# Patient Record
Sex: MALE | Race: WHITE | NOT HISPANIC OR LATINO | Employment: OTHER | URBAN - METROPOLITAN AREA
[De-identification: names, ages, dates, MRNs, and addresses within clinical notes are randomized per-mention and may not be internally consistent; named-entity substitution may affect disease eponyms.]

---

## 2017-01-10 ENCOUNTER — ALLSCRIPTS OFFICE VISIT (OUTPATIENT)
Dept: OTHER | Facility: OTHER | Age: 72
End: 2017-01-10

## 2017-01-10 LAB
BILIRUB UR QL STRIP: NORMAL
CLARITY UR: NORMAL
COLOR UR: YELLOW
GLUCOSE (HISTORICAL): NORMAL
HGB UR QL STRIP.AUTO: NORMAL
KETONES UR STRIP-MCNC: NORMAL MG/DL
LEUKOCYTE ESTERASE UR QL STRIP: NORMAL
NITRITE UR QL STRIP: NORMAL
PH UR STRIP.AUTO: 6 [PH]
PROT UR STRIP-MCNC: NORMAL MG/DL
SP GR UR STRIP.AUTO: 1.02
UROBILINOGEN UR QL STRIP.AUTO: NORMAL

## 2017-06-10 DIAGNOSIS — R97.20 ELEVATED PROSTATE SPECIFIC ANTIGEN (PSA): ICD-10-CM

## 2017-06-30 ENCOUNTER — APPOINTMENT (OUTPATIENT)
Dept: LAB | Facility: HOSPITAL | Age: 72
End: 2017-06-30
Attending: INTERNAL MEDICINE
Payer: COMMERCIAL

## 2017-06-30 DIAGNOSIS — I10 ESSENTIAL (PRIMARY) HYPERTENSION: ICD-10-CM

## 2017-06-30 DIAGNOSIS — R97.20 ELEVATED PROSTATE SPECIFIC ANTIGEN (PSA): ICD-10-CM

## 2017-06-30 DIAGNOSIS — G47.33 OBSTRUCTIVE SLEEP APNEA: ICD-10-CM

## 2017-06-30 DIAGNOSIS — E78.00 PURE HYPERCHOLESTEROLEMIA: ICD-10-CM

## 2017-06-30 LAB
ALBUMIN SERPL BCP-MCNC: 3.8 G/DL (ref 3.5–5)
ALP SERPL-CCNC: 70 U/L (ref 46–116)
ALT SERPL W P-5'-P-CCNC: 23 U/L (ref 12–78)
ANION GAP SERPL CALCULATED.3IONS-SCNC: 7 MMOL/L (ref 4–13)
AST SERPL W P-5'-P-CCNC: 16 U/L (ref 5–45)
BASOPHILS # BLD AUTO: 0.03 THOUSANDS/ΜL (ref 0–0.1)
BASOPHILS NFR BLD AUTO: 0 % (ref 0–1)
BILIRUB SERPL-MCNC: 1 MG/DL (ref 0.2–1)
BILIRUB UR QL STRIP: NEGATIVE
BUN SERPL-MCNC: 19 MG/DL (ref 5–25)
CALCIUM SERPL-MCNC: 10 MG/DL (ref 8.3–10.1)
CHLORIDE SERPL-SCNC: 106 MMOL/L (ref 100–108)
CHOLEST SERPL-MCNC: 138 MG/DL (ref 50–200)
CLARITY UR: CLEAR
CO2 SERPL-SCNC: 27 MMOL/L (ref 21–32)
COLOR UR: YELLOW
CREAT SERPL-MCNC: 1.14 MG/DL (ref 0.6–1.3)
EOSINOPHIL # BLD AUTO: 0.19 THOUSAND/ΜL (ref 0–0.61)
EOSINOPHIL NFR BLD AUTO: 3 % (ref 0–6)
ERYTHROCYTE [DISTWIDTH] IN BLOOD BY AUTOMATED COUNT: 13.9 % (ref 11.6–15.1)
GFR SERPL CREATININE-BSD FRML MDRD: >60 ML/MIN/1.73SQ M
GLUCOSE P FAST SERPL-MCNC: 91 MG/DL (ref 65–99)
GLUCOSE UR STRIP-MCNC: NEGATIVE MG/DL
HCT VFR BLD AUTO: 49.4 % (ref 36.5–49.3)
HDLC SERPL-MCNC: 41 MG/DL (ref 40–60)
HGB BLD-MCNC: 16.9 G/DL (ref 12–17)
HGB UR QL STRIP.AUTO: NEGATIVE
KETONES UR STRIP-MCNC: NEGATIVE MG/DL
LDLC SERPL CALC-MCNC: 72 MG/DL (ref 0–100)
LEUKOCYTE ESTERASE UR QL STRIP: NEGATIVE
LYMPHOCYTES # BLD AUTO: 1.7 THOUSANDS/ΜL (ref 0.6–4.47)
LYMPHOCYTES NFR BLD AUTO: 22 % (ref 14–44)
MCH RBC QN AUTO: 32.3 PG (ref 26.8–34.3)
MCHC RBC AUTO-ENTMCNC: 34.2 G/DL (ref 31.4–37.4)
MCV RBC AUTO: 94 FL (ref 82–98)
MONOCYTES # BLD AUTO: 0.7 THOUSAND/ΜL (ref 0.17–1.22)
MONOCYTES NFR BLD AUTO: 9 % (ref 4–12)
NEUTROPHILS # BLD AUTO: 5 THOUSANDS/ΜL (ref 1.85–7.62)
NEUTS SEG NFR BLD AUTO: 66 % (ref 43–75)
NITRITE UR QL STRIP: NEGATIVE
NRBC BLD AUTO-RTO: 0 /100 WBCS
PH UR STRIP.AUTO: 7 [PH] (ref 4.5–8)
PLATELET # BLD AUTO: 169 THOUSANDS/UL (ref 149–390)
PMV BLD AUTO: 12.1 FL (ref 8.9–12.7)
POTASSIUM SERPL-SCNC: 4.8 MMOL/L (ref 3.5–5.3)
PROT SERPL-MCNC: 7.2 G/DL (ref 6.4–8.2)
PROT UR STRIP-MCNC: NEGATIVE MG/DL
RBC # BLD AUTO: 5.24 MILLION/UL (ref 3.88–5.62)
SODIUM SERPL-SCNC: 140 MMOL/L (ref 136–145)
SP GR UR STRIP.AUTO: 1.02 (ref 1–1.03)
TRIGL SERPL-MCNC: 125 MG/DL
TSH SERPL DL<=0.05 MIU/L-ACNC: 1.87 UIU/ML (ref 0.36–3.74)
UROBILINOGEN UR QL STRIP.AUTO: 0.2 E.U./DL
WBC # BLD AUTO: 7.63 THOUSAND/UL (ref 4.31–10.16)

## 2017-06-30 PROCEDURE — 80053 COMPREHEN METABOLIC PANEL: CPT

## 2017-06-30 PROCEDURE — 85025 COMPLETE CBC W/AUTO DIFF WBC: CPT

## 2017-06-30 PROCEDURE — 36415 COLL VENOUS BLD VENIPUNCTURE: CPT

## 2017-06-30 PROCEDURE — 84153 ASSAY OF PSA TOTAL: CPT

## 2017-06-30 PROCEDURE — 84443 ASSAY THYROID STIM HORMONE: CPT

## 2017-06-30 PROCEDURE — 81003 URINALYSIS AUTO W/O SCOPE: CPT

## 2017-06-30 PROCEDURE — 84154 ASSAY OF PSA FREE: CPT

## 2017-06-30 PROCEDURE — 80061 LIPID PANEL: CPT

## 2017-07-01 LAB
PSA FREE MFR SERPL: 21.6 %
PSA FREE SERPL-MCNC: 0.67 NG/ML
PSA SERPL-MCNC: 3.1 NG/ML (ref 0–4)

## 2017-07-19 ENCOUNTER — ALLSCRIPTS OFFICE VISIT (OUTPATIENT)
Dept: OTHER | Facility: OTHER | Age: 72
End: 2017-07-19

## 2017-07-19 ENCOUNTER — GENERIC CONVERSION - ENCOUNTER (OUTPATIENT)
Dept: OTHER | Facility: OTHER | Age: 72
End: 2017-07-19

## 2017-07-31 ENCOUNTER — ALLSCRIPTS OFFICE VISIT (OUTPATIENT)
Dept: OTHER | Facility: OTHER | Age: 72
End: 2017-07-31

## 2017-10-23 ENCOUNTER — APPOINTMENT (OUTPATIENT)
Dept: LAB | Facility: HOSPITAL | Age: 72
End: 2017-10-23
Payer: COMMERCIAL

## 2017-10-23 DIAGNOSIS — R97.20 ELEVATED PROSTATE SPECIFIC ANTIGEN (PSA): ICD-10-CM

## 2017-10-23 PROCEDURE — 84153 ASSAY OF PSA TOTAL: CPT

## 2017-10-23 PROCEDURE — 36415 COLL VENOUS BLD VENIPUNCTURE: CPT

## 2017-10-23 PROCEDURE — 84154 ASSAY OF PSA FREE: CPT

## 2017-10-24 LAB
PSA FREE MFR SERPL: 22.5 %
PSA FREE SERPL-MCNC: 0.72 NG/ML
PSA SERPL-MCNC: 3.2 NG/ML (ref 0–4)

## 2017-10-31 ENCOUNTER — ALLSCRIPTS OFFICE VISIT (OUTPATIENT)
Dept: OTHER | Facility: OTHER | Age: 72
End: 2017-10-31

## 2017-10-31 DIAGNOSIS — R97.20 ELEVATED PROSTATE SPECIFIC ANTIGEN (PSA): ICD-10-CM

## 2017-11-02 NOTE — PROGRESS NOTES
Assessment  1  Elevated PSA (790 93) (R97 20)   2  Enlarged prostate without lower urinary tract symptoms (luts) (600 00) (N40 0)    Plan   Cystourethroscopy - POC; Status:Active - Perform Order; Requested [id number];   Perform: In Office; [id number]; Ordered;    For:Enlarged prostate without lower urinary tract symptoms (luts); Ordered By:Catrachito Pathak; Discussion/Summary  Discussion Summary:   Elevated PSA, BPH with nocturia  is a 75 y/o male being managed by Dr Nehemias Warren  He continues to find his nocturia and lower urinary tract symptoms bothersome despite the use of Flomax and Finasteride  We discussed further evaluation with cystoscopy  This procedure was reviewed with the patient and he is agreeable  His PSA remains stable at 3 2 (6 4 corrected with Finasteride), previously 3 1 (6 2 corrected with Finasteride)  His % free PSA is 22 5  His PSA was 4 4 prior to starting Finasteride  We discussed that based on his current PSA we will hold on proceeding with a prostate biopsy and recheck a PSA in 6 to 9 months to ensure stability  He is agreeable  All questions answered  Goals and Barriers: The patient has the current Goals: None  The patent has the current Barriers:   Patient's Capacity to Self-Care: Patient is able to Self-Care  Patient Education: Educational resources provided: None  Medication SE Review and Pt Understands Tx: Possible side effects of new medications were reviewed with the patient/guardian today  The treatment plan was reviewed with the patient/guardian  The patient/guardian understands and agrees with the treatment plan      Chief Complaint  Chief Complaint Free Text Note Form: Patient presents for elevated PSA, BPH  3 2 (10/23/2017)      History of Present Illness  HPI: 75 y/o male with BPH and an elevated PSA presents today for 3 month follow up  He continues to take Flomax and Finasteride daily  He continues to have nocturia every hour during the night   He denies any improvement with the use of this medication regimen  He otherwise has a fair stream and complete bladder emptying  He denies any changes in his overall health and has been doing well  His PSA was 4 4 prior to starting Finasteride  He has not had a prostate biopsy  Review of Systems  Complete-Male Urology:   Constitutional: No fever or chills, feels well, no tiredness, no recent weight gain or weight loss  Respiratory: No complaints of shortness of breath, no wheezing, no cough, no SOB on exertion, no orthopnea or PND  Cardiovascular: No complaints of slow heart rate, no fast heart rate, no chest pain, no palpitations, no leg claudication, no lower extremity  Gastrointestinal: No complaints of abdominal pain, no constipation, no nausea or vomiting, no diarrhea or bloody stools  Genitourinary: Empty sensation-- (Varies )-- and-- stream quality fair, but-- no dysuria,-- no urinary hesitancy,-- no hematuria,-- no incontinence-- and-- no feelings of urinary urgency--    The patient presents with complaints of nocturia (Every 1-2 hours )  Musculoskeletal: No complaints of arthralgia, no myalgias, no joint swelling or stiffness, no limb pain or swelling  Integumentary: No complaints of skin rash or skin lesions, no itching, no skin wound, no dry skin  Hematologic/Lymphatic: No complaints of swollen glands, no swollen glands in the neck, does not bleed easily, no easy bruising  Neurological: No compliants of headache, no confusion, no convulsions, no numbness or tingling, no dizziness or fainting, no limb weakness, no difficulty walking  ROS Reviewed:   ROS reviewed  Active Problems  1  Benign essential hypertension (401 1) (I10)   2  Chronic bilateral low back pain without sciatica (724 2,338 29) (M54 5,G89 29)   3  Copy of advanced directive obtained (V49 89) (Z78 9)   4  Elevated PSA (790 93) (R97 20)   5  Enlarged prostate without lower urinary tract symptoms (luts) (600 00) (N40 0)   6  Hypercholesterolemia (272 0) (E78 00)   7  Influenza vaccine needed (V04 81) (Z23)   8  OAB (overactive bladder) (596 51) (N32 81)   9  Obstructive sleep apnea (327 23) (G47 33)   10  Screening for genitourinary condition (V81 6) (Z13 89)    Past Medical History  1  History of Acute Gouty Arthropathy (274 01)   2  Denied: History of Carrier Of STD   3  History of Encounter for Medicare annual wellness exam (V70 0) (Z00 00)   4  History of Enlarged prostate (600 00) (N40 0)   5  History of Gout (274 9) (M10 9)   6  History of Hearing Loss (389 9)   7  History of backache (V13 59) (Z87 39)   8  History of cataract (V12 49) (Z86 69)   9  History of diverticulitis of colon (V12 79) (Z87 19)   10  History of dizziness (V13 89) (Z87 898)   11  History of hypercholesterolemia (V12 29) (Z86 39)   12  History of hypertension (V12 59) (Z86 79)   13  History of low back pain (V13 59) (Z87 39)   14  Denied: History of Mental Status Change   15  History of Neck pain (723 1) (M54 2)   16  History of Need for prophylactic vaccination and inoculation against chickenpox (V05 4)    (Z23)   17  History of Palpitations (785 1) (R00 2)   18  History of Screening for depression (V79 0) (Z13 89)   19  History of Screening for neurological condition (V80 09) (Z13 89)   20  History of Special screening for malignant neoplasms, colon (V76 51) (Z12 11)  Active Problems And Past Medical History Reviewed: The active problems and past medical history were reviewed and updated today  Surgical History  1  History of Surgery Testis Orchiectomy   2  History of Treatment Of Forearm Fracture  Surgical History Reviewed: The surgical history was reviewed and updated today  Family History  Mother    1  Family history of Coronary Artery Disease (V17 49)   2  Family history of Family Health Status 1  Children Living   3  Family history of Heart Disease (V17 49)   4  Family history of Hypertension (V17 49)   5   Family history of Mother  At Age 80   7  Family history of Stroke Syndrome (V17 1)  Father    7  Family history of Coronary Artery Disease (V17 49)  Family History    8  Family history of Acute Myocardial Infarction (V17 3)   9  Family history of Father  At Age 51   8  Family history of Stroke Syndrome (V17 1)  Family History Reviewed: The family history was reviewed and updated today  Social History   · Alcohol Use (History)   · Copy of advanced directive obtained (V49 89) (Z78 9)   · Denied: History of Drug Use   · Exercising Regularly   · Former smoker (W02 27) (Z83 630)   · Marital History - Currently    · Recreational Activities   · Retired From Work   · Travel history  Social History Reviewed: The social history was reviewed and updated today  The social history was reviewed and is unchanged  Current Meds   1  Allopurinol 300 MG Oral Tablet; TAKE 1 TABLET DAILY AS DIRECTED; Therapy: 12AXM7114 to (Evaluate:2018)  Requested for: 2017; Last   [id number] Ordered   2  AmLODIPine Besylate 5 MG Oral Tablet; TAKE 1 TABLET DAILY; Therapy: 95ZWY9364 to (Evaluate:92Qxv1607)  Requested for: 20MQV9187; Last   Rx:[id number] Ordered   3  Aspirin 81 MG TABS; Therapy: (Recorded:2015) to Recorded   4  Colestipol HCl - 1 GM Oral Tablet; TAKE 1 TABLET TWICE DAILY; Therapy: 17SAK6805 to (Evaluate:2018)  Requested for: 02NVR5302; Last   Rx:[id number] Ordered   5  Finasteride 5 MG Oral Tablet; TAKE 1 TABLET DAILY; Therapy: 43LCT1354 to (Evaluate:2018)  Requested for: 45REB9312; Last   Rx:[id number] Ordered   6  Lisinopril 20 MG Oral Tablet; 1 TAB QD;   Therapy: 12NFT8245 to (Evaluate:2017)  Requested for: 38MEF5392; Last   Rx:[id number] Ordered   7  Meclizine HCl - 25 MG Oral Tablet; One po BID prn; Therapy: 64SSQ8333 to (Evaluate:2017)  Requested for: 87AGN3280; Last   Rx:81Eft7264 Ordered   8  Simvastatin 40 MG Oral Tablet; take 1 tablet every other day;    Therapy: 32ZJL7502 to (Evaluate:22Apr2018)  Requested for: 24Oct2017; Last   AO:15NDH6163 Ordered   9  Tamsulosin HCl - 0 4 MG Oral Capsule; take 1 capsule daily; Therapy: 16JRE8214 to (Brinda Ledezma)  Requested for: 93ZHL1237; Last   Rx:[id number] Ordered  Medication List Reviewed: The medication list was reviewed and updated today  Allergies  1  No Known Drug Allergies  2  No Known Food Allergies   3  Seasonal    Vitals  Vital Signs    Recorded: 87EWR5082 09:08AM   Heart Rate 66   Systolic 802   Diastolic 80   Height 5 ft 10 in   Weight 281 lb 2 oz   BMI Calculated 40 34   BSA Calculated 2 41     Physical Exam    Constitutional   General appearance: No acute distress, well appearing and well nourished  Pulmonary   Respiratory effort: No increased work of breathing or signs of respiratory distress  Cardiovascular   Palpation of heart: Normal PMI, no thrills  Examination of extremities for edema and/or varicosities: Normal     Abdomen   Abdomen: Non-tender, no masses  Musculoskeletal   Gait and station: Normal     Skin   Skin and subcutaneous tissue: Normal without rashes or lesions  Results/Data  (1) PSA FREE & TOTAL 23Oct2017 12:51PM Bunny Mccormick Order Number: [id number]     Test Name Result Flag Reference   PSA, FREE 0 72 ng/mL  N/A   Roche ECLIA methodology  % FREE PSA 22 5 %     The table below lists the probability of prostate cancer for  men with non-suspicious JUAN LUIS results and total PSA between  4 and 10 ng/mL, by patient age Ferman Epley, [address] [place] [address],  [phone])                      % Free PSA       50-64 yr        65-75 yr                    0 00-10 00%        56%             55%                   10 01-15 00%        24%             35%                   15 01-20 00%        17%             23%                   20 01-25 00%        10%             20%                        >25 00%         5%              9%  Please note:  Michael et al did not make specific recommendations regarding the use of                percent free PSA for any other population                of men  Performed at:  [address] [place] [address]  [id number]  : Nate Willoughby MD, Phone:  [phone]   PROSTATE SPECIFIC ANTIGEN TOTAL 3 2 ng/mL  0 0 - 4 0   Roche ECLIA methodology  According to the American U[employer] Association, Serum PSA should  decrease and remain at undetectable levels after radical  prostatectomy  The AUA defines biochemical recurrence as an initial  PSA value 0 2 ng/mL or greater followed by a subsequent confirmatory  PSA value 0 2 ng/mL or greater  Values obtained with different assay methods or kits cannot be used  interchangeably  Results cannot be interpreted as absolute evidence  of the presence or absence of malignant disease       Future Appointments    Date/Time Provider Specialty Site   12/27/2017 09:00 AM Sherrill Li MD Urology [address]   01/22/2018 09:30 AM Dulce Solis MD Internal Medicine [facility]     Signatures   Electronically signed by : [address]; Oct 31 2017  9:45AM EST                       (Author)    Electronically signed by : Demetria Marrero MD; Nov 1 2017  7:21AM EST

## 2017-12-27 ENCOUNTER — ALLSCRIPTS OFFICE VISIT (OUTPATIENT)
Dept: OTHER | Facility: OTHER | Age: 72
End: 2017-12-27

## 2017-12-27 LAB
CLARITY UR: NORMAL
COLOR UR: YELLOW
GLUCOSE (HISTORICAL): NORMAL
HGB UR QL STRIP.AUTO: NORMAL
KETONES UR STRIP-MCNC: NORMAL MG/DL
LEUKOCYTE ESTERASE UR QL STRIP: NORMAL
NITRITE UR QL STRIP: NORMAL
PH UR STRIP.AUTO: 5 [PH]
PROT UR STRIP-MCNC: NORMAL MG/DL
SP GR UR STRIP.AUTO: 1.01

## 2017-12-28 NOTE — PROCEDURES
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)    Plan   Enlarged prostate without lower urinary tract symptoms (luts), Hypervascular lesion of    urinary bladder    · Schedule Surgery Treatment  Procedure -Uro lift with bladder biopsy  Please schedule    at the [place] [address]  Status: Hold For - Scheduling  Requested for: 86Mfz1036  Ordered; For: Enlarged prostate without lower urinary tract symptoms (luts),     Hypervascular lesion of urinary bladder;  Ordered By: Josh Martinez      Performed:   Due: 83XMK0651  OAB (overactive bladder)    · Urine Dip Non-Automated- POC; Status:Complete - Retrospective By Protocol    Authorization;   Done: 57CUO9484 08:38AM  Performed: In Office; [id number]; Last Updated By:Colbert, Tawni Mohs; 12/27/2017     8:38:41 AM;Ordered; For:OAB (overactive bladder); Ordered By:Bernardo Billingsley;           Cystourethroscopy - POC; Status:Active - Perform Order; Requested OOK:[id number];      Perform: In Office; [id number]; Ordered;       For:Enlarged prostate without lower urinary tract symptoms (luts); Ordered By:Saba Pathak; Discussion/Summary   Discussion Summary: This is a 26-year-old male with medically refractory lower urinary tract symptoms  today revealed lateral lobe hyperplasia, overall gland volume of less than 80 g  he has no median lobe  His PSA is less than 4  His uroflow Q max is less than 15  He has normal renal function  He has been on maximum medical therapy for more than 3 months remains highly symptomatic, specifically with nocturia up to x5  I do not believe that the additional morbidity of a TURP would justify the standard procedure     1   addition, a small hypervascular lesion noted on the prostatic urethra at the level of the median lobe was visualized on his cystoscopy    have recommended proceeding to the operating room for a Uro lift as well as a bladder biopsy (biopsy of a growth present on the prostatic urethra, at the level of the very small median lobe)  reviewed the options for treating BPH/LUTS which include but are not limited to expectant management, medical therapy, minimally invasive options such as thermotherapy or interstitial laser therapy, transurethral resection of prostate (TURP), open prostatectomy, etc, and the Uro lift procedure  At this point, the patient wishes to proceed with Uro lift The risks include but are not limited to bleeding, infection, reaction to anesthesia such as heart attack, stroke, DVT/PE, hyponatremia, bladder neck contracture, urethral stricture, injury to surrounding structures (ureters, rectum, etc) incontinence, retrograde ejaculation, erectile dysfunction, prostatic regrowth or the need for further therapy  I also told him that the tissue resected and sent for pathological analysis could reveal prostate cancer and the implications were discussed  His questions regarding surgery, the possible complications and alternatives to therapy were answered to his satisfaction  Finally, I told him that he may require additional procedures secondary to some of these complications  consent was obtained for a Uro lift plus bladder biopsy  [facility] be [facility] in the near future, as an outpatient operation with a catheter to be left in place overnight    Medication SE Review and Pt Understands Tx: Possible side effects of new medications were reviewed with the patient/guardian today  The treatment plan was reviewed with the patient/guardian  The patient/guardian understands and agrees with the treatment plan    Counseling Documentation With Imm: The patient, patient's family was counseled regarding impressions,-- risks and benefits of treatment options  Goals and Barriers:  The patient has the current Goals: Improved nocturia  The patent has the current Barriers: Underlying BPH  Patient's Capacity to Self-Care: Patient is able to Self-Care  Patient Education: Educational resources provided: Uro lift surgical brochure and informed consent packet  1 Amended By: Juan Roper; Dec 27 2017 4:40 PM EST      Chief Complaint   Chief Complaint Free Text Note Form: CYSTO; BPH With Nocturia, Elevated PSA, OAB      History of Present Illness   HPI: Angela Comes returns today for medically refractory lower urinary tract symptoms  At this point he is on maximum medical therapy with tamsulosin and finasteride and has been on this regimen for greater than 3 months  He remains highly symptomatic with nocturia up to every hour at night, double voiding, as well as a weak stream    presents today for cystoscopy having previously been counseled on the procedure in detail  Review of Systems   Complete-Male Urology:      Constitutional: No fever or chills, feels well, no tiredness, no recent weight gain or weight loss  Respiratory: No complaints of shortness of breath, no wheezing, no cough, no SOB on exertion, no orthopnea or PND  Cardiovascular: No complaints of slow heart rate, no fast heart rate, no chest pain, no palpitations, no leg claudication, no lower extremity  Gastrointestinal: No complaints of abdominal pain, no constipation, no nausea or vomiting, no diarrhea or bloody stools  Genitourinary: Empty sensation-- and-- stream quality fair, but-- no dysuria,-- no urinary hesitancy,-- no hematuria,-- no incontinence-- and-- no feelings of urinary urgency--       The patient presents with complaints of nocturia (Once hourly)  Musculoskeletal: No complaints of arthralgia, no myalgias, no joint swelling or stiffness, no limb pain or swelling  Integumentary: No complaints of skin rash or skin lesions, no itching, no skin wound, no dry skin        Hematologic/Lymphatic: No complaints of swollen glands, no swollen glands in the neck, does not bleed easily, no easy bruising  Neurological: No compliants of headache, no confusion, no convulsions, no numbness or tingling, no dizziness or fainting, no limb weakness, no difficulty walking  ROS Reviewed:    ROS reviewed  Active Problems   1  Benign essential hypertension (401 1) (I10)  2  Chronic bilateral low back pain without sciatica (724 2,338 29) (M54 5,G89 29)  3  Copy of advanced directive obtained (V49 89) (Z78 9)  4  Elevated PSA (790 93) (R97 20)  5  Enlarged prostate without lower urinary tract symptoms (luts) (600 00) (N40 0)  6  Hypercholesterolemia (272 0) (E78 00)  7  Influenza vaccine needed (V04 81) (Z23)  8  OAB (overactive bladder) (596 51) (N32 81)  9  Obstructive sleep apnea (327 23) (G47 33)  10  Screening for genitourinary condition (V81 6) (Z13 89)    Past Medical History   1  History of Acute Gouty Arthropathy (274 01)  2  Denied: History of Carrier Of STD  3  History of Encounter for Medicare annual wellness exam (V70 0) (Z00 00)  4  History of Enlarged prostate (600 00) (N40 0)  5  History of Gout (274 9) (M10 9)  6  History of Hearing Loss (389 9)  7  History of backache (V13 59) (Z87 39)  8  History of cataract (V12 49) (Z86 69)  9  History of diverticulitis of colon (V12 79) (Z87 19)  10  History of dizziness (V13 89) (Z87 898)  11  History of hypercholesterolemia (V12 29) (Z86 39)  12  History of hypertension (V12 59) (Z86 79)  13  History of low back pain (V13 59) (Z87 39)  14  Denied: History of Mental Status Change  15  History of Neck pain (723 1) (M54 2)  16  History of Need for prophylactic vaccination and inoculation against chickenpox (V05 4)      (Z23)  17  History of Palpitations (785 1) (R00 2)  18  History of Screening for depression (V79 0) (Z13 89)  19  History of Screening for neurological condition (V80 09) (Z13 89)  20   History of Special screening for malignant neoplasms, colon (V76 51) (Z12 11)  Active Problems And Past Medical History Reviewed: The active problems and past medical history were reviewed and updated today  Surgical History   1  History of Surgery Testis Orchiectomy  2  History of Treatment Of Forearm Fracture  Surgical History Reviewed: The surgical history was reviewed and updated today  Family History   Mother   1  Family history of Coronary Artery Disease (V17 49)  2  Family history of Family Health Status 1  Children Living  3  Family history of Heart Disease (V17 49)  4  Family history of Hypertension (V17 49)  5  Family history of Mother  At Age 80  7  Family history of Stroke Syndrome (V17 1)  Father   7  Family history of Coronary Artery Disease (V17 49)  Family History   8  Family history of Acute Myocardial Infarction (V17 3)  9  Family history of Father  At Age 54  7  Family history of Stroke Syndrome (V17 1)  Family History Reviewed: The family history was reviewed and updated today  Social History    · Alcohol Use (History)   · Copy of advanced directive obtained (V49 89) (Z78 9)   · Denied: History of Drug Use   · Exercising Regularly   · Former smoker (F09 04) (Y85 885)   · Marital History - Currently    · Recreational Activities   · Retired From Work   · Travel history  Social History Reviewed: The social history was reviewed and updated today  The social history was reviewed and is unchanged  Current Meds   1  Allopurinol 300 MG Oral Tablet; TAKE 1 TABLET DAILY AS DIRECTED; Therapy: 40UIA8802 to (Evaluate:2018)  Requested for: 2017; Last     ME:58EUQ1849 Ordered  2  AmLODIPine Besylate 5 MG Oral Tablet; TAKE 1 TABLET DAILY; Therapy: 03RZE1292 to (Evaluate:2018)  Requested for: 90NST3286; Last     Rx:13Hyb0297 Ordered  3  Aspirin 81 MG TABS; Therapy: (Recorded:68Sgc1177) to Recorded  4  Colestipol HCl - 1 GM Oral Tablet; TAKE 1 TABLET TWICE DAILY;      Therapy: 85KSX7505 to ([id number])  Requested for: 90PON6671; Last     Rx:71Gxi8542 Ordered  5  Finasteride 5 MG Oral Tablet; TAKE 1 TABLET DAILY; Therapy: 71GTO5354 to (Evaluate:2018)  Requested for: 20LRC6570; Last     Rx:46Exg3292 Ordered  6  Lisinopril 20 MG Oral Tablet; 1 TAB QD;     Therapy: 84GQR9162 to (Evaluate:62Tgb0409)  Requested for: 72THZ6823; Last     Rx:72Xpf5632 Ordered  7  Meclizine HCl - 25 MG Oral Tablet; One po BID prn; Therapy: 01LGM4805 to (Evaluate:2017)  Requested for: 30TND9578; Last     Rx:59Mse0830 Ordered  8  Simvastatin 40 MG Oral Tablet; take 1 tablet every other day; Therapy: 43JJL4885 to (Evaluate:2018)  Requested for: 08Wxn9565; Last     TZ:32JPK2617 Ordered  9  Tamsulosin HCl - 0 4 MG Oral Capsule; take 1 capsule daily; Therapy: 96HXT9092 to (Glorine Chacon)  Requested for: 79XPJ8156; Last     Rx:45Ukk3043 Ordered  Medication List Reviewed: The medication list was reviewed and updated today  Allergies   1  No Known Drug Allergies  2  No Known Food Allergies  3  Seasonal    Vitals   Vital Signs    Recorded: 74TPQ4472 08:39AM   Heart Rate 70   Systolic 851   Diastolic 80   Height 5 ft 10 in   Weight 284 lb    BMI Calculated 40 75   BSA Calculated 2 42     Physical Exam        Constitutional      General appearance: No acute distress, well appearing and well nourished  Pulmonary      Respiratory effort: No increased work of breathing or signs of respiratory distress  Cardiovascular      Palpation of heart: Normal PMI, no thrills  Examination of extremities for edema and/or varicosities: Normal        Abdomen      Abdomen: Non-tender, no masses  Musculoskeletal      Gait and station: Normal        Skin      Skin and subcutaneous tissue: Normal without rashes or lesions         Results/Data   AUA Symptom Score 74Pqp3331 08:39AM User, Ahs      Test Name Result Flag Reference   AUA Symptom Score (for prostate disease) 10     Incomplete emptying: Not at all (0)     Frequency: About half the time (3)     Intermittency: Not at all (0)     Urgency: Not at all (0)     Weak-stream: Less than half the time (2)     Straining: Not at all (0)     Nocturia: Almost always (5)   AUA Symptom Score (for prostate disease) - Quality of Life Due to Urinary Symptoms Mostly satisfied     AUA Symptom Score (for prostate disease) - Score Category Moderate        Urine Dip Non-Automated- POC 42EDC6219 08:38AM Sherrill Li      Test Name Result Flag Reference   Color Yellow     Clarity Transparent     Leukocytes -     Nitrite -     Blood -     Protein -     Ph 5 0     Specific Gravity 1 010     Ketone -     Glucose -        (1) PSA FREE & TOTAL 23Oct2017 12:51PM Jakub Palacios Order Number: [id number]      Test Name Result Flag Reference   PSA, FREE 0 72 ng/mL  N/A   Roche ECLIA methodology  % FREE PSA 22 5 %     The table below lists the probability of prostate cancer for     men with non-suspicious JUAN LUIS results and total PSA between     4 and 10 ng/mL, by patient age [facility], [address] [place] [address],     [id number])  % Free PSA       50-64 yr        65-75 yr                       0 00-10 00%        56%             55%                      10 01-15 00%        24%             35%                      15 01-20 00%        17%             23%                      20 01-25 00%        10%             20%                           >25 00%         5%              9%     Please note:  Michael et al did not make specific                   recommendations regarding the use of                   percent free PSA for any other population                   of men  Performed at:  [address] [place] [address]  [id number]     : Nate Willoughby MD, Phone:  [phone]   PROSTATE SPECIFIC ANTIGEN TOTAL 3 2 ng/mL  0 0 - 4 0   Roche ECLIA methodology       According to the American Urological Association, Serum PSA should     decrease and remain at undetectable levels after radical     prostatectomy  The AUA defines biochemical recurrence as an initial     PSA value 0 2 ng/mL or greater followed by a subsequent confirmatory     PSA value 0 2 ng/mL or greater  Values obtained with different assay methods or kits cannot be used     interchangeably  Results cannot be interpreted as absolute evidence     of the presence or absence of malignant disease  (1) COMPREHENSIVE METABOLIC PANEL 70SRY6942 33:27ZT Bill Morning   TW Order Number: [id number]      Test Name Result Flag Reference   SODIUM 140 mmol/L  136-145   POTASSIUM 4 8 mmol/L  3 5-5 3   CHLORIDE 106 mmol/L  100-108   CARBON DIOXIDE 27 mmol/L  21-32   ANION GAP (CALC) 7 mmol/L  4-13   BLOOD UREA NITROGEN 19 mg/dL  5-25   CREATININE 1 14 mg/dL  0 60-1 30   Standardized to IDMS reference method   CALCIUM 10 0 mg/dL  8 3-10 1   BILI, TOTAL 1 00 mg/dL  0 20-1 00   ALK PHOSPHATAS 70 U/L     ALT (SGPT) 23 U/L  12-78   AST(SGOT) 16 U/L  5-45   ALBUMIN 3 8 g/dL  3 5-5 0   TOTAL PROTEIN 7 2 g/dL  6 4-8 2   eGFR Non-African American      >60 0 ml/min/1 73sq m   [facility] Disease Education Program recommendations are as follows:     GFR calculation is accurate only with a steady state creatinine     Chronic Kidney disease less than 60 ml/min/1 73 sq  meters     Kidney failure less than 15 ml/min/1 73 sq  meters  GLUCOSE FASTING 91 mg/dL  65-99      Procedure      Procedure:      Test indication:1  Benign Prostatic Hyperplasia1   Equipment And Procedure: The patient  voided 83 ml1  1  with a maximum flow rate of 6 2 ml/second1   U/S Findings:1  PVR=31mL1   Procedure: diagnostic cystourethroscopy  Indications for the procedure include frequency  Risks, benefits, alternatives, risk of bleeding, infection risks and possible injury to the urethra, bladder and/or ureters were discussed with the patient   Written consent was obtained prior to the procedure and is detailed in the patient's record  Anesthesia: the urethra was lubricated with 2% lidocaine gel  Procedure Note:       The patient was placed in the supine position  The patient was prepped and draped in the usual sterile fashion using betadine  The periurethral area was exposed and a lubricated 16 Upper sorbian flexible cystoscope was introduced into the urethral meatus  The urethra was normal and A mild bulbar urethral stricture is noted  The cystoscope was advanced to the urethrovesical junction and the bladder was distended with saline  The prostate was visualized at the proximal urethra and bladder neck and the prostate appeared abnormal and Moderate lateral lobe hyperplasia with a long prostatic fossa  There is 80% kissing of the lateral lobes  There is no median lobe  The overall gland volume is less than 80 g  Of note on the tiny nonobstructive median lobe, there is a abnormal papillary growth that may be indicative of a very early urothelial neoplasm versus a benign inflammatory  All regions of the bladder were systematically inspected and the bladder appeared abnormal and Grade 1 trabeculations, no papillary tumors      Post-procedure: the bladder was drained and the cystoscope was removed       1 Amended By: Dennise Gardner; Dec 27 2017 9:58 AM EST      Future Appointments      Date/Time Provider Specialty Site   01/22/2018 09:30 AM Linda Snell MD Internal Medicine [facility]    Electronically signed by : Erika Carey MD; Dec 27 2017  9:26AM EST                       (Author)     Electronically signed by : Erika Carey MD; Dec 27 2017  9:59AM EST                       (Author)     Electronically signed by : Erika Carey MD; Dec 27 2017 10:01AM EST                       (Author)     Electronically signed by : Erika Carey MD; Dec 27 2017  4:40PM EST (Author)

## 2018-01-12 VITALS
DIASTOLIC BLOOD PRESSURE: 80 MMHG | WEIGHT: 277.8 LBS | SYSTOLIC BLOOD PRESSURE: 140 MMHG | RESPIRATION RATE: 16 BRPM | OXYGEN SATURATION: 96 % | TEMPERATURE: 98.8 F | BODY MASS INDEX: 41.15 KG/M2 | HEART RATE: 58 BPM | HEIGHT: 69 IN

## 2018-01-12 VITALS
DIASTOLIC BLOOD PRESSURE: 88 MMHG | SYSTOLIC BLOOD PRESSURE: 134 MMHG | HEART RATE: 60 BPM | HEIGHT: 69 IN | WEIGHT: 279 LBS | BODY MASS INDEX: 41.32 KG/M2

## 2018-01-12 VITALS
DIASTOLIC BLOOD PRESSURE: 80 MMHG | BODY MASS INDEX: 40.25 KG/M2 | HEART RATE: 66 BPM | SYSTOLIC BLOOD PRESSURE: 140 MMHG | WEIGHT: 281.13 LBS | HEIGHT: 70 IN

## 2018-01-14 VITALS
HEART RATE: 60 BPM | DIASTOLIC BLOOD PRESSURE: 82 MMHG | WEIGHT: 276.38 LBS | SYSTOLIC BLOOD PRESSURE: 148 MMHG | BODY MASS INDEX: 39.57 KG/M2 | HEIGHT: 70 IN

## 2018-01-15 ENCOUNTER — GENERIC CONVERSION - ENCOUNTER (OUTPATIENT)
Dept: OTHER | Facility: OTHER | Age: 73
End: 2018-01-15

## 2018-01-16 ENCOUNTER — APPOINTMENT (OUTPATIENT)
Dept: LAB | Facility: CLINIC | Age: 73
End: 2018-01-16
Payer: COMMERCIAL

## 2018-01-16 ENCOUNTER — GENERIC CONVERSION - ENCOUNTER (OUTPATIENT)
Dept: UROLOGY | Facility: CLINIC | Age: 73
End: 2018-01-16

## 2018-01-16 ENCOUNTER — TRANSCRIBE ORDERS (OUTPATIENT)
Dept: LAB | Facility: CLINIC | Age: 73
End: 2018-01-16

## 2018-01-16 ENCOUNTER — GENERIC CONVERSION - ENCOUNTER (OUTPATIENT)
Dept: INTERNAL MEDICINE CLINIC | Facility: CLINIC | Age: 73
End: 2018-01-16

## 2018-01-16 DIAGNOSIS — N40.0 BENIGN PROSTATIC HYPERPLASIA, UNSPECIFIED WHETHER LOWER URINARY TRACT SYMPTOMS PRESENT: ICD-10-CM

## 2018-01-16 DIAGNOSIS — N40.0 BENIGN PROSTATIC HYPERPLASIA, UNSPECIFIED WHETHER LOWER URINARY TRACT SYMPTOMS PRESENT: Primary | ICD-10-CM

## 2018-01-16 DIAGNOSIS — Z01.818 PREOP EXAMINATION: ICD-10-CM

## 2018-01-16 DIAGNOSIS — N32.89 NONTRAUMATIC RUPTURE OF BLADDER: ICD-10-CM

## 2018-01-16 LAB
ANION GAP SERPL CALCULATED.3IONS-SCNC: 7 MMOL/L (ref 4–13)
APTT PPP: 35 SECONDS (ref 23–35)
ATRIAL RATE: 65 BPM
BASOPHILS # BLD AUTO: 0.03 THOUSANDS/ΜL (ref 0–0.1)
BASOPHILS NFR BLD AUTO: 0 % (ref 0–1)
BUN SERPL-MCNC: 17 MG/DL (ref 5–25)
CALCIUM SERPL-MCNC: 9.9 MG/DL (ref 8.3–10.1)
CHLORIDE SERPL-SCNC: 105 MMOL/L (ref 100–108)
CO2 SERPL-SCNC: 28 MMOL/L (ref 21–32)
CREAT SERPL-MCNC: 1.34 MG/DL (ref 0.6–1.3)
EOSINOPHIL # BLD AUTO: 0.2 THOUSAND/ΜL (ref 0–0.61)
EOSINOPHIL NFR BLD AUTO: 3 % (ref 0–6)
ERYTHROCYTE [DISTWIDTH] IN BLOOD BY AUTOMATED COUNT: 14.3 % (ref 11.6–15.1)
GFR SERPL CREATININE-BSD FRML MDRD: 52 ML/MIN/1.73SQ M
GLUCOSE SERPL-MCNC: 114 MG/DL (ref 65–140)
HCT VFR BLD AUTO: 52.6 % (ref 36.5–49.3)
HGB BLD-MCNC: 17.2 G/DL (ref 12–17)
INR PPP: 0.91 (ref 0.86–1.16)
LYMPHOCYTES # BLD AUTO: 1.64 THOUSANDS/ΜL (ref 0.6–4.47)
LYMPHOCYTES NFR BLD AUTO: 20 % (ref 14–44)
MCH RBC QN AUTO: 30.1 PG (ref 26.8–34.3)
MCHC RBC AUTO-ENTMCNC: 32.7 G/DL (ref 31.4–37.4)
MCV RBC AUTO: 92 FL (ref 82–98)
MONOCYTES # BLD AUTO: 0.83 THOUSAND/ΜL (ref 0.17–1.22)
MONOCYTES NFR BLD AUTO: 10 % (ref 4–12)
NEUTROPHILS # BLD AUTO: 5.36 THOUSANDS/ΜL (ref 1.85–7.62)
NEUTS SEG NFR BLD AUTO: 67 % (ref 43–75)
P AXIS: 10 DEGREES
PLATELET # BLD AUTO: 193 THOUSANDS/UL (ref 149–390)
PMV BLD AUTO: 11.1 FL (ref 8.9–12.7)
POTASSIUM SERPL-SCNC: 4.2 MMOL/L (ref 3.5–5.3)
PR INTERVAL: 214 MS
PROTHROMBIN TIME: 12.5 SECONDS (ref 12.1–14.4)
QRS AXIS: -6 DEGREES
QRSD INTERVAL: 90 MS
QT INTERVAL: 366 MS
QTC INTERVAL: 380 MS
RBC # BLD AUTO: 5.71 MILLION/UL (ref 3.88–5.62)
SODIUM SERPL-SCNC: 140 MMOL/L (ref 136–145)
T WAVE AXIS: 25 DEGREES
VENTRICULAR RATE: 65 BPM
WBC # BLD AUTO: 8.06 THOUSAND/UL (ref 4.31–10.16)

## 2018-01-16 PROCEDURE — 80048 BASIC METABOLIC PNL TOTAL CA: CPT

## 2018-01-16 PROCEDURE — 93005 ELECTROCARDIOGRAM TRACING: CPT

## 2018-01-16 PROCEDURE — 85025 COMPLETE CBC W/AUTO DIFF WBC: CPT

## 2018-01-16 PROCEDURE — 85610 PROTHROMBIN TIME: CPT

## 2018-01-16 PROCEDURE — 36415 COLL VENOUS BLD VENIPUNCTURE: CPT

## 2018-01-16 PROCEDURE — 85730 THROMBOPLASTIN TIME PARTIAL: CPT

## 2018-01-18 NOTE — PROGRESS NOTES
Assessment    1  Encounter for preventive health examination (V70 0) (Z00 00)   2  Need for pneumococcal vaccination (V03 82) (Z23)   3  Encounter for Medicare annual wellness exam (V70 0) (Z00 00)   4  Screening for neurological condition (V80 09) (Z13 89)    Plan  Encounter for Medicare annual wellness exam    · Medicare Annual Wellness Visit; Status:Complete;   Done: 04SYV4093 12:00PM  Health Maintenance    · Always use a seat belt and shoulder strap when riding or driving a motor vehicle ;  Status:Complete;   Done: 22CQL1241   · Brush your teeth freq1 and floss at least once a day ; Status:Complete;   Done:  01TKF6888   · Decreasing the stress in your life may help your condition improve ; Status:Complete;    Done: 24QHN9414   · Drink plenty of fluids ; Status:Complete;   Done: 16NRB6543   · It is important that you drink enough fluids to stay healthy ; Status:Complete;   Done:  51BZJ6212   · Keep a diary of when and what you eat ; Status:Complete;   Done: 03MDS2246   · Regular aerobic exercise can help reduce stress ; Status:Complete;   Done: 77EZS7750   · Stretch and warm up your muscles during the first 10 minutes , then cool down your  muscles for the last 10 minutes of exercise ; Status:Complete;   Done: 28BMP7260   · Take steps to avoid hypothermia ; Status:Complete;   Done: 87HJQ9762   · There are many ways to reduce your risk of catching or spreading a sexually transmitted  Infection ; Status:Complete;   Done: 51GQY5616   · There are ways to decrease your stress and improve your sense of well-being  We  encourage you to keep active and exercise regularly  Make time to take care of yourself  and participate in activities that you enjoy  Stay connected to friends and family that can  support and comfort you  If at any time you have thoughts of harming yourself or  someone else, contact us immediately ; Status:Active;  Requested for:22Mar2016;    · These are things you can do to prevent falls in and around the home ; Status:Complete;    Done: 49UDG4162   · Use a sun block product with an SPF of 15 or more ; Status:Complete;   Done:  92EOT1685   · We encourage you to begin to make lifestyle changes to help control your blood  pressure  These may include losing weight, increasing your activity level, limiting salt in  your diet, decreasing alcohol intake, and eating a diet low in fat and rich in fruits  and vegetables ; Status:Complete;   Done: 02LWO5690   · We recommend routine visits to a dentist ; Status:Complete;   Done: 24EAM2698   · We recommend that you bring your body mass index down to 26 ; Status:Complete;    Done: 13SXI8983   · We recommend that you create an advance directive ; Status:Complete;   Done:  26EOC2107   · We recommend that you follow these steps to lower your risk of osteoporosis  ;  Status:Complete;   Done: 54OFC0405   · We recommend you modify your diet to achieve and maintain a healthy weight  Being  overweight may increase your risk for developing health problems such as diabetes,  heart disease, and cancer  Avoid high fat foods and eat a balanced diet rich  in fruits and vegetables  The combination of a reduced-calorie diet and increased  physical activity is recommended  Please let us know if you would like to  learn more about your nutrition and calorie needs, and additional options including  weight loss programs that can help you achieve your goals ; Status:Complete;   Done:  25HAL3900   · We want you to follow the Therapeutic Lifestyle Changes (TLC) diet ; Status:Complete;    Done: 87MNO7986   · Call [phone] if: You have any warning signs of skin cancer ; Status:Complete;    Done: 09ORJ5397   · Seek Immediate Medical Attention if: You experience a new kind of chest pain (angina)  or pressure ; Status:Complete;   Done: 35TKI4355   · Medicare Annual Wellness Visit ; every 1 year;  Last 72VJO0722; Next 59KTC1832;  Status:Active  Need for pneumococcal vaccination    · Prevnar 13 Intramuscular Suspension  Screening for depression    · *VB-Depression Screening; Status:Complete;   Done: 51QLY8757 12:01PM   · *VB-Depression Screening ; every 1 year; Last 47EOZ1618; Next 93PRI0653;  Status:Active  Screening for genitourinary condition    · *VB-Urinary Incontinence Screen (Dx V81 6 Screen for UI); Status:Complete;   Done:  22OCR3618 12:03PM   · *VB-Urinary Incontinence Screen (Dx V81 6 Screen for UI) ; every 1 year; Last  04JNN2590; Next 03ZWN0163; Status:Active  Screening for neurological condition    · *VB - Fall Risk Assessment  (Dx V80 09 Screen for Neurologic Disorder);  Status:Complete;   Done: 37BLA2258 12:02PM   · *VB - Fall Risk Assessment  (Dx V80 09 Screen for Neurologic Disorder) ; every 1 year; Last 06QYU1591; Next 19WAI8737; Status:Active    Discussion/Summary    Prevnar vaccine given today  Keep appt for colonoscopy  Labs prior to your next appt  Eye exam up to date  Impression: Subsequent Annual Wellness Visit  Cardiovascular screening and counseling: the risks and benefits of screening were discussed and screening is current  Diabetes screening and counseling: the risks and benefits of screening were discussed and screening is current  Colorectal cancer screening and counseling: the risks and benefits of screening were discussed, due for a colonoscopy (low risk) and colonoscopy due 3/24/16  Prostate cancer screening and counseling: the risks and benefits of screening were discussed, screening is current and PSA due every 1 year(s)  Glaucoma screening and counseling: the risks and benefits of screening were discussed and screening is current   Immunizations: the risks and benefits of influenza vaccination were discussed with the patient, the patient declines the influenza vaccination, the risks and benefits of pneumococcal vaccination were discussed with the patient, pneumococcal vaccine due today, Prevnar due today, the risks and benefits of the Zostavax vaccine were discussed with the patient and Zostavax vaccination up to date  Patient Discussion: follow-up visit needed in one year  Chief Complaint  Pt is here for his Medicare Annual Wellness Exam       History of Present Illness  HPI: Pt is here for annual Medicare wellness exam  Denies any complaints or concerns  Pt has appt 3/24 for colonoscopy  Last eye exam done 9/2015  Has lab work to be done prior to his next follow up appt  Welcome to [facility] and Wellness Visits: The patient is being seen for the subsequent annual wellness visit  Medicare Screening and Risk Factors   Hospitalizations: no previous hospitalizations  Medicare Screening Tests Risk Questions   Abdominal aortic aneurysm risk assessment: over 72years of age  Osteoporosis risk assessment: over 48years of age  HIV risk assessment: none indicated  Drug and Alcohol Use: The patient is a former smokeless tobacco user  The patient reports rare alcohol use  He has never used illicit drugs  Diet and Physical Activity: Current diet includes well balanced meals, frequent junk food, 2 servings of fruit per day, 2 servings of vegetables per day, 2 servings of meat per day, 0 servings of whole grains per day, 1 servings of simple carbohydrates per day, 3 servings of dairy products per day, 1 cups of coffee per day, 0 cups of tea per day, 0 cans of regular soda per day and 0 cans of diet soda per day  He exercises infrequently  Exercise: walking 30 minutes per week  Mood Disorder and Cognitive Impairment Screening: PHQ-9 Depression Scale   Over the past 2 weeks, how often have you been bothered by the following problems? 1 ) Little interest or pleasure in doing things? Not at all    2 ) Feeling down, depressed or hopeless? Not at all    3 ) Trouble falling asleep or sleeping too much? Not at all    4 ) Feeling tired or having little energy? Not at all    5 ) Poor appetite or overeating?  Not at all    6 ) Feeling bad about yourself, or that you are a failure, or have let yourself or your family down? Not at all    7 ) Trouble concentrating on things, such as reading a newspaper or watching television? Not at all    8 ) Moving or speaking so slowly that other people could have noticed, or the opposite, moving or speaking faster than usual? Not at all    9 ) Thoughts that you would be off dead or of hurting yourself in some way? Not at all  TOTAL SCORE: 0    Cognitive impairment screening: denies difficulty learning/retaining new information, denies difficulty handling complex tasks, denies difficulty with reasoning, denies difficulty with spatial ability and orientation, denies difficulty with language and denies difficulty with behavior  Functional Ability/Level of Safety: Hearing is slightly decreased, slightly decreased in the right ear, slightly decreased in the left ear and a hearing aid is not used  The patient is currently able to do activities of daily living without limitations, able to do instrumental activities of daily living without limitations, able to participate in social activities without limitations and able to drive without limitations  Activities of daily living details: does not need help using the phone, no transportation help needed, does not need help shopping, no meal preparation help needed, does not need help doing housework, does not need help doing laundry, does not need help managing medications and does not need help managing money  Fall risk factors:  urinary incontinence, but The patient fell 0 times in the past 12 months  Home safety risk factors:  loose rugs and no handrails on the stairs, but no unfamiliar surroundings, no poor household lighting, no uneven floors and no household clutter  Advance Directives: Advance directives: living will, durable power of  for health care directives and advance directives     Co-Managers and Medical Equipment/Suppliers: See Patient Care Team   Preventive Quality Program 65 and Older: Falls Risk: The patient fell 0 times in the past 12 months  Symptoms Include: no confusion, no lightheadedness, no vertigo, no dizziness, no syncope, no impaired balance, no visual problems, no leg weakness, no recurring falls and no recent fall  Urinary Incontinence Symptoms includes: no urinary incontinence, but no incomplete bladder emptying, no urinary frequency, no urinary urgency, no urinary hesitancy, no straining, no weak stream, no intermittent stream, no post-void dribbling, no vaginal pressure and no vaginal dryness    Date of last glaucoma screen was 9/2015      Review of Systems    Constitutional: no fever, no chills, no malaise, no fatigue and no anorexia  Head and Face: no facial pain and no facial pressure  Eyes: no eye pain, eyes not red, no watery discharge from the eyes, no purulent discharge from the eyes, no itching of the eyes and no blurred vision  ENT: hearing loss, but no sore throat, no scratchy throat, no hoarseness, no nasal congestion, no nasal discharge, no sneezing, no earache and no white patches in the mouth  Cardiovascular: no chest pain, no palpitations and no lower extremity edema  Respiratory: no shortness of breath, no wheezing, no cough, no dry cough and no productive cough  Gastrointestinal: no abdominal pain, no nausea, no vomiting, no diarrhea, no constipation, no bright red blood per rectum and no melena  Genitourinary: nocturia, but no dysuria, no urinary incontinence and no hematuria  Musculoskeletal: negative, no diffuse joint pain, no generalized muscle aches, no joint swelling, no joint stiffness, no back muscle spasm, no pain in other joints and no limping  Integumentary and Breasts: no rashes  Neurological: no headache, no confusion, no dizziness, no fainting, no leg weakness, no tingling and no difficulty walking     Psychiatric: no insomnia, no anxiety, no depression and not suicidal    Endocrine: no hot flashes and no muscle weakness  Hematologic and Lymphatic: no swollen glands, no swollen glands in the neck, no tendency for easy bleeding and no tendency for easy bruising  Active Problems    1  Benign essential hypertension (401 1) (I10)   2  Enlarged prostate without lower urinary tract symptoms (luts) (600 00) (N40 0)   3  Hypercholesterolemia (272 0) (E78 0)   4  Obstructive sleep apnea (327 23) (G47 33)   5  Screening for depression (V79 0) (Z13 89)   6  Screening for genitourinary condition (V81 6) (Z13 89)   7  Screening for neurological condition (V80 09) (Z13 89)    Past Medical History    · History of Acute Gouty Arthropathy (274 01)   · Denied: History of Carrier Of STD   · History of Enlarged prostate (600 00) (N40 0)   · History of Gout (274 9) (M10 9)   · History of Hearing Loss (389 9)   · History of backache (V13 59) (Z87 39)   · History of cataract (V12 49) (Z86 69)   · History of diverticulitis of colon (V12 79) (Z87 19)   · History of dizziness (V13 89) (L08 134)   · History of hypercholesterolemia (V12 29) (Z86 39)   · History of hypertension (V12 59) (Z86 79)   · History of low back pain (V13 59) (Z87 39)   · History of Influenza vaccine needed (V04 81) (Z23)   · Denied: History of Mental Status Change   · History of Neck pain (723 1) (M54 2)   · History of Need for prophylactic vaccination and inoculation against chickenpox (V05 4)  (Z23)   · History of Palpitations (785 1) (R00 2)   · History of Special screening for malignant neoplasms, colon (V76 51) (Z12 11)    The active problems and past medical history were reviewed and updated today        Surgical History    · History of Surgery Testis    Family History    · Family history of Coronary Artery Disease (V17 49)   · Family history of Family Health Status 1  Children Living   · Family history of Heart Disease (V17 49)   · Family history of Hypertension (V17 49)   · Family history of Mother  At Age 80   · Family history of Stroke Syndrome (V17 1)    · Family history of Acute Myocardial Infarction (V17 3)   · Family history of Father  At Age 46   · Family history of Stroke Syndrome (V17 1)    Social History    · Alcohol Use (History)   · DRINKS ALCOHOL VERY INFREQUENTLY   · Denied: History of Drug Use   · Exercising Regularly   · PRIMARY FORM OF EXERCISE IS WORK  · Former smoker (F33 36) (K89 462)   · QUIT DATE: 26   · Marital History - Currently    · Recreational Activities   · HE ENJOYS [address] [email or web address]  · Retired From Work   · Travel history   · Pt denies being out of the country during 2014-11/10/2014  The social history was reviewed and updated today  The social history was reviewed and is unchanged  Current Meds   1  Allopurinol 300 MG Oral Tablet; TAKE 1 TABLET DAILY AS DIRECTED; Therapy: 03PXG0655 to (Evaluate:2016)  Requested for: 21GAE7810; Last   Rx:2015 Ordered   2  AmLODIPine Besylate 5 MG Oral Tablet; TAKE 1 TABLET DAILY; Therapy: 21GBI9407 to (Evaluate:2016)  Requested for: 0676 543 19 15; Last   Rx:76Zvs1191 Ordered   3  Aspirin 81 MG Oral Tablet; Therapy: (Recorded:2015) to Recorded   4  Colestipol HCl - 1 GM Oral Tablet; TAKE 1 TABLET TWICE DAILY; Therapy: 01EYE4577 to (Aura Enciso)  Requested for: 12FTG1005; Last   Rx:2015 Ordered   5  Lisinopril 20 MG Oral Tablet; 1 TAB QD;   Therapy: 94QNV4515 to (Evaluate:2016)  Requested for: 83DXD0017; Last   Rx:2016 Ordered   6  Meclizine HCl - 25 MG Oral Tablet; TAKE 1 TABLET EVERY 6 HOURS AS NEEDED FOR   DIZZINESS; Therapy: 48GXE6820 to (Evaluate:2016)  Requested for: 63GJI3022; Last   Rx:2015 Ordered   7  Simvastatin 40 MG Oral Tablet; take 1 tablet every other day; Therapy: 44AZK6081 to (Marlon Carcamo)  Requested for: 46CAF9542; Last   Rx:2015 Ordered   8  Tamsulosin HCl - 0 4 MG Oral Capsule; take 1 capsule daily;    Therapy: 38ZOU9720 to (Evaluate:2016)  Requested for: 65OIT6639; Last   Rx:19Jan2016 Ordered    The medication list was reviewed and updated today  Allergies    1  No Known Drug Allergies    Immunizations   ** Printed in Appendix #1 below  Vitals  Signs [Data Includes: Current Encounter]    Temperature: 97 7 F, Oral  Heart Rate: 49  Systolic: 368, LUE, Standing  Diastolic: 78, LUE, Standing  BP Cuff Size: Large  Height: 5 ft 10 in  Weight: 271 lb 8 oz  BMI Calculated: 38 96  BSA Calculated: 2 38  O2 Saturation: 93, RA    Physical Exam    Constitutional   General appearance: No acute distress, well appearing and well nourished  Head and Face   Head and face: Normal     Eyes   Conjunctiva and lids: No erythema, swelling or discharge  Pupils and irises: Equal, round, reactive to light  Ears, Nose, Mouth, and Throat   Otoscopic examination: Tympanic membranes translucent with normal light reflex  Canals patent without erythema  Hearing: Normal   normal spoken work hearing intact  Nasal mucosa, septum, and turbinates: Normal without edema or erythema  Oropharynx: Normal with no erythema, edema, exudate or lesions  MMM  Neck   Neck: Supple, symmetric, trachea midline, no masses  Thyroid: Normal, no thyromegaly  Pulmonary   Respiratory effort: No increased work of breathing or signs of respiratory distress  Auscultation of lungs: Clear to auscultation  Cardiovascular   Auscultation of heart: Normal rate and rhythm, normal S1 and S2, no murmurs  Carotid pulses: 2+ bilaterally  Examination of extremities for edema and/or varicosities: Normal     Abdomen   Liver and spleen: No hepatomegaly or splenomegaly  Lymphatic   Palpation of lymph nodes in neck: No lymphadenopathy  Musculoskeletal   Gait and station: Normal     Inspection/palpation of digits and nails: Normal without clubbing or cyanosis  Stability: Normal     Muscle strength/tone: Normal     Neurologic   Cranial nerves: Cranial nerves 2-12 intact      Coordination: Normal finger to nose and heel to shin  Psychiatric   Orientation to person, place and time: Normal     Recent and remote memory: Intact  Mood and affect: Normal        Results/Data  *VB-Urinary Incontinence Screen (Dx V81 6 Screen for UI) 48ZFC4468 12:03PM Sarath Zakiya     Test Name Result Flag Reference   Urinary Incontinence Assessment 76GUE1716       *VB - Fall Risk Assessment  (Dx V80 09 Screen for Neurologic Disorder) 31ERK8783 12:02PM Sarath Zakiya     Test Name Result Flag Reference   Fall Risk Assessment 57GYI1090       *VB-Depression Screening 15INA3597 12:01PM Sarath Zakiya     Test Name Result Flag Reference   Depression Scale Result      Depression Screen - Negative For Symptoms     Medicare Annual Wellness Visit 33WDV4830 12:00PM Sarath Zakiya     Test Name Result Flag Reference   MEDICARE [place] VISIT [id number]         Health Management  History of Special screening for malignant neoplasms, colon   COLONOSCOPY; every 5 years; Last 50BMK6119; Next Due: 20TXX6276; Overdue  Screening for depression   *VB-Depression Screening; every 1 year; Last 53SDP4297; Next Due: 28UZF7935; Active  Screening for genitourinary condition   *VB-Urinary Incontinence Screen (Dx V81 6 Screen for UI); every 1 year; Last  63QSW8992; Next Due: 84PGB8056; Active  Screening for neurological condition   *VB - Fall Risk Assessment  (Dx V80 09 Screen for Neurologic Disorder); every 1 year; Last 55DKJ2270; Next Due: 10AGL1083; Active  Health Maintenance   Medicare Annual Wellness Visit; every 1 year; Last 59YGE3305; Next Due: 40NMT4076; Active    Future Appointments    Date/Time Provider Specialty Site   05/27/2016 03:30 PM Apurva Gonzales MD Internal Medicine [facility] PRIMARY CARE     Signatures   Electronically signed by : Danae Ding; Mar 22 2016  5:17PM EST                       (Author)    Electronically signed by :  Florette Rubinstein, MD; Mar 23 2016  9:21AM EST (Author)    Appendix #1     Patient: Fela Vogt ; : 1945; MRN: [id number]      1 2 3 4 5 6    Influenza  29MQB8600 57Uyi3691 20XPV5720 57HCZ5049 95NXV8089 38CBV9175    Pneumococcal  94LDJ4746         Zoster  Approx 2011

## 2018-01-22 ENCOUNTER — ALLSCRIPTS OFFICE VISIT (OUTPATIENT)
Dept: OTHER | Facility: OTHER | Age: 73
End: 2018-01-22

## 2018-01-22 VITALS
BODY MASS INDEX: 40.66 KG/M2 | SYSTOLIC BLOOD PRESSURE: 140 MMHG | HEIGHT: 70 IN | WEIGHT: 284 LBS | DIASTOLIC BLOOD PRESSURE: 80 MMHG | HEART RATE: 70 BPM

## 2018-01-23 NOTE — MISCELLANEOUS
Message   Date: 15 Lyndon 2018 1:30 PM EST, Recorded By: Connor Velazquez For: Davina Padilla   Caller: Spouse, Spouse   Phone: [phone]   Returned call from spouse patient to have urolift/bladder in February and is concerned with post op constipation, advised patient can start stool softener BID after surgery and to discuss with DR Ramirez Fret day of surgery  Active Problems    1  Benign essential hypertension (401 1) (I10)   2  Chronic bilateral low back pain without sciatica (724 2,338 29) (M54 5,G89 29)   3  Copy of advanced directive obtained (V49 89) (Z78 9)   4  Elevated PSA (790 93) (R97 20)   5  Enlarged prostate without lower urinary tract symptoms (luts) (600 00) (N40 0)   6  Hypercholesterolemia (272 0) (E78 00)   7  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)   8  Influenza vaccine needed (V04 81) (Z23)   9  OAB (overactive bladder) (596 51) (N32 81)   10  Obstructive sleep apnea (327 23) (G47 33)   11  Screening for genitourinary condition (V81 6) (Z13 89)    Current Meds   1  Allopurinol 300 MG Oral Tablet; TAKE 1 TABLET DAILY AS DIRECTED; Therapy: 22KZJ8310 to (Evaluate:22Apr2018)  Requested for: 24Oct2017; Last   LN:83UYF9828 Ordered   2  AmLODIPine Besylate 5 MG Oral Tablet; TAKE 1 TABLET DAILY; Therapy: 76CCM5389 to (Evaluate:17Jun2018)  Requested for: 05ZVV7571; Last   Rx:83Ukb8594 Ordered   3  Aspirin 81 MG TABS; Therapy: (Recorded:30Oct2015) to Recorded   4  Colestipol HCl - 1 GM Oral Tablet (Colestid); TAKE 1 TABLET TWICE DAILY; Therapy: 28PJK1333 to (Evaluate:27Mar2018)  Requested for: 44JCB8731; Last   Rx:07Clv4923 Ordered   5  Finasteride 5 MG Oral Tablet; TAKE 1 TABLET DAILY; Therapy: 26ZJM6573 to (Evaluate:27Mar2018)  Requested for: 92CPX2703; Last   Rx:45Vay6416 Ordered   6  Lisinopril 20 MG Oral Tablet; 1 TAB QD;   Therapy: 50IHB2877 to (Evaluate:89Apx9921)  Requested for: 05GLY1476; Last   Rx:01Dec2017 Ordered   7   Meclizine HCl - 25 MG Oral Tablet; One po BID prn; Therapy: 90PWA3881 to (Evaluate:10Nov2017)  Requested for: 27EQJ7218; Last   Rx:78Xni9388 Ordered   8  Simvastatin 40 MG Oral Tablet; take 1 tablet every other day; Therapy: 73SBU7022 to (Evaluate:22Apr2018)  Requested for: 06Diw2406; Last   XT:81PUC6031 Ordered   9  Tamsulosin HCl - 0 4 MG Oral Capsule; take 1 capsule daily; Therapy: 30AUZ8929 to (Evaluate:27Mar2018)  Requested for: 67JTT7127; Last   Rx:49Mhm4001 Ordered    Allergies    1  No Known Drug Allergies    2  No Known Food Allergies   3   Seasonal    Signatures   Electronically signed by : Aldo Davis, ; Lyndon 15 2018  1:32PM EST                       (Author)

## 2018-01-25 ENCOUNTER — ANESTHESIA EVENT (OUTPATIENT)
Dept: PERIOP | Facility: AMBULARY SURGERY CENTER | Age: 73
End: 2018-01-25
Payer: COMMERCIAL

## 2018-02-06 RX ORDER — AMLODIPINE BESYLATE 5 MG/1
5 TABLET ORAL DAILY
COMMUNITY
End: 2018-04-24 | Stop reason: SDUPTHER

## 2018-02-06 RX ORDER — FINASTERIDE 5 MG/1
5 TABLET, FILM COATED ORAL DAILY
COMMUNITY
End: 2018-03-14

## 2018-02-06 RX ORDER — LISINOPRIL 20 MG/1
20 TABLET ORAL DAILY
COMMUNITY
End: 2018-04-24 | Stop reason: SDUPTHER

## 2018-02-06 RX ORDER — ALLOPURINOL 300 MG/1
300 TABLET ORAL DAILY
COMMUNITY
End: 2018-04-24 | Stop reason: SDUPTHER

## 2018-02-06 RX ORDER — TAMSULOSIN HYDROCHLORIDE 0.4 MG/1
0.4 CAPSULE ORAL
COMMUNITY
End: 2018-03-14

## 2018-02-06 RX ORDER — SIMVASTATIN 40 MG
40 TABLET ORAL
COMMUNITY
End: 2018-04-24 | Stop reason: DRUGHIGH

## 2018-02-06 RX ORDER — ASPIRIN 81 MG/1
81 TABLET ORAL DAILY
COMMUNITY
End: 2018-04-24 | Stop reason: SDUPTHER

## 2018-02-06 NOTE — PRE-PROCEDURE INSTRUCTIONS
Pre-Surgery Instructions:   Medication Instructions    allopurinol (ZYLOPRIM) 300 mg tablet Instructed patient per Anesthesia Guidelines   amLODIPine (NORVASC) 5 mg tablet Instructed patient per Anesthesia Guidelines   aspirin (ECOTRIN LOW STRENGTH) 81 mg EC tablet Patient was instructed by Physician and understands   finasteride (PROSCAR) 5 mg tablet Instructed patient per Anesthesia Guidelines   lisinopril (ZESTRIL) 20 mg tablet Instructed patient per Anesthesia Guidelines   meclizine (ANTIVERT) 32 MG tablet Instructed patient per Anesthesia Guidelines   simvastatin (ZOCOR) 40 mg tablet Instructed patient per Anesthesia Guidelines   tamsulosin (FLOMAX) 0 4 mg Instructed patient per Anesthesia Guidelines  Pre op and bathing instructions reviewed   Pt has hibiclens

## 2018-02-09 ENCOUNTER — ANESTHESIA (OUTPATIENT)
Dept: PERIOP | Facility: AMBULARY SURGERY CENTER | Age: 73
End: 2018-02-09
Payer: COMMERCIAL

## 2018-02-09 ENCOUNTER — HOSPITAL ENCOUNTER (OUTPATIENT)
Facility: AMBULARY SURGERY CENTER | Age: 73
Setting detail: OUTPATIENT SURGERY
Discharge: HOME | End: 2018-02-09
Attending: UROLOGY | Admitting: UROLOGY
Payer: COMMERCIAL

## 2018-02-09 VITALS
HEIGHT: 70 IN | SYSTOLIC BLOOD PRESSURE: 108 MMHG | WEIGHT: 278 LBS | DIASTOLIC BLOOD PRESSURE: 63 MMHG | BODY MASS INDEX: 39.8 KG/M2 | HEART RATE: 49 BPM | RESPIRATION RATE: 18 BRPM | OXYGEN SATURATION: 94 % | TEMPERATURE: 97 F

## 2018-02-09 DIAGNOSIS — N40.0 ENLARGED PROSTATE WITHOUT LOWER URINARY TRACT SYMPTOMS (LUTS): ICD-10-CM

## 2018-02-09 DIAGNOSIS — N32.9 HYPERVASCULAR LESION OF URINARY BLADDER: ICD-10-CM

## 2018-02-09 PROCEDURE — 52442 CYSTO INS TRNSPRSTC IMPLT EA: CPT | Performed by: UROLOGY

## 2018-02-09 PROCEDURE — 52441 CYSTO INSJ TRNSPRSTC 1 IMPLT: CPT | Performed by: UROLOGY

## 2018-02-09 PROCEDURE — 88305 TISSUE EXAM BY PATHOLOGIST: CPT | Performed by: PATHOLOGY

## 2018-02-09 PROCEDURE — 88305 TISSUE EXAM BY PATHOLOGIST: CPT | Performed by: UROLOGY

## 2018-02-09 PROCEDURE — 52204 CYSTOSCOPY W/BIOPSY(S): CPT | Performed by: UROLOGY

## 2018-02-09 PROCEDURE — L8699 PROSTHETIC IMPLANT NOS: HCPCS | Performed by: UROLOGY

## 2018-02-09 DEVICE — IMPLANT URO PROSTATE UROLIFT: Type: IMPLANTABLE DEVICE | Status: FUNCTIONAL

## 2018-02-09 RX ORDER — CIPROFLOXACIN 500 MG/1
500 TABLET, FILM COATED ORAL EVERY 12 HOURS SCHEDULED
Qty: 6 TABLET | Refills: 0 | Status: SHIPPED | OUTPATIENT
Start: 2018-02-09 | End: 2018-02-12

## 2018-02-09 RX ORDER — DOCUSATE SODIUM 100 MG/1
100 CAPSULE, LIQUID FILLED ORAL 2 TIMES DAILY
Qty: 30 CAPSULE | Refills: 0 | Status: SHIPPED | OUTPATIENT
Start: 2018-02-09 | End: 2019-03-21 | Stop reason: ALTCHOICE

## 2018-02-09 RX ORDER — SODIUM CHLORIDE, SODIUM LACTATE, POTASSIUM CHLORIDE, CALCIUM CHLORIDE 600; 310; 30; 20 MG/100ML; MG/100ML; MG/100ML; MG/100ML
20 INJECTION, SOLUTION INTRAVENOUS CONTINUOUS
Status: DISCONTINUED | OUTPATIENT
Start: 2018-02-09 | End: 2018-02-09 | Stop reason: HOSPADM

## 2018-02-09 RX ORDER — FENTANYL CITRATE 50 UG/ML
INJECTION, SOLUTION INTRAMUSCULAR; INTRAVENOUS AS NEEDED
Status: DISCONTINUED | OUTPATIENT
Start: 2018-02-09 | End: 2018-02-09 | Stop reason: SURG

## 2018-02-09 RX ORDER — ONDANSETRON 2 MG/ML
4 INJECTION INTRAMUSCULAR; INTRAVENOUS ONCE AS NEEDED
Status: DISCONTINUED | OUTPATIENT
Start: 2018-02-09 | End: 2018-02-09 | Stop reason: HOSPADM

## 2018-02-09 RX ORDER — PROPOFOL 10 MG/ML
INJECTION, EMULSION INTRAVENOUS AS NEEDED
Status: DISCONTINUED | OUTPATIENT
Start: 2018-02-09 | End: 2018-02-09 | Stop reason: SURG

## 2018-02-09 RX ORDER — SODIUM CHLORIDE 9 MG/ML
INJECTION, SOLUTION INTRAVENOUS CONTINUOUS PRN
Status: DISCONTINUED | OUTPATIENT
Start: 2018-02-09 | End: 2018-02-09 | Stop reason: SURG

## 2018-02-09 RX ORDER — IBUPROFEN 200 MG
600 TABLET ORAL EVERY 6 HOURS PRN
Refills: 0
Start: 2018-02-09 | End: 2018-08-29

## 2018-02-09 RX ORDER — OXYCODONE HYDROCHLORIDE 5 MG/1
5 TABLET ORAL EVERY 4 HOURS PRN
Status: DISCONTINUED | OUTPATIENT
Start: 2018-02-09 | End: 2018-02-09 | Stop reason: HOSPADM

## 2018-02-09 RX ORDER — PHENAZOPYRIDINE HYDROCHLORIDE 200 MG/1
200 TABLET, FILM COATED ORAL 3 TIMES DAILY PRN
Qty: 10 TABLET | Refills: 0 | Status: SHIPPED | OUTPATIENT
Start: 2018-02-09 | End: 2018-02-12

## 2018-02-09 RX ORDER — PROPOFOL 10 MG/ML
INJECTION, EMULSION INTRAVENOUS CONTINUOUS PRN
Status: DISCONTINUED | OUTPATIENT
Start: 2018-02-09 | End: 2018-02-09 | Stop reason: SURG

## 2018-02-09 RX ORDER — MIDAZOLAM HYDROCHLORIDE 1 MG/ML
INJECTION INTRAMUSCULAR; INTRAVENOUS AS NEEDED
Status: DISCONTINUED | OUTPATIENT
Start: 2018-02-09 | End: 2018-02-09 | Stop reason: SURG

## 2018-02-09 RX ORDER — FENTANYL CITRATE/PF 50 MCG/ML
25 SYRINGE (ML) INJECTION
Status: DISCONTINUED | OUTPATIENT
Start: 2018-02-09 | End: 2018-02-09 | Stop reason: HOSPADM

## 2018-02-09 RX ORDER — HYDROCODONE BITARTRATE AND ACETAMINOPHEN 5; 325 MG/1; MG/1
1 TABLET ORAL EVERY 6 HOURS PRN
Qty: 5 TABLET | Refills: 0 | Status: SHIPPED | OUTPATIENT
Start: 2018-02-09 | End: 2018-02-19

## 2018-02-09 RX ADMIN — FENTANYL CITRATE 100 MCG: 50 INJECTION, SOLUTION INTRAMUSCULAR; INTRAVENOUS at 09:30

## 2018-02-09 RX ADMIN — CEFAZOLIN SODIUM 2000 MG: 2 SOLUTION INTRAVENOUS at 09:25

## 2018-02-09 RX ADMIN — PROPOFOL 100 MCG/KG/MIN: 10 INJECTION, EMULSION INTRAVENOUS at 09:30

## 2018-02-09 RX ADMIN — PROPOFOL 50 MG: 10 INJECTION, EMULSION INTRAVENOUS at 09:38

## 2018-02-09 RX ADMIN — SODIUM CHLORIDE: 0.9 INJECTION, SOLUTION INTRAVENOUS at 09:28

## 2018-02-09 RX ADMIN — CEFAZOLIN SODIUM 1000 MG: 1 SOLUTION INTRAVENOUS at 09:34

## 2018-02-09 RX ADMIN — FENTANYL CITRATE 25 MCG: 50 INJECTION INTRAMUSCULAR; INTRAVENOUS at 10:07

## 2018-02-09 RX ADMIN — MIDAZOLAM HYDROCHLORIDE 2 MG: 1 INJECTION, SOLUTION INTRAMUSCULAR; INTRAVENOUS at 09:28

## 2018-02-09 RX ADMIN — FENTANYL CITRATE 25 MCG: 50 INJECTION INTRAMUSCULAR; INTRAVENOUS at 10:14

## 2018-02-09 RX ADMIN — FENTANYL CITRATE 25 MCG: 50 INJECTION INTRAMUSCULAR; INTRAVENOUS at 10:21

## 2018-02-09 RX ADMIN — FENTANYL CITRATE 25 MCG: 50 INJECTION INTRAMUSCULAR; INTRAVENOUS at 10:26

## 2018-02-09 NOTE — OP NOTE
Operative Note     PATIENT:  Elma Murguia (MRN [id number])    DATE OF PROCEDURE:   2/9/2018    PRE-OP DIAGNOSES:   1) BPH with obstruction  2) bladder tumor (urothelium overlying prostatic urethra)     POST-OP DIAGNOSES AND OPERATIVE FINDINGS:   1) BPH with obstruction  2) bladder tumor (urothelium overlying prostatic urethra)    PROCEDURES:  1) UroLift implantation  2) bladder biopsy with fulguration    SURGEON:   Kenyatta Warren MD    No qualified teaching residents available to assist    ANESTHESIA TYPE:  General anesthesia    ESTIMATED BLOOD LOSS:   Minimal    COMPLICATIONS:   None    ANTIBIOTICS:  Cefazolin    INTRAOPERATIVE THROMBOEMBOLISM PROPHYLAXIS:  Pneumatic compression stockings    INDICATIONS:  Matthew Gambino is a 75-year-old male with medically refractory lower urinary tract symptoms  Recent cystoscopy demonstrated extensive lateral lobe hyperplasia  In addition a papillary tumor was present in the urothelium within the prostatic urethra  After discussion of the options patient elected to proceed to the operating room for a biopsy of this lesion as well as the Uro lift procedure  We discussed each procedure in detail, the alternatives, the benefits, the risks, the expected postoperative course  Patient provided informed consent and elected to proceed    PROCEDURE SUMMARY:    The patient was identified, brought to the operating room, and placed on the table in supine position  After induction of general anesthesia, the patient was placed in dorsal lithotomy position and prepped and draped in the usual sterile fashion  A complete formal timeout was performed  A 20F cystoscope was inserted into the bladder  A very small papillary tumor was present within the urothelium of the prostatic urethra, at the level of the bladder neck  This did appear to be consistent with a clinical TA papillary bladder tumor    Lesion was biopsied with a cold cup device and the underlying urothelium was managed with targeted Bugbee electrocautery  This was submitted as a specimen  The cystoscopy bridge was replaced with a UroLift delivery device  The first treatment site was the patient's left side approximately 1 5 cm distal to the bladder neck  The distal tip of the delivery device was then angled laterally approximately 20 degrees at this position to compress the lateral lobe  The trigger was pulled, thereby deploying a needle containing the implant through the prostate  The needle was then retracted, allowing one end of the implant to be delivered to the capsular surface of the prostate  The implant was then tensioned to assure capsular seating and removal of slack monofilament  The device was then angled back toward midline and slowly advanced proximally until cystoscopic verification of the monofilament being centered in the delivery bay  The urethral end piece was then affixed to the monofilament thereby tailoring the size of the implant  Excess filament was then severed  The delivery device was then re-advanced into the bladder  The delivery device was then replaced with cystoscope and bridge and the implant location and opening effect was confirmed cystoscopically  The same procedure was then repeated on the right side, and two additional implants were delivered just proximal to the veru montanum, again one on left and one on right side of the prostate, following the same technique  A total of 6 implants were deployed to create a nice anterior channel  A final cystoscopy was conducted first to inspect the location and state of each implant and second, to confirm the presence of a continuous anterior channel was present through the prostatic urethra with irrigation flow turned off  A pereira catheter was then placed  The patient tolerated the procedure well and was transferred to the recovery room awake alert and in stable condition  IMPLANTS:     Implant Name Type Inv   Item Serial No   Lot No  LRB No  Used   IMPLANT URO PROSTATE UROLIFT - [id number]   IMPLANT URO PROSTATE UROLIFT   [id number] N/A 6        PLAN:  Patient will continue with the Correa catheter until Monday  He was taught how to remove this at home at that time  He will follow up in 2 weeks for a review of the pathology as well as avoiding assessment    If urothelial carcinoma is present, the patient may require a secondary TURP for full resection and staging, versus BCG, versus surveillance

## 2018-02-09 NOTE — ANESTHESIA POSTPROCEDURE EVALUATION
Post-Op Assessment Note      CV Status:  Stable    Mental Status:  Alert and awake    Hydration Status:  Euvolemic    PONV Controlled:  Controlled    Airway Patency:  Patent    Post Op Vitals Reviewed: Yes          Staff: CRNA           BP      Temp     Pulse    Resp      SpO2

## 2018-02-09 NOTE — PROGRESS NOTES
Patient and wife shown by demonstration how to withdraw fluid from catheter balloon and then told to pull catheter out on Monday at 0800 per Dr Beryle [place]  Patient verbally told to use proper hand hygiene and to use alcohol swabs to cleanse penis and catheter  Leg bag use also demonstrated and applying large drainage bag at night encouraged  Not allowing bag to be above bladder level, s/s of infection discussed, picking up antibiotic and other prescribed meds  That were called in also reviewed  All questions answered but wife and patient refused teach back when asked to demonstrate back to RN

## 2018-02-09 NOTE — ANESTHESIA PREPROCEDURE EVALUATION
Review of Systems/Medical History  Patient summary reviewed  Chart reviewed  No history of anesthetic complications     Cardiovascular  EKG reviewed, Exercise tolerance: good,  Hyperlipidemia, Hypertension , No CAD, , No cardiac stents     Pulmonary  No asthma: , No recent URI , No sleep apnea ,        GI/Hepatic            Endo/Other     GYN       Hematology   Musculoskeletal       Neurology   Psychology           Physical Exam    Airway    Mallampati score: II  TM Distance: >3 FB       Dental       Cardiovascular      Pulmonary      Other Findings  PERMANENT CAPS FRONT UPPER      Anesthesia Plan  ASA Score- 2     Anesthesia Type- IV sedation with anesthesia with ASA Monitors  Additional Monitors:   Airway Plan:     Comment: TIMOTHY Randall , have personally seen and evaluated the patient prior to anesthetic care  I have reviewed the pre-anesthetic record, and other medical records if appropriate to the anesthetic care  If a CRNA is involved in the case, I have reviewed the CRNA assessment, if present, and agree  Risks/benefits and alternatives discussed with patient including possible PONV, sore throat, and possibility of rare anesthetic and surgical emergencies        Plan Factors-Patient not instructed to abstain from smoking on day of procedure  Patient did not smoke on day of surgery  Induction-     Postoperative Plan-     Informed Consent- Anesthetic plan and risks discussed with patient  I personally reviewed this patient with the CRNA  Discussed and agreed on the Anesthesia Plan with the CRNA  Burton Gutierrez

## 2018-02-09 NOTE — DISCHARGE INSTRUCTIONS
Correa Catheter Placement and Care     Because of the location of your biopsy, I would like to leave the catheter in place until Monday  You may remove it at home Monday morning before 8:00 a m  and call the office if you have any difficulty urinating by 1:00 p m  Please resume your aspirin on Monday        WHAT YOU NEED TO KNOW:   A Correa catheter is a sterile tube that is inserted into your bladder to drain urine  It is also called an indwelling urinary catheter  The tip of the catheter has a small balloon filled with solution that holds the catheter inside your bladder  DISCHARGE INSTRUCTIONS:   Return to the emergency department if:   · Your catheter comes out  · You suddenly have material that looks like sand in the tubing or drainage bag  · No urine is draining into the bag and you have checked the system  · You have pain in your hip, back, pelvis, or lower abdomen  · You are confused or cannot think clearly  Contact your healthcare provider if:   · You have a fever  · You have bladder spasms for more than 1 day after the catheter is placed  · You see blood in the tubing or drainage bag  · You have a rash or itching where the catheter tube is secured to your skin  · Urine leaks from or around the catheter, tubing, or drainage bag  · The closed drainage system has accidently come open or apart  · You see a layer of crystals inside the tubing  · You have questions or concerns about your condition or care  Care for your Correa catheter:   · Clean your genital area 2 times every day  Clean your catheter and the area around where it was inserted  Use soap and water  Clean your anal opening and catheter area after every bowel movement  · Secure the catheter tube  so you do not pull or move the catheter  This helps prevent pain and bladder spasms  Healthcare providers will show you how to use medical tape or a strap to secure the catheter tube to your body  · Keep a closed drainage system  Your Correa catheter should always be attached to the drainage bag to form a closed system  Do not disconnect any part of the closed system unless you need to change the bag  Care for your drainage bag:   · Ask if a leg bag is right for you  A leg bag can be worn under your clothes  Ask your healthcare provider for more information about a leg bag  · Keep the drainage bag below the level of your waist   This helps stop urine from moving back up the tubing and into your bladder  Do not loop or kink the tubing  This can cause urine to back up and collect in your bladder  Do not let the drainage bag touch or lie on the floor  · Empty the drainage bag when needed  The weight of a full drainage bag can be painful  Empty the drainage bag every 3 to 6 hours or when it is ? full  · Clean and change the drainage bag as directed  Ask your healthcare provider how often you should change the drainage bag and what cleaning solution to use  Wear disposable gloves when you change the bag  Do not allow the end of the catheter or tubing to touch anything  Clean the ends with an alcohol pad before you reconnect them  What to do if problems develop:   · No urine is draining into the bag:      ¨ Check for kinks in the tubing and straighten them out  ¨ Check the tape or strap used to secure the catheter tube to your skin  Make sure it is not blocking the tube  ¨ Make sure you are not sitting or lying on the tubing  ¨ Make sure the urine bag is hanging below the level of your waist     · Urine leaks from or around the catheter, tubing, or drainage bag:  Check if the closed drainage system has accidently come open or apart  Clean the catheter and tubing ends with a new alcohol pad and reconnect them  Prevent an infection:   · Wash your hands often  Wash before and after you touch your catheter, tubing, or drainage bag  Use soap and water   Wear clean disposable gloves when you care for your catheter or disconnect the drainage bag  Wash your hands before you prepare or eat food  · Drink liquids as directed  Ask your healthcare provider how much liquid to drink each day and which liquids are best for you  Liquids will help flush your kidneys and bladder to help prevent infection  Follow up with your healthcare provider as directed:  Write down your questions so you remember to ask them during your visits  © 2017 [employer]  Information is for End User's use only and may not be sold, redistributed or otherwise used for commercial purposes  All illustrations and images included in CareNotes® are the copyrighted property of A D A M , Inc  or [employer]  The above information is an  only  It is not intended as medical advice for individual conditions or treatments  Talk to your doctor, nurse or pharmacist before following any medical regimen to see if it is safe and effective for you

## 2018-02-09 NOTE — H&P
UROLOGY CONSULTATION NOTE     Patient Identifiers: Shaka Marcos (MRN [id number])  Service Providing Consultation:  Urology, Manisha Gomez MD    Date of Service: 2/9/2018        ASSESSMENT:     74 y o  old male with  MEDICALLY REFRACTORY LOWER URINARY TRACT SYMPTOMS, BPH, AND A HYPERVASCULAR BLADDER LESION  PRESENTS FOR URO LIFT PROCEDURE  PLAN:     TO OR FOR CYSTOSCOPY, URO LIFT, BLADDER BIOPSY      History of Present Illness:     Shaka Marcos is a 74 y o  old with a history of medically refractory lower urinary tract symptoms, BPH, as well as a small hypervascular lesion on the urothelium overlying the median lobe  Past Medical, Past Surgical History:     Past Medical History:   Diagnosis Date    Hyperlipidemia     Hypertension     Sinus bradycardia    :    Past Surgical History:   Procedure Laterality Date    ARM NEUROPLASTY Left     1977    COLONOSCOPY      TESTICLE SURGERY Right 1976   :    Medications, Allergies:     Current Facility-Administered Medications:     ceFAZolin (ANCEF) IVPB (premix) 2,000 mg, 2,000 mg, Intravenous, On Call To OR **AND** ceFAZolin (ANCEF) IVPB (premix) 1,000 mg, 1,000 mg, Intravenous, On Call To OR, Manisha Gomez MD    Allergies:  No Known Allergies:    Social and Family History:   Social History:   Social History   Substance Use Topics    Smoking status: Former Smoker     Quit date: 2/6/1992    Smokeless tobacco: Never Used    Alcohol use Yes      Comment: rarely     History   Smoking Status    Former Smoker    Quit date: 2/6/1992   Smokeless Tobacco    Never Used       Family History:  History reviewed  No pertinent family history :     Review of Systems:     General: Fever, chills, or night sweats: negative  Cardiac: Negative for chest pain  Pulmonary: Negative for shortness of breath  Gastrointestinal: Abdominal pain negative  Nausea, vomiting, or diarrhea negative,  Genitourinary: See HPI above  Patient does not have hematuria    All other systems queried were negative  General: Patient is pleasant and in NAD  Awake and alert  /79   Pulse (!) 50 Comment: 43-61  Temp (!) 97 2 °F (36 2 °C) (Temporal)   Resp 19   Ht 5' 10" (1 778 m)   Wt 126 kg (278 lb)   SpO2 96%   BMI 39 89 kg/m²   Cardiac: Peripheral edema: negative  Pulmonary: Non-labored breathing  Abdomen: Soft, non-tender, non-distended  No surgical scars  No masses, tenderness, hernias noted  Genitourinary: Negative CVA tenderness, negative suprapubic tenderness        Labs:     Lab Results   Component Value Date    HGB 17 2 (H) 01/16/2018    HCT 52 6 (H) 01/16/2018    WBC 8 06 01/16/2018     01/16/2018   ]    Lab Results   Component Value Date     01/16/2018    K 4 2 01/16/2018     01/16/2018    CO2 28 01/16/2018    BUN 17 01/16/2018    CREATININE 1 34 (H) 01/16/2018    CALCIUM 9 9 01/16/2018    GLUCOSE 114 01/16/2018   ]    Kenyatta Warren MD

## 2018-02-13 ENCOUNTER — TELEPHONE (OUTPATIENT)
Dept: UROLOGY | Facility: CLINIC | Age: 73
End: 2018-02-13

## 2018-02-13 NOTE — PROGRESS NOTES
Please let the patient know the great news that their biopsy was negative for cancer! SHould followup as scheduled    Thank you

## 2018-02-13 NOTE — TELEPHONE ENCOUNTER
----- Message from Lynette Valero MD sent at 2/13/2018  1:01 PM EST -----  Please let the patient know the great news that their biopsy was negative for cancer! SHould followup as scheduled    Thank you

## 2018-03-05 ENCOUNTER — TELEPHONE (OUTPATIENT)
Dept: UROLOGY | Facility: AMBULATORY SURGERY CENTER | Age: 73
End: 2018-03-05

## 2018-03-05 NOTE — TELEPHONE ENCOUNTER
Pts wife called and cancelled appt for 03/07/18 due to no electricity and would like to rescheldule  Has to see Dr Kimberly Mosqueda post up would like [facility]  Please advise on appt

## 2018-03-13 RX ORDER — AMLODIPINE BESYLATE 5 MG/1
1 TABLET ORAL DAILY
COMMUNITY
Start: 2013-10-01 | End: 2018-06-18 | Stop reason: SDUPTHER

## 2018-03-13 RX ORDER — ALLOPURINOL 300 MG/1
1 TABLET ORAL DAILY
COMMUNITY
Start: 2013-11-15 | End: 2018-04-24 | Stop reason: SDUPTHER

## 2018-03-13 RX ORDER — LISINOPRIL 20 MG/1
1 TABLET ORAL DAILY
COMMUNITY
Start: 2013-11-29 | End: 2018-04-24 | Stop reason: SDUPTHER

## 2018-03-13 RX ORDER — TAMSULOSIN HYDROCHLORIDE 0.4 MG/1
1 CAPSULE ORAL DAILY
COMMUNITY
Start: 2014-01-02 | End: 2018-03-14

## 2018-03-13 RX ORDER — SIMVASTATIN 40 MG
1 TABLET ORAL EVERY OTHER DAY
COMMUNITY
Start: 2013-11-18 | End: 2018-04-24 | Stop reason: DRUGHIGH

## 2018-03-13 RX ORDER — FINASTERIDE 5 MG/1
1 TABLET, FILM COATED ORAL DAILY
COMMUNITY
Start: 2016-05-27 | End: 2018-03-14

## 2018-03-13 RX ORDER — MECLIZINE HYDROCHLORIDE 25 MG/1
TABLET ORAL 2 TIMES DAILY PRN
COMMUNITY
Start: 2013-11-29 | End: 2018-04-09 | Stop reason: SDUPTHER

## 2018-03-13 RX ORDER — MONTELUKAST SODIUM 4 MG/1
1 TABLET, CHEWABLE ORAL 2 TIMES DAILY
COMMUNITY
Start: 2013-10-01 | End: 2018-04-09 | Stop reason: SDUPTHER

## 2018-03-14 ENCOUNTER — OFFICE VISIT (OUTPATIENT)
Dept: UROLOGY | Facility: CLINIC | Age: 73
End: 2018-03-14
Payer: COMMERCIAL

## 2018-03-14 VITALS
WEIGHT: 278 LBS | HEIGHT: 70 IN | SYSTOLIC BLOOD PRESSURE: 126 MMHG | BODY MASS INDEX: 39.8 KG/M2 | DIASTOLIC BLOOD PRESSURE: 88 MMHG

## 2018-03-14 DIAGNOSIS — N32.9 HYPERVASCULAR LESION OF URINARY BLADDER: ICD-10-CM

## 2018-03-14 DIAGNOSIS — N40.1 BENIGN PROSTATIC HYPERPLASIA WITH URINARY FREQUENCY: Primary | ICD-10-CM

## 2018-03-14 DIAGNOSIS — R35.0 BENIGN PROSTATIC HYPERPLASIA WITH URINARY FREQUENCY: Primary | ICD-10-CM

## 2018-03-14 PROCEDURE — 51741 ELECTRO-UROFLOWMETRY FIRST: CPT | Performed by: UROLOGY

## 2018-03-14 PROCEDURE — 99214 OFFICE O/P EST MOD 30 MIN: CPT | Performed by: UROLOGY

## 2018-03-14 PROCEDURE — 51798 US URINE CAPACITY MEASURE: CPT | Performed by: UROLOGY

## 2018-03-14 NOTE — PROGRESS NOTES
Diagnoses and all orders for this visit:    Benign prostatic hyperplasia with urinary frequency    Hypervascular lesion of urinary bladder    Other orders  -     aspirin 81 MG tablet; Take by mouth  -     colestipol (COLESTID) 1 g tablet; Take 1 tablet by mouth 2 (two) times a day  -     allopurinol (ZYLOPRIM) 300 mg tablet; Take 1 tablet by mouth daily  -     amLODIPine (NORVASC) 5 mg tablet; Take 1 tablet by mouth daily  -     Discontinue: finasteride (PROSCAR) 5 mg tablet; Take 1 tablet by mouth daily  -     lisinopril (ZESTRIL) 20 mg tablet; Take 1 tablet by mouth daily  -     meclizine (ANTIVERT) 25 mg tablet; Take by mouth 2 (two) times a day as needed  -     simvastatin (ZOCOR) 40 mg tablet; Take 1 tablet by mouth every other day  -     Discontinue: tamsulosin (FLOMAX) 0 4 mg; Take 1 capsule by mouth daily            Assessment and plan:       1  BPH status post Uro lift  - patient with marked improvement in his daytime and nocturnal symptoms    2  Bladder lesion status post biopsy  - cystitis cystica (benign inflammatory lesion)     It is great to see Ford Cortes doing so well  He has a remarkable improvement both objectively and subjectively on his uroflow analysis  At this point we will discontinue his tamsulosin and his finasteride  I suspect his nocturia will continue to improve over the next few months or so  He will follow up in 9 months for symptom reassessment      Jeannine Lazo MD      Chief Complaint     Chief Complaint   Patient presents with    Benign Prostatic Hypertrophy     s/p urolift/BBX 2/9/18    Elevated PSA    bladder lesion         History of Present Illness     Uday Pryor is a 74 y o  male with medically refractory BPH as well as a bladder lesion status post Uro lift procedure as well as a bladder biopsy  Patient is doing very well  He has no pain  He states that his stream is remarkably improved    His nocturia has improved from 4 times per night down to 2 times per night  Daytime urgency is also improved  Detailed Urologic History     - please refer to HPI    Review of Systems     Review of Systems   Constitutional: Negative for activity change and fatigue  HENT: Negative for congestion  Eyes: Negative for visual disturbance  Respiratory: Negative for shortness of breath and wheezing  Cardiovascular: Negative for chest pain and leg swelling  Gastrointestinal: Negative for abdominal pain  Endocrine: Negative for polyuria  Genitourinary: Positive for frequency and urgency  Negative for dysuria, flank pain and hematuria  Musculoskeletal: Negative for back pain  Allergic/Immunologic: Negative for immunocompromised state  Neurological: Negative for dizziness and numbness  Psychiatric/Behavioral: Negative for dysphoric mood  All other systems reviewed and are negative  AUA SYMPTOM SCORE    Flowsheet Row Most Recent Value   AUA SYMPTOM SCORE   How often have you had a sensation of not emptying your bladder completely after you finished urinating? 0   How often have you had to urinate again less than two hours after you finished urinating? 1   How often have you found you stopped and started again several times when you urinate?  0   How often have you found it difficult to postpone urination? 0   How often have you had a weak urinary stream?  0   How often have you had to push or strain to begin urination? 0   How many times did you most typically get up to urinate from the time you went to bed at night until the time you got up in the morning? 3   Quality of Life: If you were to spend the rest of your life with your urinary condition just the way it is now, how would you feel about that?  1   AUA SYMPTOM SCORE  4            Allergies     Allergies   Allergen Reactions    Pollen Extract        Physical Exam     Physical Exam   Constitutional: He is oriented to person, place, and time  He appears well-developed and well-nourished  No distress  HENT:   Head: Normocephalic and atraumatic  Eyes: EOM are normal    Neck: Normal range of motion  Cardiovascular:   Negative lower extremity edema   Pulmonary/Chest: Effort normal and breath sounds normal    Abdominal: Soft  Genitourinary: Penis normal    Musculoskeletal: Normal range of motion  Neurological: He is alert and oriented to person, place, and time  Skin: Skin is warm  Psychiatric: He has a normal mood and affect   His behavior is normal            Vital Signs  Vitals:    03/14/18 1450   BP: 126/88   BP Location: Left arm   Patient Position: Sitting   Weight: 126 kg (278 lb)   Height: 5' 10" (1 778 m)         Current Medications       Current Outpatient Prescriptions:     allopurinol (ZYLOPRIM) 300 mg tablet, Take 300 mg by mouth daily, Disp: , Rfl:     amLODIPine (NORVASC) 5 mg tablet, Take 5 mg by mouth daily, Disp: , Rfl:     aspirin (ECOTRIN LOW STRENGTH) 81 mg EC tablet, Take 81 mg by mouth daily, Disp: , Rfl:     colestipol (COLESTID) 1 g tablet, Take 1 tablet by mouth 2 (two) times a day, Disp: , Rfl:     finasteride (PROSCAR) 5 mg tablet, Take 5 mg by mouth daily, Disp: , Rfl:     lisinopril (ZESTRIL) 20 mg tablet, Take 20 mg by mouth daily, Disp: , Rfl:     meclizine (ANTIVERT) 25 mg tablet, Take by mouth 2 (two) times a day as needed, Disp: , Rfl:     simvastatin (ZOCOR) 40 mg tablet, Take 40 mg by mouth daily at bedtime, Disp: , Rfl:     tamsulosin (FLOMAX) 0 4 mg, Take 0 4 mg by mouth daily with dinner, Disp: , Rfl:     allopurinol (ZYLOPRIM) 300 mg tablet, Take 1 tablet by mouth daily, Disp: , Rfl:     amLODIPine (NORVASC) 5 mg tablet, Take 1 tablet by mouth daily, Disp: , Rfl:     aspirin 81 MG tablet, Take by mouth, Disp: , Rfl:     docusate sodium (COLACE) 100 mg capsule, Take 1 capsule (100 mg total) by mouth 2 (two) times a day for 15 days, Disp: 30 capsule, Rfl: 0    finasteride (PROSCAR) 5 mg tablet, Take 1 tablet by mouth daily, Disp: , Rfl:   ibuprofen (MOTRIN) 200 mg tablet, Take 3 tablets (600 mg total) by mouth every 6 (six) hours as needed for mild pain, Disp: , Rfl: 0    lisinopril (ZESTRIL) 20 mg tablet, Take 1 tablet by mouth daily, Disp: , Rfl:     meclizine (ANTIVERT) 32 MG tablet, Take 32 mg by mouth 3 (three) times a day as needed for dizziness, Disp: , Rfl:     simvastatin (ZOCOR) 40 mg tablet, Take 1 tablet by mouth every other day, Disp: , Rfl:     tamsulosin (FLOMAX) 0 4 mg, Take 1 capsule by mouth daily, Disp: , Rfl:       Active Problems     There is no problem list on file for this patient          Past Medical History     Past Medical History:   Diagnosis Date    Acute gouty arthropathy     Enlarged prostate     Hearing loss     History of diverticulitis of colon     History of dizziness     Hypercholesterolemia     Hyperlipidemia     Hypertension     Palpitations     Sinus bradycardia          Surgical History     Past Surgical History:   Procedure Laterality Date    ARM NEUROPLASTY Left     1977    COLONOSCOPY      CYSTOSCOPY  12/27/2017    ORCHIECTOMY      MS CYSTOURETHRO W/IMPLANT N/A 2/9/2018    Procedure: CYSTOSCOPY WITH INSERTION UROLIFT;  Surgeon: Jj Shah MD;  Location: [facility];  Service: Urology    MS CYSTOURETHROSCOPY,BIOPSY N/A 2/9/2018    Procedure: BLADDER BIOPSY;  Surgeon: Jj Shah MD;  Location: [facility];  Service: Urology    TESTICLE SURGERY Right 1976         Family History     Family History   Problem Relation Age of Onset    Coronary artery disease Mother     Heart disease Mother     Hypertension Mother     Stroke Mother     Coronary artery disease Father          Social History     Social History     History   Smoking Status    Former Smoker    Quit date: 1971   Smokeless Tobacco    Never Used         Pertinent Lab Values     Lab Results   Component Value Date    CREATININE 1 34 (H) 01/16/2018       Lab Results   Component Value Date    PSA 2 9 10/04/2016    PSA 4 4 (H) 05/13/2016       @RESULTRCNT(1H])@      Pertinent Imaging      - n/a    Portions of the record may have been created with voice recognition software   Occasional wrong word or "sound a like" substitutions may have occurred due to the inherent limitations of voice recognition software   Read the chart carefully and recognize, using context, where substitutions have occurred

## 2018-03-14 NOTE — PROGRESS NOTES
Uroflow Result    Indication:     medically refractory lower urinary tract symptoms       Procedure  The patient was brought ambulatory to the [facility] and instructions provided  There asked to void volitionally into the uroflow apparatus  The following findings were noted:    Findings:  Voided Volume:   72  Maximum flow (Qmax):  9 ml/s  Average flow:    6 ml/s  Description of emptying curve:    Normal bell-shaped       Post Void Residual Measurement:  After voiding to completion the patient was brought to the exam room and positioned supine    Residual urine volume was measured with an ultrasound at:    Sixty-four ml

## 2018-04-09 DIAGNOSIS — R42 DIZZINESS: Primary | ICD-10-CM

## 2018-04-09 DIAGNOSIS — E78.5 HYPERLIPIDEMIA, UNSPECIFIED HYPERLIPIDEMIA TYPE: ICD-10-CM

## 2018-04-10 RX ORDER — MECLIZINE HYDROCHLORIDE 25 MG/1
25 TABLET ORAL 2 TIMES DAILY PRN
Qty: 180 TABLET | Refills: 1 | Status: SHIPPED | OUTPATIENT
Start: 2018-04-10 | End: 2019-03-13 | Stop reason: SDUPTHER

## 2018-04-10 RX ORDER — MONTELUKAST SODIUM 4 MG/1
1 TABLET, CHEWABLE ORAL 2 TIMES DAILY
Qty: 180 TABLET | Refills: 1 | Status: SHIPPED | OUTPATIENT
Start: 2018-04-10 | End: 2018-10-04 | Stop reason: SDUPTHER

## 2018-04-24 DIAGNOSIS — E78.5 HYPERLIPIDEMIA, UNSPECIFIED HYPERLIPIDEMIA TYPE: ICD-10-CM

## 2018-04-24 DIAGNOSIS — M10.9 GOUT, UNSPECIFIED CAUSE, UNSPECIFIED CHRONICITY, UNSPECIFIED SITE: Primary | ICD-10-CM

## 2018-04-24 RX ORDER — ALLOPURINOL 300 MG/1
300 TABLET ORAL DAILY
Qty: 90 TABLET | Refills: 0 | Status: SHIPPED | OUTPATIENT
Start: 2018-04-24 | End: 2018-04-30 | Stop reason: SDUPTHER

## 2018-04-24 RX ORDER — SIMVASTATIN 40 MG
40 TABLET ORAL EVERY OTHER DAY
Qty: 45 TABLET | Refills: 0 | Status: SHIPPED | OUTPATIENT
Start: 2018-04-24 | End: 2018-04-30 | Stop reason: SDUPTHER

## 2018-04-30 DIAGNOSIS — E78.5 HYPERLIPIDEMIA, UNSPECIFIED HYPERLIPIDEMIA TYPE: ICD-10-CM

## 2018-04-30 DIAGNOSIS — M10.9 GOUT, UNSPECIFIED CAUSE, UNSPECIFIED CHRONICITY, UNSPECIFIED SITE: ICD-10-CM

## 2018-04-30 RX ORDER — SIMVASTATIN 40 MG
40 TABLET ORAL EVERY OTHER DAY
Qty: 45 TABLET | Refills: 1 | Status: SHIPPED | OUTPATIENT
Start: 2018-04-30 | End: 2018-10-16 | Stop reason: SDUPTHER

## 2018-04-30 RX ORDER — ALLOPURINOL 300 MG/1
300 TABLET ORAL DAILY
Qty: 90 TABLET | Refills: 1 | Status: SHIPPED | OUTPATIENT
Start: 2018-04-30 | End: 2018-10-24 | Stop reason: SDUPTHER

## 2018-05-24 DIAGNOSIS — I10 ESSENTIAL HYPERTENSION, BENIGN: Primary | ICD-10-CM

## 2018-05-24 RX ORDER — LISINOPRIL 20 MG/1
20 TABLET ORAL DAILY
Qty: 90 TABLET | Refills: 0 | Status: SHIPPED | OUTPATIENT
Start: 2018-05-24 | End: 2018-08-21 | Stop reason: SDUPTHER

## 2018-06-18 DIAGNOSIS — I10 ESSENTIAL HYPERTENSION, BENIGN: Primary | ICD-10-CM

## 2018-06-18 RX ORDER — AMLODIPINE BESYLATE 5 MG/1
5 TABLET ORAL DAILY
Qty: 90 TABLET | Refills: 0 | Status: SHIPPED | OUTPATIENT
Start: 2018-06-18 | End: 2018-09-17 | Stop reason: SDUPTHER

## 2018-08-21 DIAGNOSIS — I10 ESSENTIAL HYPERTENSION, BENIGN: ICD-10-CM

## 2018-08-21 RX ORDER — LISINOPRIL 20 MG/1
20 TABLET ORAL DAILY
Qty: 90 TABLET | Refills: 1 | Status: SHIPPED | OUTPATIENT
Start: 2018-08-21 | End: 2019-03-04 | Stop reason: SDUPTHER

## 2018-08-29 ENCOUNTER — OFFICE VISIT (OUTPATIENT)
Dept: INTERNAL MEDICINE CLINIC | Age: 73
End: 2018-08-29
Payer: COMMERCIAL

## 2018-08-29 VITALS
HEIGHT: 69 IN | SYSTOLIC BLOOD PRESSURE: 132 MMHG | TEMPERATURE: 97.8 F | DIASTOLIC BLOOD PRESSURE: 68 MMHG | HEART RATE: 54 BPM | OXYGEN SATURATION: 97 % | BODY MASS INDEX: 40.67 KG/M2 | WEIGHT: 274.6 LBS

## 2018-08-29 DIAGNOSIS — G89.29 CHRONIC BILATERAL LOW BACK PAIN WITHOUT SCIATICA: ICD-10-CM

## 2018-08-29 DIAGNOSIS — Z12.11 SCREENING FOR COLON CANCER: ICD-10-CM

## 2018-08-29 DIAGNOSIS — G47.33 OBSTRUCTIVE SLEEP APNEA: Primary | ICD-10-CM

## 2018-08-29 DIAGNOSIS — E78.00 HYPERCHOLESTEROLEMIA: ICD-10-CM

## 2018-08-29 DIAGNOSIS — M54.50 CHRONIC BILATERAL LOW BACK PAIN WITHOUT SCIATICA: ICD-10-CM

## 2018-08-29 DIAGNOSIS — E79.0 HYPERURICEMIA: ICD-10-CM

## 2018-08-29 DIAGNOSIS — I10 BENIGN ESSENTIAL HYPERTENSION: ICD-10-CM

## 2018-08-29 PROCEDURE — 1101F PT FALLS ASSESS-DOCD LE1/YR: CPT | Performed by: INTERNAL MEDICINE

## 2018-08-29 PROCEDURE — 3078F DIAST BP <80 MM HG: CPT | Performed by: INTERNAL MEDICINE

## 2018-08-29 PROCEDURE — 3008F BODY MASS INDEX DOCD: CPT | Performed by: INTERNAL MEDICINE

## 2018-08-29 PROCEDURE — 3075F SYST BP GE 130 - 139MM HG: CPT | Performed by: INTERNAL MEDICINE

## 2018-08-29 PROCEDURE — 1160F RVW MEDS BY RX/DR IN RCRD: CPT | Performed by: INTERNAL MEDICINE

## 2018-08-29 PROCEDURE — 3725F SCREEN DEPRESSION PERFORMED: CPT | Performed by: INTERNAL MEDICINE

## 2018-08-29 PROCEDURE — 1036F TOBACCO NON-USER: CPT | Performed by: INTERNAL MEDICINE

## 2018-08-29 PROCEDURE — 99214 OFFICE O/P EST MOD 30 MIN: CPT | Performed by: INTERNAL MEDICINE

## 2018-08-29 NOTE — PROGRESS NOTES
Assessment/Plan:     No problem-specific Assessment & Plan notes found for this encounter  Diagnoses and all orders for this visit:    Obstructive sleep apnea   patient is not using any CPAP mask he never went for it  Screening for colon cancer  -     Ambulatory referral to Gastroenterology; Future    Benign essential hypertension   hypertension is very well controlled  Chronic bilateral low back pain without sciatica    Lower back pain and hip pain are the chronic problems exercise weight loss may help overall  Hypercholesterolemia   will follow up with the blood workup    Hyperuricemia   will check the uric acid level no episodes of gout so far since he is on allopurinol        Subjective:    back and hip pain   Patient ID: Nazia Moctezuma is a 74 y o  male  HPI    The following portions of the patient's history were reviewed and updated as appropriate: allergies, current medications, past family history, past medical history, past social history, past surgical history and problem list     Review of Systems   Constitutional: Negative for appetite change, fatigue and fever  HENT: Negative for congestion, ear pain, hearing loss, nosebleeds, sneezing, tinnitus and voice change  Eyes: Negative for pain, discharge and redness  Respiratory: Negative for cough, chest tightness and wheezing  Cardiovascular: Negative for chest pain, palpitations and leg swelling  Gastrointestinal: Negative for abdominal pain, blood in stool, constipation, diarrhea, nausea and vomiting  Genitourinary: Negative for difficulty urinating, dysuria, hematuria and urgency  Musculoskeletal: Positive for arthralgias and back pain  Negative for gait problem and joint swelling  Both hip   Skin: Negative for rash and wound  Allergic/Immunologic: Negative for environmental allergies  Neurological: Negative for dizziness, tremors, seizures, weakness, light-headedness and numbness     Hematological: Negative for adenopathy  Does not bruise/bleed easily  Psychiatric/Behavioral: Negative for behavioral problems and confusion  The patient is not nervous/anxious            Past Medical History:   Diagnosis Date    Acute gouty arthropathy     last assessed-11/15/2013    Cataract     Diverticulitis of colon     Enlarged prostate     Gout     Hearing loss     History of diverticulitis of colon     History of dizziness     Hypercholesterolemia     Hyperlipidemia     Hypertension     Palpitations     Sinus bradycardia          Current Outpatient Prescriptions:     allopurinol (ZYLOPRIM) 300 mg tablet, Take 1 tablet (300 mg total) by mouth daily Address refill requests at office visit 7/31/18, Disp: 90 tablet, Rfl: 1    amLODIPine (NORVASC) 5 mg tablet, Take 1 tablet (5 mg total) by mouth daily, Disp: 90 tablet, Rfl: 0    colestipol (COLESTID) 1 g tablet, Take 1 tablet (1 g total) by mouth 2 (two) times a day, Disp: 180 tablet, Rfl: 1    lisinopril (ZESTRIL) 20 mg tablet, Take 1 tablet (20 mg total) by mouth daily, Disp: 90 tablet, Rfl: 1    meclizine (ANTIVERT) 25 mg tablet, Take 1 tablet (25 mg total) by mouth 2 (two) times a day as needed for dizziness, Disp: 180 tablet, Rfl: 1    simvastatin (ZOCOR) 40 mg tablet, Take 1 tablet (40 mg total) by mouth every other day Address refill requests at office visit 7/31/18, Disp: 45 tablet, Rfl: 1    docusate sodium (COLACE) 100 mg capsule, Take 1 capsule (100 mg total) by mouth 2 (two) times a day for 15 days, Disp: 30 capsule, Rfl: 0    Allergies   Allergen Reactions    Pollen Extract        Social History   Past Surgical History:   Procedure Laterality Date    ARM NEUROPLASTY Left     1977    COLONOSCOPY      CYSTOSCOPY  12/27/2017    diagnostic    FOREARM FRACTURE SURGERY      ORCHIECTOMY      surgery testis    [place] CYSTOURETHRO W/IMPLANT N/A 2/9/2018    Procedure: CYSTOSCOPY WITH INSERTION UROLIFT;  Surgeon: Soledad Goodell, MD;  [facility]; Service: Urology    [place] CYSTOURETHROSCOPY,BIOPSY N/A 2/9/2018    Procedure: BLADDER BIOPSY;  Surgeon: Aretha Walker MD;  [facility];  Service: Urology    TESTICLE SURGERY Right 1976     Family History   Problem Relation Age of Onset    Coronary artery disease Mother     Heart disease Mother     Hypertension Mother     Stroke Mother     Coronary artery disease Father     Stroke Family     Heart attack Family         acute MI       Objective:  /68 (BP Location: Left arm, Patient Position: Sitting, Cuff Size: Standard)   Pulse (!) 54   Temp 97 8 °F (36 6 °C) (Tympanic)   Ht 5' 8 9" (1 75 m)   Wt 125 kg (274 lb 9 6 oz)   SpO2 97%   BMI 40 67 kg/m²        Physical Exam   Constitutional: He is oriented to person, place, and time  He appears well-developed and well-nourished  HENT:   Right Ear: External ear normal    Mouth/Throat: Oropharynx is clear and moist    Eyes: Conjunctivae and EOM are normal  Pupils are equal, round, and reactive to light  Neck: Normal range of motion  No JVD present  No thyromegaly present  Cardiovascular: Normal rate, regular rhythm, normal heart sounds and intact distal pulses  Pulmonary/Chest: Breath sounds normal    Abdominal: Soft  Bowel sounds are normal    Musculoskeletal: Normal range of motion  Lymphadenopathy:     He has no cervical adenopathy  Neurological: He is alert and oriented to person, place, and time  He has normal reflexes  Skin: Skin is dry  Psychiatric: He has a normal mood and affect  His behavior is normal  Judgment and thought content normal    Nursing note and vitals reviewed

## 2018-09-17 DIAGNOSIS — I10 ESSENTIAL HYPERTENSION, BENIGN: ICD-10-CM

## 2018-09-17 RX ORDER — AMLODIPINE BESYLATE 5 MG/1
5 TABLET ORAL DAILY
Qty: 90 TABLET | Refills: 1 | Status: SHIPPED | OUTPATIENT
Start: 2018-09-17 | End: 2019-03-13 | Stop reason: SDUPTHER

## 2018-10-02 ENCOUNTER — CLINICAL SUPPORT (OUTPATIENT)
Dept: INTERNAL MEDICINE CLINIC | Age: 73
End: 2018-10-02
Payer: COMMERCIAL

## 2018-10-02 DIAGNOSIS — Z23 NEED FOR INFLUENZA VACCINATION: Primary | ICD-10-CM

## 2018-10-02 PROCEDURE — 90662 IIV NO PRSV INCREASED AG IM: CPT

## 2018-10-02 PROCEDURE — 4040F PNEUMOC VAC/ADMIN/RCVD: CPT

## 2018-10-02 PROCEDURE — G0008 ADMIN INFLUENZA VIRUS VAC: HCPCS

## 2018-10-04 DIAGNOSIS — E78.5 HYPERLIPIDEMIA, UNSPECIFIED HYPERLIPIDEMIA TYPE: ICD-10-CM

## 2018-10-04 RX ORDER — MONTELUKAST SODIUM 4 MG/1
1 TABLET, CHEWABLE ORAL 2 TIMES DAILY
Qty: 180 TABLET | Refills: 1 | Status: SHIPPED | OUTPATIENT
Start: 2018-10-04 | End: 2019-03-13 | Stop reason: SDUPTHER

## 2018-10-16 DIAGNOSIS — E78.5 HYPERLIPIDEMIA, UNSPECIFIED HYPERLIPIDEMIA TYPE: ICD-10-CM

## 2018-10-16 RX ORDER — SIMVASTATIN 40 MG
40 TABLET ORAL EVERY OTHER DAY
Qty: 45 TABLET | Refills: 1 | Status: SHIPPED | OUTPATIENT
Start: 2018-10-16 | End: 2018-10-24 | Stop reason: SDUPTHER

## 2018-10-24 DIAGNOSIS — M10.9 GOUT, UNSPECIFIED CAUSE, UNSPECIFIED CHRONICITY, UNSPECIFIED SITE: ICD-10-CM

## 2018-10-24 DIAGNOSIS — E78.5 HYPERLIPIDEMIA, UNSPECIFIED HYPERLIPIDEMIA TYPE: ICD-10-CM

## 2018-10-25 RX ORDER — SIMVASTATIN 40 MG
40 TABLET ORAL EVERY OTHER DAY
Qty: 45 TABLET | Refills: 1 | Status: SHIPPED | OUTPATIENT
Start: 2018-10-25 | End: 2019-04-16 | Stop reason: SDUPTHER

## 2018-10-25 RX ORDER — ALLOPURINOL 300 MG/1
300 TABLET ORAL DAILY
Qty: 90 TABLET | Refills: 1 | Status: SHIPPED | OUTPATIENT
Start: 2018-10-25 | End: 2019-04-25 | Stop reason: SDUPTHER

## 2018-12-17 NOTE — PROGRESS NOTES
12/19/2018    Lia Godinez  1945  [id number]      Assessment  -BPH s/p Urolift (2/9/18)  -Bladder lesion s/p biopsy (2/9/18)    Miguel Barnes is a 74 y o  male being managed by Dr Robles Mcclain        -Patient continues to do well from a urinary standpoint  He will remain off of tamsulosin and finasteride  We discussed his symptom of nocturia, as this may be secondary to underlying sleep apnea as he reports snoring  Offered patient referral for sleep study, however he defers at this time  Patient states he is not bothered by this symptom  -Patient was noted to have elevated BP today, 162/102  He states that he regularly checks BP at home, which is typically WNL  Advise he follow up with PCP  -Follow up in 1 year with PVR and uroflow  He was instructed to call with any issues  -All questions answered, patient agrees with plan      History of Present Illness  74 y o  male with a history of BPH and bladder lesion s/p Urolift and bladder biopsy (2/9/18) presents today for follow up  At last office visit, patient had discontinued use of flomax and finasteride  Patient reports that he is pleased with his urinary pattern though she continues to report episodes of daytime frequency and nocturia up to 4 times per night, however he does not find bothersome  Patient does admit to snoring and could have underlying sleep apnea  He feels he empties his bladder to completion and has a strong urinary stream   No reports of gross hematuria or dysuria  His final pathology revealed benign tissue from bladder lesion  No overall changes to health since last office visit  Review of Systems  Review of Systems   Constitutional: Negative  HENT: Negative  Respiratory: Negative  Cardiovascular: Negative  Gastrointestinal: Negative  Genitourinary: Positive for frequency ( nocturia)  Negative for decreased urine volume, difficulty urinating, dysuria, flank pain, hematuria and urgency  Musculoskeletal: Negative  Skin: Negative  Neurological: Negative  Psychiatric/Behavioral: Negative  AUA SYMPTOM SCORE      Most Recent Value   AUA SYMPTOM SCORE   How often have you had a sensation of not emptying your bladder completely after you finished urinating? 1   How often have you had to urinate again less than two hours after you finished urinating? 4   How often have you found you stopped and started again several times when you urinate? 1   How often have you found it difficult to postpone urination? 0   How often have you had a weak urinary stream?  0   How often have you had to push or strain to begin urination? 0   How many times did you most typically get up to urinate from the time you went to bed at night until the time you got up in the morning?   4   Quality of Life: If you were to spend the rest of your life with your urinary condition just the way it is now, how would you feel about that?  1   AUA SYMPTOM SCORE  10            Past Medical History  Past Medical History:   Diagnosis Date    Acute gouty arthropathy     last assessed-11/15/2013    Cataract     Diverticulitis of colon     Enlarged prostate     Gout     Hearing loss     History of diverticulitis of colon     History of dizziness     Hypercholesterolemia     Hyperlipidemia     Hypertension     Palpitations     Sinus bradycardia        Past Social History  Past Surgical History:   Procedure Laterality Date    ARM NEUROPLASTY Left     1977    COLONOSCOPY      CYSTOSCOPY  12/27/2017    diagnostic    FOREARM FRACTURE SURGERY      ORCHIECTOMY      surgery testis    [place] CYSTOURETHRO W/IMPLANT N/A 2/9/2018    Procedure: CYSTOSCOPY WITH INSERTION Riya Oats;  Surgeon: Verner Squires, MD;  [facility];  Service: Urology     [facility] N/A 2/9/2018    Procedure: BLADDER BIOPSY;  Surgeon: Verner Squires, MD;  [facility];  Service: Urology    TESTICLE SURGERY Right 1976       Past Family History  Family History   Problem Relation Age of Onset    Coronary artery disease Mother     Heart disease Mother     Hypertension Mother     Stroke Mother     Coronary artery disease Father     Stroke Family     Heart attack Family         acute MI       Past Social history  Social History     Social History    Marital status: /Civil Union     Spouse name: N/A    Number of children: N/A    Years of education: N/A     Occupational History          retired from work     Social History Main Topics    Smoking status: Former Smoker     Quit date: 1971    Smokeless tobacco: Never Used    Alcohol use Yes      Comment: rarely   Per allscripts, drinks alcohol very infrequently    Drug use: No    Sexual activity: Not on file     Other Topics Concern    Not on file     Social History Narrative    Copy of advanced directive obtained    Exercising regularly-primary form of exercise is work    Recreational activities-he enjoys home crafts and wood working    Travel history-Pt denies being out of the country during 1/2014-11/10/2014       Current Medications  Current Outpatient Prescriptions   Medication Sig Dispense Refill    allopurinol (ZYLOPRIM) 300 mg tablet Take 1 tablet (300 mg total) by mouth daily 90 tablet 1    amLODIPine (NORVASC) 5 mg tablet Take 1 tablet (5 mg total) by mouth daily 90 tablet 1    colestipol (COLESTID) 1 g tablet Take 1 tablet (1 g total) by mouth 2 (two) times a day 180 tablet 1    lisinopril (ZESTRIL) 20 mg tablet Take 1 tablet (20 mg total) by mouth daily 90 tablet 1    meclizine (ANTIVERT) 25 mg tablet Take 1 tablet (25 mg total) by mouth 2 (two) times a day as needed for dizziness 180 tablet 1    simvastatin (ZOCOR) 40 mg tablet Take 1 tablet (40 mg total) by mouth every other day 45 tablet 1    docusate sodium (COLACE) 100 mg capsule Take 1 capsule (100 mg total) by mouth 2 (two) times a day for 15 days 30 capsule 0     No current facility-administered medications for this visit  Allergies  Allergies   Allergen Reactions    Pollen Extract        Past Medical History, Social History, Family History, medications and allergies were reviewed  Vitals  Vitals:    12/19/18 0840   BP: (!) 162/102   BP Location: Left arm   Patient Position: Sitting   Cuff Size: Adult   Pulse: (!) 52   Weight: 124 kg (274 lb)   Height: 5' 10" (1 778 m)       Physical Exam  Physical Exam   Constitutional: He is oriented to person, place, and time  He appears well-developed and well-nourished  HENT:   Head: Normocephalic  Eyes: Pupils are equal, round, and reactive to light  Neck: Normal range of motion  Cardiovascular: Normal rate and regular rhythm  Pulmonary/Chest: Effort normal    Abdominal: Soft  Normal appearance  He exhibits no distension  There is no tenderness  There is no CVA tenderness  Musculoskeletal: Normal range of motion  Neurological: He is alert and oriented to person, place, and time  Skin: Skin is warm and dry  Psychiatric: He has a normal mood and affect  His behavior is normal  Judgment and thought content normal        Results    I have personally reviewed all pertinent lab results and reviewed with patient  Lab Results   Component Value Date    PSA 3 2 10/23/2017    PSA 3 1 06/30/2017    PSA 3 9 12/30/2016     Lab Results   Component Value Date    CALCIUM 9 9 01/16/2018    K 4 2 01/16/2018    CO2 28 01/16/2018     01/16/2018    BUN 17 01/16/2018    CREATININE 1 34 (H) 01/16/2018     Lab Results   Component Value Date    WBC 8 06 01/16/2018    HGB 17 2 (H) 01/16/2018    HCT 52 6 (H) 01/16/2018    MCV 92 01/16/2018     01/16/2018     No results found for this or any previous visit (from the past 1 hour(s))

## 2018-12-19 ENCOUNTER — OFFICE VISIT (OUTPATIENT)
Dept: UROLOGY | Facility: CLINIC | Age: 73
End: 2018-12-19
Payer: COMMERCIAL

## 2018-12-19 VITALS
HEIGHT: 70 IN | SYSTOLIC BLOOD PRESSURE: 162 MMHG | DIASTOLIC BLOOD PRESSURE: 102 MMHG | HEART RATE: 52 BPM | WEIGHT: 274 LBS | BODY MASS INDEX: 39.22 KG/M2

## 2018-12-19 DIAGNOSIS — N32.9 HYPERVASCULAR LESION OF URINARY BLADDER: ICD-10-CM

## 2018-12-19 DIAGNOSIS — N40.1 BENIGN PROSTATIC HYPERPLASIA WITH LOWER URINARY TRACT SYMPTOMS, SYMPTOM DETAILS UNSPECIFIED: Primary | ICD-10-CM

## 2018-12-19 PROCEDURE — 99213 OFFICE O/P EST LOW 20 MIN: CPT | Performed by: NURSE PRACTITIONER

## 2019-02-14 ENCOUNTER — CLINICAL SUPPORT (OUTPATIENT)
Dept: INTERNAL MEDICINE CLINIC | Facility: CLINIC | Age: 74
End: 2019-02-14
Payer: COMMERCIAL

## 2019-02-14 DIAGNOSIS — I10 BENIGN ESSENTIAL HYPERTENSION: ICD-10-CM

## 2019-02-14 DIAGNOSIS — E78.00 HYPERCHOLESTEROLEMIA: Primary | ICD-10-CM

## 2019-02-14 DIAGNOSIS — G47.33 OBSTRUCTIVE SLEEP APNEA: ICD-10-CM

## 2019-02-14 DIAGNOSIS — E79.0 HYPERURICEMIA: ICD-10-CM

## 2019-02-14 LAB
ALBUMIN SERPL BCP-MCNC: 4.2 G/DL (ref 3.5–5)
ALP SERPL-CCNC: 83 U/L (ref 46–116)
ALT SERPL W P-5'-P-CCNC: 25 U/L (ref 12–78)
ANION GAP SERPL CALCULATED.3IONS-SCNC: 7 MMOL/L (ref 4–13)
AST SERPL W P-5'-P-CCNC: 18 U/L (ref 5–45)
BASOPHILS # BLD AUTO: 0.05 THOUSANDS/ΜL (ref 0–0.1)
BASOPHILS NFR BLD AUTO: 1 % (ref 0–1)
BILIRUB SERPL-MCNC: 1.01 MG/DL (ref 0.2–1)
BILIRUB UR QL STRIP: NEGATIVE
BUN SERPL-MCNC: 21 MG/DL (ref 5–25)
CALCIUM SERPL-MCNC: 9.6 MG/DL (ref 8.3–10.1)
CHLORIDE SERPL-SCNC: 106 MMOL/L (ref 100–108)
CHOLEST SERPL-MCNC: 163 MG/DL (ref 50–200)
CLARITY UR: CLEAR
CO2 SERPL-SCNC: 28 MMOL/L (ref 21–32)
COLOR UR: YELLOW
CREAT SERPL-MCNC: 1.23 MG/DL (ref 0.6–1.3)
EOSINOPHIL # BLD AUTO: 0.17 THOUSAND/ΜL (ref 0–0.61)
EOSINOPHIL NFR BLD AUTO: 2 % (ref 0–6)
ERYTHROCYTE [DISTWIDTH] IN BLOOD BY AUTOMATED COUNT: 13.9 % (ref 11.6–15.1)
GFR SERPL CREATININE-BSD FRML MDRD: 57 ML/MIN/1.73SQ M
GLUCOSE P FAST SERPL-MCNC: 80 MG/DL (ref 65–99)
GLUCOSE UR STRIP-MCNC: NEGATIVE MG/DL
HCT VFR BLD AUTO: 57.8 % (ref 36.5–49.3)
HDLC SERPL-MCNC: 46 MG/DL (ref 40–60)
HGB BLD-MCNC: 18.4 G/DL (ref 12–17)
HGB UR QL STRIP.AUTO: NEGATIVE
IMM GRANULOCYTES # BLD AUTO: 0.02 THOUSAND/UL (ref 0–0.2)
IMM GRANULOCYTES NFR BLD AUTO: 0 % (ref 0–2)
KETONES UR STRIP-MCNC: NEGATIVE MG/DL
LDLC SERPL CALC-MCNC: 87 MG/DL (ref 0–100)
LEUKOCYTE ESTERASE UR QL STRIP: NEGATIVE
LYMPHOCYTES # BLD AUTO: 1.7 THOUSANDS/ΜL (ref 0.6–4.47)
LYMPHOCYTES NFR BLD AUTO: 22 % (ref 14–44)
MCH RBC QN AUTO: 31 PG (ref 26.8–34.3)
MCHC RBC AUTO-ENTMCNC: 31.8 G/DL (ref 31.4–37.4)
MCV RBC AUTO: 97 FL (ref 82–98)
MONOCYTES # BLD AUTO: 0.79 THOUSAND/ΜL (ref 0.17–1.22)
MONOCYTES NFR BLD AUTO: 10 % (ref 4–12)
NEUTROPHILS # BLD AUTO: 4.86 THOUSANDS/ΜL (ref 1.85–7.62)
NEUTS SEG NFR BLD AUTO: 65 % (ref 43–75)
NITRITE UR QL STRIP: NEGATIVE
NONHDLC SERPL-MCNC: 117 MG/DL
NRBC BLD AUTO-RTO: 0 /100 WBCS
PH UR STRIP.AUTO: 6.5 [PH] (ref 4.5–8)
PLATELET # BLD AUTO: 192 THOUSANDS/UL (ref 149–390)
PMV BLD AUTO: 12.1 FL (ref 8.9–12.7)
POTASSIUM SERPL-SCNC: 4.4 MMOL/L (ref 3.5–5.3)
PROT SERPL-MCNC: 7.7 G/DL (ref 6.4–8.2)
PROT UR STRIP-MCNC: NEGATIVE MG/DL
RBC # BLD AUTO: 5.94 MILLION/UL (ref 3.88–5.62)
SODIUM SERPL-SCNC: 141 MMOL/L (ref 136–145)
SP GR UR STRIP.AUTO: 1.02 (ref 1–1.03)
TRIGL SERPL-MCNC: 151 MG/DL
TSH SERPL DL<=0.05 MIU/L-ACNC: 2.03 UIU/ML (ref 0.36–3.74)
URATE SERPL-MCNC: 3.8 MG/DL (ref 4.2–8)
UROBILINOGEN UR QL STRIP.AUTO: 0.2 E.U./DL
WBC # BLD AUTO: 7.59 THOUSAND/UL (ref 4.31–10.16)

## 2019-02-14 PROCEDURE — 80061 LIPID PANEL: CPT | Performed by: INTERNAL MEDICINE

## 2019-02-14 PROCEDURE — 85025 COMPLETE CBC W/AUTO DIFF WBC: CPT | Performed by: INTERNAL MEDICINE

## 2019-02-14 PROCEDURE — 84443 ASSAY THYROID STIM HORMONE: CPT | Performed by: INTERNAL MEDICINE

## 2019-02-14 PROCEDURE — 81003 URINALYSIS AUTO W/O SCOPE: CPT | Performed by: INTERNAL MEDICINE

## 2019-02-14 PROCEDURE — 80053 COMPREHEN METABOLIC PANEL: CPT | Performed by: INTERNAL MEDICINE

## 2019-02-14 PROCEDURE — 36415 COLL VENOUS BLD VENIPUNCTURE: CPT

## 2019-02-14 PROCEDURE — 84550 ASSAY OF BLOOD/URIC ACID: CPT | Performed by: INTERNAL MEDICINE

## 2019-03-04 ENCOUNTER — OFFICE VISIT (OUTPATIENT)
Dept: INTERNAL MEDICINE CLINIC | Age: 74
End: 2019-03-04
Payer: COMMERCIAL

## 2019-03-04 VITALS
HEART RATE: 68 BPM | WEIGHT: 274 LBS | DIASTOLIC BLOOD PRESSURE: 76 MMHG | TEMPERATURE: 97.5 F | BODY MASS INDEX: 40.58 KG/M2 | OXYGEN SATURATION: 98 % | HEIGHT: 69 IN | SYSTOLIC BLOOD PRESSURE: 140 MMHG

## 2019-03-04 DIAGNOSIS — R97.20 ELEVATED PSA: ICD-10-CM

## 2019-03-04 DIAGNOSIS — R00.1 BRADYCARDIA: ICD-10-CM

## 2019-03-04 DIAGNOSIS — D75.1 POLYCYTHEMIA: ICD-10-CM

## 2019-03-04 DIAGNOSIS — I10 BENIGN ESSENTIAL HYPERTENSION: Primary | ICD-10-CM

## 2019-03-04 DIAGNOSIS — I10 ESSENTIAL HYPERTENSION, BENIGN: ICD-10-CM

## 2019-03-04 DIAGNOSIS — G47.33 OBSTRUCTIVE SLEEP APNEA: ICD-10-CM

## 2019-03-04 PROCEDURE — 93000 ELECTROCARDIOGRAM COMPLETE: CPT | Performed by: INTERNAL MEDICINE

## 2019-03-04 PROCEDURE — 99214 OFFICE O/P EST MOD 30 MIN: CPT | Performed by: INTERNAL MEDICINE

## 2019-03-04 PROCEDURE — 1036F TOBACCO NON-USER: CPT | Performed by: INTERNAL MEDICINE

## 2019-03-04 PROCEDURE — 1160F RVW MEDS BY RX/DR IN RCRD: CPT | Performed by: INTERNAL MEDICINE

## 2019-03-04 RX ORDER — LISINOPRIL 20 MG/1
20 TABLET ORAL DAILY
Qty: 90 TABLET | Refills: 1 | Status: SHIPPED | OUTPATIENT
Start: 2019-03-04 | End: 2019-08-05 | Stop reason: SDUPTHER

## 2019-03-04 NOTE — PROGRESS NOTES
Assessment/Plan:    Polycythemia  Patient's the hemoglobin and hematocrit is elevated WBC count and platelet count is normal this is the 2nd time that his the numbers are higher than normal he does not have a history of smoking he is not smoking a recently  I will REM recommend Hematology-Oncology consultation for further evaluation of polycythemia and the management    Hyperuricemia  Hyperuricemia is very well controlled last the uric acid level was 3 9 no episodes of gout    Hypercholesterolemia  Lipid panel is excellent HDL LDL are within normal range continue with the same medication no changes    Elevated PSA  He is followed up by the Urology for increased PSA I reviewed the notes from the Urology    Benign essential hypertension  Blood pressure is reasonably good control but the pulses in the lower side mostly in 50s he does not have any symptoms right now he has a family history of a sick sinus syndrome he also complain of some irregular heartbeats no chest pain or shortness of breath I will recommend 48 hour heart monitor and possibly a cardiology consultation for further evaluation    Obstructive sleep apnea  He was diagnosed with mild sleep apnea but he is not using CPAP mask       Diagnoses and all orders for this visit:    Benign essential hypertension    Essential hypertension, benign  -     lisinopril (ZESTRIL) 20 mg tablet; Take 1 tablet (20 mg total) by mouth daily    Elevated PSA    Obstructive sleep apnea    Polycythemia        Subjective:   Here for the regular follow-up of his medical condition complaining of bradycardia he brought his blood pressure readings most of the blood pressure reading for ease age are not too bad but the bradycardia is prominent   Patient ID: Dara Flannery is a 74 y o  male      HPI  This is a very pleasant 74 years young gentleman here for the regular follow-up for his medical condition as given above complaining of bradycardia he has a chronic problem with dizziness and vertigo I do not think so it is related to the bradycardia his heart rate goes in 30s and 40s some time he has a family history of a pacemaker I will order a 48 hours Holter and electrophysiology consultation for further evaluation his TSH was normal  The following portions of the patient's history were reviewed and updated as appropriate: allergies, current medications, past family history, past medical history, past social history, past surgical history and problem list     Review of Systems   Constitutional: Negative for appetite change, fatigue and fever  HENT: Negative for congestion, ear pain, hearing loss, nosebleeds, sneezing, tinnitus and voice change  Eyes: Negative for pain, discharge and redness  Respiratory: Negative for cough, chest tightness and wheezing  Cardiovascular: Negative for chest pain, palpitations and leg swelling  Gastrointestinal: Negative for abdominal pain, blood in stool, constipation, diarrhea, nausea and vomiting  Genitourinary: Negative for difficulty urinating, dysuria, hematuria and urgency  Musculoskeletal: Positive for arthralgias and back pain  Negative for gait problem and joint swelling  Both hip   Skin: Negative for rash and wound  Allergic/Immunologic: Negative for environmental allergies  Neurological: Negative for dizziness, tremors, seizures, weakness, light-headedness and numbness  Hematological: Negative for adenopathy  Does not bruise/bleed easily  Psychiatric/Behavioral: Negative for behavioral problems and confusion  The patient is not nervous/anxious            Past Medical History:   Diagnosis Date    Acute gouty arthropathy     last assessed-11/15/2013    Cataract     Diverticulitis of colon     Enlarged prostate     Gout     Hearing loss     History of diverticulitis of colon     History of dizziness     Hypercholesterolemia     Hyperlipidemia     Hypertension     Palpitations     Sinus bradycardia          Current Outpatient Medications:     allopurinol (ZYLOPRIM) 300 mg tablet, Take 1 tablet (300 mg total) by mouth daily, Disp: 90 tablet, Rfl: 1    amLODIPine (NORVASC) 5 mg tablet, Take 1 tablet (5 mg total) by mouth daily, Disp: 90 tablet, Rfl: 1    colestipol (COLESTID) 1 g tablet, Take 1 tablet (1 g total) by mouth 2 (two) times a day, Disp: 180 tablet, Rfl: 1    lisinopril (ZESTRIL) 20 mg tablet, Take 1 tablet (20 mg total) by mouth daily, Disp: 90 tablet, Rfl: 1    meclizine (ANTIVERT) 25 mg tablet, Take 1 tablet (25 mg total) by mouth 2 (two) times a day as needed for dizziness, Disp: 180 tablet, Rfl: 1    simvastatin (ZOCOR) 40 mg tablet, Take 1 tablet (40 mg total) by mouth every other day, Disp: 45 tablet, Rfl: 1    docusate sodium (COLACE) 100 mg capsule, Take 1 capsule (100 mg total) by mouth 2 (two) times a day for 15 days, Disp: 30 capsule, Rfl: 0    Allergies   Allergen Reactions    Pollen Extract        Social History   Past Surgical History:   Procedure Laterality Date    ARM NEUROPLASTY Left     1977    COLONOSCOPY      CYSTOSCOPY  12/27/2017    diagnostic    FOREARM FRACTURE SURGERY      ORCHIECTOMY      surgery testis    [place] CYSTOURETHRO W/IMPLANT N/A 2/9/2018    Procedure: CYSTOSCOPY WITH INSERTION Akosua Coad;  Surgeon: Saintclair Lah, MD;  [facility];  Service: Urology    MA CYSTOURETHROSCOPY,BIOPSY N/A 2/9/2018    Procedure: BLADDER BIOPSY;  Surgeon: Saintclair Lah, MD;  [facility];  Service: Urology    TESTICLE SURGERY Right 1976     Family History   Problem Relation Age of Onset    Coronary artery disease Mother     Heart disease Mother     Hypertension Mother     Stroke Mother     Coronary artery disease Father     Stroke Family     Heart attack Family         acute MI       Objective:  /76 (BP Location: Left arm, Patient Position: Sitting, Cuff Size: Adult)   Pulse 68   Temp 97 5 °F (36 4 °C) (Tympanic)   Ht 5' 8 66" (1 744 m)   Wt 124 kg (274 lb)   SpO2 98%   BMI 40 86 kg/m²        Physical Exam   Constitutional: He is oriented to person, place, and time  He appears well-developed and well-nourished  HENT:   Right Ear: External ear normal    Mouth/Throat: Oropharynx is clear and moist    Eyes: Pupils are equal, round, and reactive to light  Conjunctivae and EOM are normal    Neck: Normal range of motion  No JVD present  No thyromegaly present  Cardiovascular: Normal rate, regular rhythm, normal heart sounds and intact distal pulses  Pulmonary/Chest: Breath sounds normal    Abdominal: Soft  Bowel sounds are normal    Musculoskeletal: Normal range of motion  Lymphadenopathy:     He has no cervical adenopathy  Neurological: He is alert and oriented to person, place, and time  He has normal reflexes  Skin: Skin is dry  Psychiatric: He has a normal mood and affect  His behavior is normal  Judgment and thought content normal    Nursing note and vitals reviewed  EKG:  Normal sinus rhythm with first-degree AV block PVC no new specific ST-T changes discussed with the patient he was scheduled for a consultation with Cardiology for bradycardia        Recent Results (from the past 672 hour(s))   CBC and differential    Collection Time: 02/14/19 12:06 PM   Result Value Ref Range    WBC 7 59 4 31 - 10 16 Thousand/uL    RBC 5 94 (H) 3 88 - 5 62 Million/uL    Hemoglobin 18 4 (H) 12 0 - 17 0 g/dL    Hematocrit 57 8 (H) 36 5 - 49 3 %    MCV 97 82 - 98 fL    MCH 31 0 26 8 - 34 3 pg    MCHC 31 8 31 4 - 37 4 g/dL    RDW 13 9 11 6 - 15 1 %    MPV 12 1 8 9 - 12 7 fL    Platelets 893 636 - 216 Thousands/uL    nRBC 0 /100 WBCs    Neutrophils Relative 65 43 - 75 %    Immat GRANS % 0 0 - 2 %    Lymphocytes Relative 22 14 - 44 %    Monocytes Relative 10 4 - 12 %    Eosinophils Relative 2 0 - 6 %    Basophils Relative 1 0 - 1 %    Neutrophils Absolute 4 86 1 85 - 7 62 Thousands/µL    Immature Grans Absolute 0 02 0 00 - 0 20 Thousand/uL    Lymphocytes Absolute 1 70 0 60 - 4 47 Thousands/µL    Monocytes Absolute 0 79 0 17 - 1 22 Thousand/µL    Eosinophils Absolute 0 17 0 00 - 0 61 Thousand/µL    Basophils Absolute 0 05 0 00 - 0 10 Thousands/µL   Comprehensive metabolic panel    Collection Time: 02/14/19 12:06 PM   Result Value Ref Range    Sodium 141 136 - 145 mmol/L    Potassium 4 4 3 5 - 5 3 mmol/L    Chloride 106 100 - 108 mmol/L    CO2 28 21 - 32 mmol/L    ANION GAP 7 4 - 13 mmol/L    BUN 21 5 - 25 mg/dL    Creatinine 1 23 0 60 - 1 30 mg/dL    Glucose, Fasting 80 65 - 99 mg/dL    Calcium 9 6 8 3 - 10 1 mg/dL    AST 18 5 - 45 U/L    ALT 25 12 - 78 U/L    Alkaline Phosphatase 83 46 - 116 U/L    Total Protein 7 7 6 4 - 8 2 g/dL    Albumin 4 2 3 5 - 5 0 g/dL    Total Bilirubin 1 01 (H) 0 20 - 1 00 mg/dL    eGFR 57 ml/min/1 73sq m   Lipid panel    Collection Time: 02/14/19 12:06 PM   Result Value Ref Range    Cholesterol 163 50 - 200 mg/dL    Triglycerides 151 (H) <=150 mg/dL    HDL, Direct 46 40 - 60 mg/dL    LDL Calculated 87 0 - 100 mg/dL    Non-HDL-Chol (CHOL-HDL) 117 mg/dl   Uric acid    Collection Time: 02/14/19 12:06 PM   Result Value Ref Range    Uric Acid 3 8 (L) 4 2 - 8 0 mg/dL   TSH, 3rd generation    Collection Time: 02/14/19 12:06 PM   Result Value Ref Range    TSH 3RD GENERATON 2 030 0 358 - 3 740 uIU/mL   UA w Reflex to Microscopic w Reflex to Culture - Clinic Collect    Collection Time: 02/14/19 12:06 PM   Result Value Ref Range    Color, UA Yellow     Clarity, UA Clear     Specific Gravity, UA 1 022 1 003 - 1 030    pH, UA 6 5 4 5 - 8 0    Leukocytes, UA Negative Negative    Nitrite, UA Negative Negative    Protein, UA Negative Negative mg/dl    Glucose, UA Negative Negative mg/dl    Ketones, UA Negative Negative mg/dl    Urobilinogen, UA 0 2 0 2, 1 0 E U /dl E U /dl    Bilirubin, UA Negative Negative    Blood, UA Negative Negative

## 2019-03-04 NOTE — ASSESSMENT & PLAN NOTE
Patient's the hemoglobin and hematocrit is elevated WBC count and platelet count is normal this is the 2nd time that his the numbers are higher than normal he does not have a history of smoking he is not smoking a recently    I will REM recommend Hematology-Oncology consultation for further evaluation of polycythemia and the management

## 2019-03-04 NOTE — ASSESSMENT & PLAN NOTE
Lipid panel is excellent HDL LDL are within normal range continue with the same medication no changes

## 2019-03-04 NOTE — ASSESSMENT & PLAN NOTE
Blood pressure is reasonably good control but the pulses in the lower side mostly in 50s he does not have any symptoms right now he has a family history of a sick sinus syndrome he also complain of some irregular heartbeats no chest pain or shortness of breath I will recommend 48 hour heart monitor and possibly a cardiology consultation for further evaluation

## 2019-03-05 ENCOUNTER — TELEPHONE (OUTPATIENT)
Dept: INTERNAL MEDICINE CLINIC | Age: 74
End: 2019-03-05

## 2019-03-05 NOTE — TELEPHONE ENCOUNTER
Louann called because the pt's wife called the [place] office to let you know that when they called to schedule Cardiac Electrophysiology and the was told that before doing this one they need to schedule the regular one first

## 2019-03-07 ENCOUNTER — TELEPHONE (OUTPATIENT)
Dept: INTERNAL MEDICINE CLINIC | Age: 74
End: 2019-03-07

## 2019-03-07 NOTE — TELEPHONE ENCOUNTER
Patient saw you on Monday and received an order for an EEG, Patient's wife called and wanted to know if they should get that done right away or after they see cardiologist  Please call patient back       Patient also said that when they called to make appointment they said he should have a regular EEG first before the ambulatory one  Is this accurate?

## 2019-03-13 DIAGNOSIS — R42 DIZZINESS: ICD-10-CM

## 2019-03-13 DIAGNOSIS — I10 ESSENTIAL HYPERTENSION, BENIGN: ICD-10-CM

## 2019-03-13 DIAGNOSIS — E78.5 HYPERLIPIDEMIA, UNSPECIFIED HYPERLIPIDEMIA TYPE: ICD-10-CM

## 2019-03-13 RX ORDER — MONTELUKAST SODIUM 4 MG/1
1 TABLET, CHEWABLE ORAL 2 TIMES DAILY
Qty: 180 TABLET | Refills: 1 | Status: SHIPPED | OUTPATIENT
Start: 2019-03-13 | End: 2019-10-04 | Stop reason: SDUPTHER

## 2019-03-13 RX ORDER — MECLIZINE HYDROCHLORIDE 25 MG/1
25 TABLET ORAL 2 TIMES DAILY PRN
Qty: 180 TABLET | Refills: 1 | Status: SHIPPED | OUTPATIENT
Start: 2019-03-13 | End: 2020-03-16 | Stop reason: SDUPTHER

## 2019-03-13 RX ORDER — AMLODIPINE BESYLATE 5 MG/1
5 TABLET ORAL DAILY
Qty: 90 TABLET | Refills: 1 | Status: SHIPPED | OUTPATIENT
Start: 2019-03-13 | End: 2019-09-12 | Stop reason: SDUPTHER

## 2019-03-18 ENCOUNTER — TELEPHONE (OUTPATIENT)
Dept: HEMATOLOGY ONCOLOGY | Facility: CLINIC | Age: 74
End: 2019-03-18

## 2019-03-18 NOTE — TELEPHONE ENCOUNTER
Spoke to patient and his wife and instructed them the his appt has been moved up t 03/21/19 at 2:00 pm at the [place] location with Dr Jessica Sadler  Patient was fine with the change

## 2019-03-21 ENCOUNTER — HOSPITAL ENCOUNTER (OUTPATIENT)
Dept: RADIOLOGY | Facility: HOSPITAL | Age: 74
Discharge: HOME | End: 2019-03-21
Attending: INTERNAL MEDICINE
Payer: COMMERCIAL

## 2019-03-21 ENCOUNTER — CONSULT (OUTPATIENT)
Dept: HEMATOLOGY ONCOLOGY | Facility: CLINIC | Age: 74
End: 2019-03-21
Payer: COMMERCIAL

## 2019-03-21 ENCOUNTER — CONSULT (OUTPATIENT)
Dept: CARDIOLOGY CLINIC | Facility: CLINIC | Age: 74
End: 2019-03-21
Payer: COMMERCIAL

## 2019-03-21 ENCOUNTER — TRANSCRIBE ORDERS (OUTPATIENT)
Dept: RADIOLOGY | Facility: HOSPITAL | Age: 74
End: 2019-03-21

## 2019-03-21 ENCOUNTER — TRANSCRIBE ORDERS (OUTPATIENT)
Dept: LAB | Facility: HOSPITAL | Age: 74
End: 2019-03-21

## 2019-03-21 ENCOUNTER — APPOINTMENT (OUTPATIENT)
Dept: LAB | Facility: HOSPITAL | Age: 74
End: 2019-03-21
Attending: INTERNAL MEDICINE
Payer: COMMERCIAL

## 2019-03-21 VITALS
HEIGHT: 69 IN | DIASTOLIC BLOOD PRESSURE: 90 MMHG | BODY MASS INDEX: 40.94 KG/M2 | WEIGHT: 276.4 LBS | OXYGEN SATURATION: 93 % | HEART RATE: 83 BPM | TEMPERATURE: 98.1 F | SYSTOLIC BLOOD PRESSURE: 128 MMHG | RESPIRATION RATE: 18 BRPM

## 2019-03-21 VITALS
HEART RATE: 63 BPM | BODY MASS INDEX: 40.46 KG/M2 | DIASTOLIC BLOOD PRESSURE: 82 MMHG | SYSTOLIC BLOOD PRESSURE: 122 MMHG | HEIGHT: 69 IN | WEIGHT: 273.2 LBS

## 2019-03-21 DIAGNOSIS — I10 BENIGN ESSENTIAL HYPERTENSION: ICD-10-CM

## 2019-03-21 DIAGNOSIS — Z01.818 PRE-OP TESTING: Primary | ICD-10-CM

## 2019-03-21 DIAGNOSIS — D75.1 POLYCYTHEMIA: ICD-10-CM

## 2019-03-21 DIAGNOSIS — R00.1 BRADYCARDIA: ICD-10-CM

## 2019-03-21 LAB
CREAT SERPL-MCNC: 1.37 MG/DL (ref 0.6–1.3)
GFR SERPL CREATININE-BSD FRML MDRD: 50 ML/MIN/1.73SQ M

## 2019-03-21 PROCEDURE — 99204 OFFICE O/P NEW MOD 45 MIN: CPT | Performed by: INTERNAL MEDICINE

## 2019-03-21 PROCEDURE — 71046 X-RAY EXAM CHEST 2 VIEWS: CPT

## 2019-03-21 PROCEDURE — 81270 JAK2 GENE: CPT

## 2019-03-21 PROCEDURE — 82565 ASSAY OF CREATININE: CPT | Performed by: INTERNAL MEDICINE

## 2019-03-21 PROCEDURE — 99205 OFFICE O/P NEW HI 60 MIN: CPT | Performed by: PHYSICIAN ASSISTANT

## 2019-03-21 PROCEDURE — 82668 ASSAY OF ERYTHROPOIETIN: CPT | Performed by: INTERNAL MEDICINE

## 2019-03-21 PROCEDURE — 36415 COLL VENOUS BLD VENIPUNCTURE: CPT | Performed by: INTERNAL MEDICINE

## 2019-03-21 PROCEDURE — 93000 ELECTROCARDIOGRAM COMPLETE: CPT | Performed by: PHYSICIAN ASSISTANT

## 2019-03-21 NOTE — PROGRESS NOTES
Electrophysiology Consult    Adelfo Alvarenga  1945  [id number]  [facility]  [address]    Assessment/Plan     1  Bradycardia  Ambulatory referral to Cardiac Electrophysiology    POCT ECG     ASSESSMENT:  1  Symptomatic bradycardia, sick sinus syndrome  - recorded pulses of 33 and 39 with fatigue  2  Essential hypertension, controlled  - maintained on amlodipine 5 mg daily and lisinopril 20 mg daily  3  Hyperlipidemia  - maintained on simvastatin 40 mg every other day  4  Mild ELLYN  - noncompliance with CPAP machine  5  Polycythemia     DISCUSSION/SUMMARY:  1  Bradycardia - Given the fact that he has documented heart rates in the 30s and fatigue, recommendation at the time of be for left-sided dual-chamber pacemaker implantation  The procedure and hospital stay was explained in detail to the patient and wife  The patient has never had any surgery to her chest, radiation, or allergy to contrast dye  Patient is agreeable at this time to undergo this procedure  2  Follow up - We will have him scheduled for this procedure down stairs  I have also ordered an limited echo to assess for ejection fraction prior to procedure  He will follow up with the patient 2 weeks after his pacemaker implantation in our device clinic  History of Present Illness     HPI/INTERVAL HISTORY: Adelfo Alvarenga is a 74 y o  male with a history of essential hypertension, hypercholesterolemia, bradycardia, mild sleep apnea, polycythemia, and gout  He was referred to electrophysiology by his primary doctor, Dr Nadeen Colin for further evaluation of bradycardia  In talking with the patient, he states that he has had episodes of heart rates in the 30s  He denies lightheadedness, dizziness, or syncope at that time  Donates blood but every can and preparation for that, when they took his pulse, it was found to be 33 beats per minute and he was turned away  Even the following week when he went back to trying again, his heart rate was 39 beats per minute  At home he has started to take his pulse within the last couple weeks and is found his pulse to always be below 60 - once being in the 30s  He does have symptoms of fatigue when he is being active either outside with his tools or in the house going up the steps  He states that he has to sit for at least a half an hour before he feels any better  He understands that he is out of shape but this has gotten worse within the past couple months  Family history:  Negative on father side hurting cardiac issues  Mother had pacemaker  Surgical history:  Has never undergone invasive cardiac surgery such as catheter open heart surgery  Denies any surgery to upper chest or any radiation  Social history:  Patient is currently retired but did use to work in a [address] [employer]  Still has many power tools at home that he uses, even a small welding machine  Review of Systems   Positive for fatigue, rhythm problems (palpitations), back pain, hearing problems, swallowing  Negative for chest pain, lightheadedness or dizziness  ROS as noted above, otherwise 12 point review of systems was performed and is negative       Historical Information   Social History     Socioeconomic History    Marital status: /Civil Union     Spouse name: Not on file    Number of children: Not on file    Years of education: Not on file    Highest education level: Not on file   Occupational History     Comment: retired from work   Social Needs    Financial resource strain: Not on file    Food insecurity:     Worry: Not on file     Inability: Not on file   [employer] needs:     Medical: Not on file     Non-medical: Not on file   Tobacco Use    Smoking status: Former Smoker     Last attempt to quit:      Years since quittin 2    Smokeless tobacco: Never Used   Substance and Sexual Activity    Alcohol use: Yes     Comment: rarely   Per allscripts, drinks alcohol very infrequently    Drug use: No    Sexual activity: Not on file   Lifestyle    Physical activity:     Days per week: Not on file     Minutes per session: Not on file    Stress: Not on file   Relationships    Social connections:     Talks on phone: Not on file     Gets together: Not on file     Attends Yarsani service: Not on file     Active member of club or organization: Not on file     Attends meetings of clubs or organizations: Not on file     Relationship status: Not on file    Intimate partner violence:     Fear of current or ex partner: Not on file     Emotionally abused: Not on file     Physically abused: Not on file     Forced sexual activity: Not on file   Other Topics Concern    Not on file   Social History Narrative    Copy of advanced directive obtained    Exercising regularly-primary form of exercise is work    Recreational activities-he enjoys home crafts and wood working    Travel history-Pt denies being out of the country during 1/2014-11/10/2014     Past Medical History:   Diagnosis Date    Acute gouty arthropathy     last assessed-11/15/2013    Cataract     Diverticulitis of colon     Enlarged prostate     Gout     Hearing loss     History of diverticulitis of colon     History of dizziness     Hypercholesterolemia     Hyperlipidemia     Hypertension     Palpitations     Sinus bradycardia      Past Surgical History:   Procedure Laterality Date    ARM NEUROPLASTY Left     1977    COLONOSCOPY      CYSTOSCOPY  12/27/2017    diagnostic    FOREARM FRACTURE SURGERY      ORCHIECTOMY      surgery testis    MA CYSTOURETHRO W/IMPLANT N/A 2/9/2018    Procedure: CYSTOSCOPY WITH INSERTION Laz Kid;  Surgeon: Kelton Mariano MD;  [facility];  Service: Urology    MA CYSTOURETHROSCOPY,BIOPSY N/A 2/9/2018    Procedure: BLADDER BIOPSY;  Surgeon: Kelton Mariano MD;  [facility];  Service: Urology    TESTICLE SURGERY Right      Social History     Substance and Sexual Activity   Alcohol Use Yes    Comment: rarely   Per allscripts, drinks alcohol very infrequently     Social History     Substance and Sexual Activity   Drug Use No     Social History     Tobacco Use   Smoking Status Former Smoker    Last attempt to quit: [place] Years since quittin 2   Smokeless Tobacco Never Used     Family History   Problem Relation Age of Onset    Coronary artery disease Mother     Heart disease Mother     Hypertension Mother     Stroke Mother     Coronary artery disease Father     Stroke Family     Heart attack Family         acute MI       Meds/Allergies       Current Outpatient Medications:     allopurinol (ZYLOPRIM) 300 mg tablet, Take 1 tablet (300 mg total) by mouth daily, Disp: 90 tablet, Rfl: 1    amLODIPine (NORVASC) 5 mg tablet, Take 1 tablet (5 mg total) by mouth daily, Disp: 90 tablet, Rfl: 1    aspirin 81 MG tablet, Take 81 mg by mouth daily, Disp: , Rfl:     colestipol (COLESTID) 1 g tablet, Take 1 tablet (1 g total) by mouth 2 (two) times a day, Disp: 180 tablet, Rfl: 1    lisinopril (ZESTRIL) 20 mg tablet, Take 1 tablet (20 mg total) by mouth daily, Disp: 90 tablet, Rfl: 1    meclizine (ANTIVERT) 25 mg tablet, Take 1 tablet (25 mg total) by mouth 2 (two) times a day as needed for dizziness, Disp: 180 tablet, Rfl: 1    simvastatin (ZOCOR) 40 mg tablet, Take 1 tablet (40 mg total) by mouth every other day, Disp: 45 tablet, Rfl: 1    Allergies   Allergen Reactions    Pollen Extract        Objective   Vitals: Blood pressure 122/82, pulse 63, height 5' 8 6" (1 742 m), weight 124 kg (273 lb 3 2 oz)  Physical Exam   Constitutional: He is oriented to person, place, and time  He appears well-developed and well-nourished  No distress  HENT:   Head: Normocephalic and atraumatic  Eyes: Pupils are equal, round, and reactive to light  EOM are normal    Neck: Normal range of motion  No JVD present  Cardiovascular: Normal rate, regular rhythm and normal heart sounds  No murmur heard  Pulmonary/Chest: Effort normal and breath sounds normal  No respiratory distress  He has no wheezes  He has no rales  Abdominal: Soft  Neurological: He is alert and oriented to person, place, and time  Skin: Skin is warm and dry  He is not diaphoretic  Psychiatric: He has a normal mood and affect  His behavior is normal    Nursing note and vitals reviewed      Labs:  Clinical Support on 02/14/2019   Component Date Value    WBC 02/14/2019 7 59     RBC 02/14/2019 5 94*    Hemoglobin 02/14/2019 18 4*    Hematocrit 02/14/2019 57 8*    MCV 02/14/2019 97     MCH 02/14/2019 31 0     MCHC 02/14/2019 31 8     RDW 02/14/2019 13 9     MPV 02/14/2019 12 1     Platelets 85/51/5300 192     nRBC 02/14/2019 0     Neutrophils Relative 02/14/2019 65     Immat GRANS % 02/14/2019 0     Lymphocytes Relative 02/14/2019 22     Monocytes Relative 02/14/2019 10     Eosinophils Relative 02/14/2019 2     Basophils Relative 02/14/2019 1     Neutrophils Absolute 02/14/2019 4 86     Immature Grans Absolute 02/14/2019 0 02     Lymphocytes Absolute 02/14/2019 1 70     Monocytes Absolute 02/14/2019 0 79     Eosinophils Absolute 02/14/2019 0 17     Basophils Absolute 02/14/2019 0 05     Sodium 02/14/2019 141     Potassium 02/14/2019 4 4     Chloride 02/14/2019 106     CO2 02/14/2019 28     ANION GAP 02/14/2019 7     BUN 02/14/2019 21     Creatinine 02/14/2019 1 23     Glucose, Fasting 02/14/2019 80     Calcium 02/14/2019 9 6     AST 02/14/2019 18     ALT 02/14/2019 25     Alkaline Phosphatase 02/14/2019 83     Total Protein 02/14/2019 7 7     Albumin 02/14/2019 4 2     Total Bilirubin 02/14/2019 1 01*    eGFR 02/14/2019 57     Cholesterol 02/14/2019 163     Triglycerides 02/14/2019 151*    HDL, Direct 02/14/2019 46     LDL Calculated 02/14/2019 87     Non-HDL-Chol (CHOL-HDL) 02/14/2019 117     Uric Acid 02/14/2019 3 8*    TSH 3RD GENERATON 02/14/2019 2 030     Color, UA 02/14/2019 Yellow     Clarity, UA 02/14/2019 Clear     Specific Gravity, UA 02/14/2019 1 022     pH, UA 02/14/2019 6 5     Leukocytes, UA 02/14/2019 Negative     Nitrite, UA 02/14/2019 Negative     Protein, UA 02/14/2019 Negative     Glucose, UA 02/14/2019 Negative     Ketones, UA 02/14/2019 Negative     Urobilinogen, UA 02/14/2019 0 2     Bilirubin, UA 02/14/2019 Negative     Blood, UA 02/14/2019 Negative        Imaging: I have personally reviewed pertinent reports  EKG: Sinus rhythm with RI prolongation  63 beats per minute  No bundle branch blocks noted  Occasional PVCs

## 2019-03-21 NOTE — PROGRESS NOTES
Consultation - Medical Oncology   Mrii Pacheco 74 y o  male MRN: [id number]  Unit/Bed#:  Encounter: [id number]      Reason for Consult:  Dr Misbah Faye  HPI: Miri Pacheco is a 74y o  year old male   He is here with his wife  He runs high hematocrit that went from 49 4 in 2017 to 52 6 in 2018 and now 57 8  Patient donates blood at the blood bank every 4 months  He has been doing that for years  He has some tiredness  He has chronic mild low back discomfort  He has exertional dyspnea that lasts for couple of minutes and he will be going for a pacemaker insertion on 05/01/2019  He has nocturia and he follows with his urologist   Occasionally slight dizziness and Antivert helps He quit smoking cigars and pipes more than 30 years ago  He did maintenance work    ROS:  03/21/19 Reviewed 13 systems:  Presently no headaches, seizures,  diplopia, dysphagia, hoarseness, chest pain, palpitations,  cough, hemoptysis, abdominal pain, nausea, vomiting, change in bowel habits, melena, hematuria, fever, chills, bleeding, bone pains, skin rash, weight loss,  weakness, numbness, claudication and gait problem  No frequent infections  Not unusually sensitive to heat or cold  No swelling of the ankles  No swollen glands  Patient is anxious   Other symptoms are in HPI        Historical Information   Past Medical History:   Diagnosis Date    Acute gouty arthropathy     last assessed-11/15/2013    Cataract     Diverticulitis of colon     Enlarged prostate     Gout     Hearing loss     History of diverticulitis of colon     History of dizziness     Hypercholesterolemia     Hyperlipidemia     Hypertension     Palpitations     Sinus bradycardia      Past Surgical History:   Procedure Laterality Date    ARM NEUROPLASTY Left     1977    COLONOSCOPY      CYSTOSCOPY  12/27/2017    diagnostic    FOREARM FRACTURE SURGERY      ORCHIECTOMY      surgery testis    NM CYSTOURETHRO W/IMPLANT N/A 2/9/2018    Procedure: CYSTOSCOPY WITH INSERTION UROLIFT;  Surgeon: Radha Jaime MD;  [facility];  Service: Urology    [place] CYSTOURETHROSCOPY,BIOPSY N/A 2018    Procedure: BLADDER BIOPSY;  Surgeon: Radha Jaime MD;  [facility];  Service: Urology    TESTICLE SURGERY Right      Social History   Social History     Substance and Sexual Activity   Alcohol Use Yes    Comment: rarely   Per allscripts, drinks alcohol very infrequently     Social History     Substance and Sexual Activity   Drug Use No     Social History     Tobacco Use   Smoking Status Former Smoker    Last attempt to quit: [facility] Years since quittin 2   Smokeless Tobacco Never Used     Family History:   Family History   Problem Relation Age of Onset    Coronary artery disease Mother     Heart disease Mother     Hypertension Mother     Stroke Mother     Coronary artery disease Father     Stroke Family     Heart attack Family         acute MI         Current Outpatient Medications:     allopurinol (ZYLOPRIM) 300 mg tablet, Take 1 tablet (300 mg total) by mouth daily, Disp: 90 tablet, Rfl: 1    amLODIPine (NORVASC) 5 mg tablet, Take 1 tablet (5 mg total) by mouth daily, Disp: 90 tablet, Rfl: 1    aspirin 81 MG tablet, Take 81 mg by mouth daily, Disp: , Rfl:     colestipol (COLESTID) 1 g tablet, Take 1 tablet (1 g total) by mouth 2 (two) times a day, Disp: 180 tablet, Rfl: 1    lisinopril (ZESTRIL) 20 mg tablet, Take 1 tablet (20 mg total) by mouth daily, Disp: 90 tablet, Rfl: 1    meclizine (ANTIVERT) 25 mg tablet, Take 1 tablet (25 mg total) by mouth 2 (two) times a day as needed for dizziness, Disp: 180 tablet, Rfl: 1    simvastatin (ZOCOR) 40 mg tablet, Take 1 tablet (40 mg total) by mouth every other day, Disp: 45 tablet, Rfl: 1    Allergies   Allergen Reactions    Pollen Extract      @ ROS@  Physical Exam:  Vitals:    19 1329   BP: 128/90   BP Location: Left arm   Pulse: 83   Resp: 18   Temp: 98 1 °F (36 7 °C)   TempSrc: Tympanic Core   SpO2: 93%   Weight: 125 kg (276 lb 6 4 oz)   Height: 5' 8 5" (1 74 m)     Alert, oriented, not in distress, no icterus, no oral thrush, no palpable neck mass, clear lung fields, regular heart rate, abdomen  soft and non tender, no palpable abdominal mass, no ascites, no edema of ankles, no calf tenderness, no focal neurological deficit, no skin rash, no palpable lymphadenopathy, good arterial pulses, no clubbing  Patient is anxious  Performance status 1  Lab Results: I have reviewed all pertinent labs    LABS:  Results for orders placed or performed in visit on 02/14/19   CBC and differential   Result Value Ref Range    WBC 7 59 4 31 - 10 16 Thousand/uL    RBC 5 94 (H) 3 88 - 5 62 Million/uL    Hemoglobin 18 4 (H) 12 0 - 17 0 g/dL    Hematocrit 57 8 (H) 36 5 - 49 3 %    MCV 97 82 - 98 fL    MCH 31 0 26 8 - 34 3 pg    MCHC 31 8 31 4 - 37 4 g/dL    RDW 13 9 11 6 - 15 1 %    MPV 12 1 8 9 - 12 7 fL    Platelets 985 324 - 635 Thousands/uL    nRBC 0 /100 WBCs    Neutrophils Relative 65 43 - 75 %    Immat GRANS % 0 0 - 2 %    Lymphocytes Relative 22 14 - 44 %    Monocytes Relative 10 4 - 12 %    Eosinophils Relative 2 0 - 6 %    Basophils Relative 1 0 - 1 %    Neutrophils Absolute 4 86 1 85 - 7 62 Thousands/µL    Immature Grans Absolute 0 02 0 00 - 0 20 Thousand/uL    Lymphocytes Absolute 1 70 0 60 - 4 47 Thousands/µL    Monocytes Absolute 0 79 0 17 - 1 22 Thousand/µL    Eosinophils Absolute 0 17 0 00 - 0 61 Thousand/µL    Basophils Absolute 0 05 0 00 - 0 10 Thousands/µL   Comprehensive metabolic panel   Result Value Ref Range    Sodium 141 136 - 145 mmol/L    Potassium 4 4 3 5 - 5 3 mmol/L    Chloride 106 100 - 108 mmol/L    CO2 28 21 - 32 mmol/L    ANION GAP 7 4 - 13 mmol/L    BUN 21 5 - 25 mg/dL    Creatinine 1 23 0 60 - 1 30 mg/dL    Glucose, Fasting 80 65 - 99 mg/dL    Calcium 9 6 8 3 - 10 1 mg/dL    AST 18 5 - 45 U/L    ALT 25 12 - 78 U/L    Alkaline Phosphatase 83 46 - 116 U/L    Total Protein 7 7 6 4 - 8 2 g/dL    Albumin 4 2 3 5 - 5 0 g/dL    Total Bilirubin 1 01 (H) 0 20 - 1 00 mg/dL    eGFR 57 ml/min/1 73sq m   Lipid panel   Result Value Ref Range    Cholesterol 163 50 - 200 mg/dL    Triglycerides 151 (H) <=150 mg/dL    HDL, Direct 46 40 - 60 mg/dL    LDL Calculated 87 0 - 100 mg/dL    Non-HDL-Chol (CHOL-HDL) 117 mg/dl   Uric acid   Result Value Ref Range    Uric Acid 3 8 (L) 4 2 - 8 0 mg/dL   TSH, 3rd generation   Result Value Ref Range    TSH 3RD GENERATON 2 030 0 358 - 3 740 uIU/mL   UA w Reflex to Microscopic w Reflex to Culture - Clinic Collect   Result Value Ref Range    Color, UA Yellow     Clarity, UA Clear     Specific Gravity, UA 1 022 1 003 - 1 030    pH, UA 6 5 4 5 - 8 0    Leukocytes, UA Negative Negative    Nitrite, UA Negative Negative    Protein, UA Negative Negative mg/dl    Glucose, UA Negative Negative mg/dl    Ketones, UA Negative Negative mg/dl    Urobilinogen, UA 0 2 0 2, 1 0 E U /dl E U /dl    Bilirubin, UA Negative Negative    Blood, UA Negative Negative         Imaging Studies: I have personally reviewed pertinent reports  Pathology, and Other Studies: I have personally reviewed pertinent reports  Assessment and Plan:  Polycythemia, likely primary but could be secondary  Ordered additional tests as below  Patient will come back in 2 weeks  He will be requiring phlebotomy  Explained all that to the patient in detail  Questions answered  He states he has been keeping himself hydrated  Discussed the importance of eating healthy foods but to avoid iron rich foods, staying active and health screening tests  Patient is capable of self-care  Goal will be to bring hematocrit down to a level depending upon primary or secondary polycythemia  All discussed in detail  Questions answered  1  Polycythemia    - Ambulatory referral to Hematology / Oncology  - XR chest pa & lateral; Future  - US abdomen complete;  Future  - Erythropoietin  - Creatinine, serum  - JAK2 V617F,Ql,W/RFL Exons 12,13 and MPL Q481,S393; Future      Patient voiced understanding and agreement in the discussion  Counseling / Coordination of Care    Greater than 50% of total time was spent with the patient and / or family counseling and / or coordination of care

## 2019-03-22 ENCOUNTER — TELEPHONE (OUTPATIENT)
Dept: INTERNAL MEDICINE CLINIC | Age: 74
End: 2019-03-22

## 2019-03-22 LAB — EPO SERPL-ACNC: 9.7 MIU/ML (ref 2.6–18.5)

## 2019-03-22 NOTE — TELEPHONE ENCOUNTER
[facility] called to inform pt needs a routine EEG before they can do the 24 hr test can you please send in an order for a Routine EEG       Thank you

## 2019-03-23 ENCOUNTER — HOSPITAL ENCOUNTER (OUTPATIENT)
Dept: RADIOLOGY | Facility: HOSPITAL | Age: 74
Discharge: HOME | End: 2019-03-23
Attending: INTERNAL MEDICINE
Payer: COMMERCIAL

## 2019-03-23 DIAGNOSIS — D75.1 POLYCYTHEMIA: ICD-10-CM

## 2019-03-23 PROCEDURE — 76700 US EXAM ABDOM COMPLETE: CPT

## 2019-03-26 NOTE — TELEPHONE ENCOUNTER
I called Mrs Hollis Lat  She stated that her  saw Dr Ailyn Ibarra, cardiologist   Dawood's heart rate was in the 30's and he is scheduled for a PPM implant on 5/1/19  The 24 hour test that was ordered by Dr Tae Kitchen is not needed  Dr Ailyn Ibarra will take over and order the testing that needs to be done pre PPM implantation

## 2019-04-01 ENCOUNTER — OFFICE VISIT (OUTPATIENT)
Dept: HEMATOLOGY ONCOLOGY | Facility: CLINIC | Age: 74
End: 2019-04-01
Payer: COMMERCIAL

## 2019-04-01 VITALS
BODY MASS INDEX: 37.85 KG/M2 | HEART RATE: 59 BPM | OXYGEN SATURATION: 93 % | TEMPERATURE: 96.2 F | WEIGHT: 264.4 LBS | HEIGHT: 70 IN | SYSTOLIC BLOOD PRESSURE: 144 MMHG | RESPIRATION RATE: 18 BRPM | DIASTOLIC BLOOD PRESSURE: 88 MMHG

## 2019-04-01 DIAGNOSIS — D75.1 ERYTHROCYTOSIS: Primary | ICD-10-CM

## 2019-04-01 PROCEDURE — 99214 OFFICE O/P EST MOD 30 MIN: CPT | Performed by: PHYSICIAN ASSISTANT

## 2019-04-04 LAB — MISCELLANEOUS LAB TEST RESULT: NORMAL

## 2019-04-08 ENCOUNTER — APPOINTMENT (OUTPATIENT)
Dept: LAB | Facility: OTHER | Age: 74
End: 2019-04-08
Payer: COMMERCIAL

## 2019-04-08 ENCOUNTER — TRANSCRIBE ORDERS (OUTPATIENT)
Dept: LAB | Facility: OTHER | Age: 74
End: 2019-04-08

## 2019-04-08 DIAGNOSIS — Z01.818 PRE-OP TESTING: ICD-10-CM

## 2019-04-08 LAB
ALBUMIN SERPL BCP-MCNC: 3.7 G/DL (ref 3.5–5)
ALP SERPL-CCNC: 83 U/L (ref 46–116)
ALT SERPL W P-5'-P-CCNC: 22 U/L (ref 12–78)
ANION GAP SERPL CALCULATED.3IONS-SCNC: 3 MMOL/L (ref 4–13)
AST SERPL W P-5'-P-CCNC: 12 U/L (ref 5–45)
BASOPHILS # BLD AUTO: 0.04 THOUSANDS/ΜL (ref 0–0.1)
BASOPHILS NFR BLD AUTO: 1 % (ref 0–1)
BILIRUB SERPL-MCNC: 1.22 MG/DL (ref 0.2–1)
BUN SERPL-MCNC: 18 MG/DL (ref 5–25)
CALCIUM SERPL-MCNC: 9.7 MG/DL (ref 8.3–10.1)
CHLORIDE SERPL-SCNC: 105 MMOL/L (ref 100–108)
CO2 SERPL-SCNC: 30 MMOL/L (ref 21–32)
CREAT SERPL-MCNC: 1.42 MG/DL (ref 0.6–1.3)
EOSINOPHIL # BLD AUTO: 0.23 THOUSAND/ΜL (ref 0–0.61)
EOSINOPHIL NFR BLD AUTO: 3 % (ref 0–6)
ERYTHROCYTE [DISTWIDTH] IN BLOOD BY AUTOMATED COUNT: 13.6 % (ref 11.6–15.1)
GFR SERPL CREATININE-BSD FRML MDRD: 48 ML/MIN/1.73SQ M
GLUCOSE P FAST SERPL-MCNC: 93 MG/DL (ref 65–99)
HCT VFR BLD AUTO: 53 % (ref 36.5–49.3)
HGB BLD-MCNC: 17.4 G/DL (ref 12–17)
IMM GRANULOCYTES # BLD AUTO: 0.03 THOUSAND/UL (ref 0–0.2)
IMM GRANULOCYTES NFR BLD AUTO: 0 % (ref 0–2)
INR PPP: 0.98 (ref 0.86–1.17)
LYMPHOCYTES # BLD AUTO: 1.54 THOUSANDS/ΜL (ref 0.6–4.47)
LYMPHOCYTES NFR BLD AUTO: 19 % (ref 14–44)
MCH RBC QN AUTO: 31.5 PG (ref 26.8–34.3)
MCHC RBC AUTO-ENTMCNC: 32.8 G/DL (ref 31.4–37.4)
MCV RBC AUTO: 96 FL (ref 82–98)
MONOCYTES # BLD AUTO: 0.85 THOUSAND/ΜL (ref 0.17–1.22)
MONOCYTES NFR BLD AUTO: 11 % (ref 4–12)
NEUTROPHILS # BLD AUTO: 5.37 THOUSANDS/ΜL (ref 1.85–7.62)
NEUTS SEG NFR BLD AUTO: 66 % (ref 43–75)
NRBC BLD AUTO-RTO: 0 /100 WBCS
PLATELET # BLD AUTO: 207 THOUSANDS/UL (ref 149–390)
PMV BLD AUTO: 11.5 FL (ref 8.9–12.7)
POTASSIUM SERPL-SCNC: 4.1 MMOL/L (ref 3.5–5.3)
PROT SERPL-MCNC: 7.6 G/DL (ref 6.4–8.2)
PROTHROMBIN TIME: 13.1 SECONDS (ref 11.8–14.2)
RBC # BLD AUTO: 5.52 MILLION/UL (ref 3.88–5.62)
SODIUM SERPL-SCNC: 138 MMOL/L (ref 136–145)
WBC # BLD AUTO: 8.06 THOUSAND/UL (ref 4.31–10.16)

## 2019-04-08 PROCEDURE — 36415 COLL VENOUS BLD VENIPUNCTURE: CPT

## 2019-04-08 PROCEDURE — 85610 PROTHROMBIN TIME: CPT

## 2019-04-08 PROCEDURE — 80053 COMPREHEN METABOLIC PANEL: CPT

## 2019-04-08 PROCEDURE — 85025 COMPLETE CBC W/AUTO DIFF WBC: CPT

## 2019-04-16 DIAGNOSIS — E78.5 HYPERLIPIDEMIA, UNSPECIFIED HYPERLIPIDEMIA TYPE: ICD-10-CM

## 2019-04-16 RX ORDER — SIMVASTATIN 40 MG
40 TABLET ORAL EVERY OTHER DAY
Qty: 45 TABLET | Refills: 1 | Status: SHIPPED | OUTPATIENT
Start: 2019-04-16 | End: 2019-10-04 | Stop reason: SDUPTHER

## 2019-04-22 DIAGNOSIS — R00.1 BRADYCARDIA: Primary | ICD-10-CM

## 2019-04-24 ENCOUNTER — TELEPHONE (OUTPATIENT)
Dept: HEMATOLOGY ONCOLOGY | Facility: CLINIC | Age: 74
End: 2019-04-24

## 2019-04-24 DIAGNOSIS — D75.1 ERYTHROCYTOSIS: Primary | ICD-10-CM

## 2019-04-25 DIAGNOSIS — M10.9 GOUT, UNSPECIFIED CAUSE, UNSPECIFIED CHRONICITY, UNSPECIFIED SITE: ICD-10-CM

## 2019-04-25 RX ORDER — ALLOPURINOL 300 MG/1
300 TABLET ORAL DAILY
Qty: 90 TABLET | Refills: 1 | Status: SHIPPED | OUTPATIENT
Start: 2019-04-25 | End: 2019-10-21 | Stop reason: SDUPTHER

## 2019-04-26 ENCOUNTER — HOSPITAL ENCOUNTER (OUTPATIENT)
Dept: NON INVASIVE DIAGNOSTICS | Facility: CLINIC | Age: 74
Discharge: HOME | End: 2019-04-26
Payer: COMMERCIAL

## 2019-04-26 DIAGNOSIS — R00.1 BRADYCARDIA: ICD-10-CM

## 2019-04-26 PROCEDURE — 93321 DOPPLER ECHO F-UP/LMTD STD: CPT | Performed by: INTERNAL MEDICINE

## 2019-04-26 PROCEDURE — 93308 TTE F-UP OR LMTD: CPT

## 2019-04-26 PROCEDURE — 93325 DOPPLER ECHO COLOR FLOW MAPG: CPT | Performed by: INTERNAL MEDICINE

## 2019-04-26 PROCEDURE — 93308 TTE F-UP OR LMTD: CPT | Performed by: INTERNAL MEDICINE

## 2019-04-30 RX ORDER — CEFAZOLIN SODIUM 2 G/50ML
2000 SOLUTION INTRAVENOUS ONCE
Status: CANCELLED | OUTPATIENT
Start: 2019-04-30 | End: 2019-04-30

## 2019-05-01 ENCOUNTER — HOSPITAL ENCOUNTER (OUTPATIENT)
Dept: NON INVASIVE DIAGNOSTICS | Facility: HOSPITAL | Age: 74
Discharge: HOME | End: 2019-05-02
Attending: INTERNAL MEDICINE | Admitting: INTERNAL MEDICINE
Payer: COMMERCIAL

## 2019-05-01 ENCOUNTER — APPOINTMENT (OUTPATIENT)
Dept: RADIOLOGY | Facility: HOSPITAL | Age: 74
End: 2019-05-01
Payer: COMMERCIAL

## 2019-05-01 ENCOUNTER — ANESTHESIA EVENT (OUTPATIENT)
Dept: NON INVASIVE DIAGNOSTICS | Facility: HOSPITAL | Age: 74
End: 2019-05-01
Payer: COMMERCIAL

## 2019-05-01 ENCOUNTER — ANESTHESIA (OUTPATIENT)
Dept: NON INVASIVE DIAGNOSTICS | Facility: HOSPITAL | Age: 74
End: 2019-05-01
Payer: COMMERCIAL

## 2019-05-01 DIAGNOSIS — R00.1 BRADYCARDIA: ICD-10-CM

## 2019-05-01 LAB
ANION GAP SERPL CALCULATED.3IONS-SCNC: 6 MMOL/L (ref 4–13)
ATRIAL RATE: 60 BPM
ATRIAL RATE: 70 BPM
BASOPHILS # BLD AUTO: 0.05 THOUSANDS/ΜL (ref 0–0.1)
BASOPHILS NFR BLD AUTO: 1 % (ref 0–1)
BUN SERPL-MCNC: 22 MG/DL (ref 5–25)
CALCIUM SERPL-MCNC: 10 MG/DL (ref 8.3–10.1)
CHLORIDE SERPL-SCNC: 109 MMOL/L (ref 100–108)
CO2 SERPL-SCNC: 26 MMOL/L (ref 21–32)
CREAT SERPL-MCNC: 1.4 MG/DL (ref 0.6–1.3)
EOSINOPHIL # BLD AUTO: 0.26 THOUSAND/ΜL (ref 0–0.61)
EOSINOPHIL NFR BLD AUTO: 3 % (ref 0–6)
ERYTHROCYTE [DISTWIDTH] IN BLOOD BY AUTOMATED COUNT: 13.7 % (ref 11.6–15.1)
GFR SERPL CREATININE-BSD FRML MDRD: 49 ML/MIN/1.73SQ M
GLUCOSE P FAST SERPL-MCNC: 107 MG/DL (ref 65–99)
GLUCOSE SERPL-MCNC: 107 MG/DL (ref 65–140)
HCT VFR BLD AUTO: 53 % (ref 36.5–49.3)
HGB BLD-MCNC: 18 G/DL (ref 12–17)
IMM GRANULOCYTES # BLD AUTO: 0.03 THOUSAND/UL (ref 0–0.2)
IMM GRANULOCYTES NFR BLD AUTO: 0 % (ref 0–2)
INR PPP: 0.94 (ref 0.86–1.17)
LYMPHOCYTES # BLD AUTO: 1.52 THOUSANDS/ΜL (ref 0.6–4.47)
LYMPHOCYTES NFR BLD AUTO: 19 % (ref 14–44)
MCH RBC QN AUTO: 31.9 PG (ref 26.8–34.3)
MCHC RBC AUTO-ENTMCNC: 34 G/DL (ref 31.4–37.4)
MCV RBC AUTO: 94 FL (ref 82–98)
MONOCYTES # BLD AUTO: 0.76 THOUSAND/ΜL (ref 0.17–1.22)
MONOCYTES NFR BLD AUTO: 9 % (ref 4–12)
NEUTROPHILS # BLD AUTO: 5.47 THOUSANDS/ΜL (ref 1.85–7.62)
NEUTS SEG NFR BLD AUTO: 68 % (ref 43–75)
NRBC BLD AUTO-RTO: 0 /100 WBCS
P AXIS: 14 DEGREES
PLATELET # BLD AUTO: 186 THOUSANDS/UL (ref 149–390)
PMV BLD AUTO: 10.9 FL (ref 8.9–12.7)
POTASSIUM SERPL-SCNC: 4.4 MMOL/L (ref 3.5–5.3)
PR INTERVAL: 252 MS
PR INTERVAL: 296 MS
PROTHROMBIN TIME: 12.7 SECONDS (ref 11.8–14.2)
QRS AXIS: -15 DEGREES
QRS AXIS: -16 DEGREES
QRSD INTERVAL: 86 MS
QRSD INTERVAL: 92 MS
QT INTERVAL: 360 MS
QT INTERVAL: 370 MS
QTC INTERVAL: 370 MS
QTC INTERVAL: 388 MS
RBC # BLD AUTO: 5.65 MILLION/UL (ref 3.88–5.62)
SODIUM SERPL-SCNC: 141 MMOL/L (ref 136–145)
T WAVE AXIS: 103 DEGREES
T WAVE AXIS: 143 DEGREES
VENTRICULAR RATE: 60 BPM
VENTRICULAR RATE: 70 BPM
WBC # BLD AUTO: 8.09 THOUSAND/UL (ref 4.31–10.16)

## 2019-05-01 PROCEDURE — 80048 BASIC METABOLIC PNL TOTAL CA: CPT | Performed by: PHYSICIAN ASSISTANT

## 2019-05-01 PROCEDURE — 71045 X-RAY EXAM CHEST 1 VIEW: CPT

## 2019-05-01 PROCEDURE — C1785 PMKR, DUAL, RATE-RESP: HCPCS

## 2019-05-01 PROCEDURE — C1898 LEAD, PMKR, OTHER THAN TRANS: HCPCS

## 2019-05-01 PROCEDURE — 85610 PROTHROMBIN TIME: CPT | Performed by: PHYSICIAN ASSISTANT

## 2019-05-01 PROCEDURE — C1892 INTRO/SHEATH,FIXED,PEEL-AWAY: HCPCS

## 2019-05-01 PROCEDURE — 33208 INSRT HEART PM ATRIAL & VENT: CPT

## 2019-05-01 PROCEDURE — NC001 PR NO CHARGE: Performed by: PHYSICIAN ASSISTANT

## 2019-05-01 PROCEDURE — 93010 ELECTROCARDIOGRAM REPORT: CPT | Performed by: INTERNAL MEDICINE

## 2019-05-01 PROCEDURE — 33208 INSRT HEART PM ATRIAL & VENT: CPT | Performed by: INTERNAL MEDICINE

## 2019-05-01 PROCEDURE — 93005 ELECTROCARDIOGRAM TRACING: CPT

## 2019-05-01 PROCEDURE — 85025 COMPLETE CBC W/AUTO DIFF WBC: CPT | Performed by: PHYSICIAN ASSISTANT

## 2019-05-01 RX ORDER — ASPIRIN 81 MG/1
81 TABLET, CHEWABLE ORAL DAILY
Status: DISCONTINUED | OUTPATIENT
Start: 2019-05-01 | End: 2019-05-02 | Stop reason: HOSPADM

## 2019-05-01 RX ORDER — FENTANYL CITRATE 50 UG/ML
INJECTION, SOLUTION INTRAMUSCULAR; INTRAVENOUS AS NEEDED
Status: DISCONTINUED | OUTPATIENT
Start: 2019-05-01 | End: 2019-05-01 | Stop reason: SURG

## 2019-05-01 RX ORDER — KETAMINE HYDROCHLORIDE 50 MG/ML
INJECTION, SOLUTION, CONCENTRATE INTRAMUSCULAR; INTRAVENOUS AS NEEDED
Status: DISCONTINUED | OUTPATIENT
Start: 2019-05-01 | End: 2019-05-01 | Stop reason: SURG

## 2019-05-01 RX ORDER — AMLODIPINE BESYLATE 5 MG/1
5 TABLET ORAL DAILY
Status: DISCONTINUED | OUTPATIENT
Start: 2019-05-01 | End: 2019-05-02 | Stop reason: HOSPADM

## 2019-05-01 RX ORDER — PRAVASTATIN SODIUM 80 MG/1
80 TABLET ORAL
Status: DISCONTINUED | OUTPATIENT
Start: 2019-05-01 | End: 2019-05-02 | Stop reason: HOSPADM

## 2019-05-01 RX ORDER — EPHEDRINE SULFATE 50 MG/ML
INJECTION INTRAVENOUS AS NEEDED
Status: DISCONTINUED | OUTPATIENT
Start: 2019-05-01 | End: 2019-05-01 | Stop reason: SURG

## 2019-05-01 RX ORDER — SODIUM CHLORIDE 9 MG/ML
INJECTION, SOLUTION INTRAVENOUS CONTINUOUS PRN
Status: DISCONTINUED | OUTPATIENT
Start: 2019-05-01 | End: 2019-05-01 | Stop reason: SURG

## 2019-05-01 RX ORDER — LIDOCAINE HYDROCHLORIDE 10 MG/ML
INJECTION, SOLUTION INFILTRATION; PERINEURAL AS NEEDED
Status: DISCONTINUED | OUTPATIENT
Start: 2019-05-01 | End: 2019-05-01 | Stop reason: SURG

## 2019-05-01 RX ORDER — PROPOFOL 10 MG/ML
INJECTION, EMULSION INTRAVENOUS CONTINUOUS PRN
Status: DISCONTINUED | OUTPATIENT
Start: 2019-05-01 | End: 2019-05-01 | Stop reason: SURG

## 2019-05-01 RX ORDER — ACETAMINOPHEN 325 MG/1
650 TABLET ORAL EVERY 4 HOURS PRN
Status: DISCONTINUED | OUTPATIENT
Start: 2019-05-01 | End: 2019-05-02 | Stop reason: HOSPADM

## 2019-05-01 RX ORDER — MONTELUKAST SODIUM 4 MG/1
1 TABLET, CHEWABLE ORAL 2 TIMES DAILY
Status: DISCONTINUED | OUTPATIENT
Start: 2019-05-01 | End: 2019-05-02 | Stop reason: HOSPADM

## 2019-05-01 RX ORDER — CEFAZOLIN SODIUM 2 G/50ML
2000 SOLUTION INTRAVENOUS ONCE
Status: COMPLETED | OUTPATIENT
Start: 2019-05-01 | End: 2019-05-01

## 2019-05-01 RX ORDER — MECLIZINE HYDROCHLORIDE 25 MG/1
25 TABLET ORAL 2 TIMES DAILY PRN
Status: DISCONTINUED | OUTPATIENT
Start: 2019-05-01 | End: 2019-05-02 | Stop reason: HOSPADM

## 2019-05-01 RX ORDER — ALLOPURINOL 300 MG/1
300 TABLET ORAL DAILY
Status: DISCONTINUED | OUTPATIENT
Start: 2019-05-01 | End: 2019-05-02 | Stop reason: HOSPADM

## 2019-05-01 RX ORDER — LIDOCAINE HYDROCHLORIDE 10 MG/ML
INJECTION, SOLUTION INFILTRATION; PERINEURAL CODE/TRAUMA/SEDATION MEDICATION
Status: COMPLETED | OUTPATIENT
Start: 2019-05-01 | End: 2019-05-01

## 2019-05-01 RX ORDER — LISINOPRIL 20 MG/1
20 TABLET ORAL DAILY
Status: DISCONTINUED | OUTPATIENT
Start: 2019-05-01 | End: 2019-05-02 | Stop reason: HOSPADM

## 2019-05-01 RX ORDER — GENTAMICIN SULFATE 40 MG/ML
INJECTION, SOLUTION INTRAMUSCULAR; INTRAVENOUS CODE/TRAUMA/SEDATION MEDICATION
Status: COMPLETED | OUTPATIENT
Start: 2019-05-01 | End: 2019-05-01

## 2019-05-01 RX ADMIN — FENTANYL CITRATE 12.5 MCG: 50 INJECTION, SOLUTION INTRAMUSCULAR; INTRAVENOUS at 09:45

## 2019-05-01 RX ADMIN — PHENYLEPHRINE HYDROCHLORIDE 20 MCG/MIN: 10 INJECTION INTRAVENOUS at 08:52

## 2019-05-01 RX ADMIN — IOHEXOL 10 ML: 350 INJECTION, SOLUTION INTRAVENOUS at 09:19

## 2019-05-01 RX ADMIN — PRAVASTATIN SODIUM 80 MG: 80 TABLET ORAL at 16:23

## 2019-05-01 RX ADMIN — EPHEDRINE SULFATE 5 MG: 50 INJECTION, SOLUTION INTRAVENOUS at 09:02

## 2019-05-01 RX ADMIN — GENTAMICIN SULFATE 80 MG: 40 INJECTION, SOLUTION INTRAMUSCULAR; INTRAVENOUS at 09:59

## 2019-05-01 RX ADMIN — PROPOFOL 120 MCG/KG/MIN: 10 INJECTION, EMULSION INTRAVENOUS at 08:42

## 2019-05-01 RX ADMIN — FENTANYL CITRATE 12.5 MCG: 50 INJECTION, SOLUTION INTRAMUSCULAR; INTRAVENOUS at 09:50

## 2019-05-01 RX ADMIN — ASPIRIN 81 MG 81 MG: 81 TABLET ORAL at 16:23

## 2019-05-01 RX ADMIN — PHENYLEPHRINE HYDROCHLORIDE 100 MCG: 10 INJECTION INTRAVENOUS at 09:26

## 2019-05-01 RX ADMIN — CEFAZOLIN SODIUM 3000 MG: 2 SOLUTION INTRAVENOUS at 08:25

## 2019-05-01 RX ADMIN — EPHEDRINE SULFATE 5 MG: 50 INJECTION, SOLUTION INTRAVENOUS at 08:53

## 2019-05-01 RX ADMIN — KETAMINE HYDROCHLORIDE 20 MG: 50 INJECTION, SOLUTION INTRAMUSCULAR; INTRAVENOUS at 08:42

## 2019-05-01 RX ADMIN — LISINOPRIL 20 MG: 20 TABLET ORAL at 16:23

## 2019-05-01 RX ADMIN — KETAMINE HYDROCHLORIDE 10 MG: 50 INJECTION, SOLUTION INTRAMUSCULAR; INTRAVENOUS at 09:44

## 2019-05-01 RX ADMIN — SODIUM CHLORIDE: 0.9 INJECTION, SOLUTION INTRAVENOUS at 08:36

## 2019-05-01 RX ADMIN — PHENYLEPHRINE HYDROCHLORIDE 100 MCG: 10 INJECTION INTRAVENOUS at 09:46

## 2019-05-01 RX ADMIN — LIDOCAINE HYDROCHLORIDE 20 ML: 10 INJECTION, SOLUTION INFILTRATION; PERINEURAL at 09:15

## 2019-05-01 RX ADMIN — KETAMINE HYDROCHLORIDE 10 MG: 50 INJECTION, SOLUTION INTRAMUSCULAR; INTRAVENOUS at 09:09

## 2019-05-01 RX ADMIN — LIDOCAINE HYDROCHLORIDE 50 MG: 10 INJECTION, SOLUTION INFILTRATION; PERINEURAL at 08:42

## 2019-05-01 RX ADMIN — FENTANYL CITRATE 25 MCG: 50 INJECTION, SOLUTION INTRAMUSCULAR; INTRAVENOUS at 08:42

## 2019-05-01 RX ADMIN — KETAMINE HYDROCHLORIDE 10 MG: 50 INJECTION, SOLUTION INTRAMUSCULAR; INTRAVENOUS at 08:54

## 2019-05-01 RX ADMIN — EPHEDRINE SULFATE 10 MG: 50 INJECTION, SOLUTION INTRAVENOUS at 09:22

## 2019-05-02 ENCOUNTER — APPOINTMENT (OUTPATIENT)
Dept: RADIOLOGY | Facility: HOSPITAL | Age: 74
End: 2019-05-02
Payer: COMMERCIAL

## 2019-05-02 VITALS
BODY MASS INDEX: 38.65 KG/M2 | TEMPERATURE: 98.2 F | WEIGHT: 270 LBS | OXYGEN SATURATION: 93 % | RESPIRATION RATE: 16 BRPM | SYSTOLIC BLOOD PRESSURE: 130 MMHG | HEART RATE: 78 BPM | HEIGHT: 70 IN | DIASTOLIC BLOOD PRESSURE: 64 MMHG

## 2019-05-02 PROBLEM — R00.1 BRADYCARDIA: Status: ACTIVE | Noted: 2019-05-02

## 2019-05-02 LAB
ANION GAP SERPL CALCULATED.3IONS-SCNC: 2 MMOL/L (ref 4–13)
BASOPHILS # BLD AUTO: 0.05 THOUSANDS/ΜL (ref 0–0.1)
BASOPHILS NFR BLD AUTO: 1 % (ref 0–1)
BUN SERPL-MCNC: 21 MG/DL (ref 5–25)
CALCIUM SERPL-MCNC: 9.7 MG/DL (ref 8.3–10.1)
CHLORIDE SERPL-SCNC: 106 MMOL/L (ref 100–108)
CO2 SERPL-SCNC: 29 MMOL/L (ref 21–32)
CREAT SERPL-MCNC: 1.32 MG/DL (ref 0.6–1.3)
EOSINOPHIL # BLD AUTO: 0.25 THOUSAND/ΜL (ref 0–0.61)
EOSINOPHIL NFR BLD AUTO: 3 % (ref 0–6)
ERYTHROCYTE [DISTWIDTH] IN BLOOD BY AUTOMATED COUNT: 13.8 % (ref 11.6–15.1)
GFR SERPL CREATININE-BSD FRML MDRD: 52 ML/MIN/1.73SQ M
GLUCOSE SERPL-MCNC: 102 MG/DL (ref 65–140)
HCT VFR BLD AUTO: 50.5 % (ref 36.5–49.3)
HGB BLD-MCNC: 16.6 G/DL (ref 12–17)
IMM GRANULOCYTES # BLD AUTO: 0.03 THOUSAND/UL (ref 0–0.2)
IMM GRANULOCYTES NFR BLD AUTO: 0 % (ref 0–2)
LYMPHOCYTES # BLD AUTO: 1.41 THOUSANDS/ΜL (ref 0.6–4.47)
LYMPHOCYTES NFR BLD AUTO: 17 % (ref 14–44)
MCH RBC QN AUTO: 31.4 PG (ref 26.8–34.3)
MCHC RBC AUTO-ENTMCNC: 32.9 G/DL (ref 31.4–37.4)
MCV RBC AUTO: 96 FL (ref 82–98)
MONOCYTES # BLD AUTO: 0.86 THOUSAND/ΜL (ref 0.17–1.22)
MONOCYTES NFR BLD AUTO: 10 % (ref 4–12)
NEUTROPHILS # BLD AUTO: 5.91 THOUSANDS/ΜL (ref 1.85–7.62)
NEUTS SEG NFR BLD AUTO: 69 % (ref 43–75)
NRBC BLD AUTO-RTO: 0 /100 WBCS
PLATELET # BLD AUTO: 158 THOUSANDS/UL (ref 149–390)
PMV BLD AUTO: 10.9 FL (ref 8.9–12.7)
POTASSIUM SERPL-SCNC: 4.7 MMOL/L (ref 3.5–5.3)
RBC # BLD AUTO: 5.29 MILLION/UL (ref 3.88–5.62)
SODIUM SERPL-SCNC: 137 MMOL/L (ref 136–145)
WBC # BLD AUTO: 8.51 THOUSAND/UL (ref 4.31–10.16)

## 2019-05-02 PROCEDURE — 85025 COMPLETE CBC W/AUTO DIFF WBC: CPT | Performed by: PHYSICIAN ASSISTANT

## 2019-05-02 PROCEDURE — 71046 X-RAY EXAM CHEST 2 VIEWS: CPT

## 2019-05-02 PROCEDURE — 80048 BASIC METABOLIC PNL TOTAL CA: CPT | Performed by: PHYSICIAN ASSISTANT

## 2019-05-02 PROCEDURE — 99024 POSTOP FOLLOW-UP VISIT: CPT | Performed by: INTERNAL MEDICINE

## 2019-05-02 RX ADMIN — ALLOPURINOL 300 MG: 300 TABLET ORAL at 11:38

## 2019-05-15 ENCOUNTER — TELEPHONE (OUTPATIENT)
Dept: CARDIOLOGY CLINIC | Facility: CLINIC | Age: 74
End: 2019-05-15

## 2019-05-15 ENCOUNTER — IN-CLINIC DEVICE VISIT (OUTPATIENT)
Dept: CARDIOLOGY CLINIC | Facility: CLINIC | Age: 74
End: 2019-05-15

## 2019-05-15 DIAGNOSIS — Z95.0 CARDIAC PACEMAKER IN SITU: ICD-10-CM

## 2019-05-15 DIAGNOSIS — R00.1 BRADYCARDIA: Primary | ICD-10-CM

## 2019-05-15 PROCEDURE — 99024 POSTOP FOLLOW-UP VISIT: CPT | Performed by: INTERNAL MEDICINE

## 2019-05-29 ENCOUNTER — TELEPHONE (OUTPATIENT)
Dept: HEMATOLOGY ONCOLOGY | Facility: CLINIC | Age: 74
End: 2019-05-29

## 2019-05-30 ENCOUNTER — TELEPHONE (OUTPATIENT)
Dept: HEMATOLOGY ONCOLOGY | Facility: CLINIC | Age: 74
End: 2019-05-30

## 2019-06-06 ENCOUNTER — HOSPITAL ENCOUNTER (OUTPATIENT)
Dept: INFUSION CENTER | Facility: CLINIC | Age: 74
Discharge: HOME | End: 2019-06-06
Payer: COMMERCIAL

## 2019-06-06 DIAGNOSIS — D75.1 POLYCYTHEMIA: ICD-10-CM

## 2019-06-06 LAB
BASOPHILS # BLD AUTO: 0.05 THOUSANDS/ΜL (ref 0–0.1)
BASOPHILS NFR BLD AUTO: 1 % (ref 0–1)
EOSINOPHIL # BLD AUTO: 0.26 THOUSAND/ΜL (ref 0–0.61)
EOSINOPHIL NFR BLD AUTO: 3 % (ref 0–6)
ERYTHROCYTE [DISTWIDTH] IN BLOOD BY AUTOMATED COUNT: 13.4 % (ref 11.6–15.1)
HCT VFR BLD AUTO: 50 % (ref 36.5–49.3)
HGB BLD-MCNC: 16.6 G/DL (ref 12–17)
IMM GRANULOCYTES # BLD AUTO: 0.02 THOUSAND/UL (ref 0–0.2)
IMM GRANULOCYTES NFR BLD AUTO: 0 % (ref 0–2)
LYMPHOCYTES # BLD AUTO: 1.71 THOUSANDS/ΜL (ref 0.6–4.47)
LYMPHOCYTES NFR BLD AUTO: 20 % (ref 14–44)
MCH RBC QN AUTO: 31.5 PG (ref 26.8–34.3)
MCHC RBC AUTO-ENTMCNC: 33.2 G/DL (ref 31.4–37.4)
MCV RBC AUTO: 95 FL (ref 82–98)
MONOCYTES # BLD AUTO: 1.12 THOUSAND/ΜL (ref 0.17–1.22)
MONOCYTES NFR BLD AUTO: 13 % (ref 4–12)
NEUTROPHILS # BLD AUTO: 5.42 THOUSANDS/ΜL (ref 1.85–7.62)
NEUTS SEG NFR BLD AUTO: 63 % (ref 43–75)
NRBC BLD AUTO-RTO: 0 /100 WBCS
PLATELET # BLD AUTO: 178 THOUSANDS/UL (ref 149–390)
PMV BLD AUTO: 10.8 FL (ref 8.9–12.7)
RBC # BLD AUTO: 5.27 MILLION/UL (ref 3.88–5.62)
WBC # BLD AUTO: 8.58 THOUSAND/UL (ref 4.31–10.16)

## 2019-06-06 PROCEDURE — 85025 COMPLETE CBC W/AUTO DIFF WBC: CPT | Performed by: INTERNAL MEDICINE

## 2019-06-06 NOTE — PROGRESS NOTES
Hematocrit drawn today resulted at 50%  Phlebotomy parameters are set for greater than or equal to 54%  Parameters double checked by Connie White RN  No phlebotomy needed today  Patient instructed to have blood work done 2 days prior to next appointment and to call the day before his appointment to see if he meets parameters for phlebotomy  Patient verbalized understanding  Schedule of appointments given to patient  And he is aware of upcoming appointment

## 2019-06-06 NOTE — PROGRESS NOTES
Patient arrived for therapeutic phlebotomy  Offers no complaints  Patients last CBC from 5/3 shows a hematocrit of 50 5%  Patient did not know to have blood work drawn for appointment  CBC drawn today

## 2019-06-06 NOTE — PATIENT INSTRUCTIONS
June 2019 Sunday Monday Tuesday Wednesday Thursday Friday Saturday                                 1  Happy Birthday!        2     3     4     5     6    INF THERAPY PLAN   3:00 PM   (60 min )   [facility] [address] [place] [address] 7     8       9     10     11     12     13     14     15       16     17     18     19     20     21     22       23     24     25     26     27     28     29       30

## 2019-06-24 ENCOUNTER — APPOINTMENT (OUTPATIENT)
Dept: LAB | Facility: CLINIC | Age: 74
End: 2019-06-24
Payer: COMMERCIAL

## 2019-06-24 ENCOUNTER — TRANSCRIBE ORDERS (OUTPATIENT)
Dept: LAB | Facility: CLINIC | Age: 74
End: 2019-06-24

## 2019-06-24 DIAGNOSIS — D75.1 POLYCYTHEMIA: ICD-10-CM

## 2019-06-24 LAB
BASOPHILS # BLD AUTO: 0.06 THOUSANDS/ΜL (ref 0–0.1)
BASOPHILS NFR BLD AUTO: 1 % (ref 0–1)
EOSINOPHIL # BLD AUTO: 0.23 THOUSAND/ΜL (ref 0–0.61)
EOSINOPHIL NFR BLD AUTO: 3 % (ref 0–6)
ERYTHROCYTE [DISTWIDTH] IN BLOOD BY AUTOMATED COUNT: 13.4 % (ref 11.6–15.1)
HCT VFR BLD AUTO: 53.1 % (ref 36.5–49.3)
HGB BLD-MCNC: 17.3 G/DL (ref 12–17)
IMM GRANULOCYTES # BLD AUTO: 0.03 THOUSAND/UL (ref 0–0.2)
IMM GRANULOCYTES NFR BLD AUTO: 0 % (ref 0–2)
LYMPHOCYTES # BLD AUTO: 1.7 THOUSANDS/ΜL (ref 0.6–4.47)
LYMPHOCYTES NFR BLD AUTO: 19 % (ref 14–44)
MCH RBC QN AUTO: 30.9 PG (ref 26.8–34.3)
MCHC RBC AUTO-ENTMCNC: 32.6 G/DL (ref 31.4–37.4)
MCV RBC AUTO: 95 FL (ref 82–98)
MONOCYTES # BLD AUTO: 0.87 THOUSAND/ΜL (ref 0.17–1.22)
MONOCYTES NFR BLD AUTO: 10 % (ref 4–12)
NEUTROPHILS # BLD AUTO: 6.11 THOUSANDS/ΜL (ref 1.85–7.62)
NEUTS SEG NFR BLD AUTO: 67 % (ref 43–75)
NRBC BLD AUTO-RTO: 0 /100 WBCS
PLATELET # BLD AUTO: 209 THOUSANDS/UL (ref 149–390)
PMV BLD AUTO: 11.1 FL (ref 8.9–12.7)
RBC # BLD AUTO: 5.6 MILLION/UL (ref 3.88–5.62)
WBC # BLD AUTO: 9 THOUSAND/UL (ref 4.31–10.16)

## 2019-06-24 PROCEDURE — 36415 COLL VENOUS BLD VENIPUNCTURE: CPT | Performed by: PHYSICIAN ASSISTANT

## 2019-06-24 PROCEDURE — 85025 COMPLETE CBC W/AUTO DIFF WBC: CPT | Performed by: PHYSICIAN ASSISTANT

## 2019-07-01 ENCOUNTER — HOSPITAL ENCOUNTER (OUTPATIENT)
Dept: INFUSION CENTER | Facility: CLINIC | Age: 74
Discharge: HOME | End: 2019-07-01
Payer: COMMERCIAL

## 2019-07-01 VITALS
DIASTOLIC BLOOD PRESSURE: 96 MMHG | RESPIRATION RATE: 18 BRPM | TEMPERATURE: 98.3 F | HEART RATE: 74 BPM | SYSTOLIC BLOOD PRESSURE: 132 MMHG

## 2019-07-01 DIAGNOSIS — D75.1 POLYCYTHEMIA: Primary | ICD-10-CM

## 2019-07-01 PROCEDURE — 99195 PHLEBOTOMY: CPT

## 2019-07-01 NOTE — PROGRESS NOTES
Pt to clinic for therapeutic phlebotomy, Hct checked with Wendy King RN of 53 1 ok to proceed, started phlebotomy at 1521 on left [facility] and ended at 1537   Removed 500 ml of blood, pt offers no complaints at this time, will continue to monitor and discharge when stable

## 2019-07-03 ENCOUNTER — TELEPHONE (OUTPATIENT)
Dept: HEMATOLOGY ONCOLOGY | Facility: CLINIC | Age: 74
End: 2019-07-03

## 2019-07-03 NOTE — TELEPHONE ENCOUNTER
NASREEN informing patient that his appt on 7/5/19 needed to be R/S  His new appt is 7/18/19 at 1:00 pm with JAD Goldberg at the [place] location  Patient was instructed to call our office if the appt roberts not work for his schedule

## 2019-07-11 ENCOUNTER — OFFICE VISIT (OUTPATIENT)
Dept: INTERNAL MEDICINE CLINIC | Age: 74
End: 2019-07-11
Payer: COMMERCIAL

## 2019-07-11 VITALS
SYSTOLIC BLOOD PRESSURE: 128 MMHG | OXYGEN SATURATION: 94 % | BODY MASS INDEX: 39.26 KG/M2 | DIASTOLIC BLOOD PRESSURE: 78 MMHG | HEART RATE: 78 BPM | WEIGHT: 273.6 LBS | TEMPERATURE: 98.2 F

## 2019-07-11 DIAGNOSIS — D75.1 POLYCYTHEMIA: ICD-10-CM

## 2019-07-11 DIAGNOSIS — R97.20 ELEVATED PSA: Primary | ICD-10-CM

## 2019-07-11 PROCEDURE — 99213 OFFICE O/P EST LOW 20 MIN: CPT | Performed by: INTERNAL MEDICINE

## 2019-07-11 PROCEDURE — 1170F FXNL STATUS ASSESSED: CPT | Performed by: INTERNAL MEDICINE

## 2019-07-11 PROCEDURE — G0439 PPPS, SUBSEQ VISIT: HCPCS | Performed by: INTERNAL MEDICINE

## 2019-07-11 PROCEDURE — 1125F AMNT PAIN NOTED PAIN PRSNT: CPT | Performed by: INTERNAL MEDICINE

## 2019-07-11 NOTE — PROGRESS NOTES
Assessment/Plan:    Elevated PSA  Patient is followed up by the urologist his PSA is are lower than before    Benign essential hypertension  Hypertension is very well controlled on amlodipine and lisinopril continue with the same medication no changes diet weight loss will be advisable for this gentleman    Polycythemia  Patient was seen by the Hematology-Oncology for polycythemia, he is on phlebotomy and the 2 units of blood was removed his hemoglobin and hematocrit is better he will continue to follow up with the Hematology-Oncology       Diagnoses and all orders for this visit:    Elevated PSA    Polycythemia        Subjective:      Patient ID: Philip Murillo is a 66 y o  male  HPI    The following portions of the patient's history were reviewed and updated as appropriate: allergies, current medications, past family history, past medical history, past social history, past surgical history and problem list     Review of Systems   Constitutional: Negative for appetite change, fatigue and fever  HENT: Negative for congestion, ear pain, hearing loss, nosebleeds, sneezing, tinnitus and voice change  Eyes: Negative for pain, discharge and redness  Respiratory: Negative for cough, chest tightness and wheezing  Cardiovascular: Negative for chest pain, palpitations and leg swelling  Gastrointestinal: Negative for abdominal pain, blood in stool, constipation, diarrhea, nausea and vomiting  Genitourinary: Negative for difficulty urinating, dysuria, hematuria and urgency  Musculoskeletal: Positive for arthralgias and back pain  Negative for gait problem and joint swelling  Both hip   Skin: Negative for rash and wound  Allergic/Immunologic: Negative for environmental allergies  Neurological: Negative for dizziness, tremors, seizures, weakness, light-headedness and numbness  Hematological: Negative for adenopathy  Does not bruise/bleed easily     Psychiatric/Behavioral: Negative for behavioral problems and confusion  The patient is not nervous/anxious            Past Medical History:   Diagnosis Date    Acute gouty arthropathy     last assessed-11/15/2013    Cataract     Diverticulitis of colon     Enlarged prostate     Gout     Hearing loss     Heart disease     History of diverticulitis of colon     History of dizziness     Hypercholesterolemia     Hyperlipidemia     Hypertension     Palpitations     Sinus bradycardia          Current Outpatient Medications:     allopurinol (ZYLOPRIM) 300 mg tablet, Take 1 tablet (300 mg total) by mouth daily, Disp: 90 tablet, Rfl: 1    amLODIPine (NORVASC) 5 mg tablet, Take 1 tablet (5 mg total) by mouth daily, Disp: 90 tablet, Rfl: 1    aspirin 81 MG tablet, Take 81 mg by mouth daily, Disp: , Rfl:     colestipol (COLESTID) 1 g tablet, Take 1 tablet (1 g total) by mouth 2 (two) times a day, Disp: 180 tablet, Rfl: 1    lisinopril (ZESTRIL) 20 mg tablet, Take 1 tablet (20 mg total) by mouth daily, Disp: 90 tablet, Rfl: 1    meclizine (ANTIVERT) 25 mg tablet, Take 1 tablet (25 mg total) by mouth 2 (two) times a day as needed for dizziness, Disp: 180 tablet, Rfl: 1    simvastatin (ZOCOR) 40 mg tablet, Take 1 tablet (40 mg total) by mouth every other day, Disp: 45 tablet, Rfl: 1    Allergies   Allergen Reactions    Pollen Extract        Social History   Past Surgical History:   Procedure Laterality Date    ARM NEUROPLASTY Left     1977    CARDIAC SURGERY  05/01/2019    pace maker placed    COLONOSCOPY      CYSTOSCOPY  12/27/2017    diagnostic    FOREARM FRACTURE SURGERY      ORCHIECTOMY      surgery testis    FL CYSTOURETHRO W/IMPLANT N/A 2/9/2018    Procedure: CYSTOSCOPY WITH INSERTION UROLIFT;  Surgeon: Oralia Hughes MD;  Location: [facility];  Service: Urology    FL CYSTOURETHROSCOPY,BIOPSY N/A 2/9/2018    Procedure: BLADDER BIOPSY;  Surgeon: Oralia Hughes MD;  Location: [facility];  Service: Urology    TESTICLE SURGERY Right 1976     Family History   Problem Relation Age of Onset    Coronary artery disease Mother     Heart disease Mother     Hypertension Mother     Stroke Mother     Coronary artery disease Father     Stroke Family     Heart attack Family         acute MI       Objective:  /78 (BP Location: Left arm, Patient Position: Sitting, Cuff Size: Large)   Pulse 78   Temp 98 2 °F (36 8 °C) (Tympanic)   Wt 124 kg (273 lb 9 6 oz)   SpO2 94%   BMI 39 26 kg/m²        Physical Exam   Constitutional: He is oriented to person, place, and time  He appears well-developed and well-nourished  HENT:   Right Ear: External ear normal    Mouth/Throat: Oropharynx is clear and moist    Eyes: Pupils are equal, round, and reactive to light  Conjunctivae and EOM are normal    Neck: Normal range of motion  No JVD present  No thyromegaly present  Cardiovascular: Normal rate, regular rhythm, normal heart sounds and intact distal pulses  Pulmonary/Chest: Breath sounds normal    Abdominal: Soft  Bowel sounds are normal    Musculoskeletal: Normal range of motion  Lymphadenopathy:     He has no cervical adenopathy  Neurological: He is alert and oriented to person, place, and time  He has normal reflexes  Skin: Skin is dry  Psychiatric: He has a normal mood and affect  His behavior is normal  Judgment and thought content normal    Nursing note and vitals reviewed          Recent Results (from the past 672 hour(s))   CBC and differential    Collection Time: 06/24/19  8:16 AM   Result Value Ref Range    WBC 9 00 4 31 - 10 16 Thousand/uL    RBC 5 60 3 88 - 5 62 Million/uL    Hemoglobin 17 3 (H) 12 0 - 17 0 g/dL    Hematocrit 53 1 (H) 36 5 - 49 3 %    MCV 95 82 - 98 fL    MCH 30 9 26 8 - 34 3 pg    MCHC 32 6 31 4 - 37 4 g/dL    RDW 13 4 11 6 - 15 1 %    MPV 11 1 8 9 - 12 7 fL    Platelets 651 472 - 998 Thousands/uL    nRBC 0 /100 WBCs    Neutrophils Relative 67 43 - 75 %    Immat GRANS % 0 0 - 2 %    Lymphocytes Relative 19 14 - 44 %    Monocytes Relative 10 4 - 12 %    Eosinophils Relative 3 0 - 6 %    Basophils Relative 1 0 - 1 %    Neutrophils Absolute 6 11 1 85 - 7 62 Thousands/µL    Immature Grans Absolute 0 03 0 00 - 0 20 Thousand/uL    Lymphocytes Absolute 1 70 0 60 - 4 47 Thousands/µL    Monocytes Absolute 0 87 0 17 - 1 22 Thousand/µL    Eosinophils Absolute 0 23 0 00 - 0 61 Thousand/µL    Basophils Absolute 0 06 0 00 - 0 10 Thousands/µL

## 2019-07-11 NOTE — PATIENT INSTRUCTIONS
Obesity   AMBULATORY CARE:   Obesity  is when your body mass index (BMI) is greater than 30  Your healthcare provider will use your height and weight to measure your BMI  The risks of obesity include  many health problems, such as injuries or physical disability  You may need tests to check for the following:  · Diabetes     · High blood pressure or high cholesterol     · Heart disease     · Gallbladder or liver disease     · Cancer of the colon, breast, prostate, liver, or kidney     · Sleep apnea     · Arthritis or gout  Seek care immediately if:   · You have a severe headache, confusion, or difficulty speaking  · You have weakness on one side of your body  · You have chest pain, sweating, or shortness of breath  Contact your healthcare provider if:   · You have symptoms of gallbladder or liver disease, such as pain in your upper abdomen  · You have knee or hip pain and discomfort while walking  · You have symptoms of diabetes, such as intense hunger and thirst, and frequent urination  · You have symptoms of sleep apnea, such as snoring or daytime sleepiness  · You have questions or concerns about your condition or care  Treatment for obesity  focuses on helping you lose weight to improve your health  Even a small decrease in BMI can reduce the risk for many health problems  Your healthcare provider will help you set a weight-loss goal   · Lifestyle changes  are the first step in treating obesity  These include making healthy food choices and getting regular physical activity  Your healthcare provider may suggest a weight-loss program that involves coaching, education, and therapy  · Medicine  may help you lose weight when it is used with a healthy diet and physical activity  · Surgery  can help you lose weight if you are very obese and have other health problems  There are several types of weight-loss surgery  Ask your healthcare provider for more information    Be successful losing weight:   · Set small, realistic goals  An example of a small goal is to walk for 20 minutes 5 days a week  Anther goal is to lose 5% of your body weight  · Tell friends, family members, and coworkers about your goals  and ask for their support  Ask a friend to lose weight with you, or join a weight-loss support group  · Identify foods or triggers that may cause you to overeat , and find ways to avoid them  Remove tempting high-calorie foods from your home and workplace  Place a bowl of fresh fruit on your kitchen counter  If stress causes you to eat, then find other ways to cope with stress  · Keep a diary to track what you eat and drink  Also write down how many minutes of physical activity you do each day  Weigh yourself once a week and record it in your diary  Eating changes: You will need to eat 500 to 1,000 fewer calories each day than you currently eat to lose 1 to 2 pounds a week  The following changes will help you cut calories:  · Eat smaller portions  Use small plates, no larger than 9 inches in diameter  Fill your plate half full of fruits and vegetables  Measure your food using measuring cups until you know what a serving size looks like  · Eat 3 meals and 1 or 2 snacks each day  Plan your meals in advance  Marck Russell and eat at home most of the time  Eat slowly  · Eat fruits and vegetables at every meal   They are low in calories and high in fiber, which makes you feel full  Do not add butter, margarine, or cream sauce to vegetables  Use herbs to season steamed vegetables  · Eat less fat and fewer fried foods  Eat more baked or grilled chicken and fish  These protein sources are lower in calories and fat than red meat  Limit fast food  Dress your salads with olive oil and vinegar instead of bottled dressing  · Limit the amount of sugar you eat  Do not drink sugary beverages  Limit alcohol  Activity changes:  Physical activity is good for your body in many ways   It helps you burn calories and build strong muscles  It decreases stress and depression, and improves your mood  It can also help you sleep better  Talk to your healthcare provider before you begin an exercise program   · Exercise for at least 30 minutes 5 days a week  Start slowly  Set aside time each day for physical activity that you enjoy and that is convenient for you  It is best to do both weight training and an activity that increases your heart rate, such as walking, bicycling, or swimming  · Find ways to be more active  Do yard work and housecleaning  Walk up the stairs instead of using elevators  Spend your leisure time going to events that require walking, such as outdoor festivals or fairs  This extra physical activity can help you lose weight and keep it off  Follow up with your healthcare provider as directed: You may need to meet with a dietitian  Write down your questions so you remember to ask them during your visits  © 2017 [address] [employer] Information is for End User's use only and may not be sold, redistributed or otherwise used for commercial purposes  All illustrations and images included in CareNotes® are the copyrighted property of [employer]  or Eamon Real  The above information is an  only  It is not intended as medical advice for individual conditions or treatments  Talk to your doctor, nurse or pharmacist before following any medical regimen to see if it is safe and effective for you  Urinary Incontinence   WHAT YOU NEED TO KNOW:   What is urinary incontinence? Urinary incontinence (UI) is when you lose control of your bladder  What causes UI? UI occurs because your bladder cannot store or empty urine properly  The following are the most common types of UI:  · Stress incontinence  is when you leak urine due to increased bladder pressure  This may happen when you cough, sneeze, or exercise       · Urge incontinence  is when you feel the need to urinate right away and leak urine accidentally  · Mixed incontinence  is when you have both stress and urge UI  What are the signs and symptoms of UI?   · You feel like your bladder does not empty completely when you urinate  · You urinate often and need to urinate immediately  · You leak urine when you sleep, or you wake up with the urge to urinate  · You leak urine when you cough, sneeze, exercise, or laugh  How is UI diagnosed? Your healthcare provider will ask how often you leak urine and whether you have stress or urge symptoms  Tell him which medicines you take, how often you urinate, and how much liquid you drink each day  You may need any of the following tests:  · Urine tests  may show infection or kidney function  · A pelvic exam  may be done to check for blockages  A pelvic exam will also show if your bladder, uterus, or other organs have moved out of place  · An x-ray, ultrasound, or CT  may show problems with parts of your urinary system  You may be given contrast liquid to help your organs show up better in the pictures  Tell the healthcare provider if you have ever had an allergic reaction to contrast liquid  Do not enter the MRI room with anything metal  Metal can cause serious injury  Tell the healthcare provider if you have any metal in or on your body  · A bladder scan  will show how much urine is left in your bladder after you urinate  You will be asked to urinate and then healthcare providers will use a small ultrasound machine to check the urine left in your bladder  · Cystometry  is used to check the function of your urinary system  Your healthcare provider checks the pressure in your bladder while filling it with fluid  Your bladder pressure may also be tested when your bladder is full and while you urinate  How is UI treated? · Medicines  can help strengthen your bladder control      · Electrical stimulation  is used to send a small amount of electrical energy to your pelvic floor muscles  This helps control your bladder function  Electrodes may be placed outside your body or in your rectum  For women, the electrodes may be placed in the vagina  · A bulking agent  may be injected into the wall of your urethra to make it thicker  This helps keep your urethra closed and decreases urine leakage  · Devices  such as a clamp, pessary, or tampon may help stop urine leaks  Ask your healthcare provider for more information about these and other devices  · Surgery  may be needed if other treatments do not work  Several types of surgery can help improve your bladder control  Ask your healthcare provider for more information about the surgery you may need  How can I manage my symptoms? · Do pelvic muscle exercises often  Your pelvic muscles help you stop urinating  Squeeze these muscles tight for 5 seconds, then relax for 5 seconds  Gradually work up to squeezing for 10 seconds  Do 3 sets of 15 repetitions a day, or as directed  This will help strengthen your pelvic muscles and improve bladder control  · A catheter  may be used to help empty your bladder  A catheter is a tiny, plastic tube that is put into your bladder to drain your urine  Your healthcare provider may tell you to use a catheter to prevent your bladder from getting too full and leaking urine  · Keep a UI record  Write down how often you leak urine and how much you leak  Make a note of what you were doing when you leaked urine  · Train your bladder  Go to the bathroom at set times, such as every 2 hours, even if you do not feel the urge to go  You can also try to hold your urine when you feel the urge to go  For example, hold your urine for 5 minutes when you feel the urge to go  As that becomes easier, hold your urine for 10 minutes  · Drink liquids as directed  Ask your healthcare provider how much liquid to drink each day and which liquids are best for you   You may need to limit the amount of liquid you drink to help control your urine leakage  Limit or do not have drinks that contain caffeine or alcohol  Do not drink any liquid right before you go to bed  · Prevent constipation  Eat a variety of high-fiber foods  Good examples are high-fiber cereals, beans, vegetables, and whole-grain breads  Prune juice may help make your bowel movement softer  Walking is the best way to trigger your intestines to have a bowel movement  · Exercise regularly and maintain a healthy weight  Ask your healthcare provider how much you should weigh and about the best exercise plan for you  Weight loss and exercise will decrease pressure on your bladder and help you control your leakage  Ask him to help you create a weight loss plan if you are overweight  When should I seek immediate care? · You have severe pain  · You are confused or cannot think clearly  When should I contact my healthcare provider? · You have a fever  · You see blood in your urine  · You have pain when you urinate  · You have new or worse pain, even after treatment  · Your mouth feels dry or you have vision changes  · Your urine is cloudy or smells bad  · You have questions or concerns about your condition or care  CARE AGREEMENT:   You have the right to help plan your care  Learn about your health condition and how it may be treated  Discuss treatment options with your caregivers to decide what care you want to receive  You always have the right to refuse treatment  The above information is an  only  It is not intended as medical advice for individual conditions or treatments  Talk to your doctor, nurse or pharmacist before following any medical regimen to see if it is safe and effective for you  © 2017 [address] [employer] Information is for End User's use only and may not be sold, redistributed or otherwise used for commercial purposes   All illustrations and images included in CareNotes® are the copyrighted property of [employer] M [employer]  or Eamon Real  Cigarette Smoking and Your Health   AMBULATORY CARE:   Risks to your health if you smoke:  Nicotine and other chemicals found in tobacco damage every cell in your body  Even if you are a light smoker, you have an increased risk for cancer, heart disease, and lung disease  If you are pregnant or have diabetes, smoking increases your risk for complications  Benefits to your health if you stop smoking:   · You decrease respiratory symptoms such as coughing, wheezing, and shortness of breath  · You reduce your risk for cancers of the lung, mouth, throat, kidney, bladder, pancreas, stomach, and cervix  If you already have cancer, you increase the benefits of chemotherapy  You also reduce your risk for cancer returning or a second cancer from developing  · You reduce your risk for heart disease, blood clots, heart attack, and stroke  · You reduce your risk for lung infections, and diseases such as pneumonia, asthma, chronic bronchitis, and emphysema  · Your circulation improves  More oxygen can be delivered to your body  If you have diabetes, you lower your risk for complications, such as kidney, artery, and eye diseases  You also lower your risk for nerve damage  Nerve damage can lead to amputations, poor vision, and blindness  · You improve your body's ability to heal and to fight infections  Benefits to the health of others if you stop smoking:  Tobacco is harmful to nonsmokers who breathe in your secondhand smoke  The following are ways the health of others around you may improve when you stop smoking:  · You lower the risks for lung cancer and heart disease in nonsmoking adults  · If you are pregnant, you lower the risk for miscarriage, early delivery, low birth weight, and stillbirth  You also lower your baby's risk for SIDS, obesity, developmental delay, and neurobehavioral problems, such as ADHD  · If you have children, you lower their risk for ear infections, colds, pneumonia, bronchitis, and asthma  For more information and support to stop smoking:   · Smokefree  gov  Phone: 6- 227 - 322-2252  Web Address: www smokefrAmerican [facility]ney Stone Management  Follow up with your healthcare provider as directed:  Write down your questions so you remember to ask them during your visits  © 2017 [address] [employer] Information is for End User's use only and may not be sold, redistributed or otherwise used for commercial purposes  All illustrations and images included in CareNotes® are the copyrighted property of [employer]  or Eamon Real  The above information is an  only  It is not intended as medical advice for individual conditions or treatments  Talk to your doctor, nurse or pharmacist before following any medical regimen to see if it is safe and effective for you  Fall Prevention   WHAT YOU NEED TO KNOW:   What is fall prevention? Fall prevention includes ways to make your home and other areas safer  It also includes ways you can move more carefully to prevent a fall  What increases my risk for falls? · Lack of vitamin D    · Not getting enough sleep each night    · Trouble walking or keeping your balance, or foot problems    · Health conditions that cause changes in your blood pressure, vision, or muscle strength and coordination    · Medicines that make you dizzy, weak, or sleepy    · Problems seeing clearly    · Shoes that have high heels or are not supportive    · Tripping hazards, such as items left on the floor, no handrails on the stairs, or broken steps  How can I help protect myself from falls? · Stand or sit up slowly  This may help you keep your balance and prevent falls  If you need to get up during the night, sit up first  Be sure you are fully awake before you stand  Turn on the light before you start walking  Go slowly in case you are still sleepy   Make sure you will not trip over any pets sleeping in the bedroom  · Use assistive devices as directed  Your healthcare provider may suggest that you use a cane or walker to help you keep your balance  You may need to have grab bars put in your bathroom near the toilet or in the shower  · Wear shoes that fit well and have soles that   Wear shoes both inside and outside  Use slippers with good   Do not wear shoes with high heels  · Wear a personal alarm  This is a device that allows you to call 911 if you fall and need help  Ask your healthcare provider for more information  · Stay active  Exercise can help strengthen your muscles and improve your balance  Your healthcare provider may recommend water aerobics or walking  He or she may also recommend physical therapy to improve your coordination  Never start an exercise program without talking to your healthcare provider first      · Manage medical conditions  Keep all appointments with your healthcare providers  Visit your eye doctor as directed  How can I make my home safer? · Add items to prevent falls in the bathroom  Put nonslip strips on your bath or shower floor to prevent you from slipping  Use a bath mat if you do not have carpet in the bathroom  This will prevent you from falling when you step out of the bath or shower  Use a shower seat so you do not need to stand while you shower  Sit on the toilet or a chair in your bathroom to dry yourself and put on clothing  This will prevent you from losing your balance from drying or dressing yourself while you are standing  · Keep paths clear  Remove books, shoes, and other objects from walkways and stairs  Place cords for telephones and lamps out of the way so that you do not need to walk over them  Tape them down if you cannot move them  Remove small rugs  If you cannot remove a rug, secure it with double-sided tape  This will prevent you from tripping  · Install bright lights in your home  Use night lights to help light paths to the bathroom or kitchen  Always turn on the light before you start walking  · Keep items you use often on shelves within reach  Do not use a step stool to help you reach an item  · Paint or place reflective tape on the edges of your stairs  This will help you see the stairs better  Call 911 or have someone else call if:   · You have fallen and are unconscious  · You have fallen and cannot move part of your body  Contact your healthcare provider if:   · You have fallen and have pain or a headache  · You have questions or concerns about your condition or care  CARE AGREEMENT:   You have the right to help plan your care  Learn about your health condition and how it may be treated  Discuss treatment options with your caregivers to decide what care you want to receive  You always have the right to refuse treatment  The above information is an  only  It is not intended as medical advice for individual conditions or treatments  Talk to your doctor, nurse or pharmacist before following any medical regimen to see if it is safe and effective for you  © 2017 [address] [facility] Information is for End User's use only and may not be sold, redistributed or otherwise used for commercial purposes  All illustrations and images included in CareNotes® are the copyrighted property of [employer]  or Eamon Real  Advance Directives   WHAT YOU NEED TO KNOW:   What are advance directives? Advance directives are legal documents that state your wishes and plans for medical care  These plans are made ahead of time in case you lose your ability to make decisions for yourself  Advance directives can apply to any medical decision, such as the treatments you want, and if you want to donate organs  What are the types of advance directives? There are many types of advance directives, and each state has rules about how to use them   You may choose a combination of any of the following:  · Living will: This is a written record of the treatment you want  You can also choose which treatments you do not want, which to limit, and which to stop at a certain time  This includes surgery, medicine, IV fluid, and tube feedings  · Durable power of  for healthcare [facility]): This is a written record that states who you want to make healthcare choices for you when you are unable to make them for yourself  This person, called a proxy, is usually a family member or a friend  You may choose more than 1 proxy  · Do not resuscitate (DNR) order:  A DNR order is used in case your heart stops beating or you stop breathing  It is a request not to have certain forms of treatment, such as CPR  A DNR order may be included in other types of advance directives  · Medical directive: This covers the care that you want if you are in a coma, near death, or unable to make decisions for yourself  You can list the treatments you want for each condition  Treatment may include pain medicine, surgery, blood transfusions, dialysis, IV or tube feedings, and a ventilator (breathing machine)  · Values history: This document has questions about your views, beliefs, and how you feel and think about life  This information can help others choose the care that you would choose  Why are advance directives important? An advance directive helps you control your care  Although spoken wishes may be used, it is better to have your wishes written down  Spoken wishes can be misunderstood, or not followed  Treatments may be given even if you do not want them  An advance directive may make it easier for your family to make difficult choices about your care  How do I decide what to put in my advance directives? · Make informed decisions:  Make sure you fully understand treatments or care you may receive   Think about the benefits and problems your decisions could cause for you or your family  Talk to healthcare providers if you have concerns or questions before you write down your wishes  You may also want to talk with your Rastafarian or , or a   Check your state laws to make sure that what you put in your advance directive is legal      · Sign all forms:  Sign and date your advance directive when you have finished  You may also need 2 witnesses to sign the forms  Witnesses cannot be your doctor or his staff, your spouse, heirs or beneficiaries, people you owe money to, or your chosen proxy  Talk to your family, proxy, and healthcare providers about your advance directive  Give each person a copy, and keep one for yourself in a place you can get to easily  Do not keep it hidden or locked away  · Review and revise your plans: You can revise your advance directive at any time, as long as you are able to make decisions  Review your plan every year, and when there are changes in your life, or your health  When you make changes, let your family, proxy, and healthcare providers know  Give each a new copy  Where can I find more information? · [employer]  [address] [place] , [address]  Phone: 7- 454 - [phone]  Phone: 1- 303 - 737-4552  Web Address: http://www  aafp org  · [address] [facility])  [address] [facility], [address] [facility] , [address] [place] [address]  Phone: [phone]  Phone: [phone]  Web Address: Selina berrios  [facility] AGREEMENT:   You have the right to help plan your care  To help with this plan, you must learn about your health condition and treatment options  You must also learn about advance directives and how they are used  Work with your healthcare providers to decide what care will be used to treat you  You always have the right to refuse treatment  The above information is an  only   It is not intended as medical advice for individual conditions or treatments  Talk to your doctor, nurse or pharmacist before following any medical regimen to see if it is safe and effective for you  © 2017 [address] [employer] Information is for End User's use only and may not be sold, redistributed or otherwise used for commercial purposes  All illustrations and images included in CareNotes® are the copyrighted property of [employer]  or Eamon Real

## 2019-07-11 NOTE — ASSESSMENT & PLAN NOTE
Hypertension is very well controlled on amlodipine and lisinopril continue with the same medication no changes diet weight loss will be advisable for this gentleman

## 2019-07-11 NOTE — ASSESSMENT & PLAN NOTE
Patient was seen by the Hematology-Oncology for polycythemia, he is on phlebotomy and the 2 units of blood was removed his hemoglobin and hematocrit is better he will continue to follow up with the Hematology-Oncology

## 2019-07-11 NOTE — PROGRESS NOTES
Assessment and Plan:     Problem List Items Addressed This Visit     None         History of Present Illness:     Patient presents for Medicare Annual Wellness visit    Patient Care Team:  Deon Mccann MD as PCP - MD Deon Jovel MD     Problem List:     Patient Active Problem List   Diagnosis    Benign prostatic hyperplasia with urinary frequency    Hypervascular lesion of urinary bladder    Benign essential hypertension    Chronic bilateral low back pain without sciatica    Elevated PSA    Hypercholesterolemia    Obstructive sleep apnea    Hyperuricemia    Polycythemia    Bradycardia      Past Medical and Surgical History:     Past Medical History:   Diagnosis Date    Acute gouty arthropathy     last assessed-11/15/2013    Cataract     Diverticulitis of colon     Enlarged prostate     Gout     Hearing loss     History of diverticulitis of colon     History of dizziness     Hypercholesterolemia     Hyperlipidemia     Hypertension     Palpitations     Sinus bradycardia      Past Surgical History:   Procedure Laterality Date    ARM NEUROPLASTY Left     1977    COLONOSCOPY      CYSTOSCOPY  12/27/2017    diagnostic    FOREARM FRACTURE SURGERY      ORCHIECTOMY      surgery testis    [place] CYSTOURETHRO W/IMPLANT N/A 2/9/2018    Procedure: CYSTOSCOPY WITH INSERTION Audrea Casino;  Surgeon: Mandeep Benitez MD;  [facility];  Service: Urology    [place] CYSTOURETHROSCOPY,BIOPSY N/A 2/9/2018    Procedure: BLADDER BIOPSY;  Surgeon: Mandeep Benitez MD;  [facility];  Service: Urology    TESTICLE SURGERY Right 1976      Family History:     Family History   Problem Relation Age of Onset    Coronary artery disease Mother     Heart disease Mother     Hypertension Mother     Stroke Mother     Coronary artery disease Father     Stroke Family     Heart attack Family         acute MI      Social History:     Social History     Tobacco Use Smoking Status Former Smoker    Last attempt to quit: [facility] Years since quittin 5   Smokeless Tobacco Never Used     Social History     Substance and Sexual Activity   Alcohol Use Yes    Comment: rarely   Per allscripts, drinks alcohol very infrequently     Social History     Substance and Sexual Activity   Drug Use No      Medications and Allergies:     Current Outpatient Medications   Medication Sig Dispense Refill    allopurinol (ZYLOPRIM) 300 mg tablet Take 1 tablet (300 mg total) by mouth daily 90 tablet 1    amLODIPine (NORVASC) 5 mg tablet Take 1 tablet (5 mg total) by mouth daily 90 tablet 1    aspirin 81 MG tablet Take 81 mg by mouth daily      colestipol (COLESTID) 1 g tablet Take 1 tablet (1 g total) by mouth 2 (two) times a day 180 tablet 1    lisinopril (ZESTRIL) 20 mg tablet Take 1 tablet (20 mg total) by mouth daily 90 tablet 1    meclizine (ANTIVERT) 25 mg tablet Take 1 tablet (25 mg total) by mouth 2 (two) times a day as needed for dizziness 180 tablet 1    simvastatin (ZOCOR) 40 mg tablet Take 1 tablet (40 mg total) by mouth every other day 45 tablet 1     No current facility-administered medications for this visit  Allergies   Allergen Reactions    Pollen Extract       Immunizations:     Immunization History   Administered Date(s) Administered    INFLUENZA 2008, 2009, 10/13/2010, 10/30/2015, 11/15/2016, 10/04/2017    Influenza Split High Dose Preservative Free IM 10/04/2013, 2014, 10/30/2015, 11/15/2016, 10/04/2017    Influenza TIV (IM) 2011, 2012    Influenza, high dose seasonal 0 5 mL 10/02/2018    Pneumococcal Conjugate 13-Valent 2016    Pneumococcal Polysaccharide PPV23 05/10/2011    Zoster 2011      Medicare Screening Tests and Risk Assessments:     Sheri Rosalie is here for his Subsequent Wellness visit  Health Risk Assessment:  Patient rates overall health as very good   Patient feels that their physical health rating is Much better  Eyesight was rated as Same  Hearing was rated as Same  Patient feels that their emotional and mental health rating is Same  Pain experienced by patient in the last 7 days has been None  Patient states that he has experienced no weight loss or gain in last 6 months  Emotional/Mental Health:  Patient has not been feeling nervous/anxious  PHQ-9 Depression Screening:    Frequency of the following problems over the past two weeks:      1  Little interest or pleasure in doing things: 0 - not at all      2  Feeling down, depressed, or hopeless: 0 - not at all  PHQ-2 Score: 0          Broken Bones/Falls: Fall Risk Assessment:    In the past year, patient has experienced: No history of falling in past year          Bladder/Bowel:  Patient has not leaked urine accidently in the last six months  Patient reports no loss of bowel control  Immunizations:  Patient has had a flu vaccination within the last year  Patient has received a pneumonia shot  Patient has received a shingles shot  Home Safety:  Patient does not have trouble with stairs inside or outside of their home  Patient currently reports that there are no safety hazards present in home, working smoke alarms, no working carbon monoxide detectors  Preventative Screenings:   prostate cancer screen performed, colon cancer screen completed, cholesterol screen completed, glaucoma eye exam completed,     Nutrition:  Current diet: Regular with servings of the following:    Medications:  Patient is not currently taking any over-the-counter supplements  Patient is able to manage medications  Lifestyle Choices:  Patient reports no tobacco use  Patient has smoked or used tobacco in the past   Patient has stopped his tobacco use  Tobacco use quit date: 2006  Patient reports no alcohol use  Patient drives a vehicle  Patient wears seat belt      Current level of exercise of physical activity described by patient as: Normal daily activities           Activities of Daily Living:  Can get out of bed by his or her self, able to dress self, able to make own meals, able to do own shopping, able to bathe self, can do own laundry/housekeeping, can manage own money, pay bills and track expenses    Previous Hospitalizations:  Hospitalization or ED visit in past 12 months  Number of hospitalizations within the last year: 1-2        Advanced Directives:  Patient has decided on a power of   Patient has spoken to designated power of   Patient has completed advanced directive  Preventative Screening/Counseling:      Cardiovascular:      General: Screening Current          Diabetes:      General: Screening Current          Colorectal Cancer:      General: Screening Current          Prostate Cancer:      General: Screening Current          Osteoporosis:      General: Screening Not Indicated          AAA:      General: Screening Current          Glaucoma:      General: Screening Current          HIV:      General: Screening Not Indicated          Hepatitis C:      General: Screening Not Indicated        Advanced Directives:   Patient has living will for healthcare, has durable POA for healthcare, patient has an advanced directive  Information on ACP and/or AD provided  5 wishes given  End of life assessment reviewed with patient  Provider agrees with end of life decisions        Immunizations:      Influenza: Influenza UTD This Year      Pneumococcal: Lifetime Vaccine Completed      Zostavax: Zostavax Vaccine UTD      Other Preventative Counseling (Non-Medicare):  Dietary education for weight gain and Weight reduction discussed

## 2019-07-18 ENCOUNTER — OFFICE VISIT (OUTPATIENT)
Dept: HEMATOLOGY ONCOLOGY | Facility: CLINIC | Age: 74
End: 2019-07-18
Payer: COMMERCIAL

## 2019-07-18 VITALS
DIASTOLIC BLOOD PRESSURE: 80 MMHG | BODY MASS INDEX: 39.37 KG/M2 | RESPIRATION RATE: 16 BRPM | HEART RATE: 58 BPM | SYSTOLIC BLOOD PRESSURE: 100 MMHG | WEIGHT: 275 LBS | TEMPERATURE: 97.8 F | HEIGHT: 70 IN

## 2019-07-18 DIAGNOSIS — D75.1 ERYTHROCYTOSIS: Primary | ICD-10-CM

## 2019-07-18 PROCEDURE — 99214 OFFICE O/P EST MOD 30 MIN: CPT | Performed by: PHYSICIAN ASSISTANT

## 2019-07-18 NOTE — PROGRESS NOTES
Hematology/Oncology Outpatient Follow-up  Jorge Luis Miranda 66 y o  male 1945 [id number]    Date:  7/18/2019      Assessment and Plan:    1  Erythrocytosis  20-year-old male presents for follow-up regarding elevated hemoglobin and hematocrit  When patient presented for consultation in February 2019 his hematocrit was 57 8  Without any intervention is decreased to 53 0 in April 2019  This remained decreased at 53 0 in May 2019 and then 1 day different it decreased to 50 5  Patient was advised in May to have a phlebotomy  He was going to have this done at [facility]  We then received call in June stating that he wanted to have this done at the infusion Center  He had 1 phlebotomy on 06/24/2019 due to a hematocrit of 53 1  Reviewed that his previous workup for erythrocytosis was negative  Patient is overweight and snores occasionally  He states that he had previously been tested for sleep apnea and was given a diagnosis of borderline sleep the approximately 15 years ago  Reviewed that this could be the cause of his erythrocytosis  However he states that he does not want have a sleep study because regardless he is not going to wear a CPAP machine due to his nocturia of every hour  He may proceed with phlebotomy a [facility] as when he was donating blood frequently he did not have this problem  Recommended resumption of scheduled blood donating  Follow-up in 6 months with repeat labs  - CBC and differential    HPI:  20-year-old male presents for follow-up regarding elevated hematocrit  Patient has been donating blood every 4 months for many years  He donated double red cells when he would donate  Patient had exertional dyspnea and noted to have bradycardia and had a pacemaker implanted in May 2019       He has history of smoking cigars and pipes but this was more than 30 years ago    He does not do so at present      He has history of nocturia and follows with the urologist     Workup for erythrocytosis showed negative LUKE 2, erythropoietin of 9 7, negative chest x-ray, ultrasound showing fatty liver, normal spleen size  He had previously stated that he was diagnosed with borderline sleep apnea approximately 15 years ago but never used a CPAP  We had discussed seeing a sleep specialist at the previous visit however this has not been done  Interval history:    ROS: Review of Systems   Constitutional: Negative for appetite change, chills, fever and unexpected weight change  HENT: Negative for mouth sores and nosebleeds  Respiratory: Negative for cough and shortness of breath  Cardiovascular: Negative for chest pain, palpitations and leg swelling  Gastrointestinal: Negative for abdominal pain, blood in stool, constipation, diarrhea, nausea and vomiting  Genitourinary: Negative for difficulty urinating, dysuria and hematuria  Musculoskeletal: Negative for arthralgias  Skin: Negative  Neurological: Negative for dizziness, weakness, light-headedness, numbness and headaches  Hematological: Negative  Psychiatric/Behavioral: Negative          Past Medical History:   Diagnosis Date    Acute gouty arthropathy     last assessed-11/15/2013    Cataract     Diverticulitis of colon     Enlarged prostate     Gout     Hearing loss     Heart disease     History of diverticulitis of colon     History of dizziness     Hypercholesterolemia     Hyperlipidemia     Hypertension     Palpitations     Sinus bradycardia        Past Surgical History:   Procedure Laterality Date    ARM NEUROPLASTY Left     1977    CARDIAC SURGERY  05/01/2019    pace maker placed    COLONOSCOPY      CYSTOSCOPY  12/27/2017    diagnostic    FOREARM FRACTURE SURGERY      ORCHIECTOMY      surgery testis    [place] CYSTOURETHRO W/IMPLANT N/A 2/9/2018    Procedure: CYSTOSCOPY WITH INSERTION UROLIFT;  Surgeon: Onel Hector MD;  [facility];  Service: Urology    [place] CYSTOURETHROSCOPY,BIOPSY N/A 2018    Procedure: BLADDER BIOPSY;  Surgeon: Erika Carey MD;  [facility];  Service: Urology    TESTICLE SURGERY Right        Social History     Socioeconomic History    Marital status: /Civil Union     Spouse name: None    Number of children: None    Years of education: None    Highest education level: None   Occupational History     Comment: retired from work   Social Needs    Financial resource strain: None    Food insecurity:     Worry: None     Inability: None    Transportation needs:     Medical: None     Non-medical: None   Tobacco Use    Smoking status: Former Smoker     Last attempt to quit:      Years since quittin 5    Smokeless tobacco: Never Used   Substance and Sexual Activity    Alcohol use: Not Currently     Comment: rarely   Per allscripts, drinks alcohol very infrequently    Drug use: No    Sexual activity: None   Lifestyle    Physical activity:     Days per week: None     Minutes per session: None    Stress: None   Relationships    Social connections:     Talks on phone: None     Gets together: None     Attends L[employer] service: None     Active member of club or organization: None     Attends meetings of clubs or organizations: None     Relationship status: None    Intimate partner violence:     Fear of current or ex partner: None     Emotionally abused: None     Physically abused: None     Forced sexual activity: None   Other Topics Concern    None   Social History Narrative    Copy of advanced directive obtained    Exercising regularly-primary form of exercise is work    Recreational activities-he enjoys home crafts and wood working    Travel history-Pt denies being out of the country during 2014-11/10/2014       Family History   Problem Relation Age of Onset    Coronary artery disease Mother     Heart disease Mother     Hypertension Mother     Stroke Mother     Coronary artery disease Father     Stroke Family     Heart attack Family         acute MI       Allergies   Allergen Reactions    Pollen Extract Allergic Rhinitis         Current Outpatient Medications:     allopurinol (ZYLOPRIM) 300 mg tablet, Take 1 tablet (300 mg total) by mouth daily, Disp: 90 tablet, Rfl: 1    amLODIPine (NORVASC) 5 mg tablet, Take 1 tablet (5 mg total) by mouth daily, Disp: 90 tablet, Rfl: 1    aspirin 81 MG tablet, Take 81 mg by mouth daily, Disp: , Rfl:     colestipol (COLESTID) 1 g tablet, Take 1 tablet (1 g total) by mouth 2 (two) times a day, Disp: 180 tablet, Rfl: 1    lisinopril (ZESTRIL) 20 mg tablet, Take 1 tablet (20 mg total) by mouth daily, Disp: 90 tablet, Rfl: 1    meclizine (ANTIVERT) 25 mg tablet, Take 1 tablet (25 mg total) by mouth 2 (two) times a day as needed for dizziness, Disp: 180 tablet, Rfl: 1    simvastatin (ZOCOR) 40 mg tablet, Take 1 tablet (40 mg total) by mouth every other day, Disp: 45 tablet, Rfl: 1      Physical Exam:  /80 (BP Location: Left arm)   Pulse 58   Temp 97 8 °F (36 6 °C) (Tympanic)   Resp 16   Ht 5' 10" (1 778 m)   Wt 125 kg (275 lb)   BMI 39 46 kg/m²     Physical Exam   Constitutional: He is oriented to person, place, and time  He appears well-developed and well-nourished  No distress  Over weight   HENT:   Head: Normocephalic and atraumatic  Eyes: Conjunctivae are normal  No scleral icterus  Neck: Normal range of motion  Neck supple  Cardiovascular: Normal rate, regular rhythm and normal heart sounds  No murmur heard  Pulmonary/Chest: Effort normal and breath sounds normal  No respiratory distress  Abdominal: Soft  There is no tenderness  Musculoskeletal: Normal range of motion  He exhibits no edema or tenderness  Lymphadenopathy:     He has no cervical adenopathy  Neurological: He is alert and oriented to person, place, and time  No cranial nerve deficit  Skin: Skin is warm and dry     Psychiatric: He has a normal mood and affect  Vitals reviewed  Labs:  Lab Results   Component Value Date    WBC 9 00 06/24/2019    HGB 17 3 (H) 06/24/2019    HCT 53 1 (H) 06/24/2019    MCV 95 06/24/2019     06/24/2019     Lab Results   Component Value Date    K 4 7 05/02/2019     05/02/2019    CO2 29 05/02/2019    BUN 21 05/02/2019    CREATININE 1 32 (H) 05/02/2019    GLUF 107 (H) 05/01/2019    CALCIUM 9 7 05/02/2019    AST 12 04/08/2019    ALT 22 04/08/2019    ALKPHOS 83 04/08/2019    EGFR 52 05/02/2019       Patient voiced understanding and agreement in the above discussion  Aware to contact our office with questions/symptoms in the interim  I have spent 30 minutes with Patient and family today in which greater than 50% of this time was spent in counseling/coordination of care regarding Diagnostic results, Intructions for management, Patient and family education and Impressions

## 2019-07-24 ENCOUNTER — TELEPHONE (OUTPATIENT)
Dept: HEMATOLOGY ONCOLOGY | Facility: CLINIC | Age: 74
End: 2019-07-24

## 2019-07-24 NOTE — TELEPHONE ENCOUNTER
Patients wife Michelle called stating that [facility], [facility] stated that they will need a letter from [facility] stating that it is ok for them to draw patients blood  She state that the letter can be mailed to their home [address] [place] [id number]   Best call back [phone]

## 2019-07-29 ENCOUNTER — HOSPITAL ENCOUNTER (OUTPATIENT)
Dept: INFUSION CENTER | Facility: CLINIC | Age: 74
Discharge: HOME | End: 2019-07-29

## 2019-08-05 DIAGNOSIS — I10 ESSENTIAL HYPERTENSION, BENIGN: ICD-10-CM

## 2019-08-05 NOTE — TELEPHONE ENCOUNTER
MED: Lisinopril 20mg      SUPPLY: 90Day  PHARMACY: [facility]   PATIENT PHONE #: [phone]  LAST OV: 7/11/2019  UPCOMING OV: 11/13/2019

## 2019-08-05 NOTE — TELEPHONE ENCOUNTER
Patients wife called to know the status of the letter being written for [facility]  Please write letter and mail to patients home address   Call back [phone]

## 2019-08-06 RX ORDER — LISINOPRIL 20 MG/1
20 TABLET ORAL DAILY
Qty: 90 TABLET | Refills: 1 | Status: SHIPPED | OUTPATIENT
Start: 2019-08-06 | End: 2020-01-27 | Stop reason: SDUPTHER

## 2019-08-19 ENCOUNTER — IN-CLINIC DEVICE VISIT (OUTPATIENT)
Dept: CARDIOLOGY CLINIC | Facility: CLINIC | Age: 74
End: 2019-08-19
Payer: COMMERCIAL

## 2019-08-19 DIAGNOSIS — Z95.0 CARDIAC PACEMAKER IN SITU: Primary | ICD-10-CM

## 2019-08-19 PROCEDURE — 93280 PM DEVICE PROGR EVAL DUAL: CPT | Performed by: INTERNAL MEDICINE

## 2019-08-19 NOTE — PROGRESS NOTES
Results for orders placed or performed in visit on 08/19/19   Cardiac EP device report    Narrative    DEVICE INTERROGATED IN THE [place] OFFICE: BATTERY VOLTAGE ADEQUATE  AP 94%  35%  ALL AVAILABLE LEAD PARAMETERS WITHIN NORMAL LIMITS  1 VHR NOTED, APPROX 7 BEATS @ 179 BPM  EF 60% (2019)  PT ON ASA  EGRAM PRESENTS AS NSVT  DECREASE MADE TO RA & RV AMP TO PROMOTE DEVICE LONGEVITY WHILE MAINTAINING AN APPROPRIATE SAFETY MARGIN  NORMAL DEVICE FUNCTION   NC

## 2019-09-10 ENCOUNTER — TELEPHONE (OUTPATIENT)
Dept: HEMATOLOGY ONCOLOGY | Facility: CLINIC | Age: 74
End: 2019-09-10

## 2019-09-10 NOTE — TELEPHONE ENCOUNTER
Pa Back from [facility], [facility], [facility] called to obtain the letter from Ishan Blackmon stating patient can resume blood donation  I printed the note out and faxed it over to [phone]

## 2019-09-12 DIAGNOSIS — I10 ESSENTIAL HYPERTENSION, BENIGN: ICD-10-CM

## 2019-09-12 RX ORDER — AMLODIPINE BESYLATE 5 MG/1
5 TABLET ORAL DAILY
Qty: 90 TABLET | Refills: 1 | Status: SHIPPED | OUTPATIENT
Start: 2019-09-12 | End: 2020-03-16 | Stop reason: SDUPTHER

## 2019-10-04 DIAGNOSIS — E78.5 HYPERLIPIDEMIA, UNSPECIFIED HYPERLIPIDEMIA TYPE: ICD-10-CM

## 2019-10-04 RX ORDER — MONTELUKAST SODIUM 4 MG/1
1 TABLET, CHEWABLE ORAL 2 TIMES DAILY
Qty: 180 TABLET | Refills: 1 | Status: SHIPPED | OUTPATIENT
Start: 2019-10-04 | End: 2020-04-07 | Stop reason: SDUPTHER

## 2019-10-04 RX ORDER — SIMVASTATIN 40 MG
40 TABLET ORAL EVERY OTHER DAY
Qty: 45 TABLET | Refills: 1 | Status: SHIPPED | OUTPATIENT
Start: 2019-10-04 | End: 2020-04-07 | Stop reason: SDUPTHER

## 2019-10-21 DIAGNOSIS — M10.9 GOUT, UNSPECIFIED CAUSE, UNSPECIFIED CHRONICITY, UNSPECIFIED SITE: ICD-10-CM

## 2019-10-21 RX ORDER — ALLOPURINOL 300 MG/1
300 TABLET ORAL DAILY
Qty: 90 TABLET | Refills: 1 | Status: SHIPPED | OUTPATIENT
Start: 2019-10-21 | End: 2020-04-07 | Stop reason: SDUPTHER

## 2019-11-07 ENCOUNTER — TELEPHONE (OUTPATIENT)
Dept: HEMATOLOGY ONCOLOGY | Facility: CLINIC | Age: 74
End: 2019-11-07

## 2019-11-07 NOTE — TELEPHONE ENCOUNTER
Spoke to Michelle stating that we had to reschedule Dawood's appt from 1/20/20 to 1/13/20 at 2:30 pm  Michelle voiced understanding

## 2019-11-13 ENCOUNTER — OFFICE VISIT (OUTPATIENT)
Dept: INTERNAL MEDICINE CLINIC | Age: 74
End: 2019-11-13
Payer: COMMERCIAL

## 2019-11-13 VITALS
DIASTOLIC BLOOD PRESSURE: 80 MMHG | BODY MASS INDEX: 40.46 KG/M2 | TEMPERATURE: 97.6 F | SYSTOLIC BLOOD PRESSURE: 110 MMHG | HEART RATE: 74 BPM | OXYGEN SATURATION: 96 % | WEIGHT: 273.2 LBS | HEIGHT: 69 IN

## 2019-11-13 DIAGNOSIS — E78.00 HYPERCHOLESTEROLEMIA: ICD-10-CM

## 2019-11-13 DIAGNOSIS — G47.33 OBSTRUCTIVE SLEEP APNEA: ICD-10-CM

## 2019-11-13 DIAGNOSIS — Z23 NEED FOR INFLUENZA VACCINATION: Primary | ICD-10-CM

## 2019-11-13 DIAGNOSIS — I10 BENIGN ESSENTIAL HYPERTENSION: ICD-10-CM

## 2019-11-13 DIAGNOSIS — D75.1 POLYCYTHEMIA: ICD-10-CM

## 2019-11-13 PROCEDURE — 90662 IIV NO PRSV INCREASED AG IM: CPT

## 2019-11-13 PROCEDURE — G0008 ADMIN INFLUENZA VIRUS VAC: HCPCS

## 2019-11-13 PROCEDURE — 99214 OFFICE O/P EST MOD 30 MIN: CPT | Performed by: INTERNAL MEDICINE

## 2019-11-13 NOTE — PROGRESS NOTES
Assessment/Plan:  BMI Counseling: Body mass index is 40 34 kg/m²  The BMI is above normal  Nutrition recommendations include reducing portion sizes, decreasing overall calorie intake, 3-5 servings of fruits/vegetables daily, reducing fast food intake, consuming healthier snacks and decreasing soda and/or juice intake  Exercise recommendations include exercising 3-5 times per week  No problem-specific Assessment & Plan notes found for this encounter  Diagnoses and all orders for this visit:    Need for influenza vaccination  -     influenza vaccine, [id number], high-dose, PF 0 5 mL (FLUZONE HIGH-DOSE)    Obstructive sleep apnea    Benign essential hypertension  -     Comprehensive metabolic panel; Future  -     Lipid panel; Future  -     TSH, 3rd generation with Free T4 reflex; Future  -     UA w Reflex to Microscopic w Reflex to Culture    Hypercholesterolemia  -     Lipid panel; Future    Polycythemia        Subjective:   Chief Complaint   Patient presents with    Follow-up        Patient ID: Radha Perez is a 66 y o  male  HPI  This is the 66 years young gentleman for the regular follow-up visit he is doing well no new problems  Polycythemia he is followed up by the Hematology-Oncology and he is giving the blood donations regularly last hemoglobin hematocrit was high but better than before he does not have any symptoms of polycythemia right now  Status post permanent pacemaker    He is doing well no new cardiology problems followed up by the cardiologist  Obstructive sleep apnea mild he is not using the CPAP mask  Hypertension is very well controlled continue with the same medication no changes  I discussed with him regarding the weight he should do the diet some kind of exercise to lose some weight hopefully this will happen  The following portions of the patient's history were reviewed and updated as appropriate: allergies, current medications, past family history, past medical history, past social history, past surgical history and problem list     Review of Systems   Constitutional: Negative for appetite change, fatigue and fever  HENT: Negative for congestion, ear pain, hearing loss, nosebleeds, sneezing, tinnitus and voice change  Eyes: Negative for pain, discharge and redness  Respiratory: Negative for cough, chest tightness and wheezing  Cardiovascular: Negative for chest pain, palpitations and leg swelling  Gastrointestinal: Negative for abdominal pain, blood in stool, constipation, diarrhea, nausea and vomiting  Genitourinary: Negative for difficulty urinating, dysuria, hematuria and urgency  Musculoskeletal: Negative for arthralgias, back pain, gait problem and joint swelling  Complaining of aches and pains when he does something not too bad he does not take any medications were the   Skin: Negative for rash and wound  Allergic/Immunologic: Negative for environmental allergies  Neurological: Negative for dizziness, tremors, seizures, weakness, light-headedness and numbness  Hematological: Negative for adenopathy  Does not bruise/bleed easily  Psychiatric/Behavioral: Negative for behavioral problems and confusion  The patient is not nervous/anxious            Past Medical History:   Diagnosis Date    Acute gouty arthropathy     last assessed-11/15/2013    Cataract     Diverticulitis of colon     Enlarged prostate     Gout     Hearing loss     Heart disease     History of diverticulitis of colon     History of dizziness     Hypercholesterolemia     Hyperlipidemia     Hypertension     Palpitations     Sinus bradycardia          Current Outpatient Medications:     allopurinol (ZYLOPRIM) 300 mg tablet, Take 1 tablet (300 mg total) by mouth daily, Disp: 90 tablet, Rfl: 1    amLODIPine (NORVASC) 5 mg tablet, Take 1 tablet (5 mg total) by mouth daily, Disp: 90 tablet, Rfl: 1    aspirin 81 MG tablet, Take 81 mg by mouth daily, Disp: , Rfl:     colestipol (COLESTID) 1 g tablet, Take 1 tablet (1 g total) by mouth 2 (two) times a day, Disp: 180 tablet, Rfl: 1    lisinopril (ZESTRIL) 20 mg tablet, Take 1 tablet (20 mg total) by mouth daily, Disp: 90 tablet, Rfl: 1    meclizine (ANTIVERT) 25 mg tablet, Take 1 tablet (25 mg total) by mouth 2 (two) times a day as needed for dizziness, Disp: 180 tablet, Rfl: 1    simvastatin (ZOCOR) 40 mg tablet, Take 1 tablet (40 mg total) by mouth every other day, Disp: 45 tablet, Rfl: 1    Allergies   Allergen Reactions    Pollen Extract Allergic Rhinitis       Social History   Past Surgical History:   Procedure Laterality Date    ARM NEUROPLASTY Left     1977    CARDIAC SURGERY  05/01/2019    pace maker placed    COLONOSCOPY      CYSTOSCOPY  12/27/2017    diagnostic    FOREARM FRACTURE SURGERY      ORCHIECTOMY      surgery testis    DC CYSTOURETHRO W/IMPLANT N/A 2/9/2018    Procedure: CYSTOSCOPY WITH INSERTION Elisabeth Blarebekah;  Surgeon: Phyllis Fajardo MD;  [facility];  Service: Urology    DC CYSTOURETHROSCOPY,BIOPSY N/A 2/9/2018    Procedure: BLADDER BIOPSY;  Surgeon: Phyllis Fajardo MD;  [facility];  Service: Urology    TESTICLE SURGERY Right 1976     Family History   Problem Relation Age of Onset    Coronary artery disease Mother     Heart disease Mother     Hypertension Mother     Stroke Mother     Coronary artery disease Father     Stroke Family     Heart attack Family         acute MI       Objective:  /80 (BP Location: Left arm, Patient Position: Sitting, Cuff Size: Large)   Pulse 74   Temp 97 6 °F (36 4 °C) (Tympanic)   Ht 5' 9" (1 753 m)   Wt 124 kg (273 lb 3 2 oz)   SpO2 96%   BMI 40 34 kg/m²        Physical Exam   Constitutional: He is oriented to person, place, and time  He appears well-developed and well-nourished  HENT:   Right Ear: External ear normal    Mouth/Throat: Oropharynx is clear and moist    Eyes: Pupils are equal, round, and reactive to light  Conjunctivae and EOM are normal    Neck: Normal range of motion  No JVD present  No thyromegaly present  Cardiovascular: Normal rate, regular rhythm, normal heart sounds and intact distal pulses  Pacemaker site is intact and healing well   Pulmonary/Chest: Breath sounds normal    Abdominal: Soft  Bowel sounds are normal    Musculoskeletal: Normal range of motion  Lymphadenopathy:     He has no cervical adenopathy  Neurological: He is alert and oriented to person, place, and time  He has normal reflexes  Skin: Skin is dry  Psychiatric: He has a normal mood and affect  His behavior is normal  Judgment and thought content normal          No results found for this or any previous visit (from the past 672 hour(s))

## 2019-11-21 ENCOUNTER — REMOTE DEVICE CLINIC VISIT (OUTPATIENT)
Dept: CARDIOLOGY CLINIC | Facility: CLINIC | Age: 74
End: 2019-11-21
Payer: COMMERCIAL

## 2019-11-21 DIAGNOSIS — Z95.0 PRESENCE OF PERMANENT CARDIAC PACEMAKER: Primary | ICD-10-CM

## 2019-11-21 PROCEDURE — 93296 REM INTERROG EVL PM/IDS: CPT | Performed by: INTERNAL MEDICINE

## 2019-11-21 PROCEDURE — 93294 REM INTERROG EVL PM/LDLS PM: CPT | Performed by: INTERNAL MEDICINE

## 2019-11-22 NOTE — PROGRESS NOTES
Results for orders placed or performed in visit on 11/21/19   Cardiac EP device report    Narrative    CARELINK TRANSMISSION: BATTERY VOLTAGE ADEQUATE  (12 3 YRS) AP 96%  40%  ALL AVAILABLE LEAD PARAMETERS WITHIN NORMAL LIMITS  1 VHR EPISODES DETECTED 6 BEATS @ 380ms  EF 60% (2019)  NORMAL DEVICE FUNCTION  ---LINDSAY

## 2019-12-16 NOTE — PROGRESS NOTES
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, no malignancy  - He denies any gross hematuria since biopsy  He will follow up in 1 year for symptom reassessment  Sun Esteban PA-C      Chief Complaint     Chief Complaint   Patient presents with    BPH status post Urolift     follow up     Bladder lesion status post biopsy         History of Present Illness     Ruby Godinez is a 66 y o  male patient known to Dr Yumiko Albright for history of BPH and a bladder lesion status post Urolift and bladder biopsy performed 02/09/2018 presenting today for annual follow-up  He has previously managed on Flomax and finasteride which he has discontinued quite some time ago  AUA symptom score today is 11 with an overall bother score of 1  He continues to empty well with a PVR of 29 mL  He does continue to have 4 episodes of nocturia nightly which is not bothersome to him  He is very happy so far with his results  He reports that his urinary symptoms now are better than he was on dual medical therapy prior to the Urolift  He had a pacemaker placed in May of this year and is currently feeling well  He has no health complaints today        Laboratory     Lab Results   Component Value Date    CREATININE 1 32 (H) 05/02/2019       Lab Results   Component Value Date    PSA 3 2 10/23/2017    PSA 3 1 06/30/2017    PSA 3 9 12/30/2016       Recent Results (from the past 1 hour(s))   POCT Measure PVR    Collection Time: 12/18/19  9:13 AM   Result Value Ref Range    POST-VOID RESIDUAL VOLUME, ML POC 29 mL         Review of Systems     Review of Systems   Constitutional: Negative for chills and fever  HENT: Negative  Eyes: Negative  Respiratory: Negative for shortness of breath  Cardiovascular: Negative for chest pain  Gastrointestinal: Negative for constipation, diarrhea, nausea and vomiting  Genitourinary: Negative for difficulty urinating, dysuria, enuresis, flank pain, frequency, hematuria and urgency  Musculoskeletal: Negative  AUA SYMPTOM SCORE      Most Recent Value   AUA SYMPTOM SCORE   How often have you had a sensation of not emptying your bladder completely after you finished urinating? 0   How often have you had to urinate again less than two hours after you finished urinating? 1   How often have you found you stopped and started again several times when you urinate? 1   How often have you found it difficult to postpone urination? 0   How often have you had a weak urinary stream?  4   How often have you had to push or strain to begin urination? 1   How many times did you most typically get up to urinate from the time you went to bed at night until the time you got up in the morning? 4   Quality of Life: If you were to spend the rest of your life with your urinary condition just the way it is now, how would you feel about that?  1   AUA SYMPTOM SCORE  11                Allergies     Allergies   Allergen Reactions    Pollen Extract Allergic Rhinitis       Physical Exam     Physical Exam   Constitutional: He is oriented to person, place, and time  He appears well-developed and well-nourished  No distress  HENT:   Head: Normocephalic and atraumatic  Right Ear: External ear normal    Left Ear: External ear normal    Nose: Nose normal    Eyes: Right eye exhibits no discharge  Left eye exhibits no discharge  No scleral icterus  Cardiovascular: Normal rate     Pulmonary/Chest: Effort normal    Musculoskeletal:   Ambulates independently   Neurological: He is alert and oriented to person, place, and time  Skin: Skin is warm and dry  He is not diaphoretic  Psychiatric: He has a normal mood and affect  His behavior is normal  Judgment and thought content normal    Nursing note and vitals reviewed          Vital Signs     Vitals:    12/18/19 0905   BP: 130/82   BP Location: Left arm   Patient Position: Sitting   Cuff Size: Large   Pulse: 90   Weight: 123 kg (272 lb)   Height: 6' 10" (2 083 m)         Current Medications       Current Outpatient Medications:     allopurinol (ZYLOPRIM) 300 mg tablet, Take 1 tablet (300 mg total) by mouth daily, Disp: 90 tablet, Rfl: 1    amLODIPine (NORVASC) 5 mg tablet, Take 1 tablet (5 mg total) by mouth daily, Disp: 90 tablet, Rfl: 1    aspirin 81 MG tablet, Take 81 mg by mouth daily, Disp: , Rfl:     colestipol (COLESTID) 1 g tablet, Take 1 tablet (1 g total) by mouth 2 (two) times a day, Disp: 180 tablet, Rfl: 1    lisinopril (ZESTRIL) 20 mg tablet, Take 1 tablet (20 mg total) by mouth daily, Disp: 90 tablet, Rfl: 1    meclizine (ANTIVERT) 25 mg tablet, Take 1 tablet (25 mg total) by mouth 2 (two) times a day as needed for dizziness, Disp: 180 tablet, Rfl: 1    simvastatin (ZOCOR) 40 mg tablet, Take 1 tablet (40 mg total) by mouth every other day, Disp: 45 tablet, Rfl: 1      Active Problems     Patient Active Problem List   Diagnosis    Benign prostatic hyperplasia with urinary frequency    Hypervascular lesion of urinary bladder    Benign essential hypertension    Chronic bilateral low back pain without sciatica    Elevated PSA    Hypercholesterolemia    Obstructive sleep apnea    Hyperuricemia    Polycythemia    Bradycardia         Past Medical History     Past Medical History:   Diagnosis Date    Acute gouty arthropathy     last assessed-11/15/2013    Cataract     Diverticulitis of colon     Enlarged prostate     Gout     Hearing loss     Heart disease     History of diverticulitis of colon     History of dizziness     Hypercholesterolemia  Hyperlipidemia     Hypertension     Palpitations     Sinus bradycardia          Surgical History     Past Surgical History:   Procedure Laterality Date    ARM NEUROPLASTY Left     1977    CARDIAC PACEMAKER PLACEMENT Left     CARDIAC SURGERY  05/01/2019    pace maker placed    COLONOSCOPY      CYSTOSCOPY  12/27/2017    diagnostic    FOREARM FRACTURE SURGERY      ORCHIECTOMY      surgery testis    [place] CYSTOURETHRO W/IMPLANT N/A 2/9/2018    Procedure: CYSTOSCOPY WITH INSERTION UROLIFT;  Surgeon: Suresh Pena MD;  [facility]: [facility];  Service: Urology    AZ CYSTOURETHROSCOPY,BIOPSY N/A 2/9/2018    Procedure: BLADDER BIOPSY;  Surgeon: Suresh Pena MD;  [facility];  Service: Urology    TESTICLE SURGERY Right 1976         Family History     Family History   Problem Relation Age of Onset    Coronary artery disease Mother     Heart disease Mother     Hypertension Mother     Stroke Mother     Coronary artery disease Father     Stroke Family     Heart attack Family         acute MI         Social History     Social History       Radiology

## 2019-12-18 ENCOUNTER — OFFICE VISIT (OUTPATIENT)
Dept: UROLOGY | Facility: CLINIC | Age: 74
End: 2019-12-18
Payer: COMMERCIAL

## 2019-12-18 VITALS
BODY MASS INDEX: 31.47 KG/M2 | SYSTOLIC BLOOD PRESSURE: 130 MMHG | HEART RATE: 90 BPM | HEIGHT: 78 IN | WEIGHT: 272 LBS | DIASTOLIC BLOOD PRESSURE: 82 MMHG

## 2019-12-18 DIAGNOSIS — N40.1 BENIGN PROSTATIC HYPERPLASIA WITH LOWER URINARY TRACT SYMPTOMS, SYMPTOM DETAILS UNSPECIFIED: Primary | ICD-10-CM

## 2019-12-18 LAB — POST-VOID RESIDUAL VOLUME, ML POC: 29 ML

## 2019-12-18 PROCEDURE — 51798 US URINE CAPACITY MEASURE: CPT | Performed by: PHYSICIAN ASSISTANT

## 2019-12-18 PROCEDURE — 99213 OFFICE O/P EST LOW 20 MIN: CPT | Performed by: PHYSICIAN ASSISTANT

## 2020-01-03 ENCOUNTER — APPOINTMENT (OUTPATIENT)
Dept: LAB | Age: 75
End: 2020-01-03
Payer: COMMERCIAL

## 2020-01-03 DIAGNOSIS — I10 BENIGN ESSENTIAL HYPERTENSION: ICD-10-CM

## 2020-01-03 DIAGNOSIS — D75.1 ERYTHROCYTOSIS: ICD-10-CM

## 2020-01-03 DIAGNOSIS — E78.00 HYPERCHOLESTEROLEMIA: ICD-10-CM

## 2020-01-03 LAB
ALBUMIN SERPL BCP-MCNC: 3.8 G/DL (ref 3.5–5)
ALP SERPL-CCNC: 79 U/L (ref 46–116)
ALT SERPL W P-5'-P-CCNC: 25 U/L (ref 12–78)
ANION GAP SERPL CALCULATED.3IONS-SCNC: 3 MMOL/L (ref 4–13)
AST SERPL W P-5'-P-CCNC: 17 U/L (ref 5–45)
BASOPHILS # BLD AUTO: 0.04 THOUSANDS/ΜL (ref 0–0.1)
BASOPHILS NFR BLD AUTO: 0 % (ref 0–1)
BILIRUB SERPL-MCNC: 0.89 MG/DL (ref 0.2–1)
BILIRUB UR QL STRIP: NEGATIVE
BUN SERPL-MCNC: 25 MG/DL (ref 5–25)
CALCIUM ALBUM COR SERPL-MCNC: 10.7 MG/DL (ref 8.3–10.1)
CALCIUM SERPL-MCNC: 10.5 MG/DL (ref 8.3–10.1)
CHLORIDE SERPL-SCNC: 107 MMOL/L (ref 100–108)
CHOLEST SERPL-MCNC: 149 MG/DL (ref 50–200)
CLARITY UR: CLEAR
CO2 SERPL-SCNC: 28 MMOL/L (ref 21–32)
COLOR UR: YELLOW
CREAT SERPL-MCNC: 1.32 MG/DL (ref 0.6–1.3)
EOSINOPHIL # BLD AUTO: 0.22 THOUSAND/ΜL (ref 0–0.61)
EOSINOPHIL NFR BLD AUTO: 2 % (ref 0–6)
ERYTHROCYTE [DISTWIDTH] IN BLOOD BY AUTOMATED COUNT: 13.8 % (ref 11.6–15.1)
GFR SERPL CREATININE-BSD FRML MDRD: 52 ML/MIN/1.73SQ M
GLUCOSE P FAST SERPL-MCNC: 74 MG/DL (ref 65–99)
GLUCOSE UR STRIP-MCNC: NEGATIVE MG/DL
HCT VFR BLD AUTO: 49.6 % (ref 36.5–49.3)
HDLC SERPL-MCNC: 40 MG/DL
HGB BLD-MCNC: 16.3 G/DL (ref 12–17)
HGB UR QL STRIP.AUTO: NEGATIVE
IMM GRANULOCYTES # BLD AUTO: 0.02 THOUSAND/UL (ref 0–0.2)
IMM GRANULOCYTES NFR BLD AUTO: 0 % (ref 0–2)
KETONES UR STRIP-MCNC: NEGATIVE MG/DL
LDLC SERPL CALC-MCNC: 90 MG/DL (ref 0–100)
LEUKOCYTE ESTERASE UR QL STRIP: NEGATIVE
LYMPHOCYTES # BLD AUTO: 1.67 THOUSANDS/ΜL (ref 0.6–4.47)
LYMPHOCYTES NFR BLD AUTO: 19 % (ref 14–44)
MCH RBC QN AUTO: 31.3 PG (ref 26.8–34.3)
MCHC RBC AUTO-ENTMCNC: 32.9 G/DL (ref 31.4–37.4)
MCV RBC AUTO: 95 FL (ref 82–98)
MONOCYTES # BLD AUTO: 0.89 THOUSAND/ΜL (ref 0.17–1.22)
MONOCYTES NFR BLD AUTO: 10 % (ref 4–12)
NEUTROPHILS # BLD AUTO: 6.15 THOUSANDS/ΜL (ref 1.85–7.62)
NEUTS SEG NFR BLD AUTO: 69 % (ref 43–75)
NITRITE UR QL STRIP: NEGATIVE
NONHDLC SERPL-MCNC: 109 MG/DL
NRBC BLD AUTO-RTO: 0 /100 WBCS
PH UR STRIP.AUTO: 6 [PH]
PLATELET # BLD AUTO: 206 THOUSANDS/UL (ref 149–390)
PMV BLD AUTO: 11.6 FL (ref 8.9–12.7)
POTASSIUM SERPL-SCNC: 4.3 MMOL/L (ref 3.5–5.3)
PROT SERPL-MCNC: 7.8 G/DL (ref 6.4–8.2)
PROT UR STRIP-MCNC: NEGATIVE MG/DL
RBC # BLD AUTO: 5.21 MILLION/UL (ref 3.88–5.62)
SODIUM SERPL-SCNC: 138 MMOL/L (ref 136–145)
SP GR UR STRIP.AUTO: 1.01 (ref 1–1.03)
TRIGL SERPL-MCNC: 95 MG/DL
TSH SERPL DL<=0.05 MIU/L-ACNC: 2.73 UIU/ML (ref 0.36–3.74)
UROBILINOGEN UR QL STRIP.AUTO: 0.2 E.U./DL
WBC # BLD AUTO: 8.99 THOUSAND/UL (ref 4.31–10.16)

## 2020-01-03 PROCEDURE — 85025 COMPLETE CBC W/AUTO DIFF WBC: CPT | Performed by: PHYSICIAN ASSISTANT

## 2020-01-03 PROCEDURE — 80053 COMPREHEN METABOLIC PANEL: CPT

## 2020-01-03 PROCEDURE — 84443 ASSAY THYROID STIM HORMONE: CPT

## 2020-01-03 PROCEDURE — 81003 URINALYSIS AUTO W/O SCOPE: CPT | Performed by: INTERNAL MEDICINE

## 2020-01-03 PROCEDURE — 80061 LIPID PANEL: CPT

## 2020-01-03 PROCEDURE — 36415 COLL VENOUS BLD VENIPUNCTURE: CPT | Performed by: PHYSICIAN ASSISTANT

## 2020-01-13 ENCOUNTER — OFFICE VISIT (OUTPATIENT)
Dept: HEMATOLOGY ONCOLOGY | Facility: CLINIC | Age: 75
End: 2020-01-13
Payer: COMMERCIAL

## 2020-01-13 VITALS
SYSTOLIC BLOOD PRESSURE: 132 MMHG | RESPIRATION RATE: 17 BRPM | DIASTOLIC BLOOD PRESSURE: 84 MMHG | BODY MASS INDEX: 31.82 KG/M2 | WEIGHT: 275 LBS | OXYGEN SATURATION: 96 % | TEMPERATURE: 98.2 F | HEART RATE: 77 BPM | HEIGHT: 78 IN

## 2020-01-13 DIAGNOSIS — E79.0 HYPERURICEMIA: ICD-10-CM

## 2020-01-13 DIAGNOSIS — E78.00 HYPERCHOLESTEROLEMIA: ICD-10-CM

## 2020-01-13 DIAGNOSIS — D75.1 POLYCYTHEMIA: Primary | ICD-10-CM

## 2020-01-13 DIAGNOSIS — E83.52 HYPERCALCEMIA: ICD-10-CM

## 2020-01-13 DIAGNOSIS — N28.1 RENAL CYST: ICD-10-CM

## 2020-01-13 DIAGNOSIS — G47.33 OBSTRUCTIVE SLEEP APNEA: ICD-10-CM

## 2020-01-13 DIAGNOSIS — I10 BENIGN ESSENTIAL HYPERTENSION: ICD-10-CM

## 2020-01-13 DIAGNOSIS — K82.4 GALLBLADDER POLYP: ICD-10-CM

## 2020-01-13 PROBLEM — Q61.02 MULTIPLE RENAL CYSTS: Status: ACTIVE | Noted: 2020-01-13

## 2020-01-13 PROCEDURE — 3079F DIAST BP 80-89 MM HG: CPT | Performed by: INTERNAL MEDICINE

## 2020-01-13 PROCEDURE — 99214 OFFICE O/P EST MOD 30 MIN: CPT | Performed by: INTERNAL MEDICINE

## 2020-01-13 PROCEDURE — 3075F SYST BP GE 130 - 139MM HG: CPT | Performed by: INTERNAL MEDICINE

## 2020-01-13 NOTE — PATIENT INSTRUCTIONS
No change in therapy  Ordered blood test and ultrasound abdomen  Please cut back on milk and dairy products  Please drink more water

## 2020-01-13 NOTE — PROGRESS NOTES
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 and normal erythropoietin level and question of sleep apnea  Patient states he does not use CPAP  Patient goes for [facility]lebotomy [facility] every 4 months to [facility]  He has some tiredness  He has exertional dyspnea  He is overweight  Has minor arthritic symptoms  Current Outpatient Medications:     allopurinol (ZYLOPRIM) 300 mg tablet, Take 1 tablet (300 mg total) by mouth daily, Disp: 90 tablet, Rfl: 1    amLODIPine (NORVASC) 5 mg tablet, Take 1 tablet (5 mg total) by mouth daily, Disp: 90 tablet, Rfl: 1    aspirin 81 MG tablet, Take 81 mg by mouth daily, Disp: , Rfl:     colestipol (COLESTID) 1 g tablet, Take 1 tablet (1 g total) by mouth 2 (two) times a day, Disp: 180 tablet, Rfl: 1    lisinopril (ZESTRIL) 20 mg tablet, Take 1 tablet (20 mg total) by mouth daily, Disp: 90 tablet, Rfl: 1    meclizine (ANTIVERT) 25 mg tablet, Take 1 tablet (25 mg total) by mouth 2 (two) times a day as needed for dizziness, Disp: 180 tablet, Rfl: 1    simvastatin (ZOCOR) 40 mg tablet, Take 1 tablet (40 mg total) by mouth every other day, Disp: 45 tablet, Rfl: 1    Allergies   Allergen Reactions    Pollen Extract Allergic Rhinitis        No history exists  ROS:  01/13/20 Reviewed 13 systems:  Presently no other neurological, cardiac, pulmonary, GI and  symptoms other than mentioned above  No other symptoms like  fever, chills, bleeding, bone pains, skin rash, weight loss,  weakness, numbness,  claudication and gait problem  No frequent infections  Not unusually sensitive to heat or cold  No swelling of the ankles  No swollen glands  Patient is anxious   Other symptoms are in HPI        /84 (BP Location: Left arm, Patient Position: Sitting, Cuff Size: Adult)   Pulse 77   Temp 98 2 °F (36 8 °C)   Resp 17   Ht 6' 10" (2 083 m)   Wt 125 kg (275 lb)   SpO2 96%   BMI 28 75 kg/m²     Physical Exam:  Alert, oriented, not in distress, no icterus, no oral thrush, no palpable neck mass, clear lung fields, regular heart rate, abdomen  soft and non tender, no palpable abdominal mass, no ascites, no edema of ankles, no calf tenderness, no focal neurological deficit, no skin rash, no palpable lymphadenopathy, good arterial pulses, no clubbing  Patient is anxious  Performance status 1  Overweight  IMAGING:  IMPRESSION:     1  Top normal to slightly enlarged liver and spleen with liver demonstrating increased echotexture suggestive of hepatic steatosis      2   Gallbladder adenomyomatosis with additional echogenic foci measuring up to 5 mm either polyps or adherent stones   Note that polyps less than 6 mm do not require routine follow-up      3   Large left midpole cyst  Small right midpole cyst                  Workstation performed: [id number]      Imaging     US abdomen complete (Order: [id number]) - 3/23/2019      IMPRESSION:  No active pulmonary disease on examination which is somewhat limited secondary to low lung volumes            Workstation performed: [id number]      Imaging     XR chest 2 views (Order: [id number]) - 5/1/2019       LABS:  Results for orders placed or performed in visit on 01/03/20   Comprehensive metabolic panel   Result Value Ref Range    Sodium 138 136 - 145 mmol/L    Potassium 4 3 3 5 - 5 3 mmol/L    Chloride 107 100 - 108 mmol/L    CO2 28 21 - 32 mmol/L    ANION GAP 3 (L) 4 - 13 mmol/L    BUN 25 5 - 25 mg/dL    Creatinine 1 32 (H) 0 60 - 1 30 mg/dL    Glucose, Fasting 74 65 - 99 mg/dL    Calcium 10 5 (H) 8 3 - 10 1 mg/dL    Corrected Calcium 10 7 (H) 8 3 - 10 1 mg/dL    AST 17 5 - 45 U/L    ALT 25 12 - 78 U/L    Alkaline Phosphatase 79 46 - 116 U/L    Total Protein 7 8 6 4 - 8 2 g/dL    Albumin 3 8 3 5 - 5 0 g/dL    Total Bilirubin 0 89 0 20 - 1 00 mg/dL    eGFR 52 ml/min/1 73sq m   Lipid panel   Result Value Ref Range    Cholesterol 149 50 - 200 mg/dL    Triglycerides 95 <=150 mg/dL    HDL, Direct 40 >=40 mg/dL    LDL Calculated 90 0 - 100 mg/dL    Non-HDL-Chol (CHOL-HDL) 109 mg/dl   TSH, 3rd generation with Free T4 reflex   Result Value Ref Range    TSH 3RD GENERATON 2 730 0 358 - 3 740 uIU/mL     Labs, Imaging, & Other studies:   All pertinent labs and imaging studies were personally reviewed    Lab Results   Component Value Date    K 4 3 01/03/2020     01/03/2020    CO2 28 01/03/2020    BUN 25 01/03/2020    CREATININE 1 32 (H) 01/03/2020    GLUF 74 01/03/2020    CALCIUM 10 5 (H) 01/03/2020    CORRECTEDCA 10 7 (H) 01/03/2020    AST 17 01/03/2020    ALT 25 01/03/2020    ALKPHOS 79 01/03/2020    EGFR 52 01/03/2020     Lab Results   Component Value Date    WBC 8 99 01/03/2020    HGB 16 3 01/03/2020    HCT 49 6 (H) 01/03/2020    MCV 95 01/03/2020     01/03/2020       Reviewed and discussed with patient  Assessment and plan: Follow-up visit for 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 and normal erythropoietin level and question of sleep apnea  Patient states he does not use CPAP  Patient goes for phlebotomy once every 4 months to [facility] blood [place]  He has some tiredness  He has exertional dyspnea  He is overweight  Has minor arthritic symptoms  Patient would like to continue going for phlebotomy every 4 months  He will try to lose weight  I have noticed that his calcium level is high  He states he drinks lot of milk  He will cut back drinking milk and eating dairy products and increase drinking water  Also he will have additional tests for hypercalcemia  He will follow up with his primary physician for hypercalcemia and renal insufficiency  She will also get his PSA checked from his primary physician and the last 1 was in 2017  His wife said that she will get that taken care of and patient has appointment with his primary physician in a month  Explained all that to the patient in detail  Questions answered    Discussed the importance of eating iron poor healthy foods , staying active and health screening tests  Patient is capable of self-care     Patient will continue to follow with his primary physician and other consultants  Also discussed with him renal cyst and gallbladder polyp and fatty liver  1  Polycythemia    - US abdomen complete; Future  - CBC and differential; Future  - Comprehensive metabolic panel; Future    2  Hyperuricemia      3  Hypercholesterolemia      4  Benign essential hypertension      5  Obstructive sleep apnea      6  Gallbladder polyp    - US abdomen complete; Future    7  Hypercalcemia    - Calcium, ionized; Future  - PTH, Intact and Calcium; Future    8  Renal cyst  - US abdomen complete; Future      Patient voiced understanding and agreement in the discussion  Counseling / Coordination of Care   Greater than 50% of total time was spent with the patient and / or family counseling and / or coordination of care

## 2020-01-27 DIAGNOSIS — I10 ESSENTIAL HYPERTENSION, BENIGN: ICD-10-CM

## 2020-01-27 RX ORDER — LISINOPRIL 20 MG/1
20 TABLET ORAL DAILY
Qty: 90 TABLET | Refills: 0 | Status: SHIPPED | OUTPATIENT
Start: 2020-01-27 | End: 2020-04-01

## 2020-02-24 ENCOUNTER — REMOTE DEVICE CLINIC VISIT (OUTPATIENT)
Dept: CARDIOLOGY CLINIC | Facility: CLINIC | Age: 75
End: 2020-02-24
Payer: COMMERCIAL

## 2020-02-24 DIAGNOSIS — Z95.0 CARDIAC PACEMAKER IN SITU: Primary | ICD-10-CM

## 2020-02-24 PROCEDURE — 93294 REM INTERROG EVL PM/LDLS PM: CPT | Performed by: INTERNAL MEDICINE

## 2020-02-24 PROCEDURE — 93296 REM INTERROG EVL PM/IDS: CPT | Performed by: INTERNAL MEDICINE

## 2020-02-24 NOTE — PROGRESS NOTES
Results for orders placed or performed in visit on 02/24/20   Cardiac EP device report    Narrative    MDT DUAL CHAMBER PPM - ACTIVE SYSTEM IS MRI CONDITIONAL  CARELINK TRANSMISSION: BATTERY VOLTAGE ADEQUATE (12 2 YRS)  AP 96 5%  44 8% (>40%-MVP ON @ 70)  ALL AVAILABLE LEAD PARAMETERS WITHIN NORMAL LIMITS  NO SIGNIFICANT HIGH RATE EPISODES  PACEMAKER FUNCTIONING APPROPRIATELY     EB

## 2020-03-16 ENCOUNTER — OFFICE VISIT (OUTPATIENT)
Dept: INTERNAL MEDICINE CLINIC | Age: 75
End: 2020-03-16
Payer: COMMERCIAL

## 2020-03-16 VITALS
HEART RATE: 76 BPM | HEIGHT: 70 IN | BODY MASS INDEX: 38.22 KG/M2 | DIASTOLIC BLOOD PRESSURE: 86 MMHG | OXYGEN SATURATION: 96 % | SYSTOLIC BLOOD PRESSURE: 138 MMHG | TEMPERATURE: 97.5 F | WEIGHT: 267 LBS

## 2020-03-16 DIAGNOSIS — R35.0 BENIGN PROSTATIC HYPERPLASIA WITH URINARY FREQUENCY: Primary | ICD-10-CM

## 2020-03-16 DIAGNOSIS — N40.1 BENIGN PROSTATIC HYPERPLASIA WITH URINARY FREQUENCY: Primary | ICD-10-CM

## 2020-03-16 DIAGNOSIS — E83.52 HYPERCALCEMIA: ICD-10-CM

## 2020-03-16 DIAGNOSIS — N18.2 STAGE 2 CHRONIC KIDNEY DISEASE: ICD-10-CM

## 2020-03-16 DIAGNOSIS — I10 ESSENTIAL HYPERTENSION, BENIGN: ICD-10-CM

## 2020-03-16 DIAGNOSIS — R42 DIZZINESS: ICD-10-CM

## 2020-03-16 DIAGNOSIS — D75.1 POLYCYTHEMIA: ICD-10-CM

## 2020-03-16 PROCEDURE — 3008F BODY MASS INDEX DOCD: CPT | Performed by: INTERNAL MEDICINE

## 2020-03-16 PROCEDURE — 4040F PNEUMOC VAC/ADMIN/RCVD: CPT | Performed by: INTERNAL MEDICINE

## 2020-03-16 PROCEDURE — 3075F SYST BP GE 130 - 139MM HG: CPT | Performed by: INTERNAL MEDICINE

## 2020-03-16 PROCEDURE — 3079F DIAST BP 80-89 MM HG: CPT | Performed by: INTERNAL MEDICINE

## 2020-03-16 PROCEDURE — 1160F RVW MEDS BY RX/DR IN RCRD: CPT | Performed by: INTERNAL MEDICINE

## 2020-03-16 PROCEDURE — 99214 OFFICE O/P EST MOD 30 MIN: CPT | Performed by: INTERNAL MEDICINE

## 2020-03-16 PROCEDURE — 1036F TOBACCO NON-USER: CPT | Performed by: INTERNAL MEDICINE

## 2020-03-16 RX ORDER — AMLODIPINE BESYLATE 5 MG/1
5 TABLET ORAL DAILY
Qty: 90 TABLET | Refills: 1 | Status: SHIPPED | OUTPATIENT
Start: 2020-03-16 | End: 2020-08-20

## 2020-03-16 RX ORDER — MECLIZINE HYDROCHLORIDE 25 MG/1
25 TABLET ORAL 2 TIMES DAILY PRN
Qty: 180 TABLET | Refills: 1 | Status: SHIPPED | OUTPATIENT
Start: 2020-03-16 | End: 2020-10-15 | Stop reason: SDUPTHER

## 2020-03-16 NOTE — PROGRESS NOTES
Assessment/Plan:     Diagnoses and all orders for this visit:    Benign prostatic hyperplasia with urinary frequency  -     PSA, Total Screen; Future    Essential hypertension, benign  -     amLODIPine (NORVASC) 5 mg tablet; Take 1 tablet (5 mg total) by mouth daily    Dizziness  -     meclizine (ANTIVERT) 25 mg tablet; Take 1 tablet (25 mg total) by mouth 2 (two) times a day as needed for dizziness    Polycythemia    Stage 2 chronic kidney disease    Hypercalcemia        BMI Counseling: Body mass index is 38 31 kg/m²  The BMI is above normal  Nutrition recommendations include decreasing portion sizes, encouraging healthy choices of fruits and vegetables and moderation in carbohydrate intake  Exercise recommendations include moderate physical activity 150 minutes/week  Subjective:      Chief Complaint   Patient presents with    Follow-up     pt  presents for follow up for HTN  Patient ID: Hank Barakat is a 66 y o  male  HPI   This is a very pleasant 66 years young gentleman with history of hypertension which is very well controlled hyper calcemia mildly increased calcium level will follow-up patient is followed up by Hematology-Oncology for polycythemia his hemoglobin hematocrit are stable mild stage 2 chronic kidney disease which we will follow numbers are stable  I discussed with him regarding the diet and weight loss he has BMI of 38 which put him a very high risk for the cardiovascular problems also he has a history of obstructive sleep apnea he did lose about the 7 lb this time and he is very anxious to lose  more  The following portions of the patient's history were reviewed and updated as appropriate: allergies, current medications, past family history, past medical history, past social history, past surgical history and problem list     Review of Systems   Constitutional: Negative for appetite change, fatigue and fever     HENT: Negative for congestion, ear pain, hearing loss, nosebleeds, sneezing, tinnitus and voice change  Eyes: Negative for pain, discharge and redness  Respiratory: Negative for cough, chest tightness and wheezing  Cardiovascular: Negative for chest pain, palpitations and leg swelling  Gastrointestinal: Negative for abdominal pain, blood in stool, constipation, diarrhea, nausea and vomiting  Genitourinary: Negative for difficulty urinating, dysuria, hematuria and urgency  Musculoskeletal: Negative for arthralgias, back pain, gait problem and joint swelling  Skin: Negative for rash and wound  Allergic/Immunologic: Negative for environmental allergies  Neurological: Negative for dizziness, tremors, seizures, weakness, light-headedness and numbness  Hematological: Negative for adenopathy  Does not bruise/bleed easily  Psychiatric/Behavioral: Negative for behavioral problems and confusion  The patient is not nervous/anxious            Past Medical History:   Diagnosis Date    Acute gouty arthropathy     last assessed-11/15/2013    Cataract     Diverticulitis of colon     Enlarged prostate     Gout     Hearing loss     Heart disease     History of diverticulitis of colon     History of dizziness     Hypercholesterolemia     Hyperlipidemia     Hypertension     Palpitations     Sinus bradycardia          Current Outpatient Medications:     allopurinol (ZYLOPRIM) 300 mg tablet, Take 1 tablet (300 mg total) by mouth daily, Disp: 90 tablet, Rfl: 1    amLODIPine (NORVASC) 5 mg tablet, Take 1 tablet (5 mg total) by mouth daily, Disp: 90 tablet, Rfl: 1    aspirin 81 MG tablet, Take 81 mg by mouth daily, Disp: , Rfl:     colestipol (COLESTID) 1 g tablet, Take 1 tablet (1 g total) by mouth 2 (two) times a day, Disp: 180 tablet, Rfl: 1    lisinopril (ZESTRIL) 20 mg tablet, Take 1 tablet (20 mg total) by mouth daily, Disp: 90 tablet, Rfl: 0    meclizine (ANTIVERT) 25 mg tablet, Take 1 tablet (25 mg total) by mouth 2 (two) times a day as needed for dizziness, Disp: 180 tablet, Rfl: 1    simvastatin (ZOCOR) 40 mg tablet, Take 1 tablet (40 mg total) by mouth every other day, Disp: 45 tablet, Rfl: 1    Allergies   Allergen Reactions    Pollen Extract Allergic Rhinitis       Social History   Past Surgical History:   Procedure Laterality Date    ARM NEUROPLASTY Left     1977    CARDIAC PACEMAKER PLACEMENT Left     CARDIAC SURGERY  05/01/2019    pace maker placed    COLONOSCOPY      CYSTOSCOPY  12/27/2017    diagnostic    FOREARM FRACTURE SURGERY      ORCHIECTOMY      surgery testis    CA CYSTOURETHRO W/IMPLANT N/A 2/9/2018    Procedure: CYSTOSCOPY WITH INSERTION Jock Heidi;  Surgeon: Zeus Drake MD;  [facility];  Service: Urology    CA CYSTOURETHROSCOPY,BIOPSY N/A 2/9/2018    Procedure: BLADDER BIOPSY;  Surgeon: Zeus Drake MD;  [facility];  Service: Urology    TESTICLE SURGERY Right 1976     Family History   Problem Relation Age of Onset    Coronary artery disease Mother     Heart disease Mother     Hypertension Mother     Stroke Mother     Coronary artery disease Father     Stroke Family     Heart attack Family         acute MI       Objective:  /86 (BP Location: Left arm, Patient Position: Sitting, Cuff Size: Standard)   Pulse 76   Temp 97 5 °F (36 4 °C) (Tympanic)   Ht 5' 10" (1 778 m)   Wt 121 kg (267 lb)   SpO2 96%   BMI 38 31 kg/m²        Physical Exam   Constitutional: He is oriented to person, place, and time  He appears well-developed and well-nourished  HENT:   Right Ear: External ear normal    Mouth/Throat: Oropharynx is clear and moist    Eyes: Pupils are equal, round, and reactive to light  Conjunctivae and EOM are normal    Neck: Normal range of motion  No JVD present  No thyromegaly present  Cardiovascular: Normal rate, regular rhythm, normal heart sounds and intact distal pulses  Pulmonary/Chest: Breath sounds normal    Abdominal: Soft   Bowel sounds are normal  Musculoskeletal: Normal range of motion  Lymphadenopathy:     He has no cervical adenopathy  Neurological: He is alert and oriented to person, place, and time  He has normal reflexes  Skin: Skin is dry  Psychiatric: He has a normal mood and affect   His behavior is normal  Judgment and thought content normal

## 2020-03-23 DIAGNOSIS — I48.0 PAROXYSMAL ATRIAL FIBRILLATION (HCC): Primary | ICD-10-CM

## 2020-03-24 ENCOUNTER — REMOTE DEVICE CLINIC VISIT (OUTPATIENT)
Dept: CARDIOLOGY CLINIC | Facility: CLINIC | Age: 75
End: 2020-03-24

## 2020-03-24 DIAGNOSIS — Z95.0 CARDIAC PACEMAKER IN SITU: Primary | ICD-10-CM

## 2020-03-24 PROCEDURE — 99024 POSTOP FOLLOW-UP VISIT: CPT | Performed by: INTERNAL MEDICINE

## 2020-03-24 NOTE — PROGRESS NOTES
MDT-DUAL CHAMBER PPM (AAIR-DDDR MODE) ACTIVE SYSTEM IS MRI CONDITIONAL  CARELINK TRANSMISSION: N/B-EPISODE CK PER DT/AS  BATTERY VOLTAGE ADEQUATE (12 1 YRS)  AP 89 2%  32 7% (MVP ON - 70 PPM)  ALL AVAILABLE LEAD PARAMETERS WITHIN NORMAL LIMITS  NO NEW SIGNIFICANT HIGH RATE EPISODES SINCE LAST REPORT AF EPISODE YESTERDAY  TOTAL DURATION 5 HR, 30 MINS (BURDEN 8 3%)  PER DT NOTE IN EPIC - Left message for patient to start eliquis and called into local pharmacy   Left him my cell phone number  NORMAL DEVICE FUNCTION      EB

## 2020-03-25 ENCOUNTER — TELEPHONE (OUTPATIENT)
Dept: CARDIOLOGY CLINIC | Facility: CLINIC | Age: 75
End: 2020-03-25

## 2020-03-25 DIAGNOSIS — I48.0 PAROXYSMAL ATRIAL FIBRILLATION (HCC): ICD-10-CM

## 2020-03-25 NOTE — TELEPHONE ENCOUNTER
Both Eliquis and Xaretlto have the same price and both require a prior authorization      30 day supply at retail pharmacy $43 50  90 day supply at mail order $115 00    I will submit a prior authorization for the eliquis

## 2020-03-25 NOTE — TELEPHONE ENCOUNTER
----- Message from Zeina Singh [place] sent at 3/24/2020  2:10 PM EDT -----  Regardin [address],    I have this patient who was just diagnosed with afib from his device that we wanted to start on [facility]  Dr Erika Nguyen called in Eliquis 5mg twice daily for him  I was wondering if we could price check this for him and also Xarelto 20mg daily please?     Thank you,    [facility]

## 2020-03-25 NOTE — TELEPHONE ENCOUNTER
[facility]  Will fax application to your office   Please let me know that you have received this

## 2020-03-25 NOTE — PROGRESS NOTES
Called and spoke with patient's wife today and concerning anticoagulation  Eliquis will need to be prior auth-ed but in the meantime will send them coupon for first month free Eliquis  In discussion with staff, due the fact that he has [employer], he may also be able to utilize co-pay card which will cut cost to 10 dollars month  Let his wife know to stop taking aspirin  They do have 1 more refill for 5 days worth of Eliquis that they can use until they get coupon in the mail  I have also asked our schedulers to set them up with a tele visit concerning the fact that NSVT was also caught on his pacemaker and he is not on beta-blocker therapy  TELEMEDICINE ADDITION: She reports that they do have an iPhone but she is not sure how to use face time

## 2020-04-01 ENCOUNTER — TELEMEDICINE (OUTPATIENT)
Dept: CARDIOLOGY CLINIC | Facility: CLINIC | Age: 75
End: 2020-04-01
Payer: COMMERCIAL

## 2020-04-01 VITALS
WEIGHT: 265 LBS | HEIGHT: 70 IN | DIASTOLIC BLOOD PRESSURE: 76 MMHG | HEART RATE: 82 BPM | BODY MASS INDEX: 37.94 KG/M2 | SYSTOLIC BLOOD PRESSURE: 126 MMHG

## 2020-04-01 DIAGNOSIS — I10 BENIGN ESSENTIAL HYPERTENSION: ICD-10-CM

## 2020-04-01 DIAGNOSIS — I47.2 NSVT (NONSUSTAINED VENTRICULAR TACHYCARDIA) (HCC): ICD-10-CM

## 2020-04-01 DIAGNOSIS — R00.1 BRADYCARDIA: Primary | ICD-10-CM

## 2020-04-01 DIAGNOSIS — I48.0 PAROXYSMAL ATRIAL FIBRILLATION (HCC): ICD-10-CM

## 2020-04-01 PROBLEM — I47.29 NSVT (NONSUSTAINED VENTRICULAR TACHYCARDIA): Status: ACTIVE | Noted: 2020-04-01

## 2020-04-01 PROCEDURE — 1160F RVW MEDS BY RX/DR IN RCRD: CPT | Performed by: INTERNAL MEDICINE

## 2020-04-01 PROCEDURE — 99214 OFFICE O/P EST MOD 30 MIN: CPT | Performed by: INTERNAL MEDICINE

## 2020-04-01 RX ORDER — LISINOPRIL 10 MG/1
10 TABLET ORAL DAILY
Qty: 90 TABLET | Refills: 3 | Status: SHIPPED | OUTPATIENT
Start: 2020-04-01 | End: 2021-03-18 | Stop reason: SDUPTHER

## 2020-04-01 RX ORDER — METOPROLOL SUCCINATE 25 MG/1
25 TABLET, EXTENDED RELEASE ORAL DAILY
Qty: 90 TABLET | Refills: 3 | Status: SHIPPED | OUTPATIENT
Start: 2020-04-01 | End: 2021-03-18 | Stop reason: SDUPTHER

## 2020-04-07 DIAGNOSIS — M10.9 GOUT, UNSPECIFIED CAUSE, UNSPECIFIED CHRONICITY, UNSPECIFIED SITE: ICD-10-CM

## 2020-04-07 DIAGNOSIS — E78.5 HYPERLIPIDEMIA, UNSPECIFIED HYPERLIPIDEMIA TYPE: ICD-10-CM

## 2020-04-07 RX ORDER — ALLOPURINOL 300 MG/1
300 TABLET ORAL DAILY
Qty: 90 TABLET | Refills: 1 | Status: SHIPPED | OUTPATIENT
Start: 2020-04-07 | End: 2020-10-15 | Stop reason: SDUPTHER

## 2020-04-07 RX ORDER — MONTELUKAST SODIUM 4 MG/1
1 TABLET, CHEWABLE ORAL 2 TIMES DAILY
Qty: 180 TABLET | Refills: 1 | Status: SHIPPED | OUTPATIENT
Start: 2020-04-07 | End: 2020-11-05 | Stop reason: ALTCHOICE

## 2020-04-07 RX ORDER — SIMVASTATIN 40 MG
40 TABLET ORAL EVERY OTHER DAY
Qty: 45 TABLET | Refills: 1 | Status: SHIPPED | OUTPATIENT
Start: 2020-04-07 | End: 2020-10-02 | Stop reason: SDUPTHER

## 2020-05-27 ENCOUNTER — REMOTE DEVICE CLINIC VISIT (OUTPATIENT)
Dept: CARDIOLOGY CLINIC | Facility: CLINIC | Age: 75
End: 2020-05-27
Payer: COMMERCIAL

## 2020-05-27 DIAGNOSIS — Z95.0 PRESENCE OF CARDIAC PACEMAKER: Primary | ICD-10-CM

## 2020-05-27 PROCEDURE — 93294 REM INTERROG EVL PM/LDLS PM: CPT | Performed by: INTERNAL MEDICINE

## 2020-05-27 PROCEDURE — 93296 REM INTERROG EVL PM/IDS: CPT | Performed by: INTERNAL MEDICINE

## 2020-07-02 ENCOUNTER — TELEPHONE (OUTPATIENT)
Dept: HEMATOLOGY ONCOLOGY | Facility: CLINIC | Age: 75
End: 2020-07-02

## 2020-07-06 ENCOUNTER — APPOINTMENT (OUTPATIENT)
Dept: LAB | Facility: CLINIC | Age: 75
End: 2020-07-06
Payer: COMMERCIAL

## 2020-07-06 DIAGNOSIS — R35.0 BENIGN PROSTATIC HYPERPLASIA WITH URINARY FREQUENCY: ICD-10-CM

## 2020-07-06 DIAGNOSIS — N40.1 BENIGN PROSTATIC HYPERPLASIA WITH URINARY FREQUENCY: ICD-10-CM

## 2020-07-06 DIAGNOSIS — D75.1 POLYCYTHEMIA: ICD-10-CM

## 2020-07-06 DIAGNOSIS — E83.52 HYPERCALCEMIA: ICD-10-CM

## 2020-07-06 LAB
ALBUMIN SERPL BCP-MCNC: 4.2 G/DL (ref 3.5–5)
ALP SERPL-CCNC: 79 U/L (ref 46–116)
ALT SERPL W P-5'-P-CCNC: 26 U/L (ref 12–78)
ANION GAP SERPL CALCULATED.3IONS-SCNC: 5 MMOL/L (ref 4–13)
AST SERPL W P-5'-P-CCNC: 17 U/L (ref 5–45)
BASOPHILS # BLD AUTO: 0.03 THOUSANDS/ΜL (ref 0–0.1)
BASOPHILS NFR BLD AUTO: 0 % (ref 0–1)
BILIRUB SERPL-MCNC: 1.42 MG/DL (ref 0.2–1)
BUN SERPL-MCNC: 14 MG/DL (ref 5–25)
CA-I BLD-SCNC: 1.3 MMOL/L (ref 1.12–1.32)
CALCIUM ALBUM COR SERPL-MCNC: 10.3 MG/DL (ref 8.3–10.1)
CALCIUM SERPL-MCNC: 10.5 MG/DL (ref 8.3–10.1)
CHLORIDE SERPL-SCNC: 105 MMOL/L (ref 100–108)
CO2 SERPL-SCNC: 26 MMOL/L (ref 21–32)
CREAT SERPL-MCNC: 1.35 MG/DL (ref 0.6–1.3)
EOSINOPHIL # BLD AUTO: 0.25 THOUSAND/ΜL (ref 0–0.61)
EOSINOPHIL NFR BLD AUTO: 3 % (ref 0–6)
ERYTHROCYTE [DISTWIDTH] IN BLOOD BY AUTOMATED COUNT: 14.1 % (ref 11.6–15.1)
GFR SERPL CREATININE-BSD FRML MDRD: 50 ML/MIN/1.73SQ M
GLUCOSE P FAST SERPL-MCNC: 98 MG/DL (ref 65–99)
HCT VFR BLD AUTO: 55.2 % (ref 36.5–49.3)
HGB BLD-MCNC: 18.2 G/DL (ref 12–17)
IMM GRANULOCYTES # BLD AUTO: 0.02 THOUSAND/UL (ref 0–0.2)
IMM GRANULOCYTES NFR BLD AUTO: 0 % (ref 0–2)
LYMPHOCYTES # BLD AUTO: 1.63 THOUSANDS/ΜL (ref 0.6–4.47)
LYMPHOCYTES NFR BLD AUTO: 21 % (ref 14–44)
MCH RBC QN AUTO: 31.4 PG (ref 26.8–34.3)
MCHC RBC AUTO-ENTMCNC: 33 G/DL (ref 31.4–37.4)
MCV RBC AUTO: 95 FL (ref 82–98)
MONOCYTES # BLD AUTO: 0.86 THOUSAND/ΜL (ref 0.17–1.22)
MONOCYTES NFR BLD AUTO: 11 % (ref 4–12)
NEUTROPHILS # BLD AUTO: 5.07 THOUSANDS/ΜL (ref 1.85–7.62)
NEUTS SEG NFR BLD AUTO: 65 % (ref 43–75)
NRBC BLD AUTO-RTO: 0 /100 WBCS
PLATELET # BLD AUTO: 206 THOUSANDS/UL (ref 149–390)
PMV BLD AUTO: 11.2 FL (ref 8.9–12.7)
POTASSIUM SERPL-SCNC: 4.6 MMOL/L (ref 3.5–5.3)
PROT SERPL-MCNC: 8.1 G/DL (ref 6.4–8.2)
PSA SERPL-MCNC: 5.4 NG/ML (ref 0–4)
PTH-INTACT SERPL-MCNC: 158.7 PG/ML (ref 18.4–80.1)
RBC # BLD AUTO: 5.79 MILLION/UL (ref 3.88–5.62)
SODIUM SERPL-SCNC: 136 MMOL/L (ref 136–145)
WBC # BLD AUTO: 7.86 THOUSAND/UL (ref 4.31–10.16)

## 2020-07-06 PROCEDURE — 36415 COLL VENOUS BLD VENIPUNCTURE: CPT

## 2020-07-06 PROCEDURE — 85025 COMPLETE CBC W/AUTO DIFF WBC: CPT

## 2020-07-06 PROCEDURE — 83970 ASSAY OF PARATHORMONE: CPT

## 2020-07-06 PROCEDURE — G0103 PSA SCREENING: HCPCS

## 2020-07-06 PROCEDURE — 80053 COMPREHEN METABOLIC PANEL: CPT

## 2020-07-06 PROCEDURE — 82330 ASSAY OF CALCIUM: CPT

## 2020-07-07 ENCOUNTER — TELEPHONE (OUTPATIENT)
Dept: HEMATOLOGY ONCOLOGY | Facility: CLINIC | Age: 75
End: 2020-07-07

## 2020-07-07 DIAGNOSIS — D75.1 POLYCYTHEMIA: Primary | ICD-10-CM

## 2020-07-07 NOTE — TELEPHONE ENCOUNTER
I called patient and explained high PTH and also high hematocrit  He will have blood counts checked again in the 1st week of August   He will drink more water    He will be referred to endocrinologist at the time of next visit in the office next month

## 2020-07-13 ENCOUNTER — HOSPITAL ENCOUNTER (OUTPATIENT)
Dept: RADIOLOGY | Facility: IMAGING CENTER | Age: 75
Discharge: HOME | End: 2020-07-13
Payer: COMMERCIAL

## 2020-07-13 DIAGNOSIS — K82.4 GALLBLADDER POLYP: ICD-10-CM

## 2020-07-13 DIAGNOSIS — N28.1 RENAL CYST: ICD-10-CM

## 2020-07-13 DIAGNOSIS — D75.1 POLYCYTHEMIA: ICD-10-CM

## 2020-07-13 PROCEDURE — 76700 US EXAM ABDOM COMPLETE: CPT

## 2020-07-22 ENCOUNTER — OFFICE VISIT (OUTPATIENT)
Dept: INTERNAL MEDICINE CLINIC | Age: 75
End: 2020-07-22
Payer: COMMERCIAL

## 2020-07-22 VITALS
DIASTOLIC BLOOD PRESSURE: 88 MMHG | OXYGEN SATURATION: 96 % | HEART RATE: 74 BPM | SYSTOLIC BLOOD PRESSURE: 118 MMHG | WEIGHT: 262.4 LBS | TEMPERATURE: 98.5 F | BODY MASS INDEX: 37.56 KG/M2 | HEIGHT: 70 IN

## 2020-07-22 DIAGNOSIS — I10 BENIGN ESSENTIAL HYPERTENSION: ICD-10-CM

## 2020-07-22 DIAGNOSIS — E21.0 PRIMARY HYPERPARATHYROIDISM (HCC): ICD-10-CM

## 2020-07-22 DIAGNOSIS — Z00.00 MEDICARE ANNUAL WELLNESS VISIT, SUBSEQUENT: ICD-10-CM

## 2020-07-22 DIAGNOSIS — R35.0 BENIGN PROSTATIC HYPERPLASIA WITH URINARY FREQUENCY: Primary | ICD-10-CM

## 2020-07-22 DIAGNOSIS — D75.1 POLYCYTHEMIA: ICD-10-CM

## 2020-07-22 DIAGNOSIS — I47.2 NSVT (NONSUSTAINED VENTRICULAR TACHYCARDIA) (HCC): ICD-10-CM

## 2020-07-22 DIAGNOSIS — R97.20 ELEVATED PSA: ICD-10-CM

## 2020-07-22 DIAGNOSIS — N40.1 BENIGN PROSTATIC HYPERPLASIA WITH URINARY FREQUENCY: Primary | ICD-10-CM

## 2020-07-22 PROCEDURE — 3008F BODY MASS INDEX DOCD: CPT | Performed by: INTERNAL MEDICINE

## 2020-07-22 PROCEDURE — 3079F DIAST BP 80-89 MM HG: CPT | Performed by: INTERNAL MEDICINE

## 2020-07-22 PROCEDURE — 1170F FXNL STATUS ASSESSED: CPT | Performed by: INTERNAL MEDICINE

## 2020-07-22 PROCEDURE — 3074F SYST BP LT 130 MM HG: CPT | Performed by: INTERNAL MEDICINE

## 2020-07-22 PROCEDURE — 1160F RVW MEDS BY RX/DR IN RCRD: CPT | Performed by: INTERNAL MEDICINE

## 2020-07-22 PROCEDURE — 1125F AMNT PAIN NOTED PAIN PRSNT: CPT | Performed by: INTERNAL MEDICINE

## 2020-07-22 PROCEDURE — 1036F TOBACCO NON-USER: CPT | Performed by: INTERNAL MEDICINE

## 2020-07-22 PROCEDURE — G0439 PPPS, SUBSEQ VISIT: HCPCS | Performed by: INTERNAL MEDICINE

## 2020-07-22 PROCEDURE — 4040F PNEUMOC VAC/ADMIN/RCVD: CPT | Performed by: INTERNAL MEDICINE

## 2020-07-22 PROCEDURE — 99214 OFFICE O/P EST MOD 30 MIN: CPT | Performed by: INTERNAL MEDICINE

## 2020-07-22 NOTE — PROGRESS NOTES
Assessment and Plan:     Problem List Items Addressed This Visit     None           Preventive health issues were discussed with patient, and age appropriate screening tests were ordered as noted in patient's After Visit Summary  Personalized health advice and appropriate referrals for health education or preventive services given if needed, as noted in patient's After Visit Summary       History of Present Illness:     Patient presents for Medicare Annual Wellness visit    Patient Care Team:  Sampson Melo MD as PCP - MD Sampson Del Angel MD     Problem List:     Patient Active Problem List   Diagnosis    Benign prostatic hyperplasia with urinary frequency    Hypervascular lesion of urinary bladder    Benign essential hypertension    Chronic bilateral low back pain without sciatica    Elevated PSA    Hypercholesterolemia    Obstructive sleep apnea    Hyperuricemia    Polycythemia    Bradycardia    Multiple renal cysts    Gallbladder polyp    Hypercalcemia    Stage 2 chronic kidney disease    Paroxysmal atrial fibrillation (HCC)    NSVT (nonsustained ventricular tachycardia) (HCC)      Past Medical and Surgical History:     Past Medical History:   Diagnosis Date    Acute gouty arthropathy     last assessed-11/15/2013    Cataract     Diverticulitis of colon     Enlarged prostate     Gout     Hearing loss     Heart disease     History of diverticulitis of colon     History of dizziness     Hypercholesterolemia     Hyperlipidemia     Hypertension     Palpitations     Sinus bradycardia      Past Surgical History:   Procedure Laterality Date    ARM NEUROPLASTY Left     1977    CARDIAC PACEMAKER PLACEMENT Left     CARDIAC SURGERY  05/01/2019    pace maker placed    COLONOSCOPY      CYSTOSCOPY  12/27/2017    diagnostic    FOREARM FRACTURE SURGERY      ORCHIECTOMY      surgery testis    ND CYSTOURETHRO W/IMPLANT N/A 2/9/2018    Procedure: CYSTOSCOPY WITH INSERTION UROLIFT;  Surgeon: Praveen Garcia MD;  [facility];  Service: Urology    VA CYSTOURETHROSCOPY,BIOPSY N/A 2018    Procedure: BLADDER BIOPSY;  Surgeon: Praveen Garcia MD;  [facility];  Service: Urology    TESTICLE SURGERY Right       Family History:     Family History   Problem Relation Age of Onset    Coronary artery disease Mother     Heart disease Mother     Hypertension Mother     Stroke Mother     Coronary artery disease Father     Stroke Family     Heart attack Family         acute MI      Social History:        Social History     Socioeconomic History    Marital status: /Civil Union     Spouse name: Not on file    Number of children: Not on file    Years of education: Not on file    Highest education level: Not on file   Occupational History     Comment: retired from work   Social Needs    Financial resource strain: Not on file    Food insecurity:     Worry: Not on file     Inability: Not on file   Socii needs:     Medical: Not on file     Non-medical: Not on file   Tobacco Use    Smoking status: Former Smoker     Years: 10 00     Types: Cigars     Last attempt to quit:      Years since quittin 5    Smokeless tobacco: Never Used   Substance and Sexual Activity    Alcohol use: Not Currently     Comment: rarely   Per allscripts, drinks alcohol very infrequently    Drug use: No    Sexual activity: Not Currently   Lifestyle    Physical activity:     Days per week: Not on file     Minutes per session: Not on file    Stress: Not on file   Relationships    Social connections:     Talks on phone: Not on file     Gets together: Not on file     Attends Restoration service: Not on file     Active member of club or organization: Not on file     Attends meetings of clubs or organizations: Not on file     Relationship status: Not on file    Intimate partner violence:     Fear of current or ex partner: Not on file Emotionally abused: Not on file     Physically abused: Not on file     Forced sexual activity: Not on file   Other Topics Concern    Not on file   Social History Narrative    Copy of advanced directive obtained    Exercising regularly-primary form of exercise is work    Recreational activities-he enjoys home [employer] and wood working    Travel history-Pt denies being out of the country during 1/2014-11/10/2014      Medications and Allergies:     Current Outpatient Medications   Medication Sig Dispense Refill    allopurinol (ZYLOPRIM) 300 mg tablet Take 1 tablet (300 mg total) by mouth daily 90 tablet 1    amLODIPine (NORVASC) 5 mg tablet Take 1 tablet (5 mg total) by mouth daily 90 tablet 1    apixaban (ELIQUIS) 5 mg Take 1 tablet (5 mg total) by mouth 2 (two) times a day 10 tablet 5    apixaban (ELIQUIS) 5 mg Take 1 tablet (5 mg total) by mouth 2 (two) times a day 180 tablet 3    colestipol (COLESTID) 1 g tablet Take 1 tablet (1 g total) by mouth 2 (two) times a day 180 tablet 1    lisinopril (ZESTRIL) 10 mg tablet Take 1 tablet (10 mg total) by mouth daily 90 tablet 3    meclizine (ANTIVERT) 25 mg tablet Take 1 tablet (25 mg total) by mouth 2 (two) times a day as needed for dizziness 180 tablet 1    metoprolol succinate (TOPROL-XL) 25 mg 24 hr tablet Take 1 tablet (25 mg total) by mouth daily 90 tablet 3    simvastatin (ZOCOR) 40 mg tablet Take 1 tablet (40 mg total) by mouth every other day 45 tablet 1     No current facility-administered medications for this visit        Allergies   Allergen Reactions    Pollen Extract Allergic Rhinitis      Immunizations:     Immunization History   Administered Date(s) Administered    INFLUENZA 11/17/2008, 09/28/2009, 10/13/2010, 10/30/2015, 11/15/2016, 10/04/2017    Influenza Split High Dose Preservative Free IM 10/04/2013, 09/24/2014, 10/30/2015, 11/15/2016, 10/04/2017    Influenza TIV (IM) 11/11/2011, 11/06/2012    Influenza, high dose seasonal 0 5 mL 10/02/2018, 11/13/2019    Pneumococcal Conjugate 13-Valent 03/22/2016    Pneumococcal Polysaccharide PPV23 05/10/2011    Zoster 01/01/2011      Health Maintenance:         Topic Date Due    CRC Screening: Colonoscopy  03/24/2021         Topic Date Due    DTaP,Tdap,and Td Vaccines (1 - Tdap) 06/01/1956    Influenza Vaccine  07/01/2020      Medicare Health Risk Assessment:     There were no vitals taken for this visit  Jeanine Juan is here for his Subsequent Wellness visit  Last Medicare Wellness visit information reviewed, patient interviewed and updates made to the record today  Health Risk Assessment:   Patient rates overall health as very good  Patient feels that their physical health rating is slightly better  Eyesight was rated as same  Hearing was rated as same  Patient feels that their emotional and mental health rating is same  Pain experienced in the last 7 days has been none  Patient states that he has experienced no weight loss or gain in last 6 months  Depression Screening:   PHQ-2 Score: 0      Fall Risk Screening: In the past year, patient has experienced: no history of falling in past year      Home Safety:  Patient does not have trouble with stairs inside or outside of their home  Patient has working smoke alarms and has working carbon monoxide detector  Home safety hazards include: none  Nutrition:   Current diet is Regular  Medications:   Patient is currently taking over-the-counter supplements  OTC medications include: see medication list  Patient is able to manage medications  Activities of Daily Living (ADLs)/Instrumental Activities of Daily Living (IADLs):   Walk and transfer into and out of bed and chair?: Yes  Dress and groom yourself?: Yes    Bathe or shower yourself?: Yes    Feed yourself?  Yes  Do your laundry/housekeeping?: Yes  Manage your money, pay your bills and track your expenses?: Yes  Make your own meals?: Yes    Do your own shopping?: Yes    Previous Hospitalizations:   Any hospitalizations or ED visits within the last 12 months?: No      Advance Care Planning:   Living will: Yes    Durable POA for healthcare: Yes    Advanced directive: Yes    Advanced directive counseling given: Yes    End of Life Decisions reviewed with patient: Yes      PREVENTIVE SCREENINGS      Cardiovascular Screening:    General: Screening Not Indicated and History Lipid Disorder      Diabetes Screening:     General: Screening Current      Colorectal Cancer Screening:     General: Screening Current      Prostate Cancer Screening:    General: Screening Not Indicated      Osteoporosis Screening:    General: Screening Not Indicated      Abdominal Aortic Aneurysm (AAA) Screening:    Risk factors include: tobacco use        General: Screening Current      Lung Cancer Screening:     General: Screening Not Indicated      Hepatitis C Screening:    General: Screening Not Indicated    Other Counseling Topics:   Skin self-exam, sunscreen and calcium and vitamin D intake and regular weightbearing exercise         Vergie Litten, MD

## 2020-07-22 NOTE — PROGRESS NOTES
Assessment/Plan:     Diagnoses and all orders for this visit:    Benign prostatic hyperplasia with urinary frequency  Patient has an appointment with the Urology in December his  PSA is elevated but he is not having any symptoms    Benign essential hypertension  Hypertension is very well controlled continue with the same medication no changes  Elevated PSA  PSA is increased follow-up with urology  Polycythemia  Followed up by the oncology for polycythemia  Primary hyperparathyroidism ([facility] [address] )  -     DXA bone density spine hip and pelvis; Future  -     Basic metabolic panel; Future  -     PTH, intact; Future  Primary hyperparathyroidism asymptomatic calcium is slightly high and ionized calcium is normal PTH levels are elevated patient is asymptomatic I will do the DEXA scan for bone density and if it is the osteoporotic  we will ask the surgery to take a look at it otherwise we will just observe  NSVT (nonsustained ventricular tachycardia) ([facility])             Subjective:   Chief Complaint   Patient presents with    Medicare Wellness Visit    Follow-up     4 mo f/u chronic conditions- Lab review from 7/6/2020        Patient ID: Tayo Robison is a 66 y o  male      HPI  This is a very pleasant the 66 years young gentleman recently the cardiologist called him and told him that he has the rapid atrial fibrillation and was started on Eliquis and also on metoprolol he is followed up by the Cardiology will continue with the same medication blood pressure is good patient does not have any symptoms  PSA is elevated as given above urology follow-up  Polycythemia followed up by the oncology  Primary hyperparathyroidism asymptomatic observation check DEXA  Patient does not have any complaints he is doing well diet and exercise to lose weight  The following portions of the patient's history were reviewed and updated as appropriate: allergies, current medications, past family history, past medical history, past social history, past surgical history and problem list     Review of Systems   Constitutional: Negative for appetite change, fatigue and fever  HENT: Negative for congestion, ear pain, hearing loss, nosebleeds, sneezing, tinnitus and voice change  Eyes: Negative for pain, discharge and redness  Respiratory: Negative for cough, chest tightness and wheezing  Cardiovascular: Negative for chest pain, palpitations and leg swelling  Gastrointestinal: Negative for abdominal pain, blood in stool, constipation, diarrhea, nausea and vomiting  Genitourinary: Negative for difficulty urinating, dysuria, hematuria and urgency  Musculoskeletal: Negative for arthralgias, back pain, gait problem and joint swelling  Skin: Negative for rash and wound  Allergic/Immunologic: Negative for environmental allergies  Neurological: Negative for dizziness, tremors, seizures, weakness, light-headedness and numbness  Dizziness is better he use meclizine twice a day and sometimes if he forget to take the medication he is okay   Hematological: Negative for adenopathy  Does not bruise/bleed easily  Psychiatric/Behavioral: Negative for behavioral problems and confusion  The patient is not nervous/anxious            Past Medical History:   Diagnosis Date    Acute gouty arthropathy     last assessed-11/15/2013    Cataract     Diverticulitis of colon     Enlarged prostate     Gout     Hearing loss     Heart disease     History of diverticulitis of colon     History of dizziness     Hypercholesterolemia     Hyperlipidemia     Hypertension     Palpitations     Sinus bradycardia          Current Outpatient Medications:     allopurinol (ZYLOPRIM) 300 mg tablet, Take 1 tablet (300 mg total) by mouth daily, Disp: 90 tablet, Rfl: 1    amLODIPine (NORVASC) 5 mg tablet, Take 1 tablet (5 mg total) by mouth daily, Disp: 90 tablet, Rfl: 1    apixaban (ELIQUIS) 5 mg, Take 1 tablet (5 mg total) by mouth 2 (two) times a day, Disp: 180 tablet, Rfl: 3    colestipol (COLESTID) 1 g tablet, Take 1 tablet (1 g total) by mouth 2 (two) times a day, Disp: 180 tablet, Rfl: 1    lisinopril (ZESTRIL) 10 mg tablet, Take 1 tablet (10 mg total) by mouth daily, Disp: 90 tablet, Rfl: 3    meclizine (ANTIVERT) 25 mg tablet, Take 1 tablet (25 mg total) by mouth 2 (two) times a day as needed for dizziness, Disp: 180 tablet, Rfl: 1    metoprolol succinate (TOPROL-XL) 25 mg 24 hr tablet, Take 1 tablet (25 mg total) by mouth daily, Disp: 90 tablet, Rfl: 3    simvastatin (ZOCOR) 40 mg tablet, Take 1 tablet (40 mg total) by mouth every other day, Disp: 45 tablet, Rfl: 1    apixaban (ELIQUIS) 5 mg, Take 1 tablet (5 mg total) by mouth 2 (two) times a day (Patient not taking: Reported on 7/22/2020), Disp: 10 tablet, Rfl: 5    Allergies   Allergen Reactions    Pollen Extract Allergic Rhinitis       Social History   Past Surgical History:   Procedure Laterality Date    ARM NEUROPLASTY Left     1977    CARDIAC PACEMAKER PLACEMENT Left     CARDIAC SURGERY  05/01/2019    pace maker placed    COLONOSCOPY      CYSTOSCOPY  12/27/2017    diagnostic    FOREARM FRACTURE SURGERY      ORCHIECTOMY      surgery testis    CT CYSTOURETHRO W/IMPLANT N/A 2/9/2018    Procedure: CYSTOSCOPY WITH INSERTION Lukas Bridget;  Surgeon: Rustam Mary MD;  [facility];  Service: Urology    CT CYSTOURETHROSCOPY,BIOPSY N/A 2/9/2018    Procedure: BLADDER BIOPSY;  Surgeon: Rustam Mary MD;  [facility];  Service: Urology    TESTICLE SURGERY Right 1976     Family History   Problem Relation Age of Onset    Coronary artery disease Mother     Heart disease Mother     Hypertension Mother     Stroke Mother     Coronary artery disease Father     Stroke Family     Heart attack Family         acute MI       Objective:  /88 (BP Location: Left arm, Patient Position: Sitting, Cuff Size: Standard)   Pulse 74   Temp 98 5 °F (36 9 °C) (Temporal)   Ht 5' 10" (1 778 m)   Wt 119 kg (262 lb 6 4 oz)   SpO2 96%   BMI 37 65 kg/m²        Physical Exam   Constitutional: He is oriented to person, place, and time  He appears well-developed and well-nourished  HENT:   Right Ear: External ear normal    Mouth/Throat: Oropharynx is clear and moist    Eyes: Pupils are equal, round, and reactive to light  Conjunctivae and EOM are normal    Neck: Normal range of motion  No JVD present  No thyromegaly present  Cardiovascular: Normal rate, regular rhythm, normal heart sounds and intact distal pulses  Pulmonary/Chest: Breath sounds normal    Abdominal: Soft  Bowel sounds are normal    Musculoskeletal: Normal range of motion  Lymphadenopathy:     He has no cervical adenopathy  Neurological: He is alert and oriented to person, place, and time  He has normal reflexes  Skin: Skin is dry  Psychiatric: He has a normal mood and affect   His behavior is normal  Judgment and thought content normal

## 2020-07-28 DIAGNOSIS — Z23 NEED FOR SHINGLES VACCINE: Primary | ICD-10-CM

## 2020-08-10 ENCOUNTER — APPOINTMENT (OUTPATIENT)
Dept: LAB | Facility: IMAGING CENTER | Age: 75
End: 2020-08-10
Payer: COMMERCIAL

## 2020-08-10 DIAGNOSIS — D75.1 POLYCYTHEMIA: ICD-10-CM

## 2020-08-10 LAB
BASOPHILS # BLD AUTO: 0.07 THOUSANDS/ΜL (ref 0–0.1)
BASOPHILS NFR BLD AUTO: 1 % (ref 0–1)
EOSINOPHIL # BLD AUTO: 0.88 THOUSAND/ΜL (ref 0–0.61)
EOSINOPHIL NFR BLD AUTO: 9 % (ref 0–6)
ERYTHROCYTE [DISTWIDTH] IN BLOOD BY AUTOMATED COUNT: 13.5 % (ref 11.6–15.1)
HCT VFR BLD AUTO: 52.9 % (ref 36.5–49.3)
HGB BLD-MCNC: 17.7 G/DL (ref 12–17)
IMM GRANULOCYTES # BLD AUTO: 0.02 THOUSAND/UL (ref 0–0.2)
IMM GRANULOCYTES NFR BLD AUTO: 0 % (ref 0–2)
LYMPHOCYTES # BLD AUTO: 1.82 THOUSANDS/ΜL (ref 0.6–4.47)
LYMPHOCYTES NFR BLD AUTO: 19 % (ref 14–44)
MCH RBC QN AUTO: 31.9 PG (ref 26.8–34.3)
MCHC RBC AUTO-ENTMCNC: 33.5 G/DL (ref 31.4–37.4)
MCV RBC AUTO: 96 FL (ref 82–98)
MONOCYTES # BLD AUTO: 0.96 THOUSAND/ΜL (ref 0.17–1.22)
MONOCYTES NFR BLD AUTO: 10 % (ref 4–12)
NEUTROPHILS # BLD AUTO: 5.64 THOUSANDS/ΜL (ref 1.85–7.62)
NEUTS SEG NFR BLD AUTO: 61 % (ref 43–75)
NRBC BLD AUTO-RTO: 0 /100 WBCS
PLATELET # BLD AUTO: 232 THOUSANDS/UL (ref 149–390)
PMV BLD AUTO: 11.1 FL (ref 8.9–12.7)
RBC # BLD AUTO: 5.54 MILLION/UL (ref 3.88–5.62)
WBC # BLD AUTO: 9.39 THOUSAND/UL (ref 4.31–10.16)

## 2020-08-10 PROCEDURE — 85025 COMPLETE CBC W/AUTO DIFF WBC: CPT

## 2020-08-10 PROCEDURE — 36415 COLL VENOUS BLD VENIPUNCTURE: CPT

## 2020-08-17 ENCOUNTER — OFFICE VISIT (OUTPATIENT)
Dept: HEMATOLOGY ONCOLOGY | Facility: CLINIC | Age: 75
End: 2020-08-17
Payer: COMMERCIAL

## 2020-08-17 VITALS
DIASTOLIC BLOOD PRESSURE: 80 MMHG | WEIGHT: 264 LBS | HEIGHT: 70 IN | RESPIRATION RATE: 16 BRPM | OXYGEN SATURATION: 93 % | BODY MASS INDEX: 37.8 KG/M2 | SYSTOLIC BLOOD PRESSURE: 126 MMHG | TEMPERATURE: 97.4 F | HEART RATE: 76 BPM

## 2020-08-17 DIAGNOSIS — D75.1 SECONDARY POLYCYTHEMIA: Primary | ICD-10-CM

## 2020-08-17 DIAGNOSIS — R97.20 ELEVATED PSA: ICD-10-CM

## 2020-08-17 DIAGNOSIS — G47.33 OBSTRUCTIVE SLEEP APNEA: ICD-10-CM

## 2020-08-17 DIAGNOSIS — E21.0 PRIMARY HYPERPARATHYROIDISM (HCC): ICD-10-CM

## 2020-08-17 DIAGNOSIS — Q61.02 MULTIPLE RENAL CYSTS: ICD-10-CM

## 2020-08-17 DIAGNOSIS — I48.0 PAROXYSMAL ATRIAL FIBRILLATION (HCC): ICD-10-CM

## 2020-08-17 PROCEDURE — 1036F TOBACCO NON-USER: CPT | Performed by: INTERNAL MEDICINE

## 2020-08-17 PROCEDURE — 4040F PNEUMOC VAC/ADMIN/RCVD: CPT | Performed by: INTERNAL MEDICINE

## 2020-08-17 PROCEDURE — 3079F DIAST BP 80-89 MM HG: CPT | Performed by: INTERNAL MEDICINE

## 2020-08-17 PROCEDURE — 1160F RVW MEDS BY RX/DR IN RCRD: CPT | Performed by: INTERNAL MEDICINE

## 2020-08-17 PROCEDURE — 3074F SYST BP LT 130 MM HG: CPT | Performed by: INTERNAL MEDICINE

## 2020-08-17 PROCEDURE — 1170F FXNL STATUS ASSESSED: CPT | Performed by: INTERNAL MEDICINE

## 2020-08-17 PROCEDURE — 99214 OFFICE O/P EST MOD 30 MIN: CPT | Performed by: INTERNAL MEDICINE

## 2020-08-17 PROCEDURE — 3008F BODY MASS INDEX DOCD: CPT | Performed by: INTERNAL MEDICINE

## 2020-08-17 NOTE — PROGRESS NOTES
HPI:  Patient is here with his family member  He has polycythemia that is most likely secondary  JAK2 mutation test result is negative  Erythropoietin level is normal   Patient has history of sleep apnea but he does not use CPAP  He used to donate blood every 4 months [facility] but he can not do that anymore because he has been on Eliquis for AFib  Blood bank will not take his blood  He has been running high calcium but normal ionized calcium  High PTH  I suggested seeing an endocrinologist   He mentioned that Dr Angelica Silvre is taking care of it  He has high PSA 5 4 and he follows with his urologist   Patient has some tiredness and exertional dyspnea     Explained all that to the patient in detail   Questions answered   Discussed the importance of eating iron poor healthy foods , staying active and health screening tests   Patient is capable of self-care        Patient will continue to follow with his primary physician and other consultants    Also discussed with him renal cyst and gallbladder polyp and fatty liver                  Current Outpatient Medications:     allopurinol (ZYLOPRIM) 300 mg tablet, Take 1 tablet (300 mg total) by mouth daily, Disp: 90 tablet, Rfl: 1    amLODIPine (NORVASC) 5 mg tablet, Take 1 tablet (5 mg total) by mouth daily, Disp: 90 tablet, Rfl: 1    apixaban (ELIQUIS) 5 mg, Take 1 tablet (5 mg total) by mouth 2 (two) times a day, Disp: 180 tablet, Rfl: 3    lisinopril (ZESTRIL) 10 mg tablet, Take 1 tablet (10 mg total) by mouth daily, Disp: 90 tablet, Rfl: 3    meclizine (ANTIVERT) 25 mg tablet, Take 1 tablet (25 mg total) by mouth 2 (two) times a day as needed for dizziness, Disp: 180 tablet, Rfl: 1    metoprolol succinate (TOPROL-XL) 25 mg 24 hr tablet, Take 1 tablet (25 mg total) by mouth daily, Disp: 90 tablet, Rfl: 3    simvastatin (ZOCOR) 40 mg tablet, Take 1 tablet (40 mg total) by mouth every other day, Disp: 45 tablet, Rfl: 1    colestipol (COLESTID) 1 g tablet, Take 1 tablet (1 g total) by mouth 2 (two) times a day (Patient not taking: Reported on 8/17/2020), Disp: 180 tablet, Rfl: 1    Allergies   Allergen Reactions    Pollen Extract Allergic Rhinitis       Oncology History    No history exists  ROS:  08/17/20 Reviewed 13 systems: See symptoms in HPI :   Tiredness and exertional dyspnea  Presently no headaches, seizures, dizziness, diplopia, dysphagia, hoarseness, chest pain, palpitations,  cough, hemoptysis, abdominal pain, nausea, vomiting, change in bowel habits, melena, hematuria, fever, chills, bleeding, bone pains, skin rash, weight loss, arthritic symptoms,  weakness, numbness,  claudication and gait problem  No frequent infections  Not unusually sensitive to heat or cold  No swelling of the ankles  No swollen glands  Patient is anxious  /80 (BP Location: Left arm, Patient Position: Sitting, Cuff Size: Adult)   Pulse 76   Temp (!) 97 4 °F (36 3 °C)   Resp 16   Ht 5' 10" (1 778 m)   Wt 120 kg (264 lb)   SpO2 93%   BMI 37 88 kg/m²     Physical Exam:  Alert, oriented, not in distress, no icterus, no oral thrush, no palpable neck mass, clear lung fields, regular heart rate, abdomen  soft and non tender, no palpable abdominal mass, no ascites, no edema of ankles, no calf tenderness, no focal neurological deficit, no skin rash, no palpable lymphadenopathy, good arterial pulses, no clubbing  Patient is anxious  Performance status 1  IMAGING:  IMPRESSION:     1  Unchanged hepatic steatosis      2   Stable gallbladder polyps measuring up to 5 mm    According to current literature guidelines (Roxanna Even 2013;10:[id number]), small polyps this size are benign and no work-up or follow-up is needed      3   Stable bilateral renal cysts      Workstation performed: [id number]      Imaging     US abdomen complete (Order: [id number]) - 7/13/2020       LABS:  Results for orders placed or performed in visit on 08/10/20   CBC and differential   Result Value Ref Range    WBC 9 39 4 31 - 10 16 Thousand/uL    RBC 5 54 3 88 - 5 62 Million/uL    Hemoglobin 17 7 (H) 12 0 - 17 0 g/dL    Hematocrit 52 9 (H) 36 5 - 49 3 %    MCV 96 82 - 98 fL    MCH 31 9 26 8 - 34 3 pg    MCHC 33 5 31 4 - 37 4 g/dL    RDW 13 5 11 6 - 15 1 %    MPV 11 1 8 9 - 12 7 fL    Platelets 521 906 - 585 Thousands/uL    nRBC 0 /100 WBCs    Neutrophils Relative 61 43 - 75 %    Immat GRANS % 0 0 - 2 %    Lymphocytes Relative 19 14 - 44 %    Monocytes Relative 10 4 - 12 %    Eosinophils Relative 9 (H) 0 - 6 %    Basophils Relative 1 0 - 1 %    Neutrophils Absolute 5 64 1 85 - 7 62 Thousands/µL    Immature Grans Absolute 0 02 0 00 - 0 20 Thousand/uL    Lymphocytes Absolute 1 82 0 60 - 4 47 Thousands/µL    Monocytes Absolute 0 96 0 17 - 1 22 Thousand/µL    Eosinophils Absolute 0 88 (H) 0 00 - 0 61 Thousand/µL    Basophils Absolute 0 07 0 00 - 0 10 Thousands/µL     Labs, Imaging, & Other studies:   All pertinent labs and imaging studies were personally reviewed    Lab Results   Component Value Date    K 4 6 07/06/2020     07/06/2020    CO2 26 07/06/2020    BUN 14 07/06/2020    CREATININE 1 35 (H) 07/06/2020    GLUF 98 07/06/2020    CALCIUM 10 5 (H) 07/06/2020    CORRECTEDCA 10 3 (H) 07/06/2020    AST 17 07/06/2020    ALT 26 07/06/2020    ALKPHOS 79 07/06/2020    EGFR 50 07/06/2020     Lab Results   Component Value Date    WBC 9 39 08/10/2020    HGB 17 7 (H) 08/10/2020    HCT 52 9 (H) 08/10/2020    MCV 96 08/10/2020     08/10/2020       Reviewed and discussed with patient  Assessment and plan:  Patient is here with his family member  He has polycythemia that is most likely secondary  JAK2 mutation test result is negative  Erythropoietin level is normal   Patient has history of sleep apnea but he does not use CPAP  He used to donate blood every 4 months at [facility] but he can not do that anymore because he has been on Eliquis for AF    Blood bank will not take his blood  He has been running high calcium but normal ionized calcium  High PTH  I suggested seeing an endocrinologist   He mentioned that Dr Emeka Amador is taking care of it  He has high PSA 5 4 and he follows with his urologist   Patient has some tiredness and exertional dyspnea     Physical examination and test results are as recorded and discussed  Hematocrit is high but stable and compensatory and most likely patient has secondary polycythemia     Discussed primary versus secondary polycythemia  Counts will be monitored  Other problems and their management as mentioned above  Patient will continue to follow with his primary physician and other consultants    Praveen Perla all that to the patient in detail   Questions answered   Discussed the importance of eating iron poor healthy foods , staying active and health screening tests   Patient is capable of self-care     Discussed precautions against coronavirus  1  Secondary polycythemia    - CBC and differential; Future  - Creatinine, serum; Future    2  Obstructive sleep apnea      3  Paroxysmal atrial fibrillation (HCC)      4  Primary hyperparathyroidism ([facility] [address] )      5  Multiple renal cysts      6  Elevated PSA                            Patient voiced understanding and agreement in the discussion  Counseling / Coordination of Care   Greater than 50% of total time was spent with the patient and / or family counseling and / or coordination of care

## 2020-08-20 ENCOUNTER — IN-CLINIC DEVICE VISIT (OUTPATIENT)
Dept: CARDIOLOGY CLINIC | Facility: CLINIC | Age: 75
End: 2020-08-20
Payer: COMMERCIAL

## 2020-08-20 DIAGNOSIS — I10 ESSENTIAL HYPERTENSION, BENIGN: ICD-10-CM

## 2020-08-20 DIAGNOSIS — Z95.0 PACEMAKER: Primary | ICD-10-CM

## 2020-08-20 PROCEDURE — 93280 PM DEVICE PROGR EVAL DUAL: CPT | Performed by: INTERNAL MEDICINE

## 2020-08-20 RX ORDER — AMLODIPINE BESYLATE 5 MG/1
TABLET ORAL
Qty: 90 TABLET | Refills: 3 | Status: SHIPPED | OUTPATIENT
Start: 2020-08-20 | End: 2021-08-25 | Stop reason: SDUPTHER

## 2020-08-20 NOTE — PROGRESS NOTES
Results for orders placed or performed in visit on 08/20/20   Cardiac EP device report    Narrative    MDT-DUAL CHAMBER PPM (AAIR-DDDR MODE) ACTIVE SYSTEM IS MRI CONDITIONAL  DEVICE INTERROGATED IN THE [place] OFFICE/YEARLY  BATTERY ADEQUATE (11 2 YRS)  AP 97%;  48% (SSS/AAIR-DDDR 70)  ALL LEAD PARAMETERS WITHIN NORMAL LIMITS  2 NEW NSVT EPISODES (UP  BPM & 11 BEATS)  PT  TAKES ELIQUIS & METOPROLOL SUCC  NO PROGRAMMING CHANGES MADE TO DEVICE PARAMETERS  NORMAL DEVICE FUNCTION   PL

## 2020-10-02 DIAGNOSIS — E78.5 HYPERLIPIDEMIA, UNSPECIFIED HYPERLIPIDEMIA TYPE: ICD-10-CM

## 2020-10-02 RX ORDER — SIMVASTATIN 40 MG
40 TABLET ORAL EVERY OTHER DAY
Qty: 45 TABLET | Refills: 1 | Status: SHIPPED | OUTPATIENT
Start: 2020-10-02 | End: 2021-03-18 | Stop reason: SDUPTHER

## 2020-10-10 NOTE — LETTER
March 21, 2019     Shanna Pitts MD  [address] [place] [address]    Patient: Mary Weldon   YOB: 1945   Date of Visit: 3/21/2019       Dear Dr Ami Dai:    Thank you for referring Rach Vargas to me for evaluation  Below are my notes for this consultation  If you have questions, please do not hesitate to call me  I look forward to following your patient along with you  Sincerely,        Denis Colon MD        CC: No Recipients  Denis Colon MD  3/21/2019  2:40 PM  Sign at close encounter  Consultation - Medical Oncology   Mary Weldon 74 y o  male MRN: [id number]  Unit/Bed#:  Encounter: [id number]      Reason for Consult:  Dr Ami Dai  HPI: Mary Weldon is a 74y o  year old male   He is here with his wife  He runs high hematocrit that went from 49 4 in 2017 to 52 6 in 2018 and now 57 8  Patient donates blood at the blood bank every 4 months  He has been doing that for years  He has some tiredness  He has chronic mild low back discomfort  He has exertional dyspnea that lasts for couple of minutes and he will be going for a pacemaker insertion on 05/01/2019  He has nocturia and he follows with his urologist   Occasionally slight dizziness and Antivert helps He quit smoking cigars and pipes more than 30 years ago  He did maintenance work    ROS:  03/21/19 Reviewed 13 systems:  Presently no headaches, seizures,  diplopia, dysphagia, hoarseness, chest pain, palpitations,  cough, hemoptysis, abdominal pain, nausea, vomiting, change in bowel habits, melena, hematuria, fever, chills, bleeding, bone pains, skin rash, weight loss,  weakness, numbness, claudication and gait problem  No frequent infections  Not unusually sensitive to heat or cold  No swelling of the ankles  No swollen glands  Patient is anxious   Other symptoms are in HPI        Historical Information   Past Medical History:   Diagnosis Date    Acute gouty arthropathy     last assessed-11/15/2013    Cataract     Diverticulitis of colon     Enlarged prostate     Gout     Hearing loss     History of diverticulitis of colon     History of dizziness     Hypercholesterolemia     Hyperlipidemia     Hypertension     Palpitations     Sinus bradycardia      Past Surgical History:   Procedure Laterality Date    ARM NEUROPLASTY Left         COLONOSCOPY      CYSTOSCOPY  2017    diagnostic    FOREARM FRACTURE SURGERY      ORCHIECTOMY      surgery testis    NE CYSTOURETHRO W/IMPLANT N/A 2018    Procedure: CYSTOSCOPY WITH INSERTION UROLIFT;  Surgeon: Verner Squires, MD;  [facility];  Service: Urology    NE CYSTOURETHROSCOPY,BIOPSY N/A 2018    Procedure: BLADDER BIOPSY;  Surgeon: Verner Squires, MD;  [facility];  Service: Urology    TESTICLE SURGERY Right      Social History   Social History     Substance and Sexual Activity   Alcohol Use Yes    Comment: rarely   Per allscripts, drinks alcohol very infrequently     Social History     Substance and Sexual Activity   Drug Use No     Social History     Tobacco Use   Smoking Status Former Smoker    Last attempt to quit: Sebastian Salmon Years since quittin 2   Smokeless Tobacco Never Used     Family History:   Family History   Problem Relation Age of Onset    Coronary artery disease Mother     Heart disease Mother     Hypertension Mother     Stroke Mother     Coronary artery disease Father     Stroke Family     Heart attack Family         acute MI         Current Outpatient Medications:     allopurinol (ZYLOPRIM) 300 mg tablet, Take 1 tablet (300 mg total) by mouth daily, Disp: 90 tablet, Rfl: 1    amLODIPine (NORVASC) 5 mg tablet, Take 1 tablet (5 mg total) by mouth daily, Disp: 90 tablet, Rfl: 1    aspirin 81 MG tablet, Take 81 mg by mouth daily, Disp: , Rfl:     colestipol (COLESTID) 1 g tablet, Take 1 tablet (1 g total) by mouth 2 (two) times a day, Disp: 180 tablet, Rfl: 1    lisinopril (ZESTRIL) 20 mg tablet, Take 1 tablet (20 mg total) by mouth daily, Disp: 90 tablet, Rfl: 1    meclizine (ANTIVERT) 25 mg tablet, Take 1 tablet (25 mg total) by mouth 2 (two) times a day as needed for dizziness, Disp: 180 tablet, Rfl: 1    simvastatin (ZOCOR) 40 mg tablet, Take 1 tablet (40 mg total) by mouth every other day, Disp: 45 tablet, Rfl: 1    Allergies   Allergen Reactions    Pollen Extract      @ ROS@  Physical Exam:  Vitals:    03/21/19 1329   BP: 128/90   BP Location: Left arm   Pulse: 83   Resp: 18   Temp: 98 1 °F (36 7 °C)   TempSrc: Tympanic Core   SpO2: 93%   Weight: 125 kg (276 lb 6 4 oz)   Height: 5' 8 5" (1 74 m)     Alert, oriented, not in distress, no icterus, no oral thrush, no palpable neck mass, clear lung fields, regular heart rate, abdomen  soft and non tender, no palpable abdominal mass, no ascites, no edema of ankles, no calf tenderness, no focal neurological deficit, no skin rash, no palpable lymphadenopathy, good arterial pulses, no clubbing  Patient is anxious  Performance status 1  Lab Results: I have reviewed all pertinent labs    LABS:  Results for orders placed or performed in visit on 02/14/19   CBC and differential   Result Value Ref Range    WBC 7 59 4 31 - 10 16 Thousand/uL    RBC 5 94 (H) 3 88 - 5 62 Million/uL    Hemoglobin 18 4 (H) 12 0 - 17 0 g/dL    Hematocrit 57 8 (H) 36 5 - 49 3 %    MCV 97 82 - 98 fL    MCH 31 0 26 8 - 34 3 pg    MCHC 31 8 31 4 - 37 4 g/dL    RDW 13 9 11 6 - 15 1 %    MPV 12 1 8 9 - 12 7 fL    Platelets 496 230 - 271 Thousands/uL    nRBC 0 /100 WBCs    Neutrophils Relative 65 43 - 75 %    Immat GRANS % 0 0 - 2 %    Lymphocytes Relative 22 14 - 44 %    Monocytes Relative 10 4 - 12 %    Eosinophils Relative 2 0 - 6 %    Basophils Relative 1 0 - 1 %    Neutrophils Absolute 4 86 1 85 - 7 62 Thousands/µL    Immature Grans Absolute 0 02 0 00 - 0 20 Thousand/uL    Lymphocytes Absolute 1 70 0 60 - 4 47 Thousands/µL    Monocytes Absolute 0 79 0 17 - 1 22 Thousand/µL    Eosinophils Absolute 0 17 0 00 - 0 61 Thousand/µL    Basophils Absolute 0 05 0 00 - 0 10 Thousands/µL   Comprehensive metabolic panel   Result Value Ref Range    Sodium 141 136 - 145 mmol/L    Potassium 4 4 3 5 - 5 3 mmol/L    Chloride 106 100 - 108 mmol/L    CO2 28 21 - 32 mmol/L    ANION GAP 7 4 - 13 mmol/L    BUN 21 5 - 25 mg/dL    Creatinine 1 23 0 60 - 1 30 mg/dL    Glucose, Fasting 80 65 - 99 mg/dL    Calcium 9 6 8 3 - 10 1 mg/dL    AST 18 5 - 45 U/L    ALT 25 12 - 78 U/L    Alkaline Phosphatase 83 46 - 116 U/L    Total Protein 7 7 6 4 - 8 2 g/dL    Albumin 4 2 3 5 - 5 0 g/dL    Total Bilirubin 1 01 (H) 0 20 - 1 00 mg/dL    eGFR 57 ml/min/1 73sq m   Lipid panel   Result Value Ref Range    Cholesterol 163 50 - 200 mg/dL    Triglycerides 151 (H) <=150 mg/dL    HDL, Direct 46 40 - 60 mg/dL    LDL Calculated 87 0 - 100 mg/dL    Non-HDL-Chol (CHOL-HDL) 117 mg/dl   Uric acid   Result Value Ref Range    Uric Acid 3 8 (L) 4 2 - 8 0 mg/dL   TSH, 3rd generation   Result Value Ref Range    TSH 3RD GENERATON 2 030 0 358 - 3 740 uIU/mL   UA w Reflex to Microscopic w Reflex to Culture - Clinic Collect   Result Value Ref Range    Color, UA Yellow     Clarity, UA Clear     Specific Gravity, UA 1 022 1 003 - 1 030    pH, UA 6 5 4 5 - 8 0    Leukocytes, UA Negative Negative    Nitrite, UA Negative Negative    Protein, UA Negative Negative mg/dl    Glucose, UA Negative Negative mg/dl    Ketones, UA Negative Negative mg/dl    Urobilinogen, UA 0 2 0 2, 1 0 E U /dl E U /dl    Bilirubin, UA Negative Negative    Blood, UA Negative Negative         Imaging Studies: I have personally reviewed pertinent reports  Pathology, and Other Studies: I have personally reviewed pertinent reports  Assessment and Plan:  Polycythemia, likely primary but could be secondary  Ordered additional tests as below  Patient will come back in 2 weeks  He will be requiring phlebotomy    Explained all that to the patient in detail  Questions answered  He states he has been keeping himself hydrated  Discussed the importance of eating healthy foods but to avoid iron rich foods, staying active and health screening tests  Patient is capable of self-care  Goal will be to bring hematocrit down to a level depending upon primary or secondary polycythemia  All discussed in detail  Questions answered  1  Polycythemia    - Ambulatory referral to Hematology / Oncology  - XR chest pa & lateral; Future  - US abdomen complete; Future  - Erythropoietin  - Creatinine, serum  - JAK2 V617F,Ql,W/RFL Exons 12,13 and MPL A120,T771; Future      Patient voiced understanding and agreement in the discussion  Counseling / Coordination of Care    Greater than 50% of total time was spent with the patient and / or family counseling and / or coordination of care  no

## 2020-10-15 DIAGNOSIS — M10.9 GOUT, UNSPECIFIED CAUSE, UNSPECIFIED CHRONICITY, UNSPECIFIED SITE: ICD-10-CM

## 2020-10-15 DIAGNOSIS — R42 DIZZINESS: ICD-10-CM

## 2020-10-15 RX ORDER — ALLOPURINOL 300 MG/1
300 TABLET ORAL DAILY
Qty: 90 TABLET | Refills: 1 | Status: SHIPPED | OUTPATIENT
Start: 2020-10-15 | End: 2021-04-13 | Stop reason: SDUPTHER

## 2020-10-15 RX ORDER — MECLIZINE HYDROCHLORIDE 25 MG/1
25 TABLET ORAL 2 TIMES DAILY PRN
Qty: 180 TABLET | Refills: 1 | Status: SHIPPED | OUTPATIENT
Start: 2020-10-15 | End: 2021-04-26 | Stop reason: SDUPTHER

## 2020-11-05 ENCOUNTER — OFFICE VISIT (OUTPATIENT)
Dept: CARDIOLOGY CLINIC | Facility: CLINIC | Age: 75
End: 2020-11-05
Payer: COMMERCIAL

## 2020-11-05 VITALS
HEIGHT: 70 IN | BODY MASS INDEX: 37.8 KG/M2 | TEMPERATURE: 98.5 F | HEART RATE: 97 BPM | SYSTOLIC BLOOD PRESSURE: 128 MMHG | WEIGHT: 264 LBS | DIASTOLIC BLOOD PRESSURE: 82 MMHG

## 2020-11-05 DIAGNOSIS — I48.0 PAROXYSMAL ATRIAL FIBRILLATION (HCC): Primary | ICD-10-CM

## 2020-11-05 PROCEDURE — 3079F DIAST BP 80-89 MM HG: CPT | Performed by: PHYSICIAN ASSISTANT

## 2020-11-05 PROCEDURE — 93000 ELECTROCARDIOGRAM COMPLETE: CPT | Performed by: PHYSICIAN ASSISTANT

## 2020-11-05 PROCEDURE — 99213 OFFICE O/P EST LOW 20 MIN: CPT | Performed by: PHYSICIAN ASSISTANT

## 2020-11-05 PROCEDURE — 3074F SYST BP LT 130 MM HG: CPT | Performed by: PHYSICIAN ASSISTANT

## 2020-11-06 ENCOUNTER — HOSPITAL ENCOUNTER (OUTPATIENT)
Dept: RADIOLOGY | Facility: IMAGING CENTER | Age: 75
Discharge: HOME | End: 2020-11-06
Payer: COMMERCIAL

## 2020-11-06 DIAGNOSIS — E21.0 PRIMARY HYPERPARATHYROIDISM (HCC): ICD-10-CM

## 2020-11-06 PROCEDURE — 77080 DXA BONE DENSITY AXIAL: CPT

## 2020-11-12 ENCOUNTER — LAB (OUTPATIENT)
Dept: LAB | Facility: IMAGING CENTER | Age: 75
End: 2020-11-12
Payer: COMMERCIAL

## 2020-11-12 DIAGNOSIS — E21.0 PRIMARY HYPERPARATHYROIDISM (HCC): ICD-10-CM

## 2020-11-12 LAB
ANION GAP SERPL CALCULATED.3IONS-SCNC: 4 MMOL/L (ref 4–13)
BUN SERPL-MCNC: 17 MG/DL (ref 5–25)
CALCIUM SERPL-MCNC: 10.7 MG/DL (ref 8.3–10.1)
CHLORIDE SERPL-SCNC: 105 MMOL/L (ref 100–108)
CO2 SERPL-SCNC: 29 MMOL/L (ref 21–32)
CREAT SERPL-MCNC: 1.33 MG/DL (ref 0.6–1.3)
GFR SERPL CREATININE-BSD FRML MDRD: 51 ML/MIN/1.73SQ M
GLUCOSE P FAST SERPL-MCNC: 90 MG/DL (ref 65–99)
POTASSIUM SERPL-SCNC: 4.3 MMOL/L (ref 3.5–5.3)
PTH-INTACT SERPL-MCNC: 127.9 PG/ML (ref 18.4–80.1)
SODIUM SERPL-SCNC: 138 MMOL/L (ref 136–145)

## 2020-11-12 PROCEDURE — 83970 ASSAY OF PARATHORMONE: CPT

## 2020-11-12 PROCEDURE — 80048 BASIC METABOLIC PNL TOTAL CA: CPT

## 2020-11-12 PROCEDURE — 36415 COLL VENOUS BLD VENIPUNCTURE: CPT

## 2020-11-20 ENCOUNTER — REMOTE DEVICE CLINIC VISIT (OUTPATIENT)
Dept: CARDIOLOGY CLINIC | Facility: CLINIC | Age: 75
End: 2020-11-20
Payer: COMMERCIAL

## 2020-11-20 DIAGNOSIS — Z95.0 CARDIAC PACEMAKER IN SITU: Primary | ICD-10-CM

## 2020-11-20 PROCEDURE — 93296 REM INTERROG EVL PM/IDS: CPT | Performed by: INTERNAL MEDICINE

## 2020-11-20 PROCEDURE — 93294 REM INTERROG EVL PM/LDLS PM: CPT | Performed by: INTERNAL MEDICINE

## 2020-11-25 ENCOUNTER — OFFICE VISIT (OUTPATIENT)
Dept: INTERNAL MEDICINE CLINIC | Age: 75
End: 2020-11-25
Payer: COMMERCIAL

## 2020-11-25 VITALS
WEIGHT: 261 LBS | BODY MASS INDEX: 38.66 KG/M2 | TEMPERATURE: 97.7 F | SYSTOLIC BLOOD PRESSURE: 150 MMHG | OXYGEN SATURATION: 97 % | HEIGHT: 69 IN | DIASTOLIC BLOOD PRESSURE: 90 MMHG | HEART RATE: 71 BPM

## 2020-11-25 DIAGNOSIS — I48.0 PAROXYSMAL ATRIAL FIBRILLATION (HCC): ICD-10-CM

## 2020-11-25 DIAGNOSIS — E21.0 PRIMARY HYPERPARATHYROIDISM (HCC): ICD-10-CM

## 2020-11-25 DIAGNOSIS — M85.89 OSTEOPENIA OF MULTIPLE SITES: ICD-10-CM

## 2020-11-25 DIAGNOSIS — I10 BENIGN ESSENTIAL HYPERTENSION: ICD-10-CM

## 2020-11-25 DIAGNOSIS — G47.33 OBSTRUCTIVE SLEEP APNEA: ICD-10-CM

## 2020-11-25 DIAGNOSIS — E79.0 HYPERURICEMIA: ICD-10-CM

## 2020-11-25 DIAGNOSIS — D75.1 SECONDARY POLYCYTHEMIA: ICD-10-CM

## 2020-11-25 DIAGNOSIS — Z23 NEED FOR INFLUENZA VACCINATION: Primary | ICD-10-CM

## 2020-11-25 PROCEDURE — 90662 IIV NO PRSV INCREASED AG IM: CPT

## 2020-11-25 PROCEDURE — 1036F TOBACCO NON-USER: CPT | Performed by: INTERNAL MEDICINE

## 2020-11-25 PROCEDURE — 3725F SCREEN DEPRESSION PERFORMED: CPT | Performed by: INTERNAL MEDICINE

## 2020-11-25 PROCEDURE — 99214 OFFICE O/P EST MOD 30 MIN: CPT | Performed by: INTERNAL MEDICINE

## 2020-11-25 PROCEDURE — G0008 ADMIN INFLUENZA VIRUS VAC: HCPCS

## 2020-11-25 PROCEDURE — 1160F RVW MEDS BY RX/DR IN RCRD: CPT | Performed by: INTERNAL MEDICINE

## 2020-12-14 ENCOUNTER — TELEPHONE (OUTPATIENT)
Dept: CARDIOLOGY CLINIC | Facility: CLINIC | Age: 75
End: 2020-12-14

## 2021-02-10 ENCOUNTER — TELEPHONE (OUTPATIENT)
Dept: HEMATOLOGY ONCOLOGY | Facility: CLINIC | Age: 76
End: 2021-02-10

## 2021-02-10 NOTE — TELEPHONE ENCOUNTER
Reschedule Appointment     Who is calling in Patient    Doctor Appointment Scheduled with Dr Linnette Kunz date and time 2/22/11 am    New date and time 4/12/2:20 pm    Location [facility]    Patient verbalized understanding   Yes

## 2021-02-11 ENCOUNTER — TELEPHONE (OUTPATIENT)
Dept: HEMATOLOGY ONCOLOGY | Facility: CLINIC | Age: 76
End: 2021-02-11

## 2021-02-22 ENCOUNTER — REMOTE DEVICE CLINIC VISIT (OUTPATIENT)
Dept: CARDIOLOGY CLINIC | Facility: CLINIC | Age: 76
End: 2021-02-22
Payer: COMMERCIAL

## 2021-02-22 DIAGNOSIS — Z95.0 CARDIAC PACEMAKER IN SITU: Primary | ICD-10-CM

## 2021-02-22 PROCEDURE — 93294 REM INTERROG EVL PM/LDLS PM: CPT | Performed by: INTERNAL MEDICINE

## 2021-02-22 PROCEDURE — 93296 REM INTERROG EVL PM/IDS: CPT | Performed by: INTERNAL MEDICINE

## 2021-02-23 NOTE — PROGRESS NOTES
Results for orders placed or performed in visit on 02/22/21   Cardiac EP device report    Narrative    MDT-DUAL CHAMBER PPM (AAIR-DDDR MODE) ACTIVE SYSTEM IS MRI CONDITIONAL  CARELINK TRANSMISSION: BATTERY VOLTAGE ADEQUATE (10 5 YRS)  AP-94%, -88% (>40% Akiko@[employer])  ALL AVAILABLE LEAD PARAMETERS WITHIN NORMAL LIMITS  1 NSVT EPISODE AT LEAST 22 BEATS, AVG CL~390MS (ONSET NOT RECORDED)  EF-60% (ECHO 4/26/19)  PT ON ELIQUIS & METOPROLOL  NORMAL DEVICE FUNCTION   GV

## 2021-03-18 DIAGNOSIS — I10 BENIGN ESSENTIAL HYPERTENSION: ICD-10-CM

## 2021-03-18 DIAGNOSIS — I48.0 PAROXYSMAL ATRIAL FIBRILLATION (HCC): ICD-10-CM

## 2021-03-18 DIAGNOSIS — E78.5 HYPERLIPIDEMIA, UNSPECIFIED HYPERLIPIDEMIA TYPE: ICD-10-CM

## 2021-03-18 DIAGNOSIS — I47.2 NSVT (NONSUSTAINED VENTRICULAR TACHYCARDIA) (HCC): ICD-10-CM

## 2021-03-18 RX ORDER — SIMVASTATIN 40 MG
40 TABLET ORAL EVERY OTHER DAY
Qty: 45 TABLET | Refills: 1 | Status: SHIPPED | OUTPATIENT
Start: 2021-03-18 | End: 2021-09-28 | Stop reason: SDUPTHER

## 2021-03-18 RX ORDER — LISINOPRIL 10 MG/1
10 TABLET ORAL DAILY
Qty: 90 TABLET | Refills: 3 | Status: SHIPPED | OUTPATIENT
Start: 2021-03-18 | End: 2022-02-15 | Stop reason: SDUPTHER

## 2021-03-18 RX ORDER — METOPROLOL SUCCINATE 25 MG/1
25 TABLET, EXTENDED RELEASE ORAL DAILY
Qty: 90 TABLET | Refills: 3 | Status: SHIPPED | OUTPATIENT
Start: 2021-03-18 | End: 2022-02-15 | Stop reason: SDUPTHER

## 2021-03-23 ENCOUNTER — APPOINTMENT (OUTPATIENT)
Dept: LAB | Facility: IMAGING CENTER | Age: 76
End: 2021-03-23
Payer: COMMERCIAL

## 2021-03-23 ENCOUNTER — RA CDI HCC (OUTPATIENT)
Dept: OTHER | Facility: HOSPITAL | Age: 76
End: 2021-03-23

## 2021-03-23 DIAGNOSIS — E21.0 PRIMARY HYPERPARATHYROIDISM (HCC): ICD-10-CM

## 2021-03-23 DIAGNOSIS — E79.0 HYPERURICEMIA: ICD-10-CM

## 2021-03-23 DIAGNOSIS — D75.1 SECONDARY POLYCYTHEMIA: ICD-10-CM

## 2021-03-23 DIAGNOSIS — M85.89 OSTEOPENIA OF MULTIPLE SITES: ICD-10-CM

## 2021-03-23 LAB
25(OH)D3 SERPL-MCNC: 11.3 NG/ML (ref 30–100)
ANION GAP SERPL CALCULATED.3IONS-SCNC: 4 MMOL/L (ref 4–13)
BASOPHILS # BLD AUTO: 0.04 THOUSANDS/ΜL (ref 0–0.1)
BASOPHILS NFR BLD AUTO: 1 % (ref 0–1)
BUN SERPL-MCNC: 19 MG/DL (ref 5–25)
CA-I BLD-SCNC: 1.28 MMOL/L (ref 1.12–1.32)
CALCIUM SERPL-MCNC: 10.4 MG/DL (ref 8.3–10.1)
CHLORIDE SERPL-SCNC: 107 MMOL/L (ref 100–108)
CO2 SERPL-SCNC: 28 MMOL/L (ref 21–32)
CREAT SERPL-MCNC: 1.4 MG/DL (ref 0.6–1.3)
EOSINOPHIL # BLD AUTO: 0.25 THOUSAND/ΜL (ref 0–0.61)
EOSINOPHIL NFR BLD AUTO: 3 % (ref 0–6)
ERYTHROCYTE [DISTWIDTH] IN BLOOD BY AUTOMATED COUNT: 13.6 % (ref 11.6–15.1)
GFR SERPL CREATININE-BSD FRML MDRD: 48 ML/MIN/1.73SQ M
GLUCOSE P FAST SERPL-MCNC: 88 MG/DL (ref 65–99)
HCT VFR BLD AUTO: 53.2 % (ref 36.5–49.3)
HGB BLD-MCNC: 17.6 G/DL (ref 12–17)
IMM GRANULOCYTES # BLD AUTO: 0.01 THOUSAND/UL (ref 0–0.2)
IMM GRANULOCYTES NFR BLD AUTO: 0 % (ref 0–2)
LYMPHOCYTES # BLD AUTO: 1.87 THOUSANDS/ΜL (ref 0.6–4.47)
LYMPHOCYTES NFR BLD AUTO: 24 % (ref 14–44)
MCH RBC QN AUTO: 31.7 PG (ref 26.8–34.3)
MCHC RBC AUTO-ENTMCNC: 33.1 G/DL (ref 31.4–37.4)
MCV RBC AUTO: 96 FL (ref 82–98)
MONOCYTES # BLD AUTO: 0.77 THOUSAND/ΜL (ref 0.17–1.22)
MONOCYTES NFR BLD AUTO: 10 % (ref 4–12)
NEUTROPHILS # BLD AUTO: 4.89 THOUSANDS/ΜL (ref 1.85–7.62)
NEUTS SEG NFR BLD AUTO: 62 % (ref 43–75)
NRBC BLD AUTO-RTO: 0 /100 WBCS
PLATELET # BLD AUTO: 190 THOUSANDS/UL (ref 149–390)
PMV BLD AUTO: 11 FL (ref 8.9–12.7)
POTASSIUM SERPL-SCNC: 4.7 MMOL/L (ref 3.5–5.3)
RBC # BLD AUTO: 5.55 MILLION/UL (ref 3.88–5.62)
SODIUM SERPL-SCNC: 139 MMOL/L (ref 136–145)
URATE SERPL-MCNC: 4.4 MG/DL (ref 4.2–8)
WBC # BLD AUTO: 7.83 THOUSAND/UL (ref 4.31–10.16)

## 2021-03-23 PROCEDURE — 82306 VITAMIN D 25 HYDROXY: CPT

## 2021-03-23 PROCEDURE — 85025 COMPLETE CBC W/AUTO DIFF WBC: CPT

## 2021-03-23 PROCEDURE — 84550 ASSAY OF BLOOD/URIC ACID: CPT

## 2021-03-23 PROCEDURE — 82330 ASSAY OF CALCIUM: CPT

## 2021-03-23 PROCEDURE — 36415 COLL VENOUS BLD VENIPUNCTURE: CPT

## 2021-03-23 PROCEDURE — 80048 BASIC METABOLIC PNL TOTAL CA: CPT

## 2021-03-23 NOTE — PROGRESS NOTES
Based on clinical documentation indicated in your record, it appears that the patient may have the following conditions:    E66 01 Morbid (severe) obesity (BMI 39 10 with ELLYN, hypercholesterolemia)    E21 0 Primary hyperparathyroidism     I48 0 Paroxysmal atrial fibrillation       If this is correct, please document and assess at your next visit March 30th  [facility]  coding oppertunities             Chart reviewed, (number of) suggestions sent to provider: 3                 NO ANSWER

## 2021-03-24 DIAGNOSIS — E55.9 VITAMIN D DEFICIENCY: Primary | ICD-10-CM

## 2021-03-24 RX ORDER — ERGOCALCIFEROL 1.25 MG/1
50000 CAPSULE ORAL WEEKLY
Qty: 12 CAPSULE | Refills: 0 | Status: SHIPPED | OUTPATIENT
Start: 2021-03-24 | End: 2021-08-05

## 2021-03-30 ENCOUNTER — OFFICE VISIT (OUTPATIENT)
Dept: INTERNAL MEDICINE CLINIC | Age: 76
End: 2021-03-30
Payer: COMMERCIAL

## 2021-03-30 VITALS
HEIGHT: 69 IN | OXYGEN SATURATION: 97 % | TEMPERATURE: 97.5 F | WEIGHT: 261.3 LBS | SYSTOLIC BLOOD PRESSURE: 128 MMHG | DIASTOLIC BLOOD PRESSURE: 66 MMHG | HEART RATE: 80 BPM | BODY MASS INDEX: 38.7 KG/M2

## 2021-03-30 DIAGNOSIS — D75.1 SECONDARY POLYCYTHEMIA: ICD-10-CM

## 2021-03-30 DIAGNOSIS — E83.52 HYPERCALCEMIA: ICD-10-CM

## 2021-03-30 DIAGNOSIS — E21.0 PRIMARY HYPERPARATHYROIDISM (HCC): ICD-10-CM

## 2021-03-30 DIAGNOSIS — I48.0 PAROXYSMAL ATRIAL FIBRILLATION (HCC): Primary | ICD-10-CM

## 2021-03-30 PROCEDURE — 1160F RVW MEDS BY RX/DR IN RCRD: CPT | Performed by: INTERNAL MEDICINE

## 2021-03-30 PROCEDURE — 99214 OFFICE O/P EST MOD 30 MIN: CPT | Performed by: INTERNAL MEDICINE

## 2021-03-30 PROCEDURE — 3074F SYST BP LT 130 MM HG: CPT | Performed by: INTERNAL MEDICINE

## 2021-03-30 PROCEDURE — 1036F TOBACCO NON-USER: CPT | Performed by: INTERNAL MEDICINE

## 2021-03-30 PROCEDURE — 3078F DIAST BP <80 MM HG: CPT | Performed by: INTERNAL MEDICINE

## 2021-03-30 NOTE — PROGRESS NOTES
Assessment/Plan:     Diagnoses and all orders for this visit:    Paroxysmal atrial fibrillation ([facility] )    Primary hyperparathyroidism ([facility] )    Hypercalcemia    Secondary polycythemia        BMI Counseling: Body mass index is 39 15 kg/m²  The BMI is above normal  Nutrition recommendations include decreasing portion sizes, encouraging healthy choices of fruits and vegetables and moderation in carbohydrate intake  Exercise recommendations include moderate physical activity 150 minutes/week  Subjective:   Chief Complaint   Patient presents with    Follow-up     pt  presents for 4 month follow up for HTN  Review labs     Health maint     Due for BMI FOLLOW UP        Patient ID: Radha Perez is a 66 y o  male  HPI  This is a very pleasant 66 years young gentleman presented to the office for regular follow-up visit his doing well no new problems  He has an appointment with Hematology-Oncology for follow-up of polycythemia  Blood workup shows the hypercalcemia out mild we will follow-up with the calcium and the ionized calcium and PTH levels in the next office visit  His ionized calcium was normal  Follow-up the CBC for polycythemia  Paroxysmal atrial fibrillation on Eliquis doing well no episodes of bleeding  Hypertension is very well controlled with the systolic 646 and diastolic 66 weight is stable although it need to be addressed diet exercise and weight loss recommended again  Will continue to follow    The following portions of the patient's history were reviewed and updated as appropriate: allergies, current medications, past family history, past medical history, past social history, past surgical history and problem list     Review of Systems   Constitutional: Positive for fatigue  Negative for appetite change and fever  HENT: Negative for congestion, ear pain, hearing loss, nosebleeds, sneezing, tinnitus and voice change  Eyes: Negative for pain, discharge and redness     Respiratory: Negative for cough, chest tightness and wheezing  Cardiovascular: Negative for chest pain, palpitations and leg swelling  Gastrointestinal: Negative for abdominal pain, blood in stool, constipation, diarrhea, nausea and vomiting  Genitourinary: Negative for difficulty urinating, dysuria, hematuria and urgency  Musculoskeletal: Positive for arthralgias and myalgias  Negative for back pain, gait problem and joint swelling  Skin: Negative for rash and wound  Allergic/Immunologic: Negative for environmental allergies  Neurological: Negative for dizziness, tremors, seizures, weakness, light-headedness and numbness  Hematological: Negative for adenopathy  Does not bruise/bleed easily  Psychiatric/Behavioral: Negative for behavioral problems and confusion  The patient is not nervous/anxious            Past Medical History:   Diagnosis Date    Acute gouty arthropathy     last assessed-11/15/2013    Cataract     Diverticulitis of colon     Enlarged prostate     Gout     Hearing loss     Heart disease     History of diverticulitis of colon     History of dizziness     Hypercholesterolemia     Hyperlipidemia     Hypertension     Palpitations     Sinus bradycardia          Current Outpatient Medications:     allopurinol (ZYLOPRIM) 300 mg tablet, Take 1 tablet (300 mg total) by mouth daily, Disp: 90 tablet, Rfl: 1    amLODIPine (NORVASC) 5 mg tablet, TAKE 1 TABLET DAILY, Disp: 90 tablet, Rfl: 3    apixaban (ELIQUIS) 5 mg, Take 1 tablet (5 mg total) by mouth 2 (two) times a day, Disp: 180 tablet, Rfl: 3    ergocalciferol (VITAMIN D2) 50,000 units, Take 1 capsule (50,000 Units total) by mouth once a week, Disp: 12 capsule, Rfl: 0    lisinopril (ZESTRIL) 10 mg tablet, Take 1 tablet (10 mg total) by mouth daily, Disp: 90 tablet, Rfl: 3    meclizine (ANTIVERT) 25 mg tablet, Take 1 tablet (25 mg total) by mouth 2 (two) times a day as needed for dizziness, Disp: 180 tablet, Rfl: 1    metoprolol succinate (TOPROL-XL) 25 mg 24 hr tablet, Take 1 tablet (25 mg total) by mouth daily, Disp: 90 tablet, Rfl: 3    simvastatin (ZOCOR) 40 mg tablet, Take 1 tablet (40 mg total) by mouth every other day, Disp: 45 tablet, Rfl: 1    Allergies   Allergen Reactions    Pollen Extract Allergic Rhinitis       Social History   Past Surgical History:   Procedure Laterality Date    ARM NEUROPLASTY Left     1977    CARDIAC PACEMAKER PLACEMENT Left     CARDIAC SURGERY  05/01/2019    pace maker placed    COLONOSCOPY      CYSTOSCOPY  12/27/2017    diagnostic    FOREARM FRACTURE SURGERY      ORCHIECTOMY      surgery testis    LA CYSTOURETHRO W/IMPLANT N/A 2/9/2018    Procedure: CYSTOSCOPY WITH INSERTION Camilo Light;  Surgeon: Tito Madrid MD;  Location: [facility];  Service: Urology    LA CYSTOURETHROSCOPY,BIOPSY N/A 2/9/2018    Procedure: BLADDER BIOPSY;  Surgeon: Tito Madrid MD;  [facility];  Service: Urology    TESTICLE SURGERY Right 1976     Family History   Problem Relation Age of Onset    Coronary artery disease Mother     Heart disease Mother     Hypertension Mother     Stroke Mother     Coronary artery disease Father     Stroke Family     Heart attack Family         acute MI       Objective:  /66 (BP Location: Left arm, Patient Position: Sitting, Cuff Size: Standard)   Pulse 80   Temp 97 5 °F (36 4 °C) (Temporal)   Ht 5' 8 5" (1 74 m)   Wt 119 kg (261 lb 4 8 oz)   SpO2 97%   BMI 39 15 kg/m²        Physical Exam  Vitals signs and nursing note reviewed  Constitutional:       Appearance: He is well-developed  HENT:      Right Ear: External ear normal    Eyes:      Conjunctiva/sclera: Conjunctivae normal       Pupils: Pupils are equal, round, and reactive to light  Neck:      Musculoskeletal: Normal range of motion  Thyroid: No thyromegaly  Vascular: No JVD  Cardiovascular:      Rate and Rhythm: Normal rate and regular rhythm        Heart sounds: Normal heart sounds  Pulmonary:      Breath sounds: Normal breath sounds  Abdominal:      General: Bowel sounds are normal       Palpations: Abdomen is soft  Musculoskeletal: Normal range of motion  Lymphadenopathy:      Cervical: No cervical adenopathy  Skin:     General: Skin is dry  Neurological:      Mental Status: He is alert and oriented to person, place, and time  Deep Tendon Reflexes: Reflexes are normal and symmetric     Psychiatric:         Behavior: Behavior normal

## 2021-04-12 ENCOUNTER — OFFICE VISIT (OUTPATIENT)
Dept: HEMATOLOGY ONCOLOGY | Facility: CLINIC | Age: 76
End: 2021-04-12
Payer: COMMERCIAL

## 2021-04-12 VITALS
OXYGEN SATURATION: 97 % | SYSTOLIC BLOOD PRESSURE: 126 MMHG | HEIGHT: 69 IN | HEART RATE: 82 BPM | RESPIRATION RATE: 18 BRPM | TEMPERATURE: 98.2 F | BODY MASS INDEX: 38.8 KG/M2 | DIASTOLIC BLOOD PRESSURE: 76 MMHG | WEIGHT: 262 LBS

## 2021-04-12 DIAGNOSIS — E78.00 HYPERCHOLESTEROLEMIA: ICD-10-CM

## 2021-04-12 DIAGNOSIS — I48.0 PAROXYSMAL ATRIAL FIBRILLATION (HCC): ICD-10-CM

## 2021-04-12 DIAGNOSIS — N18.2 STAGE 2 CHRONIC KIDNEY DISEASE: ICD-10-CM

## 2021-04-12 DIAGNOSIS — G47.33 OBSTRUCTIVE SLEEP APNEA: ICD-10-CM

## 2021-04-12 DIAGNOSIS — I10 BENIGN ESSENTIAL HYPERTENSION: ICD-10-CM

## 2021-04-12 DIAGNOSIS — R97.20 ELEVATED PSA: ICD-10-CM

## 2021-04-12 DIAGNOSIS — D75.1 SECONDARY POLYCYTHEMIA: Primary | ICD-10-CM

## 2021-04-12 PROCEDURE — 3078F DIAST BP <80 MM HG: CPT | Performed by: INTERNAL MEDICINE

## 2021-04-12 PROCEDURE — 3074F SYST BP LT 130 MM HG: CPT | Performed by: INTERNAL MEDICINE

## 2021-04-12 PROCEDURE — 99213 OFFICE O/P EST LOW 20 MIN: CPT | Performed by: INTERNAL MEDICINE

## 2021-04-12 PROCEDURE — 1160F RVW MEDS BY RX/DR IN RCRD: CPT | Performed by: INTERNAL MEDICINE

## 2021-04-12 NOTE — PROGRESS NOTES
HPI:  Patient is here with his  Wife  Follow-up visit for polycythemia that is most likely secondary, secondary to pulmonary disease and sleep apnea but patient does not use CPAP   JAK2 mutation test was negative  Erythropoietin level was normal   Counts are being monitored  He keeps himself hydrated  He used to [facility] blood [facility] 4 [facility] but he can not do that anymore per  Blood bank because he has been on Eliquis for AFib  Blood bank will not take his blood  He has very low vitamin-D level and his family physician has started him on vitamin-D  History of high calcium and high PTH and that is being managed by his primary physician  History of high PSA and he follows with his urologist   Patient states he will be going for cataract surgery  Patient has some tiredness and exertional dyspnea as before                  Current Outpatient Medications:     allopurinol (ZYLOPRIM) 300 mg tablet, Take 1 tablet (300 mg total) by mouth daily, Disp: 90 tablet, Rfl: 1    amLODIPine (NORVASC) 5 mg tablet, TAKE 1 TABLET DAILY, Disp: 90 tablet, Rfl: 3    apixaban (ELIQUIS) 5 mg, Take 1 tablet (5 mg total) by mouth 2 (two) times a day, Disp: 180 tablet, Rfl: 3    ergocalciferol (VITAMIN D2) 50,000 units, Take 1 capsule (50,000 Units total) by mouth once a week, Disp: 12 capsule, Rfl: 0    lisinopril (ZESTRIL) 10 mg tablet, Take 1 tablet (10 mg total) by mouth daily, Disp: 90 tablet, Rfl: 3    meclizine (ANTIVERT) 25 mg tablet, Take 1 tablet (25 mg total) by mouth 2 (two) times a day as needed for dizziness, Disp: 180 tablet, Rfl: 1    metoprolol succinate (TOPROL-XL) 25 mg 24 hr tablet, Take 1 tablet (25 mg total) by mouth daily, Disp: 90 tablet, Rfl: 3    simvastatin (ZOCOR) 40 mg tablet, Take 1 tablet (40 mg total) by mouth every other day, Disp: 45 tablet, Rfl: 1    Allergies   Allergen Reactions    Pollen Extract Allergic Rhinitis       Oncology History    No history exists  ROS:  04/12/21 Reviewed 12 systems:   Presently no  Other neurological, cardiac, pulmonary, GI and  symptoms other than listed in HPI  No fever, chills, bleeding, bone pains, skin rash, weight loss, arthritic symptoms,  weakness, numbness,  claudication and gait problem  No frequent infections  Not unusually sensitive to heat or cold  No swelling of the ankles  No swollen glands  Patient is anxious  /76 (BP Location: Left arm, Patient Position: Sitting, Cuff Size: Adult)   Pulse 82   Temp 98 2 °F (36 8 °C)   Resp 18   Ht 5' 8 5" (1 74 m)   Wt 119 kg (262 lb)   SpO2 97%   BMI 39 26 kg/m²     Physical Exam:  Alert, oriented, not in distress, stable vitals, no icterus, no oral thrush, no palpable neck mass, clear lung fields, regular heart rate, abdomen  soft and non tender, no palpable abdominal mass, no ascites, no edema of ankles, no calf tenderness, no focal neurological deficit, no skin rash, no palpable lymphadenopathy in the neck and axillary areas,  no clubbing  Patient is anxious  Performance status 2  IMAGING:  IMPRESSION:     1  Unchanged hepatic steatosis      2   Stable gallbladder polyps measuring up to 5 mm    According to current literature guidelines (JACR 2013;10:953-956), small polyps this size are benign and no work-up or follow-up is needed      3   Stable bilateral renal cysts      Workstation performed: [id number]      Imaging     US abdomen complete (Order: [id number]) - 7/13/2020       LABS:    Results for orders placed or performed in visit on 03/23/21   CBC and differential   Result Value Ref Range    WBC 7 83 4 31 - 10 16 Thousand/uL    RBC 5 55 3 88 - 5 62 Million/uL    Hemoglobin 17 6 (H) 12 0 - 17 0 g/dL    Hematocrit 53 2 (H) 36 5 - 49 3 %    MCV 96 82 - 98 fL    MCH 31 7 26 8 - 34 3 pg    MCHC 33 1 31 4 - 37 4 g/dL    RDW 13 6 11 6 - 15 1 %    MPV 11 0 8 9 - 12 7 fL    Platelets 251 179 - 210 Thousands/uL    nRBC 0 /100 WBCs    Neutrophils Relative 62 43 - 75 %    Immat GRANS % 0 0 - 2 %    Lymphocytes Relative 24 14 - 44 %    Monocytes Relative 10 4 - 12 %    Eosinophils Relative 3 0 - 6 %    Basophils Relative 1 0 - 1 %    Neutrophils Absolute 4 89 1 85 - 7 62 Thousands/µL    Immature Grans Absolute 0 01 0 00 - 0 20 Thousand/uL    Lymphocytes Absolute 1 87 0 60 - 4 47 Thousands/µL    Monocytes Absolute 0 77 0 17 - 1 22 Thousand/µL    Eosinophils Absolute 0 25 0 00 - 0 61 Thousand/µL    Basophils Absolute 0 04 0 00 - 0 10 Thousands/µL   Calcium, ionized   Result Value Ref Range    Calcium, Ionized 1 28 1 12 - 1 32 mmol/L   Vitamin D 25 hydroxy   Result Value Ref Range    Vit D, 25-Hydroxy 11 3 (L) 30 0 - 100 0 ng/mL   Basic metabolic panel   Result Value Ref Range    Sodium 139 136 - 145 mmol/L    Potassium 4 7 3 5 - 5 3 mmol/L    Chloride 107 100 - 108 mmol/L    CO2 28 21 - 32 mmol/L    ANION GAP 4 4 - 13 mmol/L    BUN 19 5 - 25 mg/dL    Creatinine 1 40 (H) 0 60 - 1 30 mg/dL    Glucose, Fasting 88 65 - 99 mg/dL    Calcium 10 4 (H) 8 3 - 10 1 mg/dL    eGFR 48 ml/min/1 73sq m   Uric acid   Result Value Ref Range    Uric Acid 4 4 4 2 - 8 0 mg/dL     Labs, Imaging, & Other studies:   All pertinent labs and imaging studies were personally reviewed    Lab Results   Component Value Date    K 4 7 03/23/2021     03/23/2021    CO2 28 03/23/2021    BUN 19 03/23/2021    CREATININE 1 40 (H) 03/23/2021    GLUF 88 03/23/2021    CALCIUM 10 4 (H) 03/23/2021    CORRECTEDCA 10 3 (H) 07/06/2020    AST 17 07/06/2020    ALT 26 07/06/2020    ALKPHOS 79 07/06/2020    EGFR 48 03/23/2021     Lab Results   Component Value Date    WBC 7 83 03/23/2021    HGB 17 6 (H) 03/23/2021    HCT 53 2 (H) 03/23/2021    MCV 96 03/23/2021     03/23/2021       Reviewed CBC, CMP, vitamin-D, uric acid and ultrasound abdomen and discussed with patient  Assessment and plan:   Patient is here with his  Wife    Follow-up visit for polycythemia that is most likely secondary, secondary to pulmonary disease and sleep apnea but patient does not use CPAP   JAK2 mutation test was negative  Erythropoietin level was normal   Counts are being monitored  He keeps himself hydrated  He used to donate blood every 4 months at [facility] but he can not do that anymore per  Blood bank because he has been on Eliquis for AFib  Blood bank will not take his blood  He has very low vitamin-D level and his family physician has started him on vitamin-D  History of high calcium and high PTH and that is being managed by his primary physician  History of high PSA and he follows with his urologist   Patient states he will be going for cataract surgery  Patient has some tiredness and exertional dyspnea as before                Physical examination and test results are as recorded and discussed  Hematocrit is high but stable and compensatory and most likely patient has secondary polycythemia     Discussed primary versus secondary polycythemia  Counts will be monitored  Other problems and their management per  Primary physician and other consultants    Harlan Frees all that to the patient in detail   Questions answered   Discussed the importance of eating iron poor healthy foods , staying active and health screening tests   Patient is capable of self-care     Discussed precautions against coronavirus  See diagnoses and orders below  He will like to come back in 6 months    1  Secondary polycythemia    - CBC and differential; Future  - Comprehensive metabolic panel; Future    2  Obstructive sleep apnea      3  Paroxysmal atrial fibrillation (HCC)      4  Stage 2 chronic kidney disease      5  Elevated PSA      6  Hypercholesterolemia      7  Benign essential hypertension                 Blood work prior to next visit in 6 months               Patient voiced understanding and agrees       Counseling / Coordination of Care     Provided counseling and support

## 2021-04-13 DIAGNOSIS — M10.9 GOUT, UNSPECIFIED CAUSE, UNSPECIFIED CHRONICITY, UNSPECIFIED SITE: ICD-10-CM

## 2021-04-13 RX ORDER — ALLOPURINOL 300 MG/1
300 TABLET ORAL DAILY
Qty: 90 TABLET | Refills: 1 | Status: SHIPPED | OUTPATIENT
Start: 2021-04-13 | End: 2021-10-18 | Stop reason: SDUPTHER

## 2021-04-26 DIAGNOSIS — R42 DIZZINESS: ICD-10-CM

## 2021-04-26 RX ORDER — MECLIZINE HYDROCHLORIDE 25 MG/1
25 TABLET ORAL 2 TIMES DAILY PRN
Qty: 180 TABLET | Refills: 1 | Status: SHIPPED | OUTPATIENT
Start: 2021-04-26 | End: 2021-10-18 | Stop reason: SDUPTHER

## 2021-05-11 ENCOUNTER — OFFICE VISIT (OUTPATIENT)
Dept: UROLOGY | Facility: CLINIC | Age: 76
End: 2021-05-11
Payer: COMMERCIAL

## 2021-05-11 VITALS
BODY MASS INDEX: 37.51 KG/M2 | HEIGHT: 70 IN | WEIGHT: 262 LBS | DIASTOLIC BLOOD PRESSURE: 76 MMHG | SYSTOLIC BLOOD PRESSURE: 124 MMHG

## 2021-05-11 DIAGNOSIS — N40.1 BENIGN PROSTATIC HYPERPLASIA WITH LOWER URINARY TRACT SYMPTOMS, SYMPTOM DETAILS UNSPECIFIED: Primary | ICD-10-CM

## 2021-05-11 PROCEDURE — 99213 OFFICE O/P EST LOW 20 MIN: CPT | Performed by: PHYSICIAN ASSISTANT

## 2021-05-11 RX ORDER — OFLOXACIN 3 MG/ML
SOLUTION/ DROPS OPHTHALMIC
COMMUNITY
Start: 2021-04-16 | End: 2021-11-09 | Stop reason: HOSPADM

## 2021-05-11 NOTE — PROGRESS NOTES
UROLOGY PROGRESS NOTE   Patient Identifiers: Zane Baez (MRN [id number])  Date of Service: 5/11/2021    Subjective:     41-year-old man history of BPH  He had a Urolift procedure February 9th 2018  He reports good flow but he has nocturia about every hour and most likely has sleep apnea  He reports no dribbling or burning  Feels empty       Reason for visit:  BPH follow-up     Objective:     VITALS:    Vitals:    05/11/21 1001   BP: 124/76           LABS:  Lab Results   Component Value Date    HGB 17 6 (H) 03/23/2021    HCT 53 2 (H) 03/23/2021    WBC 7 83 03/23/2021     03/23/2021   ]    Lab Results   Component Value Date    K 4 7 03/23/2021     03/23/2021    CO2 28 03/23/2021    BUN 19 03/23/2021    CREATININE 1 40 (H) 03/23/2021    CALCIUM 10 4 (H) 03/23/2021   ]        INPATIENT MEDS:    Current Outpatient Medications:     allopurinol (ZYLOPRIM) 300 mg tablet, Take 1 tablet (300 mg total) by mouth daily, Disp: 90 tablet, Rfl: 1    amLODIPine (NORVASC) 5 mg tablet, TAKE 1 TABLET DAILY, Disp: 90 tablet, Rfl: 3    apixaban (ELIQUIS) 5 mg, Take 1 tablet (5 mg total) by mouth 2 (two) times a day, Disp: 180 tablet, Rfl: 3    ergocalciferol (VITAMIN D2) 50,000 units, Take 1 capsule (50,000 Units total) by mouth once a week, Disp: 12 capsule, Rfl: 0    lisinopril (ZESTRIL) 10 mg tablet, Take 1 tablet (10 mg total) by mouth daily, Disp: 90 tablet, Rfl: 3    meclizine (ANTIVERT) 25 mg tablet, Take 1 tablet (25 mg total) by mouth 2 (two) times a day as needed for dizziness, Disp: 180 tablet, Rfl: 1    metoprolol succinate (TOPROL-XL) 25 mg 24 hr tablet, Take 1 tablet (25 mg total) by mouth daily, Disp: 90 tablet, Rfl: 3    simvastatin (ZOCOR) 40 mg tablet, Take 1 tablet (40 mg total) by mouth every other day, Disp: 45 tablet, Rfl: 1    ofloxacin (OCUFLOX) 0 3 % ophthalmic solution, , Disp: , Rfl:       Physical Exam:   /76   Ht 5' 10" (1 778 m)   Wt 119 kg (262 lb)   BMI 37 59 kg/m² GEN: no acute distress    RESP: breathing comfortably with no accessory muscle use    ABD: soft, non-tender, non-distended   INCISION:    EXT: no significant peripheral edema   (Male): Penis circumcised, phallus normal, meatus patent  Testicles descended into scrotum bilaterally without masses nor tenderness  No inguinal hernias bilaterally  JUAN LUIS: Prostate is enlarged at 50+ grams  The prostate is not boggy  The prostate is not tender  No nodules noted      RADIOLOGY:    none     Assessment:    1   BPH     Plan:   - follow-up 1 year for his annual exam  -  -  -

## 2021-05-20 ENCOUNTER — CONSULT (OUTPATIENT)
Dept: INTERNAL MEDICINE CLINIC | Age: 76
End: 2021-05-20
Payer: COMMERCIAL

## 2021-05-20 VITALS
WEIGHT: 260.5 LBS | DIASTOLIC BLOOD PRESSURE: 80 MMHG | OXYGEN SATURATION: 96 % | HEART RATE: 103 BPM | HEIGHT: 70 IN | TEMPERATURE: 98.2 F | BODY MASS INDEX: 37.29 KG/M2 | SYSTOLIC BLOOD PRESSURE: 120 MMHG

## 2021-05-20 DIAGNOSIS — Z01.818 PRE-OPERATIVE CLEARANCE: ICD-10-CM

## 2021-05-20 DIAGNOSIS — N18.2 STAGE 2 CHRONIC KIDNEY DISEASE: ICD-10-CM

## 2021-05-20 DIAGNOSIS — E78.00 HYPERCHOLESTEROLEMIA: ICD-10-CM

## 2021-05-20 DIAGNOSIS — I10 BENIGN ESSENTIAL HYPERTENSION: Primary | ICD-10-CM

## 2021-05-20 DIAGNOSIS — I48.0 PAROXYSMAL ATRIAL FIBRILLATION (HCC): ICD-10-CM

## 2021-05-20 DIAGNOSIS — H25.9 AGE-RELATED CATARACT OF BOTH EYES, UNSPECIFIED AGE-RELATED CATARACT TYPE: ICD-10-CM

## 2021-05-20 DIAGNOSIS — G47.33 OBSTRUCTIVE SLEEP APNEA: ICD-10-CM

## 2021-05-20 PROCEDURE — 99214 OFFICE O/P EST MOD 30 MIN: CPT | Performed by: INTERNAL MEDICINE

## 2021-05-20 PROCEDURE — 1036F TOBACCO NON-USER: CPT | Performed by: INTERNAL MEDICINE

## 2021-05-20 PROCEDURE — 1160F RVW MEDS BY RX/DR IN RCRD: CPT | Performed by: INTERNAL MEDICINE

## 2021-05-20 PROCEDURE — 3079F DIAST BP 80-89 MM HG: CPT | Performed by: INTERNAL MEDICINE

## 2021-05-20 PROCEDURE — 3074F SYST BP LT 130 MM HG: CPT | Performed by: INTERNAL MEDICINE

## 2021-05-20 NOTE — PROGRESS NOTES
Presurgical Evaluation    Subjective:   Chief Complaint   Patient presents with    Pre-op Exam     cataract surg (R 05/27/2021, L 06/16/2021)        Patient ID: Uday Pryor is a 66 y o  male  Chief Complaint   Patient presents with    Pre-op Exam     cataract surg (R 05/27/2021, L 06/16/2021)       HPI    The following portions of the patient's history were reviewed and updated as appropriate: allergies, current medications, past family history, past medical history, past social history, past surgical history and problem list     Procedure date: May 27, 2021    Surgeon:  Dr Moise Vo  Planned procedure:  Cataract surgery  Diagnosis for procedure:  Bilateral cataract  Prior anesthesia: Yes   General; Complications:  None / Tolerated well    CAD History: None   NOTE: Patient should see Cardiology if time available before surgery, and if appropriate  Pulmonary History: None    Renal history: None    Diabetes History:  None     Neurological History: None     On Immunosuppressant meds/biologics: No      Review of Systems   Constitutional: Negative for appetite change, fatigue and fever  HENT: Negative for congestion, ear pain, hearing loss, nosebleeds, sneezing, tinnitus and voice change  Eyes: Negative for pain, discharge and redness  Respiratory: Negative for cough, chest tightness and wheezing  Cardiovascular: Negative for chest pain, palpitations and leg swelling  Gastrointestinal: Negative for abdominal pain, blood in stool, constipation, diarrhea, nausea and vomiting  Genitourinary: Negative for difficulty urinating, dysuria, hematuria and urgency  Musculoskeletal: Negative for arthralgias, back pain, gait problem and joint swelling  Skin: Negative for rash and wound  Allergic/Immunologic: Negative for environmental allergies  Neurological: Negative for dizziness, tremors, seizures, weakness, light-headedness and numbness  Hematological: Negative for adenopathy   Does not bruise/bleed easily  Psychiatric/Behavioral: Negative for behavioral problems and confusion  The patient is not nervous/anxious  Current Outpatient Medications   Medication Sig Dispense Refill    allopurinol (ZYLOPRIM) 300 mg tablet Take 1 tablet (300 mg total) by mouth daily 90 tablet 1    amLODIPine (NORVASC) 5 mg tablet TAKE 1 TABLET DAILY 90 tablet 3    apixaban (ELIQUIS) 5 mg Take 1 tablet (5 mg total) by mouth 2 (two) times a day 180 tablet 3    ergocalciferol (VITAMIN D2) 50,000 units Take 1 capsule (50,000 Units total) by mouth once a week 12 capsule 0    lisinopril (ZESTRIL) 10 mg tablet Take 1 tablet (10 mg total) by mouth daily 90 tablet 3    meclizine (ANTIVERT) 25 mg tablet Take 1 tablet (25 mg total) by mouth 2 (two) times a day as needed for dizziness 180 tablet 1    metoprolol succinate (TOPROL-XL) 25 mg 24 hr tablet Take 1 tablet (25 mg total) by mouth daily 90 tablet 3    ofloxacin (OCUFLOX) 0 3 % ophthalmic solution       simvastatin (ZOCOR) 40 mg tablet Take 1 tablet (40 mg total) by mouth every other day 45 tablet 1     No current facility-administered medications for this visit          Allergies on file:   Pollen extract    Patient Active Problem List   Diagnosis    Benign prostatic hyperplasia with urinary frequency    Hypervascular lesion of urinary bladder    Benign essential hypertension    Chronic bilateral low back pain without sciatica    Elevated PSA    Hypercholesterolemia    Obstructive sleep apnea    Hyperuricemia    Secondary polycythemia    Bradycardia    Multiple renal cysts    Gallbladder polyp    Hypercalcemia    Stage 2 chronic kidney disease    Paroxysmal atrial fibrillation (HCC)    NSVT (nonsustained ventricular tachycardia) ([facility] [address]    Primary hyperparathyroidism (HCC)    Osteopenia of multiple sites        Past Medical History:   Diagnosis Date    Acute gouty arthropathy     last assessed-11/15/2013    Cataract     Diverticulitis of colon     Enlarged prostate     Gout     Hearing loss     Heart disease     History of diverticulitis of colon     History of dizziness     Hypercholesterolemia     Hyperlipidemia     Hypertension     Palpitations     Sinus bradycardia        Past Surgical History:   Procedure Laterality Date    ARM NEUROPLASTY Left     1977    CARDIAC PACEMAKER PLACEMENT Left     CARDIAC SURGERY  2019    pace maker placed    COLONOSCOPY      CYSTOSCOPY  2017    diagnostic    FOREARM FRACTURE SURGERY      ORCHIECTOMY      surgery testis    MD CYSTOURETHRO W/IMPLANT N/A 2018    Procedure: CYSTOSCOPY WITH INSERTION UROLIFT;  Surgeon: Pedro Flood MD;  [facility];  Service: Urology    MD CYSTOURETHROSCOPY,BIOPSY N/A 2018    Procedure: BLADDER BIOPSY;  Surgeon: Pedro Flood MD;  [facility];  Service: Urology    TESTICLE SURGERY Right 1976       Family History   Problem Relation Age of Onset    Coronary artery disease Mother     Heart disease Mother     Hypertension Mother     Stroke Mother     Coronary artery disease Father     Stroke Family     Heart attack Family         acute MI       Social History     Tobacco Use    Smoking status: Former Smoker     Years: 10 00     Types: Cigars     Quit date:      Years since quittin 4    Smokeless tobacco: Never Used   Substance Use Topics    Alcohol use: Not Currently     Comment: rarely   Per allscripts, drinks alcohol very infrequently    Drug use: No       Objective:    Vitals:    21 1304   BP: 120/80   BP Location: Left arm   Patient Position: Sitting   Cuff Size: Large   Pulse: 103   Temp: 98 2 °F (36 8 °C)   TempSrc: Temporal   SpO2: 96%   Weight: 118 kg (260 lb 8 oz)   Height: 5' 10" (1 778 m)        Physical Exam  Vitals signs and nursing note reviewed  Constitutional:       Appearance: He is well-developed  He is obese     Cardiovascular:      Rate and Rhythm: Normal rate and regular rhythm  Heart sounds: Normal heart sounds  Pulmonary:      Effort: Pulmonary effort is normal       Breath sounds: Normal breath sounds  Abdominal:      General: Bowel sounds are normal       Palpations: Abdomen is soft  Skin:     General: Skin is warm and dry  Neurological:      General: No focal deficit present  Mental Status: He is oriented to person, place, and time  Mental status is at baseline  Preop labs/testing available and reviewed: no               EKG no    Echo no    Stress test/cath no    PFT/Demetris no    Functional capacity: Climb stairs                        4 Mets   Pick the highest level patient can comfortably perform   4 mets or greater for surgery    RCRI  High Risk surgery? 1 Point  CAD History:         1 Point   MI; Positive Stress Test; CP due to Mi;  Nitrate Usage to control Angina; Pathologic Q wave on EKG  CHF Active:         1 Point   Pulm Edema; Paroxysmal Nocturnal Dyspnea;  Bibasilar Rales (crackles);S3; CHF on CXR  Cerebrovascular Disease (TIA or CVA):     1 Point  DM on Insulin:        1 Point  Serum Creat >2 0 mg/dl:       1 Point          Total Points: 0     Scorin: Class I, Very Low Risk (0 4%)     1: Class II, Low risk (0 9%)     2: Class III Moderate (6 6%)     3: Class IV High (>11%)      CARISSA Risk:  GFR:        For PCP:  If GFR>60, Hold ACE/ARB/Diuretic on the day of surgery, and NSAIDS 10 days before  If GFR<45, Consider PRE and POST op Nephrology Consult  If 46 <GFR> 59 : Has Patient had CARISSA in last 6 Months? no   If YES: Preop Nephrology consult   If No:  [address] Nephrology consult  Assessment/Plan:    Patient is medically optimized (cleared) for the planned procedure  Further testing/evaluation is not required      Postop concerns: no    Problem List Items Addressed This Visit     None           Diagnoses and all orders for this visit:    Benign essential hypertension    Hypercholesterolemia    Obstructive sleep apnea    Stage 2 chronic kidney disease    Paroxysmal atrial fibrillation (HCC)        Pre-Surgery Instructions:   Medication Instructions    allopurinol (ZYLOPRIM) 300 mg tablet Take morning of surgery    amLODIPine (NORVASC) 5 mg tablet Take morning of surgery    apixaban (ELIQUIS) 5 mg per anesthesia guidelines     ergocalciferol (VITAMIN D2) 50,000 units per anesthesia guidelines     lisinopril (ZESTRIL) 10 mg tablet per anesthesia guidelines     meclizine (ANTIVERT) 25 mg tablet Take morning of surgery    metoprolol succinate (TOPROL-XL) 25 mg 24 hr tablet Take morning of surgery    ofloxacin (OCUFLOX) 0 3 % ophthalmic solution Take morning of surgery    simvastatin (ZOCOR) 40 mg tablet per anesthesia guidelines         NOTE: Please use the above to review important meds for your specialty, the remainder "per anesthesia Guidelines "    NOTE: Please place an Inbasket message for "[facility]" pool for complicated patients

## 2021-05-28 ENCOUNTER — REMOTE DEVICE CLINIC VISIT (OUTPATIENT)
Dept: CARDIOLOGY CLINIC | Facility: CLINIC | Age: 76
End: 2021-05-28
Payer: COMMERCIAL

## 2021-05-28 DIAGNOSIS — Z95.0 PACEMAKER: Primary | ICD-10-CM

## 2021-05-28 PROCEDURE — 93294 REM INTERROG EVL PM/LDLS PM: CPT | Performed by: INTERNAL MEDICINE

## 2021-05-28 PROCEDURE — 93296 REM INTERROG EVL PM/IDS: CPT | Performed by: INTERNAL MEDICINE

## 2021-05-28 NOTE — PROGRESS NOTES
Results for orders placed or performed in visit on 05/28/21   Cardiac EP device report    Narrative    MDT-DUAL CHAMBER PPM (AAIR-DDDR MODE) ACTIVE SYSTEM IS MRI CONDITIONAL  CARELINK TRANSMISSION: BATTERY VOLTAGE ADEQUATE (10 2 YRS)  AP 97 5%   88 2% (>40% / AAIR-DDDR 70)  ALL AVAILABLE LEAD PARAMETERS WITHIN NORMAL LIMITS  1 VT-NS EPISODE, 5 BEATS @  BPM  EF 60% (4/26/19 ECHO)  HX: NSVT, PAF & PT ON ELIQUIS, METOPROLOL SUCC   NORMAL DEVICE FUNCTION      ES

## 2021-07-26 ENCOUNTER — APPOINTMENT (OUTPATIENT)
Dept: LAB | Facility: CLINIC | Age: 76
End: 2021-07-26
Payer: COMMERCIAL

## 2021-07-26 DIAGNOSIS — E21.0 PRIMARY HYPERPARATHYROIDISM (HCC): ICD-10-CM

## 2021-07-26 DIAGNOSIS — E83.52 HYPERCALCEMIA: ICD-10-CM

## 2021-07-26 LAB
ANION GAP SERPL CALCULATED.3IONS-SCNC: 5 MMOL/L (ref 4–13)
BUN SERPL-MCNC: 20 MG/DL (ref 5–25)
CALCIUM SERPL-MCNC: 9.6 MG/DL (ref 8.3–10.1)
CHLORIDE SERPL-SCNC: 107 MMOL/L (ref 100–108)
CO2 SERPL-SCNC: 25 MMOL/L (ref 21–32)
CREAT SERPL-MCNC: 1.13 MG/DL (ref 0.6–1.3)
GFR SERPL CREATININE-BSD FRML MDRD: 62 ML/MIN/1.73SQ M
GLUCOSE P FAST SERPL-MCNC: 97 MG/DL (ref 65–99)
POTASSIUM SERPL-SCNC: 4.2 MMOL/L (ref 3.5–5.3)
PTH-INTACT SERPL-MCNC: 145.4 PG/ML (ref 18.4–80.1)
SODIUM SERPL-SCNC: 137 MMOL/L (ref 136–145)

## 2021-07-26 PROCEDURE — 36415 COLL VENOUS BLD VENIPUNCTURE: CPT

## 2021-07-26 PROCEDURE — 83970 ASSAY OF PARATHORMONE: CPT

## 2021-07-26 PROCEDURE — 80048 BASIC METABOLIC PNL TOTAL CA: CPT

## 2021-07-29 ENCOUNTER — RA CDI HCC (OUTPATIENT)
Dept: OTHER | Facility: HOSPITAL | Age: 76
End: 2021-07-29

## 2021-07-29 NOTE — PROGRESS NOTES
Based on clinical documentation indicated in your record, it appears that the patient may have the following conditions:     E66 01 Morbid (severe) obesity (BMI 37 38 with ELLYN, hypercholesterolemia)    If this is correct, please document and assess at your next visit August 5th  [facility] [address]  coding opportunities             Chart Reviewed * (Number of) Inbasket suggestions sent to Provider: 1                  Patients insurance company: [employer] (Medicare Advantage and [employer])             [facility] [address]  coding opportunities             Chart Reviewed * (Number of) Inbasket suggestions sent to Provider: 1                  Patients insurance company: [employer] (Medicare Advantage and [employer])     Visit status: Patient arrived for their scheduled appointment     Provider never responded to [facility] [employer]  coding request

## 2021-08-05 ENCOUNTER — OFFICE VISIT (OUTPATIENT)
Dept: INTERNAL MEDICINE CLINIC | Age: 76
End: 2021-08-05
Payer: COMMERCIAL

## 2021-08-05 VITALS
DIASTOLIC BLOOD PRESSURE: 90 MMHG | TEMPERATURE: 98.5 F | HEIGHT: 70 IN | WEIGHT: 258.8 LBS | BODY MASS INDEX: 37.05 KG/M2 | OXYGEN SATURATION: 97 % | SYSTOLIC BLOOD PRESSURE: 130 MMHG | HEART RATE: 74 BPM

## 2021-08-05 DIAGNOSIS — G47.33 OBSTRUCTIVE SLEEP APNEA: ICD-10-CM

## 2021-08-05 DIAGNOSIS — E78.00 HYPERCHOLESTEROLEMIA: ICD-10-CM

## 2021-08-05 DIAGNOSIS — I48.0 PAROXYSMAL ATRIAL FIBRILLATION (HCC): ICD-10-CM

## 2021-08-05 DIAGNOSIS — Z95.811 PRESENCE OF HEART ASSIST DEVICE (HCC): ICD-10-CM

## 2021-08-05 DIAGNOSIS — N18.2 STAGE 2 CHRONIC KIDNEY DISEASE: ICD-10-CM

## 2021-08-05 DIAGNOSIS — I10 BENIGN ESSENTIAL HYPERTENSION: Primary | ICD-10-CM

## 2021-08-05 PROCEDURE — 1170F FXNL STATUS ASSESSED: CPT | Performed by: INTERNAL MEDICINE

## 2021-08-05 PROCEDURE — 3075F SYST BP GE 130 - 139MM HG: CPT | Performed by: INTERNAL MEDICINE

## 2021-08-05 PROCEDURE — 1036F TOBACCO NON-USER: CPT | Performed by: INTERNAL MEDICINE

## 2021-08-05 PROCEDURE — 3288F FALL RISK ASSESSMENT DOCD: CPT | Performed by: INTERNAL MEDICINE

## 2021-08-05 PROCEDURE — 3080F DIAST BP >= 90 MM HG: CPT | Performed by: INTERNAL MEDICINE

## 2021-08-05 PROCEDURE — G0439 PPPS, SUBSEQ VISIT: HCPCS | Performed by: INTERNAL MEDICINE

## 2021-08-05 PROCEDURE — 1125F AMNT PAIN NOTED PAIN PRSNT: CPT | Performed by: INTERNAL MEDICINE

## 2021-08-05 PROCEDURE — 1160F RVW MEDS BY RX/DR IN RCRD: CPT | Performed by: INTERNAL MEDICINE

## 2021-08-05 PROCEDURE — 99214 OFFICE O/P EST MOD 30 MIN: CPT | Performed by: INTERNAL MEDICINE

## 2021-08-05 PROCEDURE — 3725F SCREEN DEPRESSION PERFORMED: CPT | Performed by: INTERNAL MEDICINE

## 2021-08-05 NOTE — PROGRESS NOTES
Assessment and Plan:     Problem List Items Addressed This Visit     None           Preventive health issues were discussed with patient, and age appropriate screening tests were ordered as noted in patient's After Visit Summary  Personalized health advice and appropriate referrals for health education or preventive services given if needed, as noted in patient's After Visit Summary       History of Present Illness:     Patient presents for Medicare Annual Wellness visit    Patient Care Team:  Anika Escobar MD as PCP - MD Anika Treviño MD     Problem List:     Patient Active Problem List   Diagnosis    Benign prostatic hyperplasia with urinary frequency    Hypervascular lesion of urinary bladder    Benign essential hypertension    Chronic bilateral low back pain without sciatica    Elevated PSA    Hypercholesterolemia    Obstructive sleep apnea    Hyperuricemia    Secondary polycythemia    Bradycardia    Multiple renal cysts    Gallbladder polyp    Hypercalcemia    Stage 2 chronic kidney disease    Paroxysmal atrial fibrillation (HCC)    NSVT (nonsustained ventricular tachycardia) ([address]    Primary hyperparathyroidism ([facility] [address] )    Osteopenia of multiple sites      Past Medical and Surgical History:     Past Medical History:   Diagnosis Date    Acute gouty arthropathy     last assessed-11/15/2013    Cataract     Diverticulitis of colon     Enlarged prostate     Gout     Hearing loss     Heart disease     History of diverticulitis of colon     History of dizziness     Hypercholesterolemia     Hyperlipidemia     Hypertension     Palpitations     Sinus bradycardia      Past Surgical History:   Procedure Laterality Date    ARM NEUROPLASTY Left     1977    CARDIAC PACEMAKER PLACEMENT Left     CARDIAC SURGERY  05/01/2019    pace maker placed    COLONOSCOPY      CYSTOSCOPY  12/27/2017    diagnostic    FOREARM FRACTURE SURGERY      ORCHIECTOMY      surgery testis    IN CYSTOURETHRO W/IMPLANT N/A 2018    Procedure: CYSTOSCOPY WITH INSERTION UROLIFT;  Surgeon: Clifton Salgado MD;  [facility];  Service: Urology    IN CYSTOURETHROSCOPY,BIOPSY N/A 2018    Procedure: BLADDER BIOPSY;  Surgeon: Clifton Salgado MD;  [facility];  Service: Urology    TESTICLE SURGERY Right       Family History:     Family History   Problem Relation Age of Onset    Coronary artery disease Mother     Heart disease Mother     Hypertension Mother     Stroke Mother     Coronary artery disease Father     Stroke Family     Heart attack Family         acute MI      Social History:     Social History     Socioeconomic History    Marital status: /Civil Union     Spouse name: Not on file    Number of children: Not on file    Years of education: Not on file    Highest education level: Not on file   Occupational History     Comment: retired from work   Tobacco Use    Smoking status: Former Smoker     Years: 10 00     Types: Cigars     Quit date:      Years since quittin 7    Smokeless tobacco: Never Used   Vaping Use    Vaping Use: Never used   Substance and Sexual Activity    Alcohol use: Not Currently     Comment: rarely   Per allscripts, drinks alcohol very infrequently    Drug use: No    Sexual activity: Not Currently   Other Topics Concern    Not on file   Social History Narrative    Copy of advanced directive obtained    Exercising regularly-primary form of exercise is work    Recreational activities-he enjoys home crafts and wood working    Travel history-Pt denies being out of the country during 2014-11/10/2014     Social Determinants of Health     Financial Resource Strain:     Difficulty of Paying Living Expenses:    Food Insecurity:     Worried About Running Out of Food in the Last Year:     [address] in the Last Year:    Transportation Needs:     Lack of Transportation (Medical):      Lack of Transportation (Non-Medical):    Physical Activity:     Days of Exercise per Week:     Minutes of Exercise per Session:    Stress:     Feeling of Stress :    Social Connections:     Frequency of Communication with Friends and Family:     Frequency of Social Gatherings with Friends and Family:     Attends M[employer] Services:     Active Member of Clubs or Organizations:     Attends Club or Organization Meetings:     Marital Status:    Intimate Partner Violence:     Fear of Current or Ex-Partner:     Emotionally Abused:     Physically Abused:     Sexually Abused:       Medications and Allergies:     Current Outpatient Medications   Medication Sig Dispense Refill    allopurinol (ZYLOPRIM) 300 mg tablet Take 1 tablet (300 mg total) by mouth daily 90 tablet 1    amLODIPine (NORVASC) 5 mg tablet TAKE 1 TABLET DAILY 90 tablet 3    apixaban (ELIQUIS) 5 mg Take 1 tablet (5 mg total) by mouth 2 (two) times a day 180 tablet 3    ergocalciferol (VITAMIN D2) 50,000 units Take 1 capsule (50,000 Units total) by mouth once a week 12 capsule 0    lisinopril (ZESTRIL) 10 mg tablet Take 1 tablet (10 mg total) by mouth daily 90 tablet 3    meclizine (ANTIVERT) 25 mg tablet Take 1 tablet (25 mg total) by mouth 2 (two) times a day as needed for dizziness 180 tablet 1    metoprolol succinate (TOPROL-XL) 25 mg 24 hr tablet Take 1 tablet (25 mg total) by mouth daily 90 tablet 3    ofloxacin (OCUFLOX) 0 3 % ophthalmic solution       simvastatin (ZOCOR) 40 mg tablet Take 1 tablet (40 mg total) by mouth every other day 45 tablet 1     No current facility-administered medications for this visit       Allergies   Allergen Reactions    Pollen Extract Allergic Rhinitis      Immunizations:     Immunization History   Administered Date(s) Administered    INFLUENZA 11/17/2008, 09/28/2009, 10/13/2010, 10/30/2015, 11/15/2016, 10/04/2017    Influenza Split High Dose Preservative Free IM 10/04/2013, 09/24/2014, 10/30/2015, 11/15/2016, 10/04/2017    Influenza, high dose seasonal 0 7 mL 10/02/2018, 11/13/2019, 11/25/2020    Influenza, seasonal, injectable 11/11/2011, 11/06/2012    Pneumococcal Conjugate 13-Valent 03/22/2016    Pneumococcal Polysaccharide PPV23 05/10/2011    SARS-CoV-2 / COVID-19 mRNA IM (Moderna) 02/05/2021, 03/05/2021    Zoster 01/01/2011    Zoster Vaccine Recombinant 07/30/2020, 10/05/2020      Health Maintenance:         Topic Date Due    Hepatitis C Screening  Never done    Colorectal Cancer Screening  03/24/2021         Topic Date Due    DTaP,Tdap,and Td Vaccines (1 - Tdap) Never done    Influenza Vaccine (1) 09/01/2021      Medicare Health Risk Assessment:     There were no vitals taken for this visit  Ever Meek is here for his Subsequent Wellness visit  Health Risk Assessment:   Patient rates overall health as very good  Patient feels that their physical health rating is same  Patient is very satisfied with their life  Eyesight was rated as same  Hearing was rated as same  Patient feels that their emotional and mental health rating is slightly better  Patients states they are never, rarely angry  Patient states they are sometimes unusually tired/fatigued  Pain experienced in the last 7 days has been some  Patient's pain rating has been 7/10  Patient states that he has experienced no weight loss or gain in last 6 months  Depression Screening:   PHQ-2 Score: 1      Fall Risk Screening: In the past year, patient has experienced: no history of falling in past year      Home Safety:  Patient does not have trouble with stairs inside or outside of their home  Patient has working smoke alarms and has working carbon monoxide detector  Home safety hazards include: none  Nutrition:   Current diet is Regular  Medications:   Patient is currently taking over-the-counter supplements  OTC medications include: see medication list  Patient is able to manage medications       Activities of Daily Living (ADLs)/Instrumental Activities of Daily Living (IADLs):   Walk and transfer into and out of bed and chair?: Yes  Dress and groom yourself?: Yes    Bathe or shower yourself?: Yes    Feed yourself? Yes  Do your laundry/housekeeping?: Yes  Manage your money, pay your bills and track your expenses?: Yes  Make your own meals?: Yes    Do your own shopping?: Yes    Previous Hospitalizations:   Any hospitalizations or ED visits within the last 12 months?: No      Advance Care Planning:   Living will: Yes    Durable POA for healthcare: Yes    Advanced directive: Yes    Five wishes given: Yes      Cognitive Screening:   Provider or family/friend/caregiver concerned regarding cognition?: No    PREVENTIVE SCREENINGS      Cardiovascular Screening:    General: Screening Not Indicated and History Lipid Disorder      Diabetes Screening:     General: Screening Current      Prostate Cancer Screening:    General: Screening Not Indicated      Abdominal Aortic Aneurysm (AAA) Screening:    Risk factors include: tobacco use        General: Screening Current      Lung Cancer Screening:     General: Screening Not Indicated      Hepatitis C Screening:    General: Screening Not Indicated    Screening, Brief Intervention, and Referral to Treatment (SBIRT)    Screening  Typical number of drinks in a day: 0  Typical number of drinks in a week: 0  Interpretation: Low risk drinking behavior  Single Item Drug Screening:  How often have you used an illegal drug (including marijuana) or a prescription medication for non-medical reasons in the past year? never    Single Item Drug Screen Score: 0  Interpretation: Negative screen for possible drug use disorder    Other Counseling Topics:   Calcium and vitamin D intake and regular weightbearing exercise         Aria Pabon MD

## 2021-08-05 NOTE — PROGRESS NOTES
Assessment/Plan:     Diagnoses and all orders for this visit:    Benign essential hypertension  -     Comprehensive metabolic panel; Future  -     CBC and differential; Future  -     UA w Reflex to Microscopic w Reflex to Culture    Hypercholesterolemia  -     Comprehensive metabolic panel; Future  -     Lipid panel; Future  -     TSH, 3rd generation with Free T4 reflex; Future    Obstructive sleep apnea  -     TSH, 3rd generation with Free T4 reflex; Future    Stage 2 chronic kidney disease    Paroxysmal atrial fibrillation (HCC)  -     CBC and differential; Future    Body mass index (BMI)40 0-44 9, adult (HCC)  Trying to lose weight but unable to do much exercises only very active  Presence of heart assist device ([address] )    Other orders  -     Cholecalciferol (Vitamin D-3) 125 MCG (5000 UT) TABS; Take by mouth             Subjective:   Chief Complaint   Patient presents with    Follow-up    Medicare Wellness Visit    HM     has decedied to no longer go for colonoscopy         Patient ID: Neil Luna is a 64 y o  male  Very pleasant 64 years young gentleman is here today for the regular follow-up he is doing very well no new complaints except for the back pain and aches and pains in other joint he has the active gentleman but unable to do much exercises I told him to see if he can do some dieting and continue to lose some more weight he did lose 2 lb this time  Hypertension is very well controlled continue with the same medication no changes  History of BPH status urolift    Did not help much nighttime frequency is almost the same also depends on what he is drinking  Hypercholesterolemia will follow-up with the blood workup next time  Hypercalcemia calcium levels are normal right now PTH is elevated no symptoms  Follow-up with the DEXA scan      The following portions of the patient's history were reviewed and updated as appropriate: allergies, current medications, past family history, past medical history, past social history, past surgical history and problem list     Review of Systems   Constitutional: Negative for appetite change, fatigue and fever  HENT: Negative for congestion, ear pain, hearing loss, nosebleeds, sneezing, tinnitus and voice change  Eyes: Negative for pain, discharge and redness  Respiratory: Negative for cough, chest tightness and wheezing  Cardiovascular: Negative for chest pain, palpitations and leg swelling  Gastrointestinal: Negative for abdominal pain, blood in stool, constipation, diarrhea, nausea and vomiting  Genitourinary: Positive for frequency  Negative for difficulty urinating, dysuria, hematuria and urgency  Musculoskeletal: Positive for arthralgias (Aches and pain in all joints but mostly in the back) and back pain  Negative for gait problem and joint swelling  Skin: Negative for rash and wound  Allergic/Immunologic: Negative for environmental allergies  Neurological: Positive for dizziness (Dizziness is better than before)  Negative for tremors, seizures, weakness, light-headedness and numbness  Hematological: Negative for adenopathy  Does not bruise/bleed easily  Psychiatric/Behavioral: Negative for behavioral problems and confusion  The patient is not nervous/anxious            Past Medical History:   Diagnosis Date    Acute gouty arthropathy     last assessed-11/15/2013    Cataract     Diverticulitis of colon     Enlarged prostate     Gout     Hearing loss     Heart disease     History of diverticulitis of colon     History of dizziness     Hypercholesterolemia     Hyperlipidemia     Hypertension     Palpitations     Sinus bradycardia          Current Outpatient Medications:     allopurinol (ZYLOPRIM) 300 mg tablet, Take 1 tablet (300 mg total) by mouth daily, Disp: 90 tablet, Rfl: 1    amLODIPine (NORVASC) 5 mg tablet, TAKE 1 TABLET DAILY, Disp: 90 tablet, Rfl: 3    apixaban (ELIQUIS) 5 mg, Take 1 tablet (5 mg total) by mouth 2 (two) times a day, Disp: 180 tablet, Rfl: 3    Cholecalciferol (Vitamin D-3) 125 MCG (5000 UT) TABS, Take by mouth, Disp: , Rfl:     lisinopril (ZESTRIL) 10 mg tablet, Take 1 tablet (10 mg total) by mouth daily, Disp: 90 tablet, Rfl: 3    meclizine (ANTIVERT) 25 mg tablet, Take 1 tablet (25 mg total) by mouth 2 (two) times a day as needed for dizziness, Disp: 180 tablet, Rfl: 1    metoprolol succinate (TOPROL-XL) 25 mg 24 hr tablet, Take 1 tablet (25 mg total) by mouth daily, Disp: 90 tablet, Rfl: 3    ofloxacin (OCUFLOX) 0 3 % ophthalmic solution, , Disp: , Rfl:     simvastatin (ZOCOR) 40 mg tablet, Take 1 tablet (40 mg total) by mouth every other day, Disp: 45 tablet, Rfl: 1    Allergies   Allergen Reactions    Pollen Extract Allergic Rhinitis       Social History   Past Surgical History:   Procedure Laterality Date    ARM NEUROPLASTY Left     1977    CARDIAC PACEMAKER PLACEMENT Left     CARDIAC SURGERY  05/01/2019    pace maker placed    CATARACT EXTRACTION, BILATERAL  2021    COLONOSCOPY      CYSTOSCOPY  12/27/2017    diagnostic    FOREARM FRACTURE SURGERY      ORCHIECTOMY      surgery testis    MN CYSTOURETHRO W/IMPLANT N/A 2/9/2018    Procedure: CYSTOSCOPY WITH INSERTION UROLIFT;  Surgeon: Kameron Mckinney MD;  [facility];  Service: Urology    MN CYSTOURETHROSCOPY,BIOPSY N/A 2/9/2018    Procedure: BLADDER BIOPSY;  Surgeon: Kameron Mckinney MD;  [facility];  Service: Urology    TESTICLE SURGERY Right 1976     Family History   Problem Relation Age of Onset    Coronary artery disease Mother     Heart disease Mother     Hypertension Mother     Stroke Mother     Coronary artery disease Father     Stroke Family     Heart attack Family         acute MI       Objective:  /90 (BP Location: Left arm, Patient Position: Sitting, Cuff Size: Large)   Pulse 74   Temp 98 5 °F (36 9 °C) (Temporal)   Ht 5' 10" (1 778 m)   Wt 117 kg (258 lb 12 8 oz)   SpO2 97% BMI 37 13 kg/m²        Physical Exam  Constitutional:       Appearance: He is well-developed  He is obese  HENT:      Right Ear: External ear normal    Eyes:      Conjunctiva/sclera: Conjunctivae normal       Pupils: Pupils are equal, round, and reactive to light  Neck:      Thyroid: No thyromegaly  Vascular: No JVD  Cardiovascular:      Rate and Rhythm: Normal rate and regular rhythm  Heart sounds: Normal heart sounds  Pulmonary:      Breath sounds: Normal breath sounds  Abdominal:      General: Bowel sounds are normal       Palpations: Abdomen is soft  Musculoskeletal:         General: Normal range of motion  Cervical back: Normal range of motion  Lymphadenopathy:      Cervical: No cervical adenopathy  Skin:     General: Skin is dry  Neurological:      Mental Status: He is alert and oriented to person, place, and time  Deep Tendon Reflexes: Reflexes are normal and symmetric     Psychiatric:         Behavior: Behavior normal

## 2021-08-05 NOTE — PATIENT INSTRUCTIONS

## 2021-08-25 DIAGNOSIS — I10 ESSENTIAL HYPERTENSION, BENIGN: ICD-10-CM

## 2021-08-25 RX ORDER — AMLODIPINE BESYLATE 5 MG/1
5 TABLET ORAL DAILY
Qty: 90 TABLET | Refills: 1 | Status: SHIPPED | OUTPATIENT
Start: 2021-08-25 | End: 2022-03-10 | Stop reason: SDUPTHER

## 2021-08-25 NOTE — TELEPHONE ENCOUNTER
Last O/V: 8/5/2021  Next O/V: 12/9/2021    Medication requested:  Requested Prescriptions     Pending Prescriptions Disp Refills    amLODIPine (NORVASC) 5 mg tablet 90 tablet 1     Sig: Take 1 tablet (5 mg total) by mouth daily          Ph[facility]   [facility]

## 2021-08-26 ENCOUNTER — IN-CLINIC DEVICE VISIT (OUTPATIENT)
Dept: CARDIOLOGY CLINIC | Facility: CLINIC | Age: 76
End: 2021-08-26
Payer: COMMERCIAL

## 2021-08-26 DIAGNOSIS — Z95.0 CARDIAC PACEMAKER IN SITU: Primary | ICD-10-CM

## 2021-08-26 PROCEDURE — 93280 PM DEVICE PROGR EVAL DUAL: CPT | Performed by: INTERNAL MEDICINE

## 2021-08-26 NOTE — PROGRESS NOTES
Results for orders placed or performed in visit on 08/26/21   Cardiac EP device report    Narrative    MDT-DUAL CHAMBER PPM (AAIR-DDDR MODE) ACTIVE SYSTEM IS MRI CONDITIONAL  DEVICE INTERROGATED IN THE [place] OFFICE  BATTERY VOLTAGE ADEQUATE (9 8 YRS)  AP-96%, -90% (>40% ANIT Lauri@[employer])  ALL AVAILABLE LEAD PARAMETERS WITHIN NORMAL LIMITS  1 NSVT EPISODE FOR 8  BEATS, AVG CL~320MS  EF-60% (ECHO 4/26/19)  PT ON ELIQUIS & METOPROLOL  NORMAL DEVICE FUNCTION   GV

## 2021-09-21 ENCOUNTER — VBI (OUTPATIENT)
Dept: ADMINISTRATIVE | Facility: OTHER | Age: 76
End: 2021-09-21

## 2021-09-28 ENCOUNTER — TELEPHONE (OUTPATIENT)
Dept: CARDIOLOGY CLINIC | Facility: CLINIC | Age: 76
End: 2021-09-28

## 2021-09-28 DIAGNOSIS — E78.5 HYPERLIPIDEMIA, UNSPECIFIED HYPERLIPIDEMIA TYPE: ICD-10-CM

## 2021-09-28 RX ORDER — SIMVASTATIN 40 MG
40 TABLET ORAL EVERY OTHER DAY
Qty: 45 TABLET | Refills: 1 | Status: SHIPPED | OUTPATIENT
Start: 2021-09-28 | End: 2022-02-15 | Stop reason: SDUPTHER

## 2021-10-14 ENCOUNTER — TELEPHONE (OUTPATIENT)
Dept: INTERNAL MEDICINE CLINIC | Age: 76
End: 2021-10-14

## 2021-10-18 DIAGNOSIS — M10.9 GOUT, UNSPECIFIED CAUSE, UNSPECIFIED CHRONICITY, UNSPECIFIED SITE: ICD-10-CM

## 2021-10-18 DIAGNOSIS — R42 DIZZINESS: ICD-10-CM

## 2021-10-19 RX ORDER — MECLIZINE HYDROCHLORIDE 25 MG/1
25 TABLET ORAL 2 TIMES DAILY PRN
Qty: 180 TABLET | Refills: 1 | Status: SHIPPED | OUTPATIENT
Start: 2021-10-19 | End: 2022-04-26 | Stop reason: SDUPTHER

## 2021-10-19 RX ORDER — ALLOPURINOL 300 MG/1
300 TABLET ORAL DAILY
Qty: 90 TABLET | Refills: 1 | Status: SHIPPED | OUTPATIENT
Start: 2021-10-19 | End: 2022-04-26 | Stop reason: SDUPTHER

## 2021-10-20 ENCOUNTER — APPOINTMENT (OUTPATIENT)
Dept: LAB | Age: 76
End: 2021-10-20
Payer: COMMERCIAL

## 2021-10-20 DIAGNOSIS — I48.0 PAROXYSMAL ATRIAL FIBRILLATION (HCC): ICD-10-CM

## 2021-10-20 DIAGNOSIS — E78.00 HYPERCHOLESTEROLEMIA: ICD-10-CM

## 2021-10-20 DIAGNOSIS — G47.33 OBSTRUCTIVE SLEEP APNEA: ICD-10-CM

## 2021-10-20 DIAGNOSIS — I10 BENIGN ESSENTIAL HYPERTENSION: ICD-10-CM

## 2021-10-20 DIAGNOSIS — D75.1 SECONDARY POLYCYTHEMIA: ICD-10-CM

## 2021-10-20 LAB
ALBUMIN SERPL BCP-MCNC: 3.7 G/DL (ref 3.5–5)
ALP SERPL-CCNC: 72 U/L (ref 46–116)
ALT SERPL W P-5'-P-CCNC: 31 U/L (ref 12–78)
ANION GAP SERPL CALCULATED.3IONS-SCNC: 0 MMOL/L (ref 4–13)
AST SERPL W P-5'-P-CCNC: 20 U/L (ref 5–45)
BASOPHILS # BLD AUTO: 0.05 THOUSANDS/ΜL (ref 0–0.1)
BASOPHILS NFR BLD AUTO: 1 % (ref 0–1)
BILIRUB SERPL-MCNC: 1.14 MG/DL (ref 0.2–1)
BILIRUB UR QL STRIP: NEGATIVE
BUN SERPL-MCNC: 20 MG/DL (ref 5–25)
CALCIUM SERPL-MCNC: 10.4 MG/DL (ref 8.3–10.1)
CHLORIDE SERPL-SCNC: 104 MMOL/L (ref 100–108)
CHOLEST SERPL-MCNC: 155 MG/DL (ref 50–200)
CLARITY UR: CLEAR
CO2 SERPL-SCNC: 31 MMOL/L (ref 21–32)
COLOR UR: YELLOW
CREAT SERPL-MCNC: 1.28 MG/DL (ref 0.6–1.3)
EOSINOPHIL # BLD AUTO: 0.18 THOUSAND/ΜL (ref 0–0.61)
EOSINOPHIL NFR BLD AUTO: 2 % (ref 0–6)
ERYTHROCYTE [DISTWIDTH] IN BLOOD BY AUTOMATED COUNT: 13.3 % (ref 11.6–15.1)
GFR SERPL CREATININE-BSD FRML MDRD: 53 ML/MIN/1.73SQ M
GLUCOSE P FAST SERPL-MCNC: 97 MG/DL (ref 65–99)
GLUCOSE UR STRIP-MCNC: NEGATIVE MG/DL
HCT VFR BLD AUTO: 55.2 % (ref 36.5–49.3)
HDLC SERPL-MCNC: 35 MG/DL
HGB BLD-MCNC: 18.4 G/DL (ref 12–17)
HGB UR QL STRIP.AUTO: NEGATIVE
IMM GRANULOCYTES # BLD AUTO: 0.02 THOUSAND/UL (ref 0–0.2)
IMM GRANULOCYTES NFR BLD AUTO: 0 % (ref 0–2)
KETONES UR STRIP-MCNC: NEGATIVE MG/DL
LDLC SERPL CALC-MCNC: 89 MG/DL (ref 0–100)
LEUKOCYTE ESTERASE UR QL STRIP: NEGATIVE
LYMPHOCYTES # BLD AUTO: 1.84 THOUSANDS/ΜL (ref 0.6–4.47)
LYMPHOCYTES NFR BLD AUTO: 22 % (ref 14–44)
MCH RBC QN AUTO: 31.8 PG (ref 26.8–34.3)
MCHC RBC AUTO-ENTMCNC: 33.3 G/DL (ref 31.4–37.4)
MCV RBC AUTO: 95 FL (ref 82–98)
MONOCYTES # BLD AUTO: 0.79 THOUSAND/ΜL (ref 0.17–1.22)
MONOCYTES NFR BLD AUTO: 9 % (ref 4–12)
NEUTROPHILS # BLD AUTO: 5.64 THOUSANDS/ΜL (ref 1.85–7.62)
NEUTS SEG NFR BLD AUTO: 66 % (ref 43–75)
NITRITE UR QL STRIP: NEGATIVE
NONHDLC SERPL-MCNC: 120 MG/DL
NRBC BLD AUTO-RTO: 0 /100 WBCS
PH UR STRIP.AUTO: 7 [PH]
PLATELET # BLD AUTO: 215 THOUSANDS/UL (ref 149–390)
PMV BLD AUTO: 11 FL (ref 8.9–12.7)
POTASSIUM SERPL-SCNC: 4.6 MMOL/L (ref 3.5–5.3)
PROT SERPL-MCNC: 7.8 G/DL (ref 6.4–8.2)
PROT UR STRIP-MCNC: NEGATIVE MG/DL
RBC # BLD AUTO: 5.79 MILLION/UL (ref 3.88–5.62)
SODIUM SERPL-SCNC: 135 MMOL/L (ref 136–145)
SP GR UR STRIP.AUTO: 1.01 (ref 1–1.03)
TRIGL SERPL-MCNC: 154 MG/DL
TSH SERPL DL<=0.05 MIU/L-ACNC: 2.38 UIU/ML (ref 0.36–3.74)
UROBILINOGEN UR QL STRIP.AUTO: 0.2 E.U./DL
WBC # BLD AUTO: 8.52 THOUSAND/UL (ref 4.31–10.16)

## 2021-10-20 PROCEDURE — 85025 COMPLETE CBC W/AUTO DIFF WBC: CPT

## 2021-10-20 PROCEDURE — 84443 ASSAY THYROID STIM HORMONE: CPT

## 2021-10-20 PROCEDURE — 36415 COLL VENOUS BLD VENIPUNCTURE: CPT

## 2021-10-20 PROCEDURE — 80053 COMPREHEN METABOLIC PANEL: CPT

## 2021-10-20 PROCEDURE — 80061 LIPID PANEL: CPT

## 2021-10-20 PROCEDURE — 81003 URINALYSIS AUTO W/O SCOPE: CPT | Performed by: INTERNAL MEDICINE

## 2021-11-02 ENCOUNTER — OFFICE VISIT (OUTPATIENT)
Dept: INTERNAL MEDICINE CLINIC | Age: 76
End: 2021-11-02
Payer: COMMERCIAL

## 2021-11-02 VITALS
HEIGHT: 70 IN | DIASTOLIC BLOOD PRESSURE: 74 MMHG | BODY MASS INDEX: 36.75 KG/M2 | SYSTOLIC BLOOD PRESSURE: 132 MMHG | WEIGHT: 256.7 LBS | TEMPERATURE: 98.4 F | OXYGEN SATURATION: 97 % | HEART RATE: 74 BPM

## 2021-11-02 DIAGNOSIS — D75.1 SECONDARY POLYCYTHEMIA: ICD-10-CM

## 2021-11-02 DIAGNOSIS — I48.0 PAROXYSMAL ATRIAL FIBRILLATION (HCC): ICD-10-CM

## 2021-11-02 DIAGNOSIS — N40.1 BENIGN PROSTATIC HYPERPLASIA WITH URINARY FREQUENCY: ICD-10-CM

## 2021-11-02 DIAGNOSIS — G47.33 OBSTRUCTIVE SLEEP APNEA: ICD-10-CM

## 2021-11-02 DIAGNOSIS — I10 BENIGN ESSENTIAL HYPERTENSION: Primary | ICD-10-CM

## 2021-11-02 DIAGNOSIS — R35.0 BENIGN PROSTATIC HYPERPLASIA WITH URINARY FREQUENCY: ICD-10-CM

## 2021-11-02 DIAGNOSIS — E83.52 HYPERCALCEMIA: ICD-10-CM

## 2021-11-02 DIAGNOSIS — Z23 NEED FOR INFLUENZA VACCINATION: ICD-10-CM

## 2021-11-02 DIAGNOSIS — E79.0 HYPERURICEMIA: ICD-10-CM

## 2021-11-02 DIAGNOSIS — E78.00 HYPERCHOLESTEROLEMIA: ICD-10-CM

## 2021-11-02 PROCEDURE — 99214 OFFICE O/P EST MOD 30 MIN: CPT | Performed by: INTERNAL MEDICINE

## 2021-11-02 PROCEDURE — G0008 ADMIN INFLUENZA VIRUS VAC: HCPCS

## 2021-11-02 PROCEDURE — 90662 IIV NO PRSV INCREASED AG IM: CPT

## 2021-11-04 ENCOUNTER — OFFICE VISIT (OUTPATIENT)
Dept: HEMATOLOGY ONCOLOGY | Facility: CLINIC | Age: 76
End: 2021-11-04
Payer: COMMERCIAL

## 2021-11-04 VITALS
DIASTOLIC BLOOD PRESSURE: 76 MMHG | RESPIRATION RATE: 18 BRPM | TEMPERATURE: 98.9 F | HEART RATE: 88 BPM | OXYGEN SATURATION: 96 % | BODY MASS INDEX: 36.94 KG/M2 | HEIGHT: 70 IN | SYSTOLIC BLOOD PRESSURE: 128 MMHG | WEIGHT: 258 LBS

## 2021-11-04 DIAGNOSIS — I48.0 PAROXYSMAL ATRIAL FIBRILLATION (HCC): ICD-10-CM

## 2021-11-04 DIAGNOSIS — I10 BENIGN ESSENTIAL HYPERTENSION: ICD-10-CM

## 2021-11-04 DIAGNOSIS — E78.00 HYPERCHOLESTEROLEMIA: ICD-10-CM

## 2021-11-04 DIAGNOSIS — N28.1 RENAL CYST: ICD-10-CM

## 2021-11-04 DIAGNOSIS — G47.33 OBSTRUCTIVE SLEEP APNEA: ICD-10-CM

## 2021-11-04 DIAGNOSIS — D75.1 SECONDARY POLYCYTHEMIA: Primary | ICD-10-CM

## 2021-11-04 DIAGNOSIS — N18.2 STAGE 2 CHRONIC KIDNEY DISEASE: ICD-10-CM

## 2021-11-04 PROCEDURE — 3078F DIAST BP <80 MM HG: CPT | Performed by: INTERNAL MEDICINE

## 2021-11-04 PROCEDURE — 99214 OFFICE O/P EST MOD 30 MIN: CPT | Performed by: INTERNAL MEDICINE

## 2021-11-04 PROCEDURE — 3074F SYST BP LT 130 MM HG: CPT | Performed by: INTERNAL MEDICINE

## 2021-11-09 ENCOUNTER — OFFICE VISIT (OUTPATIENT)
Dept: CARDIOLOGY CLINIC | Facility: CLINIC | Age: 76
End: 2021-11-09
Payer: COMMERCIAL

## 2021-11-09 VITALS
SYSTOLIC BLOOD PRESSURE: 142 MMHG | WEIGHT: 256.5 LBS | BODY MASS INDEX: 36.72 KG/M2 | HEART RATE: 75 BPM | DIASTOLIC BLOOD PRESSURE: 98 MMHG | HEIGHT: 70 IN

## 2021-11-09 DIAGNOSIS — R00.1 SYMPTOMATIC BRADYCARDIA: Primary | ICD-10-CM

## 2021-11-09 PROCEDURE — 1036F TOBACCO NON-USER: CPT | Performed by: PHYSICIAN ASSISTANT

## 2021-11-09 PROCEDURE — 99214 OFFICE O/P EST MOD 30 MIN: CPT | Performed by: PHYSICIAN ASSISTANT

## 2021-11-09 PROCEDURE — 1160F RVW MEDS BY RX/DR IN RCRD: CPT | Performed by: PHYSICIAN ASSISTANT

## 2021-11-29 ENCOUNTER — TELEPHONE (OUTPATIENT)
Dept: CARDIOLOGY CLINIC | Facility: CLINIC | Age: 76
End: 2021-11-29

## 2021-11-29 ENCOUNTER — REMOTE DEVICE CLINIC VISIT (OUTPATIENT)
Dept: CARDIOLOGY CLINIC | Facility: CLINIC | Age: 76
End: 2021-11-29
Payer: COMMERCIAL

## 2021-11-29 DIAGNOSIS — Z95.0 PRESENCE OF PERMANENT CARDIAC PACEMAKER: Primary | ICD-10-CM

## 2021-11-29 PROCEDURE — 93296 REM INTERROG EVL PM/IDS: CPT | Performed by: INTERNAL MEDICINE

## 2021-11-29 PROCEDURE — 93294 REM INTERROG EVL PM/LDLS PM: CPT | Performed by: INTERNAL MEDICINE

## 2021-12-07 ENCOUNTER — VBI (OUTPATIENT)
Dept: ADMINISTRATIVE | Facility: OTHER | Age: 76
End: 2021-12-07

## 2022-01-17 ENCOUNTER — VBI (OUTPATIENT)
Dept: ADMINISTRATIVE | Facility: OTHER | Age: 77
End: 2022-01-17

## 2022-02-15 DIAGNOSIS — I10 BENIGN ESSENTIAL HYPERTENSION: ICD-10-CM

## 2022-02-15 DIAGNOSIS — I10 ESSENTIAL HYPERTENSION, BENIGN: ICD-10-CM

## 2022-02-15 DIAGNOSIS — E78.5 HYPERLIPIDEMIA, UNSPECIFIED HYPERLIPIDEMIA TYPE: ICD-10-CM

## 2022-02-15 DIAGNOSIS — I47.2 NSVT (NONSUSTAINED VENTRICULAR TACHYCARDIA) (HCC): ICD-10-CM

## 2022-02-15 RX ORDER — SIMVASTATIN 40 MG
40 TABLET ORAL EVERY OTHER DAY
Qty: 45 TABLET | Refills: 1 | Status: SHIPPED | OUTPATIENT
Start: 2022-02-15

## 2022-02-15 RX ORDER — LISINOPRIL 10 MG/1
10 TABLET ORAL DAILY
Qty: 90 TABLET | Refills: 3 | Status: SHIPPED | OUTPATIENT
Start: 2022-02-15

## 2022-02-15 RX ORDER — METOPROLOL SUCCINATE 25 MG/1
25 TABLET, EXTENDED RELEASE ORAL DAILY
Qty: 90 TABLET | Refills: 3 | Status: SHIPPED | OUTPATIENT
Start: 2022-02-15

## 2022-02-18 ENCOUNTER — RA CDI HCC (OUTPATIENT)
Dept: OTHER | Facility: HOSPITAL | Age: 77
End: 2022-02-18

## 2022-02-18 NOTE — PROGRESS NOTES
E66 01    [facility]  coding opportunities             Chart Reviewed * (Number of) Inbasket suggestions sent to Provider: 1                  Patients insurance company: [employer] (Medicare Advantage and Commercial)

## 2022-03-02 ENCOUNTER — REMOTE DEVICE CLINIC VISIT (OUTPATIENT)
Dept: CARDIOLOGY CLINIC | Facility: CLINIC | Age: 77
End: 2022-03-02
Payer: COMMERCIAL

## 2022-03-02 DIAGNOSIS — Z95.0 CARDIAC PACEMAKER IN SITU: Primary | ICD-10-CM

## 2022-03-02 PROCEDURE — 93294 REM INTERROG EVL PM/LDLS PM: CPT | Performed by: INTERNAL MEDICINE

## 2022-03-02 PROCEDURE — 93296 REM INTERROG EVL PM/IDS: CPT | Performed by: INTERNAL MEDICINE

## 2022-03-02 NOTE — PROGRESS NOTES
Results for orders placed or performed in visit on 03/02/22   Cardiac EP device report    Narrative    MDT-DUAL CHAMBER PPM (AAIR-DDDR MODE)/ACTIVE SYSTEM IS MRI CONDITIONAL  CARELINK TRANSMISSION: BATTERY VOLTAGE ADEQUATE (9 2 YRS)  AP-98%, -97% (>40% Aileen@[employer])  ALL AVAILABLE LEAD PARAMETERS WITHIN NORMAL LIMITS  1 NSVT EPISODE FOR 10 BEATS, AVG CL~380MS  EF-60% (ECHO 4/26/19)  PT ON ELIQUIS & METOPROLOL  NORMAL DEVICE FUNCTION   GV

## 2022-03-03 ENCOUNTER — OFFICE VISIT (OUTPATIENT)
Dept: INTERNAL MEDICINE CLINIC | Age: 77
End: 2022-03-03
Payer: COMMERCIAL

## 2022-03-03 VITALS
SYSTOLIC BLOOD PRESSURE: 140 MMHG | WEIGHT: 256 LBS | BODY MASS INDEX: 36.65 KG/M2 | TEMPERATURE: 98.2 F | DIASTOLIC BLOOD PRESSURE: 70 MMHG | HEART RATE: 71 BPM | HEIGHT: 70 IN | OXYGEN SATURATION: 96 %

## 2022-03-03 DIAGNOSIS — G47.33 OBSTRUCTIVE SLEEP APNEA: ICD-10-CM

## 2022-03-03 DIAGNOSIS — I48.0 PAROXYSMAL ATRIAL FIBRILLATION (HCC): ICD-10-CM

## 2022-03-03 DIAGNOSIS — E78.00 HYPERCHOLESTEROLEMIA: ICD-10-CM

## 2022-03-03 DIAGNOSIS — I10 BENIGN ESSENTIAL HYPERTENSION: Primary | ICD-10-CM

## 2022-03-03 DIAGNOSIS — E21.0 PRIMARY HYPERPARATHYROIDISM (HCC): ICD-10-CM

## 2022-03-03 DIAGNOSIS — E66.01 SEVERE OBESITY WITH BODY MASS INDEX (BMI) OF 35.0 TO 39.9 WITH SERIOUS COMORBIDITY (HCC): ICD-10-CM

## 2022-03-03 DIAGNOSIS — D75.1 SECONDARY POLYCYTHEMIA: ICD-10-CM

## 2022-03-03 PROCEDURE — 3078F DIAST BP <80 MM HG: CPT | Performed by: INTERNAL MEDICINE

## 2022-03-03 PROCEDURE — 99214 OFFICE O/P EST MOD 30 MIN: CPT | Performed by: INTERNAL MEDICINE

## 2022-03-03 PROCEDURE — 3077F SYST BP >= 140 MM HG: CPT | Performed by: INTERNAL MEDICINE

## 2022-03-03 PROCEDURE — 1160F RVW MEDS BY RX/DR IN RCRD: CPT | Performed by: INTERNAL MEDICINE

## 2022-03-03 PROCEDURE — 3725F SCREEN DEPRESSION PERFORMED: CPT | Performed by: INTERNAL MEDICINE

## 2022-03-03 PROCEDURE — 1036F TOBACCO NON-USER: CPT | Performed by: INTERNAL MEDICINE

## 2022-03-03 NOTE — PROGRESS NOTES
Assessment/Plan:     Diagnoses and all orders for this visit:    Benign essential hypertension  Controlled  Hypercholesterolemia  Follow-up blood workup  Obstructive sleep apnea  Not using any CPAP mask  Secondary polycythemia  Followed up by the Hematology  Paroxysmal atrial fibrillation (HCC)  Right now in regular sinus rhythm continue with Eliquis  Primary hyperparathyroidism ([address] )    follow-up with the blood workup         M*Modal software was used to dictate this note  It may contain errors with dictating incorrect words or incorrect spelling  Please contact the provider directly with any questions  Subjective:   Chief Complaint   Patient presents with    Follow-up     4 month f/u          colonoscopy refused, depression screening , BMI F/U         Patient ID: Khadar Us is a 64 y o  male  HPI  This is a very pleasant 64 years young gentleman who is here today for the regular follow-up multiple medical problems include  1  Atrial fibrillation with a controlled ventricular response no signs of CHF right now he is in regular rhythm he has the underlying paroxysmal atrial fibrillation   2  Hypertension which is very well controlled continue with the same medication no change he need to lose some weight his weight is stable at least he is not gaining any weight  3  Polycythemia followed up by the Hematology  4  Hypercholesterolemia continue with the present management  5  Obesity diet exercise and weight loss   The following portions of the patient's history were reviewed and updated as appropriate: allergies, current medications, past family history, past medical history, past social history, past surgical history and problem list     Review of Systems   Constitutional: Negative for chills and fever  HENT: Negative for ear pain and sore throat  Eyes: Negative for pain and visual disturbance  Respiratory: Negative for cough and shortness of breath      Cardiovascular: Negative for chest pain and palpitations  Gastrointestinal: Negative for abdominal pain and vomiting  Genitourinary: Negative for dysuria and hematuria  Musculoskeletal: Negative for arthralgias and back pain  Skin: Positive for rash (Both lower legs)  Negative for color change  Neurological: Negative for seizures and syncope  All other systems reviewed and are negative          Past Medical History:   Diagnosis Date    Acute gouty arthropathy     last assessed-11/15/2013    Cataract     Diverticulitis of colon     Enlarged prostate     Gout     Hearing loss     Heart disease     History of diverticulitis of colon     History of dizziness     Hypercholesterolemia     Hyperlipidemia     Hypertension     Palpitations     Sinus bradycardia          Current Outpatient Medications:     allopurinol (ZYLOPRIM) 300 mg tablet, Take 1 tablet (300 mg total) by mouth daily, Disp: 90 tablet, Rfl: 1    amLODIPine (NORVASC) 5 mg tablet, Take 1 tablet (5 mg total) by mouth daily, Disp: 90 tablet, Rfl: 1    apixaban (ELIQUIS) 5 mg, Take 1 tablet (5 mg total) by mouth 2 (two) times a day, Disp: 180 tablet, Rfl: 3    Carboxymethylcellulose Sodium (EYE DROPS OP), Apply to eye as needed, Disp: , Rfl:     Cholecalciferol (Vitamin D-3) 125 MCG (5000 UT) TABS, Take by mouth daily , Disp: , Rfl:     lisinopril (ZESTRIL) 10 mg tablet, Take 1 tablet (10 mg total) by mouth daily, Disp: 90 tablet, Rfl: 3    meclizine (ANTIVERT) 25 mg tablet, Take 1 tablet (25 mg total) by mouth 2 (two) times a day as needed for dizziness, Disp: 180 tablet, Rfl: 1    metoprolol succinate (TOPROL-XL) 25 mg 24 hr tablet, Take 1 tablet (25 mg total) by mouth daily, Disp: 90 tablet, Rfl: 3    simvastatin (ZOCOR) 40 mg tablet, Take 1 tablet (40 mg total) by mouth every other day, Disp: 45 tablet, Rfl: 1    Allergies   Allergen Reactions    Pollen Extract Allergic Rhinitis       Social History   Past Surgical History:   Procedure Laterality Date    ARM NEUROPLASTY Left     1977    CARDIAC PACEMAKER PLACEMENT Left     CARDIAC SURGERY  05/01/2019    pace maker placed    CATARACT EXTRACTION, BILATERAL  2021    COLONOSCOPY      CYSTOSCOPY  12/27/2017    diagnostic    FOREARM FRACTURE SURGERY      ORCHIECTOMY      surgery testis    VA CYSTOURETHRO W/IMPLANT N/A 2/9/2018    Procedure: CYSTOSCOPY WITH INSERTION UROLIFT;  Surgeon: Randy Hanna MD;  [facility];  Service: Urology    VA CYSTOURETHROSCOPY,BIOPSY N/A 2/9/2018    Procedure: BLADDER BIOPSY;  Surgeon: Randy Hanna MD;  [facility];  Service: Urology    TESTICLE SURGERY Right 1976     Family History   Problem Relation Age of Onset    Coronary artery disease Mother     Heart disease Mother     Hypertension Mother     Stroke Mother     Coronary artery disease Father     Stroke Family     Heart attack Family         acute MI       Objective:  /70 (BP Location: Left arm, Patient Position: Sitting, Cuff Size: Large)   Pulse 71   Temp 98 2 °F (36 8 °C) (Temporal)   Ht 5' 10" (1 778 m)   Wt 116 kg (256 lb)   SpO2 96%   BMI 36 73 kg/m²        Physical Exam  Constitutional:       Appearance: He is well-developed  He is obese  HENT:      Right Ear: External ear normal    Eyes:      Conjunctiva/sclera: Conjunctivae normal       Pupils: Pupils are equal, round, and reactive to light  Neck:      Thyroid: No thyromegaly  Vascular: No JVD  Cardiovascular:      Rate and Rhythm: Normal rate and regular rhythm  Heart sounds: Normal heart sounds  Pulmonary:      Breath sounds: Normal breath sounds  Abdominal:      General: Bowel sounds are normal       Palpations: Abdomen is soft  Musculoskeletal:         General: Normal range of motion  Cervical back: Normal range of motion  Lymphadenopathy:      Cervical: No cervical adenopathy  Skin:     General: Skin is dry        Findings: Lesion (On the left side of the forehead need to be biopsied) and rash (Small area of dermatitis on both legs with dry skin and some scaling) present  Neurological:      Mental Status: He is alert and oriented to person, place, and time  Deep Tendon Reflexes: Reflexes are normal and symmetric     Psychiatric:         Behavior: Behavior normal

## 2022-03-10 ENCOUNTER — TELEPHONE (OUTPATIENT)
Dept: INTERNAL MEDICINE CLINIC | Facility: OTHER | Age: 77
End: 2022-03-10

## 2022-03-10 DIAGNOSIS — I10 ESSENTIAL HYPERTENSION, BENIGN: ICD-10-CM

## 2022-03-10 RX ORDER — AMLODIPINE BESYLATE 5 MG/1
5 TABLET ORAL DAILY
Qty: 90 TABLET | Refills: 1 | Status: SHIPPED | OUTPATIENT
Start: 2022-03-10

## 2022-03-10 NOTE — TELEPHONE ENCOUNTER
LOV 03/03/2022 - Saw Dr Reji Morelos was supposed to be refilled at [facility] but was never sent

## 2022-03-25 DIAGNOSIS — I48.0 PAROXYSMAL ATRIAL FIBRILLATION (HCC): ICD-10-CM

## 2022-04-02 ENCOUNTER — HOSPITAL ENCOUNTER (OUTPATIENT)
Dept: ULTRASOUND IMAGING | Facility: HOSPITAL | Age: 77
Discharge: HOME | End: 2022-04-02
Attending: INTERNAL MEDICINE
Payer: COMMERCIAL

## 2022-04-02 DIAGNOSIS — N28.1 RENAL CYST: ICD-10-CM

## 2022-04-02 PROCEDURE — 76770 US EXAM ABDO BACK WALL COMP: CPT

## 2022-04-26 DIAGNOSIS — R42 DIZZINESS: ICD-10-CM

## 2022-04-26 DIAGNOSIS — M10.9 GOUT, UNSPECIFIED CAUSE, UNSPECIFIED CHRONICITY, UNSPECIFIED SITE: ICD-10-CM

## 2022-04-26 RX ORDER — MECLIZINE HYDROCHLORIDE 25 MG/1
25 TABLET ORAL 2 TIMES DAILY PRN
Qty: 180 TABLET | Refills: 1 | Status: SHIPPED | OUTPATIENT
Start: 2022-04-26

## 2022-04-26 RX ORDER — ALLOPURINOL 300 MG/1
300 TABLET ORAL DAILY
Qty: 15 TABLET | Refills: 0 | Status: SHIPPED | OUTPATIENT
Start: 2022-04-26 | End: 2022-05-02 | Stop reason: SDUPTHER

## 2022-05-06 ENCOUNTER — OFFICE VISIT (OUTPATIENT)
Dept: HEMATOLOGY ONCOLOGY | Facility: CLINIC | Age: 77
End: 2022-05-06
Payer: COMMERCIAL

## 2022-05-06 VITALS
TEMPERATURE: 97.6 F | WEIGHT: 260 LBS | SYSTOLIC BLOOD PRESSURE: 142 MMHG | OXYGEN SATURATION: 97 % | HEART RATE: 92 BPM | RESPIRATION RATE: 17 BRPM | DIASTOLIC BLOOD PRESSURE: 80 MMHG | BODY MASS INDEX: 37.22 KG/M2 | HEIGHT: 70 IN

## 2022-05-06 DIAGNOSIS — N28.1 RENAL CYST, LEFT: ICD-10-CM

## 2022-05-06 DIAGNOSIS — I48.0 PAROXYSMAL ATRIAL FIBRILLATION (HCC): ICD-10-CM

## 2022-05-06 DIAGNOSIS — G47.33 OBSTRUCTIVE SLEEP APNEA: ICD-10-CM

## 2022-05-06 DIAGNOSIS — D75.1 SECONDARY POLYCYTHEMIA: Primary | ICD-10-CM

## 2022-05-06 DIAGNOSIS — R97.20 ELEVATED PSA: ICD-10-CM

## 2022-05-06 DIAGNOSIS — Z95.0 PACEMAKER: ICD-10-CM

## 2022-05-06 PROCEDURE — 3077F SYST BP >= 140 MM HG: CPT | Performed by: INTERNAL MEDICINE

## 2022-05-06 PROCEDURE — 3079F DIAST BP 80-89 MM HG: CPT | Performed by: INTERNAL MEDICINE

## 2022-05-06 PROCEDURE — 99214 OFFICE O/P EST MOD 30 MIN: CPT | Performed by: INTERNAL MEDICINE

## 2022-05-06 NOTE — PATIENT INSTRUCTIONS
Blood work now and again in 6 months  Visit in 7 months  Referral to urologist for left renal cyst and increased PSA

## 2022-05-06 NOTE — PROGRESS NOTES
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 and remains under surveillance  Normal erythropoietin level  He has minimal   exertional dyspnea  Has some tiredness He keeps himself hydrated  He used to donate blood every 4 months at [facility] but blood bank will not take his blood because he has been on Eliquis for atrial fibrillation  He has a pacemaker  No bleeding or blood clot on Eliquis  History of vitamin-D deficiency  History of high calcium and high PTH and these problems are being managed by patient's primary physician      History of high PSA and left renal cyst he follows with his urologist   No new symptoms since last visit   Patient states that he quit smoking cigars and pipe 20 years ago                 Current Outpatient Medications:     allopurinol (ZYLOPRIM) 300 mg tablet, Take 1 tablet (300 mg total) by mouth daily, Disp: 90 tablet, Rfl: 1    amLODIPine (NORVASC) 5 mg tablet, Take 1 tablet (5 mg total) by mouth daily, Disp: 90 tablet, Rfl: 1    apixaban (ELIQUIS) 5 mg, Take 1 tablet (5 mg total) by mouth 2 (two) times a day, Disp: 180 tablet, Rfl: 3    Carboxymethylcellulose Sodium (EYE DROPS OP), Apply to eye as needed, Disp: , Rfl:     Cholecalciferol (Vitamin D-3) 125 MCG (5000 UT) TABS, Take by mouth daily , Disp: , Rfl:     lisinopril (ZESTRIL) 10 mg tablet, Take 1 tablet (10 mg total) by mouth daily, Disp: 90 tablet, Rfl: 3    meclizine (ANTIVERT) 25 mg tablet, Take 1 tablet (25 mg total) by mouth 2 (two) times a day as needed for dizziness, Disp: 180 tablet, Rfl: 1    metoprolol succinate (TOPROL-XL) 25 mg 24 hr tablet, Take 1 tablet (25 mg total) by mouth daily, Disp: 90 tablet, Rfl: 3    simvastatin (ZOCOR) 40 mg tablet, Take 1 tablet (40 mg total) by mouth every other day, Disp: 45 tablet, Rfl: 1    Allergies   Allergen Reactions    Pollen Extract Allergic Rhinitis       Oncology History    No history exists  ROS:  05/06/22 Reviewed 12 systems:   Presently no  Other neurological, cardiac, pulmonary, GI and  symptoms other than listed in HPI  Other symptoms are in HPI  No other symptoms like fever, chills, bleeding, bone pains, skin rash, weight loss, arthritic symptoms,  weakness, numbness,  claudication and gait problem  No frequent infections  Not unusually sensitive to heat or cold  No swelling of the ankles  No swollen glands  Patient is not anxious  /80 (BP Location: Left arm, Patient Position: Sitting, Cuff Size: Adult)   Pulse 92   Temp 97 6 °F (36 4 °C)   Resp 17   Ht 5' 10" (1 778 m)   Wt 118 kg (260 lb)   SpO2 97%   BMI 37 31 kg/m²     Physical Exam:  Alert and oriented and not in distress and stable vitals  No icterus , no oral thrush, no palpable neck mass, clear lung fields, prolonged expiration, regular heart rate, pacemaker,  soft and non tender abdomen, no palpable abdominal mass, no ascites, no edema of ankles, no calf tenderness, no focal neurological deficit, no skin rash, no palpable lymphadenopathy in the neck and axillary areas,  no clubbing  Patient is not anxious  Performance status 2  IMAGING:  IMPRESSION:     1  Unchanged hepatic steatosis      2   Stable gallbladder polyps measuring up to 5 mm  According to current literature guidelines (JACR 2013;10:953-956), small polyps this size are benign and no work-up or follow-up is needed      3   Stable bilateral renal cysts      Workstation performed: [id number]      Imaging     US abdomen complete (Order: [id number]) - 7/13/2020   IMPRESSION:     Bilateral renal cysts including a large 8 8 cm cyst in the left kidney    No hydronephrosis or hydroureter      Bladder diverticulum             Workstation performed: [id number]          Imaging    US kidney and bladder (Order: [id number]) - 4/2/2022      LABS:    Results for orders placed or performed in visit on 10/20/21   CBC and differential   Result Value Ref Range    WBC 8 52 4 31 - 10 16 Thousand/uL    RBC 5 79 (H) 3 88 - 5 62 Million/uL    Hemoglobin 18 4 (H) 12 0 - 17 0 g/dL    Hematocrit 55 2 (H) 36 5 - 49 3 %    MCV 95 82 - 98 fL    MCH 31 8 26 8 - 34 3 pg    MCHC 33 3 31 4 - 37 4 g/dL    RDW 13 3 11 6 - 15 1 %    MPV 11 0 8 9 - 12 7 fL    Platelets 031 664 - 268 Thousands/uL    nRBC 0 /100 WBCs    Neutrophils Relative 66 43 - 75 %    Immat GRANS % 0 0 - 2 %    Lymphocytes Relative 22 14 - 44 %    Monocytes Relative 9 4 - 12 %    Eosinophils Relative 2 0 - 6 %    Basophils Relative 1 0 - 1 %    Neutrophils Absolute 5 64 1 85 - 7 62 Thousands/µL    Immature Grans Absolute 0 02 0 00 - 0 20 Thousand/uL    Lymphocytes Absolute 1 84 0 60 - 4 47 Thousands/µL    Monocytes Absolute 0 79 0 17 - 1 22 Thousand/µL    Eosinophils Absolute 0 18 0 00 - 0 61 Thousand/µL    Basophils Absolute 0 05 0 00 - 0 10 Thousands/µL   Comprehensive metabolic panel   Result Value Ref Range    Sodium 135 (L) 136 - 145 mmol/L    Potassium 4 6 3 5 - 5 3 mmol/L    Chloride 104 100 - 108 mmol/L    CO2 31 21 - 32 mmol/L    ANION GAP 0 (L) 4 - 13 mmol/L    BUN 20 5 - 25 mg/dL    Creatinine 1 28 0 60 - 1 30 mg/dL    Glucose, Fasting 97 65 - 99 mg/dL    Calcium 10 4 (H) 8 3 - 10 1 mg/dL    AST 20 5 - 45 U/L    ALT 31 12 - 78 U/L    Alkaline Phosphatase 72 46 - 116 U/L    Total Protein 7 8 6 4 - 8 2 g/dL    Albumin 3 7 3 5 - 5 0 g/dL    Total Bilirubin 1 14 (H) 0 20 - 1 00 mg/dL    eGFR 53 ml/min/1 73sq m   Lipid panel   Result Value Ref Range    Cholesterol 155 50 - 200 mg/dL    Triglycerides 154 (H) <=150 mg/dL    HDL, Direct 35 (L) >=40 mg/dL    LDL Calculated 89 0 - 100 mg/dL    Non-HDL-Chol (CHOL-HDL) 120 mg/dl   TSH, 3rd generation with Free T4 reflex   Result Value Ref Range    TSH 3RD GENERATON 2 380 0 358 - 3 740 uIU/mL         Labs, Imaging, & Other studies:   All pertinent labs and imaging studies were personally reviewed      Reviewed above test results and discussed with patient  Assessment and plan:     Patient is here with his wife     Patient has chronic lung disease and sleep apnea and because of that he has 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 and remains under surveillance  Normal erythropoietin level  He has minimal   exertional dyspnea  Has some tiredness He keeps himself hydrated  He used to donate blood every 4 months at [facility] but blood bank will not take his blood because he has been on Eliquis for atrial fibrillation  He has a pacemaker  No bleeding or blood clot on Eliquis  History of vitamin-D deficiency  History of high calcium and high PTH and these problems are being managed by patient's primary physician  History of high PSA and left renal cyst he follows with his urologist   No new symptoms since last visit   Patient states that he quit smoking cigars and pipe 20 years ago      Physical examination and test results are as recorded and discussed  Hematocrit is high but stable and compensatory and most likely patient has secondary polycythemia  I explained patient the difference between primary and secondary polycythemia and his polycythemia is secondary  He could have phlebotomy to lower viscosity  He wants his counts to be monitored  He could consider eating iron poor foods so that hematocrit does not go too high and if that happens he could have phlebotomy to lower viscosity  He keeps himself hydrated     Other problems and their management per  Primary physician and other consultants    Chicho Uriarte all  to the patient in detail   Questions answered   Discussed the importance of eating iron poor healthy foods , staying active and health screening tests   Patient is capable of self-care     Discussed precautions against coronavirus  Unk Janel He will continue to follow with his primary physician and other consultants    See diagnoses, instructions and orders below  He will like to come back in 6 months  1  Secondary polycythemia    - CBC and differential; Standing  - Comprehensive metabolic panel; Standing  - CBC and differential  - Comprehensive metabolic panel    2  Obstructive sleep apnea      3  Paroxysmal atrial fibrillation (HCC)      4  Pacemaker      5  Renal cyst, left    - Ambulatory referral to Urology; Future    6  Elevated PSA    - Ambulatory referral to Urology; Future  - PSA Total, Diagnostic; Future      Blood work now and again in 6 months  Visit in 7 months  Referral to urologist for left renal cyst and increased PSA                        Patient voiced understanding and agrees       Counseling / Coordination of Care     Provided counseling and support

## 2022-05-11 ENCOUNTER — OFFICE VISIT (OUTPATIENT)
Dept: UROLOGY | Facility: CLINIC | Age: 77
End: 2022-05-11
Payer: COMMERCIAL

## 2022-05-11 VITALS
BODY MASS INDEX: 37.22 KG/M2 | SYSTOLIC BLOOD PRESSURE: 138 MMHG | WEIGHT: 260 LBS | DIASTOLIC BLOOD PRESSURE: 80 MMHG | OXYGEN SATURATION: 98 % | RESPIRATION RATE: 16 BRPM | HEART RATE: 92 BPM | HEIGHT: 70 IN

## 2022-05-11 DIAGNOSIS — N40.1 BENIGN PROSTATIC HYPERPLASIA WITH URINARY FREQUENCY: Primary | ICD-10-CM

## 2022-05-11 DIAGNOSIS — R35.0 BENIGN PROSTATIC HYPERPLASIA WITH URINARY FREQUENCY: Primary | ICD-10-CM

## 2022-05-11 PROCEDURE — 1036F TOBACCO NON-USER: CPT | Performed by: PHYSICIAN ASSISTANT

## 2022-05-11 PROCEDURE — 1160F RVW MEDS BY RX/DR IN RCRD: CPT | Performed by: PHYSICIAN ASSISTANT

## 2022-05-11 PROCEDURE — 99213 OFFICE O/P EST LOW 20 MIN: CPT | Performed by: PHYSICIAN ASSISTANT

## 2022-05-11 NOTE — PROGRESS NOTES
UROLOGY PROGRESS NOTE   Patient Identifiers: Hattie Morales (MRN [id number])  Date of Service: 5/11/2022    Subjective:    47-year-old man history of BPH  He had a UroLift procedure in 2018  Reports good flow but has nocturia almost every hour  Multiple medical problems as well as sleep apnea likely contributing to this  He has no burning dribbling or hematuria        Reason for visit:  BPH follow-up     Objective:     VITALS:    Vitals:    05/11/22 0959   BP: 138/80   Pulse: 92   Resp: 16   SpO2: 98%           LABS:  Lab Results   Component Value Date    HGB 18 4 (H) 10/20/2021    HCT 55 2 (H) 10/20/2021    WBC 8 52 10/20/2021     10/20/2021   ]    Lab Results   Component Value Date    K 4 6 10/20/2021     10/20/2021    CO2 31 10/20/2021    BUN 20 10/20/2021    CREATININE 1 28 10/20/2021    CALCIUM 10 4 (H) 10/20/2021   ]        INPATIENT MEDS:    Current Outpatient Medications:     allopurinol (ZYLOPRIM) 300 mg tablet, Take 1 tablet (300 mg total) by mouth daily, Disp: 90 tablet, Rfl: 1    amLODIPine (NORVASC) 5 mg tablet, Take 1 tablet (5 mg total) by mouth daily, Disp: 90 tablet, Rfl: 1    apixaban (ELIQUIS) 5 mg, Take 1 tablet (5 mg total) by mouth 2 (two) times a day, Disp: 180 tablet, Rfl: 3    Carboxymethylcellulose Sodium (EYE DROPS OP), Apply to eye as needed, Disp: , Rfl:     Cholecalciferol (Vitamin D-3) 125 MCG (5000 UT) TABS, Take by mouth daily , Disp: , Rfl:     lisinopril (ZESTRIL) 10 mg tablet, Take 1 tablet (10 mg total) by mouth daily, Disp: 90 tablet, Rfl: 3    meclizine (ANTIVERT) 25 mg tablet, Take 1 tablet (25 mg total) by mouth 2 (two) times a day as needed for dizziness, Disp: 180 tablet, Rfl: 1    metoprolol succinate (TOPROL-XL) 25 mg 24 hr tablet, Take 1 tablet (25 mg total) by mouth daily, Disp: 90 tablet, Rfl: 3    simvastatin (ZOCOR) 40 mg tablet, Take 1 tablet (40 mg total) by mouth every other day, Disp: 45 tablet, Rfl: 1      Physical Exam:   /80 Pulse 92   Resp 16   Ht 5' 10" (1 778 m)   Wt 118 kg (260 lb)   SpO2 98%   BMI 37 31 kg/m²   GEN: no acute distress    RESP: breathing comfortably with no accessory muscle use    ABD: soft, non-tender, non-distended   INCISION:    EXT: no significant peripheral edema   (Male): Penis circumcised, phallus normal, meatus patent  Testicles descended into scrotum bilaterally without masses nor tenderness  No inguinal hernias bilaterally  JUAN LUIS: Prostate is enlarged at 45 grams  The prostate is not boggy  The prostate is not tender  No nodules noted      RADIOLOGY:    none     Assessment:    1   BPH     Plan:   - offered him a trial of tamsulosin which he declined  - follow-up in 1 year for his annual exam  -  -

## 2022-06-01 ENCOUNTER — REMOTE DEVICE CLINIC VISIT (OUTPATIENT)
Dept: CARDIOLOGY CLINIC | Facility: CLINIC | Age: 77
End: 2022-06-01
Payer: COMMERCIAL

## 2022-06-01 DIAGNOSIS — Z95.0 PRESENCE OF PERMANENT CARDIAC PACEMAKER: Primary | ICD-10-CM

## 2022-06-01 PROCEDURE — 93294 REM INTERROG EVL PM/LDLS PM: CPT | Performed by: INTERNAL MEDICINE

## 2022-06-01 PROCEDURE — 93296 REM INTERROG EVL PM/IDS: CPT | Performed by: INTERNAL MEDICINE

## 2022-06-01 NOTE — PROGRESS NOTES
MDT-DUAL CHAMBER PPM (AAIR-DDDR MODE)/ACTIVE SYSTEM IS MRI CONDITIONAL   CARELINK TRANSMISSION:  BATTERY VOLTAGE ADEQUATE (8 7 YR  )    AP 97 6%  97 9% (>40%/DEPENDENT)   ALL AVAILABLE LEAD PARAMETERS WITHIN NORMAL LIMITS   2 VT-NS EPISODES WITH EGMS SHOWING NSVT (6 @ 222 BPM, 6 @ 188 BPM)   EF 60% (ECHO 4/2019)   PT TAKES ELIQUIS AND METOPROLOL   NORMAL DEVICE FUNCTION   RG

## 2022-06-03 ENCOUNTER — TELEPHONE (OUTPATIENT)
Dept: CARDIOLOGY CLINIC | Facility: CLINIC | Age: 77
End: 2022-06-03

## 2022-06-03 DIAGNOSIS — I48.0 PAROXYSMAL ATRIAL FIBRILLATION (HCC): Primary | ICD-10-CM

## 2022-06-03 NOTE — TELEPHONE ENCOUNTER
Put order in for Echo     Called patient and spoke with Wife Michelle and she said she would need this scheduled in [address] [place] [address] apt better and the only 2 days that will not work for them would be June 16th and 17th           Called central scheduling to help patient set up apt for Echo (the earliest apt they have in the morning would be listed below)  [facility] WITH [facility] Friday July [email or web address] arrive at [address] [place] [address] [place] [facility] [place] [phone]

## 2022-06-03 NOTE — TELEPHONE ENCOUNTER
----- Message from Yao Larsen DO sent at 6/1/2022  6:09 PM EDT -----  Regarding: RE: pacemaker pt with NST > 200 bpm (6 @ 222 bpm, 6 @ 188 bpm)  Can you please call patient and just let him know he had a few extra beats on his pacemaker and get an echo to make sure his heart function is okay thank you  ----- Message -----  From: Priyanka Fam  Sent: 6/1/2022  10:01 AM EDT  To: Yao Larsen DO  Subject: pacemaker pt with NST > 200 bpm (6 @ 222 bpm#    For your review  Thank you, Ines Trejo MDT-DUAL CHAMBER PPM (AAIR-DDDR MODE)/ACTIVE SYSTEM IS MRI CONDITIONAL   CARELINK TRANSMISSION:  BATTERY VOLTAGE ADEQUATE (8 7 YR  )    AP 97 6%  97 9% (>40%/DEPENDENT)   ALL AVAILABLE LEAD PARAMETERS WITHIN NORMAL LIMITS   2 VT-NS EPISODES WITH EGMS SHOWING NSVT (6 @ 222 BPM, 6 @ 188 BPM)   EF 60% (ECHO 4/2019)   PT TAKES ELIQUIS AND METOPROLOL   NORMAL DEVICE FUNCTION   RG

## 2022-07-15 ENCOUNTER — HOSPITAL ENCOUNTER (OUTPATIENT)
Dept: NON INVASIVE DIAGNOSTICS | Facility: CLINIC | Age: 77
Discharge: HOME | End: 2022-07-15
Payer: COMMERCIAL

## 2022-07-15 VITALS
HEART RATE: 91 BPM | WEIGHT: 260.14 LBS | DIASTOLIC BLOOD PRESSURE: 72 MMHG | SYSTOLIC BLOOD PRESSURE: 118 MMHG | HEIGHT: 70 IN | BODY MASS INDEX: 37.24 KG/M2

## 2022-07-15 DIAGNOSIS — I48.0 PAROXYSMAL ATRIAL FIBRILLATION (HCC): ICD-10-CM

## 2022-07-15 LAB
AORTIC ROOT: 3.7 CM
APICAL FOUR CHAMBER EJECTION FRACTION: 55 %
ASCENDING AORTA: 3.8 CM
AV REGURGITATION PRESSURE HALF TIME: 256 MS
E WAVE DECELERATION TIME: 255 MS
FRACTIONAL SHORTENING: 32 % (ref 28–44)
INTERVENTRICULAR SEPTUM IN DIASTOLE (PARASTERNAL SHORT AXIS VIEW): 1.2 CM
INTERVENTRICULAR SEPTUM: 1.2 CM (ref 0.6–1.1)
LAAS-AP4: 17.6 CM2
LEFT ATRIUM SIZE: 4.6 CM
LEFT INTERNAL DIMENSION IN SYSTOLE: 2.8 CM (ref 2.1–4)
LEFT VENTRICULAR INTERNAL DIMENSION IN DIASTOLE: 4.1 CM (ref 3.5–6)
LEFT VENTRICULAR POSTERIOR WALL IN END DIASTOLE: 1.2 CM
LEFT VENTRICULAR STROKE VOLUME: 47 ML
LVSV (TEICH): 47 ML
MV E'TISSUE VEL-LAT: 8 CM/S
MV E'TISSUE VEL-SEP: 4 CM/S
MV PEAK A VEL: 0.89 M/S
MV PEAK E VEL: 41 CM/S
MV STENOSIS PRESSURE HALF TIME: 74 MS
MV VALVE AREA P 1/2 METHOD: 2.97 CM2
RIGHT ATRIAL 2D VOLUME: 35 ML
RIGHT ATRIUM AREA SYSTOLE A4C: 15.1 CM2
RIGHT VENTRICLE ID DIMENSION: 2.7 CM
SL CV AV DECELERATION TIME RETROGRADE: 881 MS
SL CV AV PEAK GRADIENT RETROGRADE: 14 MMHG
SL CV LV EF: 55
SL CV PED ECHO LEFT VENTRICLE DIASTOLIC VOLUME (MOD BIPLANE) 2D: 75 ML
SL CV PED ECHO LEFT VENTRICLE SYSTOLIC VOLUME (MOD BIPLANE) 2D: 29 ML
TR MAX PG: 14 MMHG
TR PEAK VELOCITY: 1.8 M/S
TRICUSPID VALVE PEAK REGURGITATION VELOCITY: 1.84 M/S

## 2022-07-15 PROCEDURE — 93306 TTE W/DOPPLER COMPLETE: CPT

## 2022-07-15 PROCEDURE — 93306 TTE W/DOPPLER COMPLETE: CPT | Performed by: INTERNAL MEDICINE

## 2022-07-18 ENCOUNTER — TELEPHONE (OUTPATIENT)
Dept: CARDIOLOGY CLINIC | Facility: CLINIC | Age: 77
End: 2022-07-18

## 2022-07-18 ENCOUNTER — APPOINTMENT (OUTPATIENT)
Dept: LAB | Age: 77
End: 2022-07-18
Payer: COMMERCIAL

## 2022-07-18 DIAGNOSIS — E78.00 HYPERCHOLESTEROLEMIA: ICD-10-CM

## 2022-07-18 DIAGNOSIS — I10 BENIGN ESSENTIAL HYPERTENSION: ICD-10-CM

## 2022-07-18 LAB
ALBUMIN SERPL BCP-MCNC: 3.7 G/DL (ref 3.5–5)
ALP SERPL-CCNC: 69 U/L (ref 46–116)
ALT SERPL W P-5'-P-CCNC: 24 U/L (ref 12–78)
ANION GAP SERPL CALCULATED.3IONS-SCNC: 6 MMOL/L (ref 4–13)
AST SERPL W P-5'-P-CCNC: 19 U/L (ref 5–45)
BASOPHILS # BLD AUTO: 0.05 THOUSANDS/ΜL (ref 0–0.1)
BASOPHILS NFR BLD AUTO: 1 % (ref 0–1)
BILIRUB SERPL-MCNC: 1.18 MG/DL (ref 0.2–1)
BILIRUB UR QL STRIP: NEGATIVE
BUN SERPL-MCNC: 18 MG/DL (ref 5–25)
CALCIUM SERPL-MCNC: 10.1 MG/DL (ref 8.3–10.1)
CHLORIDE SERPL-SCNC: 110 MMOL/L (ref 96–108)
CLARITY UR: CLEAR
CO2 SERPL-SCNC: 23 MMOL/L (ref 21–32)
COLOR UR: NORMAL
CREAT SERPL-MCNC: 1.29 MG/DL (ref 0.6–1.3)
EOSINOPHIL # BLD AUTO: 0.24 THOUSAND/ΜL (ref 0–0.61)
EOSINOPHIL NFR BLD AUTO: 3 % (ref 0–6)
ERYTHROCYTE [DISTWIDTH] IN BLOOD BY AUTOMATED COUNT: 13.5 % (ref 11.6–15.1)
GFR SERPL CREATININE-BSD FRML MDRD: 52 ML/MIN/1.73SQ M
GLUCOSE P FAST SERPL-MCNC: 107 MG/DL (ref 65–99)
GLUCOSE UR STRIP-MCNC: NEGATIVE MG/DL
HCT VFR BLD AUTO: 52 % (ref 36.5–49.3)
HGB BLD-MCNC: 17.2 G/DL (ref 12–17)
HGB UR QL STRIP.AUTO: NEGATIVE
IMM GRANULOCYTES # BLD AUTO: 0.03 THOUSAND/UL (ref 0–0.2)
IMM GRANULOCYTES NFR BLD AUTO: 0 % (ref 0–2)
KETONES UR STRIP-MCNC: NEGATIVE MG/DL
LEUKOCYTE ESTERASE UR QL STRIP: NEGATIVE
LYMPHOCYTES # BLD AUTO: 1.84 THOUSANDS/ΜL (ref 0.6–4.47)
LYMPHOCYTES NFR BLD AUTO: 22 % (ref 14–44)
MCH RBC QN AUTO: 31.4 PG (ref 26.8–34.3)
MCHC RBC AUTO-ENTMCNC: 33.1 G/DL (ref 31.4–37.4)
MCV RBC AUTO: 95 FL (ref 82–98)
MONOCYTES # BLD AUTO: 0.79 THOUSAND/ΜL (ref 0.17–1.22)
MONOCYTES NFR BLD AUTO: 10 % (ref 4–12)
NEUTROPHILS # BLD AUTO: 5.34 THOUSANDS/ΜL (ref 1.85–7.62)
NEUTS SEG NFR BLD AUTO: 64 % (ref 43–75)
NITRITE UR QL STRIP: NEGATIVE
NRBC BLD AUTO-RTO: 0 /100 WBCS
PH UR STRIP.AUTO: 6.5 [PH]
PLATELET # BLD AUTO: 210 THOUSANDS/UL (ref 149–390)
PMV BLD AUTO: 11.5 FL (ref 8.9–12.7)
POTASSIUM SERPL-SCNC: 4.9 MMOL/L (ref 3.5–5.3)
PROT SERPL-MCNC: 8.1 G/DL (ref 6.4–8.4)
PROT UR STRIP-MCNC: NEGATIVE MG/DL
RBC # BLD AUTO: 5.47 MILLION/UL (ref 3.88–5.62)
SODIUM SERPL-SCNC: 139 MMOL/L (ref 135–147)
SP GR UR STRIP.AUTO: 1.01 (ref 1–1.03)
TSH SERPL DL<=0.05 MIU/L-ACNC: 2.53 UIU/ML (ref 0.45–4.5)
UROBILINOGEN UR STRIP-ACNC: <2 MG/DL
WBC # BLD AUTO: 8.29 THOUSAND/UL (ref 4.31–10.16)

## 2022-07-18 PROCEDURE — 84443 ASSAY THYROID STIM HORMONE: CPT

## 2022-07-18 PROCEDURE — 81003 URINALYSIS AUTO W/O SCOPE: CPT | Performed by: INTERNAL MEDICINE

## 2022-07-18 PROCEDURE — 85025 COMPLETE CBC W/AUTO DIFF WBC: CPT | Performed by: INTERNAL MEDICINE

## 2022-07-18 PROCEDURE — 36415 COLL VENOUS BLD VENIPUNCTURE: CPT | Performed by: INTERNAL MEDICINE

## 2022-07-18 PROCEDURE — 80053 COMPREHEN METABOLIC PANEL: CPT | Performed by: INTERNAL MEDICINE

## 2022-07-28 ENCOUNTER — PROCEDURE VISIT (OUTPATIENT)
Dept: INTERNAL MEDICINE CLINIC | Facility: OTHER | Age: 77
End: 2022-07-28

## 2022-07-28 VITALS
BODY MASS INDEX: 36.71 KG/M2 | OXYGEN SATURATION: 94 % | DIASTOLIC BLOOD PRESSURE: 76 MMHG | TEMPERATURE: 98.1 F | HEART RATE: 87 BPM | WEIGHT: 256.4 LBS | SYSTOLIC BLOOD PRESSURE: 112 MMHG | RESPIRATION RATE: 20 BRPM | HEIGHT: 70 IN

## 2022-07-28 DIAGNOSIS — D09.9 SQUAMOUS CELL CARCINOMA IN SITU: ICD-10-CM

## 2022-07-28 DIAGNOSIS — L81.9 ATYPICAL PIGMENTED SKIN LESION: Primary | ICD-10-CM

## 2022-07-28 PROCEDURE — 88305 TISSUE EXAM BY PATHOLOGIST: CPT | Performed by: PATHOLOGY

## 2022-07-28 NOTE — PROGRESS NOTES
Shave lesion    Date/Time: 7/28/2022 2:31 PM  Performed by: Raul Flannery DO  Authorized by: Raul Flannery DO   Universal Protocol:  Consent: Written consent obtained  Risks and benefits: risks, benefits and alternatives were discussed  Consent given by: patient  Patient understanding: patient states understanding of the procedure being performed  Patient consent: the patient's understanding of the procedure matches consent given      Number of Lesions: 2  Lesion 1:     Body area: head/neck    Head/neck location: forehead (left side)    Initial size (mm): 6    Final defect size (mm): 7    Malignancy: malignant lesion      Destruction method: shave removal    Lesion 2:     Body area: upper extremity    Upper extremity location: R elbow    Initial size (mm): 13    Final defect size (mm): 16    Malignancy: malignant lesion      Destruction method: shave removal      Comments:  Post care instructions given to patient  Both lesions sent for pathology

## 2022-08-17 ENCOUNTER — OFFICE VISIT (OUTPATIENT)
Dept: INTERNAL MEDICINE CLINIC | Age: 77
End: 2022-08-17
Payer: COMMERCIAL

## 2022-08-17 VITALS
OXYGEN SATURATION: 98 % | WEIGHT: 255 LBS | BODY MASS INDEX: 36.51 KG/M2 | HEIGHT: 70 IN | TEMPERATURE: 98.2 F | DIASTOLIC BLOOD PRESSURE: 72 MMHG | HEART RATE: 79 BPM | SYSTOLIC BLOOD PRESSURE: 128 MMHG

## 2022-08-17 DIAGNOSIS — E21.0 PRIMARY HYPERPARATHYROIDISM (HCC): ICD-10-CM

## 2022-08-17 DIAGNOSIS — I48.0 PAROXYSMAL ATRIAL FIBRILLATION (HCC): ICD-10-CM

## 2022-08-17 DIAGNOSIS — I10 BENIGN ESSENTIAL HYPERTENSION: ICD-10-CM

## 2022-08-17 DIAGNOSIS — G89.29 CHRONIC BILATERAL LOW BACK PAIN WITHOUT SCIATICA: ICD-10-CM

## 2022-08-17 DIAGNOSIS — R97.20 ELEVATED PSA: ICD-10-CM

## 2022-08-17 DIAGNOSIS — M54.50 CHRONIC BILATERAL LOW BACK PAIN WITHOUT SCIATICA: ICD-10-CM

## 2022-08-17 DIAGNOSIS — D75.1 POLYCYTHEMIA: Primary | ICD-10-CM

## 2022-08-17 DIAGNOSIS — Z00.00 MEDICARE ANNUAL WELLNESS VISIT, SUBSEQUENT: ICD-10-CM

## 2022-08-17 DIAGNOSIS — E66.01 SEVERE OBESITY WITH BODY MASS INDEX (BMI) OF 35.0 TO 39.9 WITH SERIOUS COMORBIDITY (HCC): ICD-10-CM

## 2022-08-17 DIAGNOSIS — E78.00 HYPERCHOLESTEROLEMIA: ICD-10-CM

## 2022-08-17 DIAGNOSIS — G47.33 OBSTRUCTIVE SLEEP APNEA: ICD-10-CM

## 2022-08-17 PROCEDURE — 3725F SCREEN DEPRESSION PERFORMED: CPT | Performed by: INTERNAL MEDICINE

## 2022-08-17 PROCEDURE — 3074F SYST BP LT 130 MM HG: CPT | Performed by: INTERNAL MEDICINE

## 2022-08-17 PROCEDURE — 1170F FXNL STATUS ASSESSED: CPT | Performed by: INTERNAL MEDICINE

## 2022-08-17 PROCEDURE — 1160F RVW MEDS BY RX/DR IN RCRD: CPT | Performed by: INTERNAL MEDICINE

## 2022-08-17 PROCEDURE — 3288F FALL RISK ASSESSMENT DOCD: CPT | Performed by: INTERNAL MEDICINE

## 2022-08-17 PROCEDURE — 3078F DIAST BP <80 MM HG: CPT | Performed by: INTERNAL MEDICINE

## 2022-08-17 PROCEDURE — 99214 OFFICE O/P EST MOD 30 MIN: CPT | Performed by: INTERNAL MEDICINE

## 2022-08-17 PROCEDURE — 1125F AMNT PAIN NOTED PAIN PRSNT: CPT | Performed by: INTERNAL MEDICINE

## 2022-08-17 PROCEDURE — G0439 PPPS, SUBSEQ VISIT: HCPCS | Performed by: INTERNAL MEDICINE

## 2022-08-17 NOTE — PATIENT INSTRUCTIONS
Medicare Preventive Visit Patient Instructions  Thank you for completing your Welcome to Medicare Visit or Medicare Annual Wellness Visit today  Your next wellness visit will be due in one year (8/18/2023)  The screening/preventive services that you may require over the next 5-10 years are detailed below  Some tests may not apply to you based off risk factors and/or age  Screening tests ordered at today's visit but not completed yet may show as past due  Also, please note that scanned in results may not display below  Preventive Screenings:  Service Recommendations Previous Testing/Comments   Colorectal Cancer Screening  · Colonoscopy    · Fecal Occult Blood Test (FOBT)/Fecal Immunochemical Test (FIT)  · Fecal DNA/Cologuard Test  · Flexible Sigmoidoscopy Age: 39-70 years old   Colonoscopy: every 10 years (May be performed more frequently if at higher risk)  OR  FOBT/FIT: every 1 year  OR  Cologuard: every 3 years  OR  Sigmoidoscopy: every 5 years  Screening may be recommended earlier than age 39 if at higher risk for colorectal cancer  Also, an individualized decision between you and your healthcare provider will decide whether screening between the ages of 74-80 would be appropriate   Colonoscopy: 03/24/2016  FOBT/FIT: Not on file  Cologuard: Not on file  Sigmoidoscopy: Not on file          Prostate Cancer Screening Individualized decision between patient and health care provider in men between ages of 53-78   Medicare will cover every 12 months beginning on the day after your 50th birthday PSA: 5 4 ng/mL     Screening Not Indicated     Hepatitis C Screening Once for adults born between 1945 and 1965  More frequently in patients at high risk for Hepatitis C Hep C Antibody: Not on file        Diabetes Screening 1-2 times per year if you're at risk for diabetes or have pre-diabetes Fasting glucose: 107 mg/dL (7/18/2022)  A1C: No results in last 5 years (No results in last 5 years)  Screening Current   Cholesterol Screening Once every 5 years if you don't have a lipid disorder  May order more often based on risk factors  Lipid panel: 10/20/2021  Screening Not Indicated  History Lipid Disorder      Other Preventive Screenings Covered by Medicare:  1  Abdominal Aortic Aneurysm (AAA) Screening: covered once if your at risk  You're considered to be at risk if you have a family history of AAA or a male between the age of 73-68 who smoking at least 100 cigarettes in your lifetime  2  Lung Cancer Screening: covers low dose CT scan once per year if you meet all of the following conditions: (1) Age 50-69; (2) No signs or symptoms of lung cancer; (3) Current smoker or have quit smoking within the last 15 years; (4) You have a tobacco smoking history of at least 20 pack years (packs per day x number of years you smoked); (5) You get a written order from a healthcare provider  3  Glaucoma Screening: covered annually if you're considered high risk: (1) You have diabetes OR (2) Family history of glaucoma OR (3)  aged 48 and older OR (3)  American aged 72 and older  3  Osteoporosis Screening: covered every 2 years if you meet one of the following conditions: (1) Have a vertebral abnormality; (2) On glucocorticoid therapy for more than 3 months; (3) Have primary hyperparathyroidism; (4) On osteoporosis medications and need to assess response to drug therapy  5  HIV Screening: covered annually if you're between the age of 12-76  Also covered annually if you are younger than 13 and older than 72 with risk factors for HIV infection  For pregnant patients, it is covered up to 3 times per pregnancy      Immunizations:  Immunization Recommendations   Influenza Vaccine Annual influenza vaccination during flu season is recommended for all persons aged >= 6 months who do not have contraindications   Pneumococcal Vaccine   * Pneumococcal conjugate vaccine = PCV13 (Prevnar 13), PCV15 (Vaxneuvance), PCV20 (Prevnar 20)  * Pneumococcal polysaccharide vaccine = PPSV23 (Pneumovax) Adults 2364 years old: 1-3 doses may be recommended based on certain risk factors  Adults 72 years old: 1-2 doses may be recommended based off what pneumonia vaccine you previously received   Hepatitis B Vaccine 3 dose series if at intermediate or high risk (ex: diabetes, end stage renal disease, liver disease)   Tetanus (Td) Vaccine - COST NOT COVERED BY MEDICARE PART B Following completion of primary series, a booster dose should be given every 10 years to maintain immunity against tetanus  Td may also be given as tetanus wound prophylaxis  Tdap Vaccine - COST NOT COVERED BY MEDICARE PART B Recommended at least once for all adults  For pregnant patients, recommended with each pregnancy  Shingles Vaccine (Shingrix) - COST NOT COVERED BY MEDICARE PART B  2 shot series recommended in those aged 48 and above     Health Maintenance Due:      Topic Date Due    Hepatitis C Screening  Never done    Colorectal Cancer Screening  03/24/2021     Immunizations Due:      Topic Date Due    COVID-19 Vaccine (3 - Booster for Moderna series) 08/05/2021    Influenza Vaccine (1) 09/01/2022     Advance Directives   What are advance directives? Advance directives are legal documents that state your wishes and plans for medical care  These plans are made ahead of time in case you lose your ability to make decisions for yourself  Advance directives can apply to any medical decision, such as the treatments you want, and if you want to donate organs  What are the types of advance directives? There are many types of advance directives, and each state has rules about how to use them  You may choose a combination of any of the following:  · Living will: This is a written record of the treatment you want  You can also choose which treatments you do not want, which to limit, and which to stop at a certain time  This includes surgery, medicine, IV fluid, and tube feedings  · Durable power of  for healthcare [facility]): This is a written record that states who you want to make healthcare choices for you when you are unable to make them for yourself  This person, called a proxy, is usually a family member or a friend  You may choose more than 1 proxy  · Do not resuscitate (DNR) order:  A DNR order is used in case your heart stops beating or you stop breathing  It is a request not to have certain forms of treatment, such as CPR  A DNR order may be included in other types of advance directives  · Medical directive: This covers the care that you want if you are in a coma, near death, or unable to make decisions for yourself  You can list the treatments you want for each condition  Treatment may include pain medicine, surgery, blood transfusions, dialysis, IV or tube feedings, and a ventilator (breathing machine)  · Values history: This document has questions about your views, beliefs, and how you feel and think about life  This information can help others choose the care that you would choose  Why are advance directives important? An advance directive helps you control your care  Although spoken wishes may be used, it is better to have your wishes written down  Spoken wishes can be misunderstood, or not followed  Treatments may be given even if you do not want them  An advance directive may make it easier for your family to make difficult choices about your care  Weight Management   Why it is important to manage your weight:  Being overweight increases your risk of health conditions such as heart disease, high blood pressure, type 2 diabetes, and certain types of cancer  It can also increase your risk for osteoarthritis, sleep apnea, and other respiratory problems  Aim for a slow, steady weight loss  Even a small amount of weight loss can lower your risk of health problems    How to lose weight safely:  A safe and healthy way to lose weight is to eat fewer calories and get regular exercise  You can lose up about 1 pound a week by decreasing the number of calories you eat by 500 calories each day  Healthy meal plan for weight management:  A healthy meal plan includes a variety of foods, contains fewer calories, and helps you stay healthy  A healthy meal plan includes the following:  · Eat whole-grain foods more often  A healthy meal plan should contain fiber  Fiber is the part of grains, fruits, and vegetables that is not broken down by your body  Whole-grain foods are healthy and provide extra fiber in your diet  Some examples of whole-grain foods are whole-wheat breads and pastas, oatmeal, brown rice, and bulgur  · Eat a variety of vegetables every day  Include dark, leafy greens such as spinach, kale, citlaly greens, and mustard greens  Eat yellow and orange vegetables such as carrots, sweet potatoes, and winter squash  · Eat a variety of fruits every day  Choose fresh or canned fruit (canned in its own juice or light syrup) instead of juice  Fruit juice has very little or no fiber  · Eat low-fat dairy foods  Drink fat-free (skim) milk or 1% milk  Eat fat-free yogurt and low-fat cottage cheese  Try low-fat cheeses such as mozzarella and other reduced-fat cheeses  · Choose meat and other protein foods that are low in fat  Choose beans or other legumes such as split peas or lentils  Choose fish, skinless poultry (chicken or turkey), or lean cuts of red meat (beef or pork)  Before you cook meat or poultry, cut off any visible fat  · Use less fat and oil  Try baking foods instead of frying them  Add less fat, such as margarine, sour cream, regular salad dressing and mayonnaise to foods  Eat fewer high-fat foods  Some examples of high-fat foods include french fries, doughnuts, ice cream, and cakes  · Eat fewer sweets  Limit foods and drinks that are high in sugar  This includes candy, cookies, regular soda, and sweetened drinks    Exercise:  Exercise at least 30 minutes per day on most days of the week  Some examples of exercise include walking, biking, dancing, and swimming  You can also fit in more physical activity by taking the stairs instead of the elevator or parking farther away from stores  Ask your healthcare provider about the best exercise plan for you  © Copyright [employer] 2018 Information is for End User's use only and may not be sold, redistributed or otherwise used for commercial purposes   All illustrations and images included in CareNotes® are the copyrighted property of [employer]  or [address] [place] [address] [employer]

## 2022-08-17 NOTE — PROGRESS NOTES
Assessment and Plan:     Problem List Items Addressed This Visit    None          Preventive health issues were discussed with patient, and age appropriate screening tests were ordered as noted in patient's After Visit Summary  Personalized health advice and appropriate referrals for health education or preventive services given if needed, as noted in patient's After Visit Summary  History of Present Illness:     Patient presents for a Medicare Wellness Visit    This is a very pleasant 76 years young gentleman who is here today for the regular follow-up he is doing well no new complaints except for the back pain thus description of the back pain is intermittent claudication the pain starts with walking relieved by rest usually in the lower back and radiated to the inguinal area bilaterally  Denying any weakness of the leg numbness or tingling on the leg the problems only happen when he is walking or doing activities  Hypertension is very well controlled  Continue to have the problem with the morbid obesity he cannot do much exercise but he is keeping an eye on his diet he lost few lb but he may need to lose much more  Atrial fibrillation with controlled ventricular response and no signs or symptoms of heart failure  He is status post permanent pacemaker for bradycardia       Patient Care Team:  Adrián Graves MD as PCP - Merline Pih, MD Lurene Hutch, MD     Review of Systems:     Review of Systems   Constitutional: Negative for appetite change, fatigue and fever  HENT: Negative for congestion, ear pain, hearing loss, nosebleeds, sneezing, tinnitus and voice change  Eyes: Negative for pain, discharge and redness  Respiratory: Negative for cough, chest tightness and wheezing  Cardiovascular: Negative for chest pain, palpitations and leg swelling  Gastrointestinal: Negative for abdominal pain, blood in stool, constipation, diarrhea, nausea and vomiting     Genitourinary: Negative for difficulty urinating, dysuria, hematuria and urgency  Musculoskeletal: Positive for back pain (Back back pain radiating to the both buttocks specially when he walk)  Negative for arthralgias, gait problem and joint swelling  Skin: Negative for rash and wound  Allergic/Immunologic: Negative for environmental allergies  Neurological: Negative for dizziness, tremors, seizures, weakness, light-headedness and numbness  Hematological: Negative for adenopathy  Does not bruise/bleed easily  Psychiatric/Behavioral: Negative for behavioral problems and confusion  The patient is not nervous/anxious           Problem List:     Patient Active Problem List   Diagnosis    Benign prostatic hyperplasia with urinary frequency    Hypervascular lesion of urinary bladder    Benign essential hypertension    Chronic bilateral low back pain without sciatica    Elevated PSA    Hypercholesterolemia    Obstructive sleep apnea    Hyperuricemia    Secondary polycythemia    Bradycardia    Renal cyst, left    Gallbladder polyp    Hypercalcemia    Stage 2 chronic kidney disease    Paroxysmal atrial fibrillation (HCC)    NSVT (nonsustained ventricular tachycardia) (HCC)    Primary hyperparathyroidism (HCC)    Osteopenia of multiple sites    Body mass index (BMI)40 0-44 9, adult    Severe obesity with body mass index (BMI) of 35 0 to 39 9 with serious comorbidity ([address] )    Pacemaker      Past Medical and Surgical History:     Past Medical History:   Diagnosis Date    Acute gouty arthropathy     last assessed-11/15/2013    Cataract     Diverticulitis of colon     Enlarged prostate     Gout     Hearing loss     Heart disease     History of diverticulitis of colon     History of dizziness     Hypercholesterolemia     Hyperlipidemia     Hypertension     Palpitations     Sinus bradycardia      Past Surgical History:   Procedure Laterality Date    ARM NEUROPLASTY Left     1977    CARDIAC PACEMAKER PLACEMENT Left     CARDIAC SURGERY  2019    pace maker placed    CATARACT EXTRACTION, BILATERAL      COLONOSCOPY      CYSTOSCOPY  2017    diagnostic    FOREARM FRACTURE SURGERY      ORCHIECTOMY      surgery testis    AR CYSTOURETHRO W/IMPLANT N/A 2018    Procedure: CYSTOSCOPY WITH INSERTION UROLIFT;  Surgeon: Kelton Mariano MD;  [facility];  Service: Urology    AR CYSTOURETHROSCOPY,BIOPSY N/A 2018    Procedure: BLADDER BIOPSY;  Surgeon: Kelton Mariano MD;  [facility];  Service: Urology    TESTICLE SURGERY Right 1976      Family History:     Family History   Problem Relation Age of Onset    Coronary artery disease Mother     Heart disease Mother     Hypertension Mother     Stroke Mother     Coronary artery disease Father     Stroke Family     Heart attack Family         acute MI      Social History:     Social History     Socioeconomic History    Marital status: /Civil Union     Spouse name: None    Number of children: None    Years of education: None    Highest education level: None   Occupational History     Comment: retired from work   Tobacco Use    Smoking status: Former Smoker     Packs/day: 0 25     Years: 10 00     Pack years: 2 50     Types: Cigars, Pipe     Quit date:      Years since quittin 6    Smokeless tobacco: Never Used   Vaping Use    Vaping Use: Never used   Substance and Sexual Activity    Alcohol use: Not Currently     Comment: rarely   Per allscripts, drinks alcohol very infrequently    Drug use: No    Sexual activity: Not Currently   Other Topics Concern    None   Social History Narrative    Copy of advanced directive obtained    Exercising regularly-primary form of exercise is work    Recreational activities-he enjoys home crafts and wood working    Travel history-Pt denies being out of the country during 2014-11/10/2014     Social Determinants of Health     Financial Resource Strain: Not on file   Food Insecurity: Not on file   Transportation Needs: Not on file   Physical Activity: Not on file   Stress: Not on file   Social Connections: Not on file   Intimate Partner Violence: Not on file   Housing Stability: Not on file      Medications and Allergies:     Current Outpatient Medications   Medication Sig Dispense Refill    allopurinol (ZYLOPRIM) 300 mg tablet Take 1 tablet (300 mg total) by mouth daily 90 tablet 1    amLODIPine (NORVASC) 5 mg tablet Take 1 tablet (5 mg total) by mouth daily 90 tablet 1    apixaban (ELIQUIS) 5 mg Take 1 tablet (5 mg total) by mouth 2 (two) times a day 180 tablet 3    Carboxymethylcellulose Sodium (EYE DROPS OP) Apply to eye as needed      Cholecalciferol (Vitamin D-3) 125 MCG (5000 UT) TABS Take by mouth daily       lisinopril (ZESTRIL) 10 mg tablet Take 1 tablet (10 mg total) by mouth daily 90 tablet 3    meclizine (ANTIVERT) 25 mg tablet Take 1 tablet (25 mg total) by mouth 2 (two) times a day as needed for dizziness 180 tablet 1    metoprolol succinate (TOPROL-XL) 25 mg 24 hr tablet Take 1 tablet (25 mg total) by mouth daily 90 tablet 3    simvastatin (ZOCOR) 40 mg tablet Take 1 tablet (40 mg total) by mouth every other day 45 tablet 1     No current facility-administered medications for this visit       Allergies   Allergen Reactions    Pollen Extract Allergic Rhinitis      Immunizations:     Immunization History   Administered Date(s) Administered    COVID-19 MODERNA VACC 0 5 ML IM 02/05/2021, 03/05/2021    INFLUENZA 11/17/2008, 09/28/2009, 10/13/2010, 10/30/2015, 11/15/2016, 10/04/2017    Influenza Split High Dose Preservative Free IM 10/04/2013, 09/24/2014, 10/30/2015, 11/15/2016, 10/04/2017    Influenza, high dose seasonal 0 7 mL 10/02/2018, 11/13/2019, 11/25/2020, 11/02/2021    Influenza, seasonal, injectable 11/11/2011, 11/06/2012    Pneumococcal Conjugate 13-Valent 03/22/2016    Pneumococcal Polysaccharide PPV23 05/10/2011    Zoster 01/01/2011    Zoster Vaccine Recombinant 07/30/2020, 10/05/2020      Health Maintenance:         Topic Date Due    Hepatitis C Screening  Never done    Colorectal Cancer Screening  03/24/2021         Topic Date Due    COVID-19 Vaccine (3 - Booster for Moderna series) 08/05/2021    Influenza Vaccine (1) 09/01/2022      Medicare Screening Tests and Risk Assessments:     Staci Galeano is here for his Subsequent Wellness visit  Last Medicare Wellness visit information reviewed, patient interviewed and updates made to the record today  Health Risk Assessment:   Patient rates overall health as very good  Patient feels that their physical health rating is same  Patient is very satisfied with their life  Eyesight was rated as same  Hearing was rated as same  Patient feels that their emotional and mental health rating is slightly better  Patients states they are never, rarely angry  Patient states they are never, rarely unusually tired/fatigued  Pain experienced in the last 7 days has been none  Patient states that he has experienced no weight loss or gain in last 6 months  Depression Screening:   PHQ-2 Score: 0      Fall Risk Screening: In the past year, patient has experienced: no history of falling in past year      Home Safety:  Patient does not have trouble with stairs inside or outside of their home  Patient has working smoke alarms and has working carbon monoxide detector  Home safety hazards include: none  Nutrition:   Current diet is Regular  Medications:   Patient is not currently taking any over-the-counter supplements  Patient is able to manage medications  Activities of Daily Living (ADLs)/Instrumental Activities of Daily Living (IADLs):   Walk and transfer into and out of bed and chair?: Yes  Dress and groom yourself?: Yes    Bathe or shower yourself?: Yes    Feed yourself?  Yes  Do your laundry/housekeeping?: Yes  Manage your money, pay your bills and track your expenses?: Yes  Make your own meals?: Yes    Do your own shopping?: Yes    Previous Hospitalizations:   Any hospitalizations or ED visits within the last 12 months?: No      Advance Care Planning:   Living will: Yes    Durable POA for healthcare: Yes    Advanced directive: Yes    Advanced directive counseling given: Yes      Cognitive Screening:   Provider or family/friend/caregiver concerned regarding cognition?: No    PREVENTIVE SCREENINGS      Cardiovascular Screening:    General: Screening Not Indicated and History Lipid Disorder      Diabetes Screening:     General: Screening Current      Prostate Cancer Screening:    General: Screening Not Indicated      Osteoporosis Screening:    General: Screening Current      Abdominal Aortic Aneurysm (AAA) Screening:    Risk factors include: tobacco use        General: Screening Current      Lung Cancer Screening:     General: Screening Not Indicated    No exam data present     Physical Exam:     There were no vitals taken for this visit  Physical Exam  Vitals and nursing note reviewed  Constitutional:       Appearance: Normal appearance  He is obese  HENT:      Head: Normocephalic and atraumatic  Right Ear: Tympanic membrane normal       Left Ear: Tympanic membrane normal       Nose: Nose normal       Mouth/Throat:      Mouth: Mucous membranes are moist    Eyes:      Extraocular Movements: Extraocular movements intact  Pupils: Pupils are equal, round, and reactive to light  Cardiovascular:      Rate and Rhythm: Normal rate and regular rhythm  Pulses: Normal pulses  Heart sounds: Normal heart sounds  Pulmonary:      Effort: Pulmonary effort is normal       Breath sounds: Normal breath sounds  Abdominal:      General: Abdomen is flat  Bowel sounds are normal       Palpations: Abdomen is soft  Musculoskeletal:         General: No swelling, tenderness or deformity  Normal range of motion  Cervical back: Normal range of motion and neck supple        Right lower leg: Edema present  Left lower leg: Edema present  Skin:     General: Skin is warm  Capillary Refill: Capillary refill takes 2 to 3 seconds  Neurological:      General: No focal deficit present  Mental Status: He is alert and oriented to person, place, and time  Mental status is at baseline     Psychiatric:         Mood and Affect: Mood normal          Behavior: Behavior normal           Ney Boudreaux MD

## 2022-08-26 ENCOUNTER — IN-CLINIC DEVICE VISIT (OUTPATIENT)
Dept: CARDIOLOGY CLINIC | Facility: CLINIC | Age: 77
End: 2022-08-26
Payer: COMMERCIAL

## 2022-08-26 DIAGNOSIS — Z95.0 CARDIAC PACEMAKER IN SITU: Primary | ICD-10-CM

## 2022-08-26 PROCEDURE — 93280 PM DEVICE PROGR EVAL DUAL: CPT | Performed by: INTERNAL MEDICINE

## 2022-08-26 NOTE — PROGRESS NOTES
Results for orders placed or performed in visit on 08/26/22   Cardiac EP device report    Narrative    MDT-DUAL CHAMBER PPM (AAIR-DDDR MODE)/ACTIVE SYSTEM IS MRI CONDITIONAL  DEVICE INTERROGATED IN THE [facility] OFFICE: BATTERY VOLTAGE ADEQUATE-8 5 YRS  AP 98%  97%  ALL AVAILABLE LEAD PARAMETERS WITHIN NORMAL LIMITS  NO SIGNIFICANT HIGH RATE EPISODES  NORMAL DEVICE FUNCTION   NC

## 2022-08-29 DIAGNOSIS — E78.5 HYPERLIPIDEMIA, UNSPECIFIED HYPERLIPIDEMIA TYPE: ICD-10-CM

## 2022-08-29 DIAGNOSIS — I10 ESSENTIAL HYPERTENSION, BENIGN: ICD-10-CM

## 2022-08-29 RX ORDER — SIMVASTATIN 40 MG
40 TABLET ORAL EVERY OTHER DAY
Qty: 45 TABLET | Refills: 1 | Status: SHIPPED | OUTPATIENT
Start: 2022-08-29

## 2022-08-29 RX ORDER — AMLODIPINE BESYLATE 5 MG/1
5 TABLET ORAL DAILY
Qty: 90 TABLET | Refills: 1 | Status: SHIPPED | OUTPATIENT
Start: 2022-08-29

## 2022-09-06 DIAGNOSIS — D09.9 SQUAMOUS CELL CARCINOMA IN SITU: Primary | ICD-10-CM

## 2022-09-09 ENCOUNTER — TELEPHONE (OUTPATIENT)
Dept: HEMATOLOGY ONCOLOGY | Facility: CLINIC | Age: 77
End: 2022-09-09

## 2022-09-09 NOTE — TELEPHONE ENCOUNTER
Appointment Cancellation Or Reschedule     Person calling in Spouse Michelle   Provider Dr Pepe Dang   Office Visit Date and Time  12/8/22   Office Visit Location [facility]   Did patient want to reschedule their office appointment? If so, when was it scheduled to? no   Did you have STAR scheduled for this appointment? no   Do you need STAR set up for your new appointment? If yes, please send to "PATIENT RIDESHARE" pool for STAR rescheduling no   If you are cancelling appointment, can we notify STAR to cancel ride? If yes, please send to "PATIENT RIDESHARE" pool for STAR to cancel service no   Is this patient calling to reschedule an infusion appointment? no   When is their next infusion appointment? no   Is this patient a Chemo patient? no   Reason for Cancellation or Reschedule Will call back to reschedule     If the patient is a treatment patient, please route this to the office nurse  If the patient is not on treatment, please route to the office MA  If the patient is a surgical oncology patient, please route to surg/onc clinical pool

## 2022-10-21 ENCOUNTER — IMMUNIZATIONS (OUTPATIENT)
Dept: INTERNAL MEDICINE CLINIC | Age: 77
End: 2022-10-21
Payer: COMMERCIAL

## 2022-10-21 DIAGNOSIS — Z23 ENCOUNTER FOR IMMUNIZATION: Primary | ICD-10-CM

## 2022-10-21 PROCEDURE — 90662 IIV NO PRSV INCREASED AG IM: CPT

## 2022-10-21 PROCEDURE — G0008 ADMIN INFLUENZA VIRUS VAC: HCPCS

## 2022-10-25 ENCOUNTER — LAB REQUISITION (OUTPATIENT)
Dept: LAB | Facility: HOSPITAL | Age: 77
End: 2022-10-25

## 2022-10-25 DIAGNOSIS — D04.61 CARCINOMA IN SITU OF SKIN OF RIGHT UPPER LIMB, INCLUDING SHOULDER: ICD-10-CM

## 2022-10-28 DIAGNOSIS — R42 DIZZINESS: ICD-10-CM

## 2022-10-28 RX ORDER — MECLIZINE HYDROCHLORIDE 25 MG/1
25 TABLET ORAL 2 TIMES DAILY PRN
Qty: 180 TABLET | Refills: 1 | Status: SHIPPED | OUTPATIENT
Start: 2022-10-28

## 2022-10-31 DIAGNOSIS — M10.9 GOUT, UNSPECIFIED CAUSE, UNSPECIFIED CHRONICITY, UNSPECIFIED SITE: ICD-10-CM

## 2022-10-31 RX ORDER — ALLOPURINOL 300 MG/1
300 TABLET ORAL DAILY
Qty: 90 TABLET | Refills: 1 | Status: SHIPPED | OUTPATIENT
Start: 2022-10-31

## 2022-11-28 ENCOUNTER — REMOTE DEVICE CLINIC VISIT (OUTPATIENT)
Dept: CARDIOLOGY CLINIC | Facility: CLINIC | Age: 77
End: 2022-11-28

## 2022-11-28 DIAGNOSIS — Z95.0 PRESENCE OF PERMANENT CARDIAC PACEMAKER: Primary | ICD-10-CM

## 2022-11-28 NOTE — PROGRESS NOTES
MDT-DUAL CHAMBER PPM (AAIR-DDDR MODE)/ACTIVE SYSTEM IS MRI CONDITIONAL   CARELINK TRANSMISSION:  BATTERY VOLTAGE ADEQUATE (8 3 YR )   AP 98 1%  97 6% (>40%/DDDR 70 PPM, MVP ON)   ALL LEAD PARAMETERS WITHIN NORMAL LIMITS   1 EPISODE OF NSVT (7 @ 164 BPM)   2 AF EPISODES WITH LONGEST 27 MIN  19 SEC     EF 55% (ECHO 07/2022)   PT TAKES ELIQUIS AND METOPROLOL   NORMAL DEVICE FUNCTION   RG

## 2022-12-02 NOTE — PROGRESS NOTES
Electrophysiology Office Note    Rosana Leon  1945  [id number]  [facility]  [address]        Assessment/Plan      1  Pacemaker        2  Symptomatic bradycardia        3  Paroxysmal atrial fibrillation (HCC)  POCT ECG      4  NSVT (nonsustained ventricular tachycardia)        5  PVCs (premature ventricular contractions)            PLAN:  1  Bradycardia pacemaker in situ working appropriately    2  PAF we have anticoagulated    3  NSVT and PVCs asymptomatic    Pt has dental appointment coming up  Pt is asking if OK to hold Eliquis  This is OK for a 24 hour hold per Dr Roddy Hinds  Pt is asking if OK to use a welding unit with 110 amps  Dr contacted [employer] Rep-yes, but be sure to have someone with him at that time  Pt should be aware of any symptoms that come up if welding  Pt understands  HPI    Kaylen Thapa returns to the office for a 1 year follow up office visit with Dr Roddy Hinds  Pt has no complaints today  Echocardiogram personally reviewed with pt-looks good  Labs look good  Pacemaker is functioning, lead is working well  Abdominal ultrasound reviewed with the pt  Pt worked in [place] at a [employer], retired  Son, Lobo Gunn, is a paramedic for the [employer] [employer]  Dtr in [address] works at [employer], with "residency program"  Granddaughter is still deciding, she is 25, and is working at [email or web address] and [employer]  History of Present Illness      As mentioned in a previous note by Christoph Pike, dated 11/9/2021,   HPI/INTERVAL HISTORY: Rosana Leon is a 76 y o  male with history as above who presents to [facility] EP office today under direction of Dr Roddy Hinds for routine follow up  Since last OV from EP standpoint he has no new concerns or complaints  Not having any LH, dizziness, ELLIS, LE edema  Able to work on his yard without limitations   No side effects from any medications  ROS    negative for chest pain, shortness of breath, palpitations, LH, dizziness, and syncope  All 12 point ROS needed for complaints of      VITALS:  Blood pressure 118/80, pulse 82, height 5' 10" (1 778 m), weight 117 kg (258 lb)  Historical Information   Social History     Socioeconomic History   • Marital status: /Civil Union     Spouse name: Not on file   • Number of children: Not on file   • Years of education: Not on file   • Highest education level: Not on file   Occupational History     Comment: retired from work   Tobacco Use   • Smoking status: Former     Packs/day: 0 25     Years: 10 00     Pack years: 2 50     Types: Cigars, Pipe, Cigarettes     Quit date:      Years since quittin 0   • Smokeless tobacco: Never   Vaping Use   • Vaping Use: Never used   Substance and Sexual Activity   • Alcohol use: Not Currently     Comment: rarely   Per allscripts, drinks alcohol very infrequently   • Drug use: No   • Sexual activity: Not Currently   Other Topics Concern   • Not on file   Social History Narrative    Copy of advanced directive obtained    Exercising regularly-primary form of exercise is work    Recreational activities-he enjoys home crafts and wood working    Travel history-Pt denies being out of the country during 2014-11/10/2014     Social Determinants of Health     Financial Resource Strain: Not on file   Food Insecurity: Not on file   Transportation Needs: Not on file   Physical Activity: Not on file   Stress: Not on file   Social Connections: Not on file   Intimate Partner Violence: Not on file   Housing Stability: Not on file     Past Medical History:   Diagnosis Date   • Acute gouty arthropathy     last assessed-11/15/2013   • Cataract    • Diverticulitis of colon    • Enlarged prostate    • Gout    • Hearing loss    • Heart disease    • History of diverticulitis of colon    • History of dizziness    • Hypercholesterolemia    • Hyperlipidemia    • Hypertension    • Palpitations    • Sinus bradycardia      Past Surgical History:   Procedure Laterality Date   • ARM NEUROPLASTY Left        • CARDIAC PACEMAKER PLACEMENT Left    • CARDIAC SURGERY  2019    pace maker placed   • CATARACT EXTRACTION, BILATERAL     • COLONOSCOPY     • CYSTOSCOPY  2017    diagnostic   • FOREARM FRACTURE SURGERY     • ORCHIECTOMY      surgery testis   • MO CYSTOURETHRO W/IMPLANT N/A 2018    Procedure: CYSTOSCOPY WITH INSERTION Fletcher Rachele;  Surgeon: Oscar Martinez MD;  [facility];  Service: Urology   • MO CYSTOURETHROSCOPY,BIOPSY N/A 2018    Procedure: BLADDER BIOPSY;  Surgeon: Ocsar Martinez MD;  [facility];  Service: Urology   • TESTICLE SURGERY Right      Social History     Substance and Sexual Activity   Alcohol Use Not Currently    Comment: rarely   Per allscripts, drinks alcohol very infrequently     Social History     Substance and Sexual Activity   Drug Use No     Social History     Tobacco Use   Smoking Status Former   • Packs/day: 0 25   • Years: 10 00   • Pack years: 2 50   • Types: Cigars, Pipe, Cigarettes   • Quit date: 26   • Years since quittin 0   Smokeless Tobacco Never     Family History   Problem Relation Age of Onset   • Coronary artery disease Mother    • Heart disease Mother    • Hypertension Mother    • Stroke Mother    • Coronary artery disease Father    • Stroke Family    • Heart attack Family         acute MI       Meds/Allergies       Current Outpatient Medications:   •  allopurinol (ZYLOPRIM) 300 mg tablet, Take 1 tablet (300 mg total) by mouth daily, Disp: 90 tablet, Rfl: 1  •  amLODIPine (NORVASC) 5 mg tablet, Take 1 tablet (5 mg total) by mouth daily, Disp: 90 tablet, Rfl: 1  •  apixaban (ELIQUIS) 5 mg, Take 1 tablet (5 mg total) by mouth 2 (two) times a day, Disp: 180 tablet, Rfl: 3  •  betamethasone, augmented, (DIPROLENE-AF) 0 05 % cream, , Disp: , Rfl:   • Carboxymethylcellulose Sodium (EYE DROPS OP), Apply to eye as needed, Disp: , Rfl:   •  Cholecalciferol (Vitamin D-3) 125 MCG (5000 UT) TABS, Take by mouth daily , Disp: , Rfl:   •  lisinopril (ZESTRIL) 10 mg tablet, Take 1 tablet (10 mg total) by mouth daily, Disp: 90 tablet, Rfl: 3  •  meclizine (ANTIVERT) 25 mg tablet, Take 1 tablet (25 mg total) by mouth 2 (two) times a day as needed for dizziness, Disp: 180 tablet, Rfl: 1  •  metoprolol succinate (TOPROL-XL) 25 mg 24 hr tablet, Take 1 tablet (25 mg total) by mouth daily, Disp: 90 tablet, Rfl: 3  •  simvastatin (ZOCOR) 40 mg tablet, Take 1 tablet (40 mg total) by mouth every other day, Disp: 45 tablet, Rfl: 1    Allergies   Allergen Reactions   • Pollen Extract Allergic Rhinitis       Objective   Vitals:    Blood pressure 118/80, pulse 82, height 5' 10" (1 778 m), weight 117 kg (258 lb)  GEN: patient appears well, alert and oriented x 3, pleasant and cooperative   HEENT: pupils equal, round, and reactive to light; extraocular muscles intact  NECK: supple, no carotid bruits   HEART: regular rhythm, normal S1 and S2, no murmurs, clicks, gallops or rubs   LUNGS: clear to auscultation bilaterally; no wheezes, rales, or rhonchi   ABDOMEN: normal bowel sounds, soft, no tenderness, no distention  EXTREMITIES: peripheral pulses normal; no clubbing, cyanosis, or edema  NEURO: no focal findings   SKIN: normal without suspicious lesions on exposed skin      Labs:not applicable    Imaging: I have personally reviewed pertinent reports  Cardiac Testing:     ECHO: No results found for this or any previous visit        CATH/STRESS TEST:   None     EKG:

## 2022-12-07 ENCOUNTER — OFFICE VISIT (OUTPATIENT)
Dept: CARDIOLOGY CLINIC | Facility: CLINIC | Age: 77
End: 2022-12-07

## 2022-12-07 VITALS
DIASTOLIC BLOOD PRESSURE: 80 MMHG | HEIGHT: 70 IN | WEIGHT: 258 LBS | HEART RATE: 82 BPM | SYSTOLIC BLOOD PRESSURE: 118 MMHG | BODY MASS INDEX: 36.94 KG/M2

## 2022-12-07 DIAGNOSIS — I49.3 PVCS (PREMATURE VENTRICULAR CONTRACTIONS): ICD-10-CM

## 2022-12-07 DIAGNOSIS — I47.29 NSVT (NONSUSTAINED VENTRICULAR TACHYCARDIA): ICD-10-CM

## 2022-12-07 DIAGNOSIS — I48.0 PAROXYSMAL ATRIAL FIBRILLATION (HCC): ICD-10-CM

## 2022-12-07 DIAGNOSIS — Z95.0 PACEMAKER: Primary | ICD-10-CM

## 2022-12-07 DIAGNOSIS — R00.1 SYMPTOMATIC BRADYCARDIA: ICD-10-CM

## 2022-12-07 RX ORDER — BETAMETHASONE DIPROPIONATE 0.5 MG/G
CREAM TOPICAL
COMMUNITY
Start: 2022-10-17

## 2023-01-03 PROBLEM — I49.3 PVCS (PREMATURE VENTRICULAR CONTRACTIONS): Status: ACTIVE | Noted: 2023-01-03

## 2023-01-03 PROBLEM — R00.1 SYMPTOMATIC BRADYCARDIA: Status: ACTIVE | Noted: 2023-01-03

## 2023-01-11 ENCOUNTER — TELEPHONE (OUTPATIENT)
Dept: HEMATOLOGY ONCOLOGY | Facility: CLINIC | Age: 78
End: 2023-01-11

## 2023-01-11 NOTE — TELEPHONE ENCOUNTER
Scheduling Appointment SEND TO \[facility]\""    Who Is Calling to Schedule Michelle, wife    Doctor Dr Cristina Laureano   Date and Time 06/12 at 8:00am   Reason for scheduling appointment Follow up    Patient verbalized understanding   Yes

## 2023-01-19 DIAGNOSIS — I10 BENIGN ESSENTIAL HYPERTENSION: ICD-10-CM

## 2023-01-19 RX ORDER — LISINOPRIL 10 MG/1
10 TABLET ORAL DAILY
Qty: 90 TABLET | Refills: 3 | Status: SHIPPED | OUTPATIENT
Start: 2023-01-19

## 2023-02-14 ENCOUNTER — APPOINTMENT (OUTPATIENT)
Dept: LAB | Age: 78
End: 2023-02-14

## 2023-02-14 DIAGNOSIS — D75.1 POLYCYTHEMIA: ICD-10-CM

## 2023-02-14 DIAGNOSIS — R97.20 ELEVATED PSA: Primary | ICD-10-CM

## 2023-02-14 DIAGNOSIS — E21.0 PRIMARY HYPERPARATHYROIDISM (HCC): ICD-10-CM

## 2023-02-14 LAB
ANION GAP SERPL CALCULATED.3IONS-SCNC: 4 MMOL/L (ref 4–13)
BASOPHILS # BLD AUTO: 0.04 THOUSANDS/ÂΜL (ref 0–0.1)
BASOPHILS NFR BLD AUTO: 1 % (ref 0–1)
BUN SERPL-MCNC: 22 MG/DL (ref 5–25)
CA-I BLD-SCNC: 1.37 MMOL/L (ref 1.12–1.32)
CALCIUM SERPL-MCNC: 10.5 MG/DL (ref 8.3–10.1)
CHLORIDE SERPL-SCNC: 110 MMOL/L (ref 96–108)
CO2 SERPL-SCNC: 25 MMOL/L (ref 21–32)
CREAT SERPL-MCNC: 1.24 MG/DL (ref 0.6–1.3)
EOSINOPHIL # BLD AUTO: 0.14 THOUSAND/ÂΜL (ref 0–0.61)
EOSINOPHIL NFR BLD AUTO: 2 % (ref 0–6)
ERYTHROCYTE [DISTWIDTH] IN BLOOD BY AUTOMATED COUNT: 13.6 % (ref 11.6–15.1)
GFR SERPL CREATININE-BSD FRML MDRD: 54 ML/MIN/1.73SQ M
GLUCOSE P FAST SERPL-MCNC: 103 MG/DL (ref 65–99)
HCT VFR BLD AUTO: 55 % (ref 36.5–49.3)
HGB BLD-MCNC: 18.4 G/DL (ref 12–17)
IMM GRANULOCYTES # BLD AUTO: 0.02 THOUSAND/UL (ref 0–0.2)
IMM GRANULOCYTES NFR BLD AUTO: 0 % (ref 0–2)
LYMPHOCYTES # BLD AUTO: 1.59 THOUSANDS/ÂΜL (ref 0.6–4.47)
LYMPHOCYTES NFR BLD AUTO: 19 % (ref 14–44)
MCH RBC QN AUTO: 31.3 PG (ref 26.8–34.3)
MCHC RBC AUTO-ENTMCNC: 33.5 G/DL (ref 31.4–37.4)
MCV RBC AUTO: 94 FL (ref 82–98)
MONOCYTES # BLD AUTO: 0.82 THOUSAND/ÂΜL (ref 0.17–1.22)
MONOCYTES NFR BLD AUTO: 10 % (ref 4–12)
NEUTROPHILS # BLD AUTO: 5.88 THOUSANDS/ÂΜL (ref 1.85–7.62)
NEUTS SEG NFR BLD AUTO: 68 % (ref 43–75)
NRBC BLD AUTO-RTO: 0 /100 WBCS
PLATELET # BLD AUTO: 193 THOUSANDS/UL (ref 149–390)
PMV BLD AUTO: 11.8 FL (ref 8.9–12.7)
POTASSIUM SERPL-SCNC: 4.4 MMOL/L (ref 3.5–5.3)
RBC # BLD AUTO: 5.88 MILLION/UL (ref 3.88–5.62)
SODIUM SERPL-SCNC: 139 MMOL/L (ref 135–147)
WBC # BLD AUTO: 8.49 THOUSAND/UL (ref 4.31–10.16)

## 2023-03-02 ENCOUNTER — OFFICE VISIT (OUTPATIENT)
Dept: INTERNAL MEDICINE CLINIC | Age: 78
End: 2023-03-02

## 2023-03-02 VITALS
SYSTOLIC BLOOD PRESSURE: 132 MMHG | HEIGHT: 70 IN | HEART RATE: 78 BPM | BODY MASS INDEX: 36.68 KG/M2 | WEIGHT: 256.2 LBS | TEMPERATURE: 97.3 F | OXYGEN SATURATION: 95 % | DIASTOLIC BLOOD PRESSURE: 86 MMHG

## 2023-03-02 DIAGNOSIS — R73.01 ELEVATED FASTING BLOOD SUGAR: ICD-10-CM

## 2023-03-02 DIAGNOSIS — E21.0 PRIMARY HYPERPARATHYROIDISM (HCC): ICD-10-CM

## 2023-03-02 DIAGNOSIS — M85.89 OSTEOPENIA OF MULTIPLE SITES: ICD-10-CM

## 2023-03-02 DIAGNOSIS — E78.5 HYPERLIPIDEMIA, UNSPECIFIED HYPERLIPIDEMIA TYPE: ICD-10-CM

## 2023-03-02 DIAGNOSIS — I10 ESSENTIAL HYPERTENSION, BENIGN: Primary | ICD-10-CM

## 2023-03-02 DIAGNOSIS — I48.0 PAROXYSMAL ATRIAL FIBRILLATION (HCC): ICD-10-CM

## 2023-03-02 DIAGNOSIS — N18.31 STAGE 3A CHRONIC KIDNEY DISEASE (HCC): ICD-10-CM

## 2023-03-02 RX ORDER — AMLODIPINE BESYLATE 5 MG/1
5 TABLET ORAL DAILY
Qty: 90 TABLET | Refills: 1 | Status: SHIPPED | OUTPATIENT
Start: 2023-03-02

## 2023-03-02 RX ORDER — SIMVASTATIN 40 MG
40 TABLET ORAL EVERY OTHER DAY
Qty: 45 TABLET | Refills: 1 | Status: SHIPPED | OUTPATIENT
Start: 2023-03-02

## 2023-03-02 NOTE — PROGRESS NOTES
[facility] PRIMARY CARE [facility]:  ASSESSMENT/PLAN:  Diagnoses and all orders for this visit:    Hypertension:  · History of HTN; controlled at this time  · Blood pressure in office today 132/86  · Current regimen: Lisinopril 10 mg, Metoprolol succinate 25 mg, and Amlodipine 5 mg daily   · Continue current regimen    Paroxysmal Atrial Fibrillation:  · Well controlled - asymptomatic and NSR on examination   · S/P pacemaker placement due to symptomatic bradycardia additionally  · Rate Control: Metoprolol succinate 25 mg daily   · Anticoagulation: Eliquis 5 mg BID  · Continue current regimen and routine follow up with Electrophysiology    Hyperlipidemia:  · History of HLD; most recent lipid panel completed 10/2021  · Cholesterol 155, , HDL 35, and LDL 85  · Current regimen: Simvastatin 40 mg daily   · Advised lifestyle modifications including diet and exercise  · Continue current regimen     Elevated Fasting Blood Sugar:  ·  on 2/14/2023; no hemoglobin A1c  · Will continue to monitor off medication  · Advised lifestyle modifications including diet and exercise     Chronic Kidney Disease Stage IIIA:  · Likely 2/2 to hypertensive nephropathy   · Most recent labs completed 2/14/2023  · BUN/Creatinine 22/1 24, eGFR 54  · Will continue to clinically monitor - blood pressure control as above  · Repeat CMP prior to next visit     Primary Hyperparathyroidism:  ·  5 (7/2021), Vit D 11 3 (3/2021), Calcium 10 5 and Ionized Calcium 1 37 (2/12023)  · Most recent DEXA 10/2020 with T-Score of -1 8 consistent with osteopenia  · Repeat PTH, Calcium, and Vit D levels ordered and to be completed  · DEXA scan ordered and to be completed   · Continue on Cholecalciferol 125 mcg (5000 UT) tabs daily    Osteopenia:  · Recent DEXA completed 10/2020 which revealed a T score of -1 8  · Findings consistent with osteopenia  · Likely secondary to primary hyperparathyroidism as above  · Repeat DEXA scan ordered and to be completed  · Continue on vitamin D supplementiation    BMI Counseling: Body mass index is 36 76 kg/m²  The BMI is above normal  Nutrition recommendations include 3-5 servings of fruits/vegetables daily, consuming healthier snacks and reducing intake of saturated fat and trans fat  Exercise recommendations include exercising 3-5 times per week  Schedule a follow-up appointment in 4 months for continue follow up  CHIEF COMPLAINT: 4 month follow up     HISTORY OF PRESENT ILLNESS:  Mr Carrie Sutherland is a 76year old male with a PMHx of primary hyperparathyroidism, ELLYN, Afib on Eliquis, HTN, osteopenia, and CKD III, symptomatic bradycardia S/P PM, HLD, and BPH who presents to the office today for routine follow up  Patient is doing well overall today  Asymptomatic and with no complaints  Denies chest pain, shortness of breath, dizziness/lightheadedness, palpitations, GI symptoms, or changes in his vision or hearing  Lab work completed today and reviewed with the patient  Labs work revealing hypercalcemia which it has revealed in the past  Currently denies depression/anxiety, urinary symptoms/flank pain, or abdominal pain  He remains compliant with his current treatment regimen  Continues to work on his diet and make overall lifestyles changes  Remains compliant with follow up  The following portions of the patient's history were reviewed and updated as appropriate: allergies, current medications, past family history, past medical history, past social history, past surgical history and problem list     Review of Systems   Constitutional: Negative for activity change, appetite change, fatigue and unexpected weight change  HENT: Negative for congestion  Eyes: Negative for visual disturbance  Respiratory: Negative for apnea, cough, chest tightness, shortness of breath and wheezing  Cardiovascular: Negative for chest pain, palpitations and leg swelling     Gastrointestinal: Negative for abdominal distention, abdominal pain, constipation, diarrhea and vomiting  Genitourinary: Negative for difficulty urinating  Musculoskeletal: Negative for back pain and gait problem  Skin: Negative for color change and pallor  Neurological: Negative for dizziness, weakness, light-headedness, numbness and headaches  Psychiatric/Behavioral: Negative for agitation and confusion  OBJECTIVE:  There were no vitals filed for this visit  Physical Exam  Constitutional:       General: He is not in acute distress  Appearance: He is normal weight  He is not ill-appearing or toxic-appearing  HENT:      Head: Normocephalic and atraumatic  Nose: Nose normal  No congestion  Mouth/Throat:      Mouth: Mucous membranes are moist       Pharynx: Oropharynx is clear  No oropharyngeal exudate  Eyes:      General: No scleral icterus  Cardiovascular:      Rate and Rhythm: Normal rate and regular rhythm  Pulses: Normal pulses  Heart sounds: Normal heart sounds  No murmur heard  Pulmonary:      Effort: Pulmonary effort is normal  No respiratory distress  Breath sounds: Normal breath sounds  No wheezing, rhonchi or rales  Abdominal:      General: Abdomen is flat  Bowel sounds are normal  There is no distension  Palpations: Abdomen is soft  Tenderness: There is no abdominal tenderness  There is no guarding  Hernia: No hernia is present  Musculoskeletal:         General: Normal range of motion  Cervical back: Normal range of motion  Right lower leg: No edema  Left lower leg: No edema  Skin:     General: Skin is warm  Capillary Refill: Capillary refill takes less than 2 seconds  Coloration: Skin is not pale  Neurological:      Mental Status: He is alert and oriented to person, place, and time     Psychiatric:         Mood and Affect: Mood normal          Behavior: Behavior normal            Current Outpatient Medications:   •  lisinopril (ZESTRIL) 10 mg tablet, Take 1 tablet (10 mg total) by mouth daily, Disp: 90 tablet, Rfl: 3  •  allopurinol (ZYLOPRIM) 300 mg tablet, Take 1 tablet (300 mg total) by mouth daily, Disp: 90 tablet, Rfl: 1  •  amLODIPine (NORVASC) 5 mg tablet, Take 1 tablet (5 mg total) by mouth daily, Disp: 90 tablet, Rfl: 1  •  apixaban (ELIQUIS) 5 mg, Take 1 tablet (5 mg total) by mouth 2 (two) times a day, Disp: 180 tablet, Rfl: 3  •  betamethasone, augmented, (DIPROLENE-AF) 0 05 % cream, , Disp: , Rfl:   •  Carboxymethylcellulose Sodium (EYE DROPS OP), Apply to eye as needed, Disp: , Rfl:   •  Cholecalciferol (Vitamin D-3) 125 MCG (5000 UT) TABS, Take by mouth daily , Disp: , Rfl:   •  meclizine (ANTIVERT) 25 mg tablet, Take 1 tablet (25 mg total) by mouth 2 (two) times a day as needed for dizziness, Disp: 180 tablet, Rfl: 1  •  metoprolol succinate (TOPROL-XL) 25 mg 24 hr tablet, Take 1 tablet (25 mg total) by mouth daily, Disp: 90 tablet, Rfl: 3  •  simvastatin (ZOCOR) 40 mg tablet, Take 1 tablet (40 mg total) by mouth every other day, Disp: 45 tablet, Rfl: 1    Past Medical History:   Diagnosis Date   • Acute gouty arthropathy     last assessed-11/15/2013   • Cataract    • Diverticulitis of colon    • Enlarged prostate    • Gout    • Hearing loss    • Heart disease    • History of diverticulitis of colon    • History of dizziness    • Hypercholesterolemia    • Hyperlipidemia    • Hypertension    • Palpitations    • Sinus bradycardia      Past Surgical History:   Procedure Laterality Date   • ARM NEUROPLASTY Left     1977   • CARDIAC PACEMAKER PLACEMENT Left    • CARDIAC SURGERY  05/01/2019    pace maker placed   • CATARACT EXTRACTION, BILATERAL  2021   • COLONOSCOPY     • CYSTOSCOPY  12/27/2017    diagnostic   • FOREARM FRACTURE SURGERY     • ORCHIECTOMY      surgery testis   • UT CYSTOURETHRO W/IMPLANT N/A 2/9/2018    Procedure: CYSTOSCOPY WITH INSERTION Kwame Maddox;  Surgeon: Sena Chaudhry MD;  Location: [facility];  Service: Urology • MD CYSTOURETHROSCOPY,BIOPSY N/A 2018    Procedure: BLADDER BIOPSY;  Surgeon: Malia Saucedo MD;  Location: [facility];  Service: Urology   • TESTICLE SURGERY Right      Social History     Socioeconomic History   • Marital status: /Civil Union     Spouse name: Not on file   • Number of children: Not on file   • Years of education: Not on file   • Highest education level: Not on file   Occupational History     Comment: retired from work   Tobacco Use   • Smoking status: Former     Packs/day: 0 25     Years: 10 00     Pack years: 2 50     Types: Cigars, Pipe, Cigarettes     Quit date:      Years since quittin 1   • Smokeless tobacco: Never   Vaping Use   • Vaping Use: Never used   Substance and Sexual Activity   • Alcohol use: Not Currently     Comment: rarely   Per allscripts, drinks alcohol very infrequently   • Drug use: No   • Sexual activity: Not Currently   Other Topics Concern   • Not on file   Social History Narrative    Copy of advanced directive obtained    Exercising regularly-primary form of exercise is work    Recreational activities-he enjoys home crafts and wood working    Travel history-Pt denies being out of the country during 2014-11/10/2014     Social Determinants of Health     Financial Resource Strain: Not on file   Food Insecurity: Not on file   Transportation Needs: Not on file   Physical Activity: Not on file   Stress: Not on file   Social Connections: Not on file   Intimate Partner Violence: Not on file   Housing Stability: Not on file     Family History   Problem Relation Age of Onset   • Coronary artery disease Mother    • Heart disease Mother    • Hypertension Mother    • Stroke Mother    • Coronary artery disease Father    • Stroke Family    • Heart attack Family         acute MI       ==  [facility] Internal Medicine PGY-3

## 2023-03-03 ENCOUNTER — REMOTE DEVICE CLINIC VISIT (OUTPATIENT)
Dept: CARDIOLOGY CLINIC | Facility: CLINIC | Age: 78
End: 2023-03-03

## 2023-03-03 DIAGNOSIS — Z95.0 CARDIAC PACEMAKER IN SITU: Primary | ICD-10-CM

## 2023-03-03 NOTE — PROGRESS NOTES
Results for orders placed or performed in visit on 03/03/23   Cardiac EP device report    Narrative    MDT-DUAL CHAMBER PPM (AAIR-DDDR MODE)/ACTIVE SYSTEM IS MRI CONDITIONAL  CARELINK TRANSMISSION:  BATTERY VOLTAGE ADEQUATE (7 9 YR )  AP 97 3%  98 3% (>40%/AAIR-DDDR 70)  ALL AVAIALABLE LEAD PARAMETERS WITHIN NORMAL LIMITS   1 AF EPISODES WITH DURATION @ 15:13:17 HRS  AT/AF BURDEN 1 7%; EF 55% (07/2022 ECHO)  HX: SAME &  PT TAKES ELIQUIS, METOPROLOL SUCC  NORMAL DEVICE FUNCTION      ES

## 2023-03-20 DIAGNOSIS — I47.29 NSVT (NONSUSTAINED VENTRICULAR TACHYCARDIA) (HCC): ICD-10-CM

## 2023-03-20 DIAGNOSIS — I48.0 PAROXYSMAL ATRIAL FIBRILLATION (HCC): ICD-10-CM

## 2023-03-20 RX ORDER — METOPROLOL SUCCINATE 25 MG/1
25 TABLET, EXTENDED RELEASE ORAL DAILY
Qty: 90 TABLET | Refills: 3 | Status: SHIPPED | OUTPATIENT
Start: 2023-03-20

## 2023-03-21 ENCOUNTER — HOSPITAL ENCOUNTER (OUTPATIENT)
Dept: RADIOLOGY | Facility: IMAGING CENTER | Age: 78
Discharge: HOME | End: 2023-03-21

## 2023-03-21 DIAGNOSIS — E21.0 PRIMARY HYPERPARATHYROIDISM (HCC): ICD-10-CM

## 2023-03-21 NOTE — CONSULTS
Chief Complaint  PT here for Pre - Operative appt  PT stated he is having a Uro -Lift performed by Dr Robles Mcclain, at [facility] on 02/09/2018  all PAT is done        History of Present Illness  Pre-Op Visit (Brief): The patient is being seen for a preoperative visit  The procedure is a(n) urolift scheduled for 2/9/2018 with French Canchola  The indication for surgery is BPH  Surgical Risk Assessment:   Prior Anesthesia: He had prior anesthesia, no prior adverse reaction to edidural anesthesia, no prior adverse reaction to spinal anesthesia and no prior adverse reaction to general anesthesia  Pertinent Past Medical History: no pertinent past medical history  Exercise Capacity: able to walk four blocks without symptoms and able to walk two flights of stairs without symptoms  Lifestyle Factors: denies alcohol use, denies tobacco use and denies illegal drug use  Symptoms: no easy bleeding, no easy bruising, no heavy menses, no frequent nosebleeds, no chest pain, no cough, no dyspnea, no edema, no palpitations and no wheezing  STOP questionnaire score is 4  Other ELLYN risk factors include high BMI, male gender, large neck circumference and age over 48  Predicted risk of ELLYN: Moderate  Pertinent Family History: early sudden death, but no family history of an adverse reaction to anesthesia, no aneurysm, no bleeding problems, no ischemic heart disease and no stroke  Living Situation: home is secure and supportive  Hypertension (Follow-Up): The patient presents for follow-up of essential hypertension  The patient states he has been doing well with his blood pressure control since the last visit  He has no comorbid illnesses  He has no significant interval events  Symptoms: The patient is currently asymptomatic  Home monitoring: The patient is not checking blood pressure at home  Obstructive Sleep Apnea (Brief): The patient is being seen for a routine clinic follow-up of obstructive sleep apnea   The patient is currently asymptomatic  Associated symptoms:  no morning headaches and no shortness of breath  No associated symptoms are reported  (not using CPAP )   Hyperlipidemia (Follow-Up): The patient states his hyperlipidemia has been under good control since the last visit  Comorbid Illnesses: hypertension and Lipid panel discussed with the patient, cholesterol HDL and LDL all normal and good  He has no significant interval events  Symptoms: The patient is currently asymptomatic  Review of Systems    Constitutional: No fever or chills, feels well, no tiredness, no recent weight gain or weight loss  Eyes: No complaints of eye pain, no red eyes, no discharge from eyes, no itchy eyes  ENT: no complaints of earache, no hearing loss, no nosebleeds, no nasal discharge, no sore throat, no hoarseness  Cardiovascular: No complaints of slow heart rate, no fast heart rate, no chest pain, no palpitations, no leg claudication, no lower extremity  Respiratory: No complaints of shortness of breath, no wheezing, no cough, no SOB on exertion, no orthopnea or PND  Gastrointestinal: No complaints of abdominal pain, no constipation, no nausea or vomiting, no diarrhea or bloody stools  Musculoskeletal: arthralgias, myalgias and Back pain in the lower back note radiating to the buttocks much worse when he walks or do some activities well suggestive of spinal stenosis we will order the x-ray of the lumbosacral spine and also physical therapy will be started and follow-up in 2-4 weeks need an MRI of the back if the symptoms continue to get worse he is pretty much upset about this lower back pain, but as noted in HPI  Neurological: No compliants of headache, no confusion, no convulsions, no numbness or tingling, no dizziness or fainting, no limb weakness, no difficulty walking  Psychiatric: Is not suicidal, no sleep disturbances, no anxiety or depression, no change in personality, no emotional problems        Active Problems    1  Benign essential hypertension (401 1) (I10)   2  Chronic bilateral low back pain without sciatica (724 2,338 29) (M54 5,G89 29)   3  Copy of advanced directive obtained (V49 89) (Z78 9)   4  Elevated PSA (790 93) (R97 20)   5  Enlarged prostate without lower urinary tract symptoms (luts) (600 00) (N40 0)   6  Hypercholesterolemia (272 0) (E78 00)   7  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)   8  OAB (overactive bladder) (596 51) (N32 81)   9  Obstructive sleep apnea (327 23) (G47 33)    Past Medical History    · History of Acute Gouty Arthropathy (274 01)   · Denied: History of Carrier Of STD   · History of Encounter for Medicare annual wellness exam (V70 0) (Z00 00)   · History of Enlarged prostate (600 00) (N40 0)   · History of Gout (274 9) (M10 9)   · History of Hearing Loss (389 9)   · History of backache (V13 59) (Z87 39)   · History of cataract (V12 49) (Z86 69)   · History of diverticulitis of colon (V12 79) (Z87 19)   · History of dizziness (V13 89) (M49 608)   · History of hypercholesterolemia (V12 29) (Z86 39)   · History of hypertension (V12 59) (Z86 79)   · History of low back pain (V13 59) (Z87 39)   · Denied: History of Mental Status Change   · History of Neck pain (723 1) (M54 2)   · History of Need for prophylactic vaccination and inoculation against chickenpox (V05 4)  (Z23)   · History of Palpitations (785 1) (R00 2)   · History of Screening for depression (V79 0) (Z13 89)   · History of Screening for neurological condition (V80 09) (Z13 89)   · History of Special screening for malignant neoplasms, colon (V76 51) (Z12 11)    The active problems and past medical history were reviewed and updated today  Surgical History    · History of Diagnostic Cystoscopy   · History of Surgery Testis Orchiectomy   · History of Treatment Of Forearm Fracture    The surgical history was reviewed and updated today         Family History    · Family history of Coronary Artery Disease (V17 49)   · Family history of Family Health Status 1  Children Living   · Family history of Heart Disease (V17 49)   · Family history of Hypertension (V17 49)   · Family history of Mother  At Age 80   · Family history of Stroke Syndrome (V17 1)    · Family history of Coronary Artery Disease (V17 49)    · Family history of Acute Myocardial Infarction (V17 3)   · Family history of Father  At Age 46   · Family history of Stroke Syndrome (V17 1)    The family history was reviewed and updated today  Social History    · Alcohol Use (History)   · DRINKS ALCOHOL VERY INFREQUENTLY   · Copy of advanced directive obtained (V49 89) (Z78 9)   · Denied: History of Drug Use   · Exercising Regularly   · PRIMARY FORM OF EXERCISE IS WORK  · Former smoker (E22 94) (W13 268)   · QUIT DATE:    · Marital History - Currently    · Recreational Activities   · HE ENJOYS [address] [place]  · Retired From Work   · Travel history   · Pt denies being out of the country during 2014-11/10/2014  The social history was reviewed and updated today  The social history was reviewed and is unchanged  Current Meds   1  Allopurinol 300 MG Oral Tablet; TAKE 1 TABLET DAILY AS DIRECTED; Therapy: 57VTI5640 to (Evaluate:2018)  Requested for: 2017; Last   SP:[id number] Ordered   2  AmLODIPine Besylate 5 MG Oral Tablet; TAKE 1 TABLET DAILY; Therapy: 84VWL6149 to (Evaluate:2018)  Requested for: 54QSL6287; Last   Rx:77Qjx6237 Ordered   3  Aspirin 81 MG TABS; Therapy: (Recorded:2015) to Recorded   4  Colestipol HCl - 1 GM Oral Tablet; TAKE 1 TABLET TWICE DAILY; Therapy: 69WZO6332 to (Evaluate:2018)  Requested for: 26QSA4040; Last   Rx:49Wsn4262 Ordered   5  Finasteride 5 MG Oral Tablet; TAKE 1 TABLET DAILY; Therapy: 15IBP7084 to (Evaluate:2018)  Requested for: 35VUV8450; Last   Rx:30Zxe7417 Ordered   6   Lisinopril 20 MG Oral Tablet; 1 TAB QD;   Therapy: 31ZKJ6306 to (Armandoramiro Suyapa)  Requested for: 17WJY6820; Last   Rx:84Cyg4410 Ordered   7  Meclizine HCl - 25 MG Oral Tablet; One po BID prn; Therapy: 15SUM2196 to (Evaluate:56Nft0758)  Requested for: 05SUI1733; Last   Rx:[id number] Ordered   8  Simvastatin 40 MG Oral Tablet; take 1 tablet every other day; Therapy: 69XXP5177 to (Evaluate:72Ygs7815)  Requested for: 58Roa4814; Last   MO:37CED1743 Ordered   9  Tamsulosin HCl - 0 4 MG Oral Capsule; take 1 capsule daily; Therapy: 52GMK4763 to (Evaluate:27Mar2018)  Requested for: 80QOC5643; Last   Rx:61Ppf2996 Ordered    The medication list was reviewed and updated today  Allergies    1  No Known Drug Allergies    2  No Known Food Allergies   3  Seasonal    Vitals  Signs    Temperature: 97 9 F, Oral  Heart Rate: 58  Systolic: 633, LUE, Sitting  Diastolic: 80, LUE, Sitting  Height: 5 ft 9 5 in  Weight: 279 lb   BMI Calculated: 40 61  BSA Calculated: 2 39  O2 Saturation: 97, RA    Physical Exam    Constitutional   General appearance: No acute distress, well appearing and well nourished  Eyes   Conjunctiva and lids: No swelling, erythema, or discharge  Ears, Nose, Mouth, and Throat   External inspection of ears and nose: Normal     Pulmonary   Auscultation of lungs: Clear to auscultation, equal breath sounds bilaterally, no wheezes, no rales, no rhonci  Cardiovascular   Auscultation of heart: Normal rate and rhythm, normal S1 and S2, without murmurs  Examination of extremities for edema and/or varicosities: Normal     Carotid pulses: Normal     Musculoskeletal   Gait and station: Normal     Digits and nails: Normal without clubbing or cyanosis  Inspection/palpation of joints, bones, and muscles: Normal     Skin   Skin and subcutaneous tissue: Normal without rashes or lesions  Neurologic   Cranial nerves: Cranial nerves 2-12 intact           Results/Data  (1) PT WITH INR [id number] 08:40AM [facility]     Test Name Result Flag Reference   INR 0 91 0  86-1 16   PT 12 5 seconds  12 1-14 4     (1) CBC/PLT/DIFF [id number] 08:40AM [employer]     Test Name Result Flag Reference   WBC COUNT 8 06 Thousand/uL  4 31-10 16   RBC COUNT 5 71 Million/uL H 3 88-5 62   HEMOGLOBIN 17 2 g/dL H 12 0-17 0   HEMATOCRIT 52 6 % H 36 5-49 3   MCV 92 fL  82-98   MCH 30 1 pg  26 8-34 3   MCHC 32 7 g/dL  31 4-37 4   RDW 14 3 %  11 6-15 1   MPV 11 1 fL  8 9-12 7   PLATELET COUNT 071 Thousands/uL  149-390   NEUTROPHILS RELATIVE PERCENT 67 %  43-75   LYMPHOCYTES RELATIVE PERCENT 20 %  14-44   MONOCYTES RELATIVE PERCENT 10 %  4-12   EOSINOPHILS RELATIVE PERCENT 3 %  0-6   BASOPHILS RELATIVE PERCENT 0 %  0-1   NEUTROPHILS ABSOLUTE COUNT 5 36 Thousands/? ??L  1 85-7 62   LYMPHOCYTES ABSOLUTE COUNT 1 64 Thousands/? ??L  0 60-4 47   MONOCYTES ABSOLUTE COUNT 0 83 Thousand/? ??L  0 17-1 22   EOSINOPHILS ABSOLUTE COUNT 0 20 Thousand/? ??L  0 00-0 61   BASOPHILS ABSOLUTE COUNT 0 03 Thousands/? ??L  0 00-0 10   This is a patient instruction: This test is non-fasting  Please drink two glasses of water morning of bloodwork  (1) APTT [id number] 08:40AM [facility] [employer]     Test Name Result Flag Reference   PARTIAL THROMBOPLASTIN TIME 35 seconds  23-35   Therapeutic Heparin Range = 60-90 seconds     (1) BASIC METABOLIC PROFILE [id number] 08:40AM [facility] [employer]     Test Name Result Flag Reference   GLUCOSE,RANDM 114 mg/dL     If the patient is fasting, the ADA then defines impaired fasting glucose as > 100 mg/dL and diabetes as > or equal to 123 mg/dL  Specimen collection should occur prior to Sulfasalazine administration due to the potential for falsely depressed results  Specimen collection should occur prior to Sulfapyridine administration due to the potential for falsely elevated results     SODIUM 140 mmol/L  136-145   POTASSIUM 4 2 mmol/L  3 5-5 3   CHLORIDE 105 mmol/L  100-108   CARBON DIOXIDE 28 mmol/L  21-32   ANION GAP (CALC) 7 mmol/L  4-13   BLOOD UREA NITROGEN 17 mg/dL 5-25   CREATININE 1 34 mg/dL H 0 60-1 30   Standardized to IDMS reference method   CALCIUM 9 9 mg/dL  8 3-10 1   eGFR 52 ml/min/1 73sq m     This is a patient instruction: Patient fasting for 8 hours or longer recommended  National Kidney Disease Education Program recommendations are as follows:  GFR calculation is accurate only with a steady state creatinine  Chronic Kidney disease less than 60 ml/min/1 73 sq  meters  Kidney failure less than 15 ml/min/1 73 sq  meters  ECG 12-LEAD [id number] 08:24AM [facility]     Test Name Result Flag Reference   ECG 12-LEAD      Sinus rhythm with 1st degree A-V block with frequent Premature ventricular complexes   Nonspecific T wave abnormality   No previous ECGs available   Confirmed by Marta Ortez MD, [employer] ([id number]) on 1/16/2018 8:48:45 PM     Assessment    1  Preop general physical exam (V72 83) (Z01 818)   2  Preoperative clearance (V72 84) (Z01 818)   3  Benign essential hypertension (401 1) (I10)   4  Elevated PSA (790 93) (R97 20)   5  Enlarged prostate without lower urinary tract symptoms (luts) (600 00) (N40 0)   6  Obstructive sleep apnea (327 23) (G47 33)    Discussion/Summary  Surgical Clearance: He is at a LOW TO MODERATE risk from a cardiovascular standpoint at this time without any additional cardiac testing  Reevaluation needed, if he should present with symptoms prior to surgery/procedure  Comments:  Patient has a history of obstructive sleep apnea, need monitoring postoperatively I reviewed the blood workup in EKG and patient is medically clear for the surgery he is low to moderate risk because of the obstructive sleep apnea and history of hypertension  The patient was counseled regarding diagnostic results, prognosis, impressions  End of Encounter Meds    1  AmLODIPine Besylate 5 MG Oral Tablet; TAKE 1 TABLET DAILY; Therapy: 50RYK4433 to (Evaluate:17Jun2018)  Requested for: 88FOE1051; Last   Rx:[id number] Ordered   2   Lisinopril 20 MG Oral Tablet; 1 TAB QD;   Therapy: 59ENW3476 to (Evaluate:55Qji9517)  Requested for: 49NXU8173; Last   Rx:[id number] Ordered    3  Finasteride 5 MG Oral Tablet; TAKE 1 TABLET DAILY; Therapy: 18YWZ7989 to (Evaluate:27Mar2018)  Requested for: 86PKM1345; Last   Rx:08Hes0359 Ordered   4  Tamsulosin HCl - 0 4 MG Oral Capsule; take 1 capsule daily; Therapy: 45HZH6665 to (Evaluate:27Mar2018)  Requested for: 26TNU5131; Last   Rx:09Bgb1623 Ordered    5  Colestipol HCl - 1 GM Oral Tablet (Colestid); TAKE 1 TABLET TWICE DAILY; Therapy: 42BVF3875 to (Halie Hawkins)  Requested for: 20YWQ9459; Last   Rx:79Gib9408 Ordered   6  Simvastatin 40 MG Oral Tablet; take 1 tablet every other day; Therapy: 72ZDP1301 to (Evaluate:22Apr2018)  Requested for: 47Xfw1605; Last   Rx:28Icu0740 Ordered    7  Allopurinol 300 MG Oral Tablet; TAKE 1 TABLET DAILY AS DIRECTED; Therapy: 64NPY6014 to (Evaluate:22Apr2018)  Requested for: 50Tto1603; Last   MO:87UNW5148 Ordered    8  Meclizine HCl - 25 MG Oral Tablet; One po BID prn; Therapy: 54HOR7061 to (Evaluate:10Nov2017)  Requested for: 87DXP5546; Last   Rx:00Whj6336 Ordered    9  Aspirin 81 MG TABS;    Therapy: (Recorded:30Oct2015) to Recorded    Signatures   Electronically signed by : Pascual Pagan MD; Jan 22 2018  9:22AM EST                       (Author) Detail Level: Zone Continue Regimen: hydrocortisone 2.5 % topical ointment apply to affected areas on eyelids and face BID PRN for flares Discontinue Regimen: clobetasol 0.05 % topical ointment apply to affected areas on body BID PRN. Initiate Treatment: triamcinolone acetonide 0.1 % topical ointment apply to affected areas on body BID PRN for flares

## 2023-04-24 DIAGNOSIS — M10.9 GOUT, UNSPECIFIED CAUSE, UNSPECIFIED CHRONICITY, UNSPECIFIED SITE: ICD-10-CM

## 2023-04-24 DIAGNOSIS — R42 DIZZINESS: ICD-10-CM

## 2023-04-24 RX ORDER — MECLIZINE HYDROCHLORIDE 25 MG/1
25 TABLET ORAL 2 TIMES DAILY PRN
Qty: 180 TABLET | Refills: 1 | Status: SHIPPED | OUTPATIENT
Start: 2023-04-24

## 2023-04-24 RX ORDER — ALLOPURINOL 300 MG/1
300 TABLET ORAL DAILY
Qty: 90 TABLET | Refills: 1 | Status: SHIPPED | OUTPATIENT
Start: 2023-04-24

## 2023-05-19 ENCOUNTER — OFFICE VISIT (OUTPATIENT)
Dept: UROLOGY | Facility: CLINIC | Age: 78
End: 2023-05-19

## 2023-05-19 VITALS
WEIGHT: 257 LBS | SYSTOLIC BLOOD PRESSURE: 130 MMHG | BODY MASS INDEX: 36.79 KG/M2 | OXYGEN SATURATION: 98 % | DIASTOLIC BLOOD PRESSURE: 80 MMHG | HEIGHT: 70 IN | RESPIRATION RATE: 18 BRPM | HEART RATE: 78 BPM

## 2023-05-19 DIAGNOSIS — R35.0 BENIGN PROSTATIC HYPERPLASIA WITH URINARY FREQUENCY: Primary | ICD-10-CM

## 2023-05-19 DIAGNOSIS — N40.1 BENIGN PROSTATIC HYPERPLASIA WITH URINARY FREQUENCY: Primary | ICD-10-CM

## 2023-05-19 NOTE — PROGRESS NOTES
UROLOGY PROGRESS NOTE   Patient Identifiers: Nini Nova (MRN [id number])  Date of Service: 5/19/2023    Subjective:   42-year-old man history of BPH  He had a UroLift in 2018  He has multiple medical problems and sleep apnea  He has hourly nocturia  I offered him tamsulosin which he declined  He had a high PSA in 2020 of 5 4  He started an over-the-counter herbal medication which she reports has improved his symptoms  Reason for visit: BPH follow-up    Objective:     VITALS:    There were no vitals filed for this visit          LABS:  Lab Results   Component Value Date    HGB 18 4 (H) 02/14/2023    HCT 55 0 (H) 02/14/2023    WBC 8 49 02/14/2023     02/14/2023   ]    Lab Results   Component Value Date    K 4 4 02/14/2023     (H) 02/14/2023    CO2 25 02/14/2023    BUN 22 02/14/2023    CREATININE 1 24 02/14/2023    CALCIUM 10 5 (H) 02/14/2023   ]        INPATIENT MEDS:    Current Outpatient Medications:   •  allopurinol (ZYLOPRIM) 300 mg tablet, Take 1 tablet (300 mg total) by mouth daily, Disp: 90 tablet, Rfl: 1  •  amLODIPine (NORVASC) 5 mg tablet, Take 1 tablet (5 mg total) by mouth daily, Disp: 90 tablet, Rfl: 1  •  apixaban (ELIQUIS) 5 mg, Take 1 tablet (5 mg total) by mouth 2 (two) times a day, Disp: 180 tablet, Rfl: 3  •  betamethasone, augmented, (DIPROLENE-AF) 0 05 % cream, if needed, Disp: , Rfl:   •  Carboxymethylcellulose Sodium (EYE DROPS OP), Apply to eye as needed, Disp: , Rfl:   •  Cholecalciferol (Vitamin D-3) 125 MCG (5000 UT) TABS, Take by mouth daily , Disp: , Rfl:   •  lisinopril (ZESTRIL) 10 mg tablet, Take 1 tablet (10 mg total) by mouth daily, Disp: 90 tablet, Rfl: 3  •  meclizine (ANTIVERT) 25 mg tablet, Take 1 tablet (25 mg total) by mouth 2 (two) times a day as needed for dizziness, Disp: 180 tablet, Rfl: 1  •  metoprolol succinate (TOPROL-XL) 25 mg 24 hr tablet, Take 1 tablet (25 mg total) by mouth daily, Disp: 90 tablet, Rfl: 3  •  simvastatin (ZOCOR) 40 mg tablet, Take 1 tablet (40 mg total) by mouth every other day, Disp: 45 tablet, Rfl: 1      Physical Exam:   There were no vitals taken for this visit  GEN: no acute distress    RESP: breathing comfortably with no accessory muscle use    ABD: soft, non-tender, non-distended   INCISION:    EXT: no significant peripheral edema   (Male): Penis circumcised, phallus normal, meatus patent  Testicles descended into scrotum bilaterally without masses nor tenderness  No inguinal hernias bilaterally  JUAN LUIS: Prostate is enlarged at 50 grams  The prostate is not boggy  The prostate is not tender  No nodules noted      RADIOLOGY:   None    Assessment:   #1    BPH    Plan:   -Follow-up in 1 year for his annual exam  -No further PSA testing  -  -

## 2023-06-02 ENCOUNTER — REMOTE DEVICE CLINIC VISIT (OUTPATIENT)
Dept: CARDIOLOGY CLINIC | Facility: CLINIC | Age: 78
End: 2023-06-02

## 2023-06-02 DIAGNOSIS — Z95.0 PRESENCE OF PERMANENT CARDIAC PACEMAKER: Primary | ICD-10-CM

## 2023-06-02 NOTE — PROGRESS NOTES
Results for orders placed or performed in visit on 06/02/23   Cardiac EP device report    Narrative    MDT-DUAL CHAMBER PPM (AAIR-DDDR MODE)/ACTIVE SYSTEM IS MRI CONDITIONAL  CARELINK TRANSMISSION:  BATTERY VOLTAGE ADEQUATE (7 8 YR )  AP 98 1%  97 9% (>40%/AAIR-DDDR 70)  ALL AVAIALABLE LEAD PARAMETERS WITHIN NORMAL LIMITS  1 VT-NS EPISODE WITH NSVT ON EGM, 10 @  BPM; 1 AT/AF EPISODES WITH DURATION @ 6HRS, 10MINS  AT/AF BURDEN 0 3%; EF 55% (07/2022 ECHO)  HX: SAME &  PT TAKES ELIQUIS, METOPROLOL SUCC  NORMAL DEVICE FUNCTION      ES

## 2023-06-06 ENCOUNTER — TELEPHONE (OUTPATIENT)
Dept: HEMATOLOGY ONCOLOGY | Facility: CLINIC | Age: 78
End: 2023-06-06

## 2023-06-06 NOTE — TELEPHONE ENCOUNTER
Appointment Change  Cancel, Reschedule, Change to Virtual      Who are you speaking with? Spouse   If it is not the patient, are they listed on an active communication consent form? N/A   Which provider is the appointment scheduled with? Dr Bijal Mishra   When is the appointment scheduled? Please list date and time  06/12/2023 @8AM    At which location is the appointment scheduled to take place? [facility]   Was the appointment rescheduled or changed from an in person visit to a virtual visit? If so, please list the details of the change  No, will call back    What is the reason for the appointment change? Patient is going away    Was STAR transport scheduled for this visit? No   Does STAR transport need to be scheduled for the new visit (if applicable) No   Does the patient need an infusion appointment rescheduled? No   Does the patient have an infusion appointment scheduled? If so, when? No   Is the patient undergoing chemotherapy? No   Was the no-show policy reviewed for appointments being changed with less then 24 hours of notice?  No

## 2023-06-29 ENCOUNTER — LAB (OUTPATIENT)
Dept: LAB | Age: 78
End: 2023-06-29
Payer: COMMERCIAL

## 2023-06-29 DIAGNOSIS — E21.0 PRIMARY HYPERPARATHYROIDISM (HCC): ICD-10-CM

## 2023-06-29 LAB
25(OH)D3 SERPL-MCNC: 70.2 NG/ML (ref 30–100)
ALBUMIN SERPL BCP-MCNC: 3.8 G/DL (ref 3.5–5)
ALP SERPL-CCNC: 69 U/L (ref 46–116)
ALT SERPL W P-5'-P-CCNC: 24 U/L (ref 12–78)
ANION GAP SERPL CALCULATED.3IONS-SCNC: 3 MMOL/L
AST SERPL W P-5'-P-CCNC: 15 U/L (ref 5–45)
BILIRUB SERPL-MCNC: 1.47 MG/DL (ref 0.2–1)
BUN SERPL-MCNC: 19 MG/DL (ref 5–25)
CA-I BLD-SCNC: 1.33 MMOL/L (ref 1.12–1.32)
CALCIUM SERPL-MCNC: 10.6 MG/DL (ref 8.3–10.1)
CHLORIDE SERPL-SCNC: 110 MMOL/L (ref 96–108)
CO2 SERPL-SCNC: 28 MMOL/L (ref 21–32)
CREAT SERPL-MCNC: 1.26 MG/DL (ref 0.6–1.3)
GFR SERPL CREATININE-BSD FRML MDRD: 53 ML/MIN/1.73SQ M
GLUCOSE P FAST SERPL-MCNC: 109 MG/DL (ref 65–99)
POTASSIUM SERPL-SCNC: 4.6 MMOL/L (ref 3.5–5.3)
PROT SERPL-MCNC: 7.9 G/DL (ref 6.4–8.4)
PTH-INTACT SERPL-MCNC: 124.8 PG/ML (ref 12–88)
SODIUM SERPL-SCNC: 141 MMOL/L (ref 135–147)

## 2023-06-29 PROCEDURE — 82306 VITAMIN D 25 HYDROXY: CPT

## 2023-06-29 PROCEDURE — 80053 COMPREHEN METABOLIC PANEL: CPT

## 2023-06-29 PROCEDURE — 82330 ASSAY OF CALCIUM: CPT

## 2023-06-29 PROCEDURE — 36415 COLL VENOUS BLD VENIPUNCTURE: CPT

## 2023-06-29 PROCEDURE — 83970 ASSAY OF PARATHORMONE: CPT

## 2023-07-14 ENCOUNTER — OFFICE VISIT (OUTPATIENT)
Dept: INTERNAL MEDICINE CLINIC | Age: 78
End: 2023-07-14
Payer: COMMERCIAL

## 2023-07-14 VITALS
SYSTOLIC BLOOD PRESSURE: 124 MMHG | HEIGHT: 70 IN | OXYGEN SATURATION: 96 % | WEIGHT: 254 LBS | DIASTOLIC BLOOD PRESSURE: 84 MMHG | TEMPERATURE: 97.8 F | HEART RATE: 80 BPM | BODY MASS INDEX: 36.36 KG/M2

## 2023-07-14 DIAGNOSIS — M85.89 OSTEOPENIA OF MULTIPLE SITES: ICD-10-CM

## 2023-07-14 DIAGNOSIS — E21.0 PRIMARY HYPERPARATHYROIDISM (HCC): ICD-10-CM

## 2023-07-14 DIAGNOSIS — E79.0 HYPERURICEMIA: ICD-10-CM

## 2023-07-14 DIAGNOSIS — I10 BENIGN ESSENTIAL HYPERTENSION: Primary | ICD-10-CM

## 2023-07-14 DIAGNOSIS — E78.00 HYPERCHOLESTEROLEMIA: ICD-10-CM

## 2023-07-14 DIAGNOSIS — N18.31 STAGE 3A CHRONIC KIDNEY DISEASE (HCC): ICD-10-CM

## 2023-07-14 DIAGNOSIS — I48.0 PAROXYSMAL ATRIAL FIBRILLATION (HCC): ICD-10-CM

## 2023-07-14 DIAGNOSIS — E66.01 SEVERE OBESITY WITH BODY MASS INDEX (BMI) OF 35.0 TO 39.9 WITH SERIOUS COMORBIDITY (HCC): ICD-10-CM

## 2023-07-14 PROCEDURE — 99214 OFFICE O/P EST MOD 30 MIN: CPT | Performed by: INTERNAL MEDICINE

## 2023-07-14 NOTE — PROGRESS NOTES
Assessment/Plan:     Diagnoses and all orders for this visit:    Benign essential hypertension  -     CBC and differential; Future    Hypercholesterolemia    Hyperuricemia    Paroxysmal atrial fibrillation (HCC)    Stage 3a chronic kidney disease (HCC)    Primary hyperparathyroidism ([address])  -     Basic metabolic panel; Future  -     Calcium, ionized; Future  -     Uric acid; Future    Osteopenia of multiple sites    Severe obesity with body mass index (BMI) of 35.0 to 39.9 with serious comorbidity [facility])             M*Modal software was used to dictate this note. It may contain errors with dictating incorrect words or incorrect spelling. Please contact the provider directly with any questions. Subjective:   Chief Complaint   Patient presents with   • Follow-up     6 month follow up   Labs done - 6/29   • Hypertension   • Hyperlipidemia   • HM     Due: AWV after 8/17/2023  CRC - last done 2016 diagnosis of polyps         Patient ID: Julian Simmonds is a 78 y.o. male. HPI  This is a very pleasant 78 years young gentleman who is here today for the regular follow-up he is doing better no new complaints except for the back pain and the knee pain. Patient is here today for the follow-up. Hypertension. I reviewed antihypertensive medication, patient does not have any side effects of  medications, no signs or symptoms of hypertension ,hypotension or orthostatic hypotension. Patient is compliant with medications. Blood workup related to hypertensive diagnosis reviewed. Plan is to continue with the present management. We will follow-up as a scheduled and adjust the doses of the medication as indicated. discussed about hypercalcemia.   We will repeat the serum calcium ionized calcium again if continue to go up then most likely we will need second opinion from a endocrine for further evaluation of hyperparathyroidism I think it is a primary hyperparathyroidism I discussed that the options are surgical removal of parathyroid gland or medical treatment. We will see what we can do for his osteopenia I will leave the discussion and the choice on the patient medical treatment of hypercalcemia versus surgical intervention    Obesity again discussed with the diet exercise and weight loss  The following portions of the patient's history were reviewed and updated as appropriate: allergies, current medications, past family history, past medical history, past social history, past surgical history and problem list.    Review of Systems   Constitutional: Positive for fatigue. Negative for appetite change and fever. HENT: Negative for congestion, ear pain, hearing loss, nosebleeds, sneezing, tinnitus and voice change. Eyes: Negative for pain, discharge and redness. Respiratory: Negative for cough, chest tightness and wheezing. Cardiovascular: Negative for chest pain, palpitations and leg swelling. Gastrointestinal: Negative for abdominal pain, blood in stool, constipation, diarrhea, nausea and vomiting. Genitourinary: Negative for difficulty urinating, dysuria, hematuria and urgency. Musculoskeletal: Positive for arthralgias. Negative for back pain, gait problem and joint swelling. Skin: Negative for rash and wound. Allergic/Immunologic: Negative for environmental allergies. Neurological: Negative for dizziness, tremors, seizures, weakness, light-headedness and numbness. Hematological: Negative for adenopathy. Does not bruise/bleed easily. Psychiatric/Behavioral: Negative for behavioral problems and confusion. The patient is not nervous/anxious.           Past Medical History:   Diagnosis Date   • Acute gouty arthropathy     last assessed-11/15/2013   • Cataract    • Diverticulitis of colon    • Enlarged prostate    • Gout    • Hearing loss    • Heart disease    • History of diverticulitis of colon    • History of dizziness    • Hypercholesterolemia    • Hyperlipidemia    • Hypertension    • Palpitations    • Sinus bradycardia          Current Outpatient Medications:   •  allopurinol (ZYLOPRIM) 300 mg tablet, Take 1 tablet (300 mg total) by mouth daily, Disp: 90 tablet, Rfl: 1  •  amLODIPine (NORVASC) 5 mg tablet, Take 1 tablet (5 mg total) by mouth daily, Disp: 90 tablet, Rfl: 1  •  apixaban (ELIQUIS) 5 mg, Take 1 tablet (5 mg total) by mouth 2 (two) times a day, Disp: 180 tablet, Rfl: 3  •  betamethasone, augmented, (DIPROLENE-AF) 0.05 % cream, if needed, Disp: , Rfl:   •  Carboxymethylcellulose Sodium (EYE DROPS OP), Apply to eye as needed, Disp: , Rfl:   •  Cholecalciferol (Vitamin D-3) 125 MCG (5000 UT) TABS, Take by mouth daily , Disp: , Rfl:   •  lisinopril (ZESTRIL) 10 mg tablet, Take 1 tablet (10 mg total) by mouth daily, Disp: 90 tablet, Rfl: 3  •  meclizine (ANTIVERT) 25 mg tablet, Take 1 tablet (25 mg total) by mouth 2 (two) times a day as needed for dizziness, Disp: 180 tablet, Rfl: 1  •  metoprolol succinate (TOPROL-XL) 25 mg 24 hr tablet, Take 1 tablet (25 mg total) by mouth daily, Disp: 90 tablet, Rfl: 3  •  simvastatin (ZOCOR) 40 mg tablet, Take 1 tablet (40 mg total) by mouth every other day, Disp: 45 tablet, Rfl: 1    Allergies   Allergen Reactions   • Pollen Extract Allergic Rhinitis       Social History   Past Surgical History:   Procedure Laterality Date   • ARM NEUROPLASTY Left     1977   • CARDIAC PACEMAKER PLACEMENT Left    • CARDIAC SURGERY  05/01/2019    pace maker placed   • CATARACT EXTRACTION, BILATERAL  2021   • COLONOSCOPY     • CYSTOSCOPY  12/27/2017    diagnostic   • FOREARM FRACTURE SURGERY     • ORCHIECTOMY      surgery testis   • OK CYSTO INSERTION TRANSPROSTATIC IMPLANT SINGLE N/A 2/9/2018    Procedure: CYSTOSCOPY WITH INSERTION UROLIFT;  Surgeon: Dana Goss MD;  Location: [facility];  Service: Urology   • OK CYSTOURETHROSCOPY WITH BIOPSY N/A 2/9/2018    Procedure: BLADDER BIOPSY;  Surgeon: Dana Goss MD;  Location: [facility];  Service: Urology   • TESTICLE SURGERY Right 1976     Family History   Problem Relation Age of Onset   • Coronary artery disease Mother    • Heart disease Mother    • Hypertension Mother    • Stroke Mother    • Coronary artery disease Father    • Stroke Family    • Heart attack Family         acute MI       Objective:  /84 (BP Location: Left arm, Patient Position: Sitting, Cuff Size: Adult)   Pulse 80   Temp 97.8 °F (36.6 °C) (Temporal)   Ht 5' 10" (1.778 m)   Wt 115 kg (254 lb)   SpO2 96%   BMI 36.45 kg/m²        Physical Exam  Constitutional:       Appearance: He is well-developed. He is obese. HENT:      Right Ear: External ear normal.   Eyes:      Conjunctiva/sclera: Conjunctivae normal.      Pupils: Pupils are equal, round, and reactive to light. Neck:      Thyroid: No thyromegaly. Vascular: No JVD. Cardiovascular:      Rate and Rhythm: Normal rate and regular rhythm. Heart sounds: Normal heart sounds. Pulmonary:      Breath sounds: Normal breath sounds. Abdominal:      General: Bowel sounds are normal.      Palpations: Abdomen is soft. Musculoskeletal:         General: Normal range of motion. Cervical back: Normal range of motion. Lymphadenopathy:      Cervical: No cervical adenopathy. Skin:     General: Skin is dry. Neurological:      Mental Status: He is alert and oriented to person, place, and time. Deep Tendon Reflexes: Reflexes are normal and symmetric.    Psychiatric:         Behavior: Behavior normal.

## 2023-08-28 DIAGNOSIS — M85.89 OSTEOPENIA OF MULTIPLE SITES: ICD-10-CM

## 2023-08-28 DIAGNOSIS — I10 ESSENTIAL HYPERTENSION, BENIGN: ICD-10-CM

## 2023-08-28 DIAGNOSIS — E78.5 HYPERLIPIDEMIA, UNSPECIFIED HYPERLIPIDEMIA TYPE: ICD-10-CM

## 2023-08-28 RX ORDER — AMLODIPINE BESYLATE 5 MG/1
5 TABLET ORAL DAILY
Qty: 90 TABLET | Refills: 1 | Status: SHIPPED | OUTPATIENT
Start: 2023-08-28

## 2023-08-28 RX ORDER — SIMVASTATIN 40 MG
40 TABLET ORAL EVERY OTHER DAY
Qty: 45 TABLET | Refills: 1 | Status: SHIPPED | OUTPATIENT
Start: 2023-08-28

## 2023-09-01 ENCOUNTER — IN-CLINIC DEVICE VISIT (OUTPATIENT)
Dept: CARDIOLOGY CLINIC | Facility: CLINIC | Age: 78
End: 2023-09-01
Payer: COMMERCIAL

## 2023-09-01 DIAGNOSIS — Z95.0 PRESENCE OF PERMANENT CARDIAC PACEMAKER: Primary | ICD-10-CM

## 2023-09-01 PROCEDURE — 93280 PM DEVICE PROGR EVAL DUAL: CPT | Performed by: INTERNAL MEDICINE

## 2023-09-01 NOTE — PROGRESS NOTES
Results for orders placed or performed in visit on 09/01/23   Cardiac EP device report    Narrative    MDT-DUAL CHAMBER PPM (AAIR-DDDR MODE)/ACTIVE SYSTEM IS MRI CONDITIONAL  DEVICE INTERROGATED IN THE [facility] OFFICE. BATTERY VOLTAGE ADEQUATE (7.4 YRS). AP 97.9%  98.3% (>40%/AAIR~DDDR 70) ALL LEAD PARAMETERS WITHIN NORMAL LIMITS. NO NEW SIGNIFICANT HIGH RATE EPISODES. NO PROGRAMMING CHANGES MADE TO DEVICE PARAMETERS. NORMAL DEVICE FUNCTION.  AM/GV

## 2023-10-05 ENCOUNTER — OFFICE VISIT (OUTPATIENT)
Dept: INTERNAL MEDICINE CLINIC | Age: 78
End: 2023-10-05
Payer: COMMERCIAL

## 2023-10-05 ENCOUNTER — APPOINTMENT (OUTPATIENT)
Dept: LAB | Age: 78
End: 2023-10-05
Payer: COMMERCIAL

## 2023-10-05 VITALS
HEART RATE: 62 BPM | WEIGHT: 250 LBS | OXYGEN SATURATION: 96 % | TEMPERATURE: 98 F | SYSTOLIC BLOOD PRESSURE: 138 MMHG | BODY MASS INDEX: 35.79 KG/M2 | HEIGHT: 70 IN | DIASTOLIC BLOOD PRESSURE: 98 MMHG

## 2023-10-05 DIAGNOSIS — I10 BENIGN ESSENTIAL HYPERTENSION: ICD-10-CM

## 2023-10-05 DIAGNOSIS — Z23 NEEDS FLU SHOT: ICD-10-CM

## 2023-10-05 DIAGNOSIS — M25.551 BILATERAL HIP PAIN: ICD-10-CM

## 2023-10-05 DIAGNOSIS — E66.01 SEVERE OBESITY WITH BODY MASS INDEX (BMI) OF 35.0 TO 39.9 WITH SERIOUS COMORBIDITY (HCC): ICD-10-CM

## 2023-10-05 DIAGNOSIS — Z00.00 MEDICARE ANNUAL WELLNESS VISIT, SUBSEQUENT: Primary | ICD-10-CM

## 2023-10-05 DIAGNOSIS — I48.0 PAROXYSMAL ATRIAL FIBRILLATION (HCC): ICD-10-CM

## 2023-10-05 DIAGNOSIS — M25.552 BILATERAL HIP PAIN: ICD-10-CM

## 2023-10-05 DIAGNOSIS — E21.0 PRIMARY HYPERPARATHYROIDISM (HCC): ICD-10-CM

## 2023-10-05 DIAGNOSIS — N18.31 STAGE 3A CHRONIC KIDNEY DISEASE (HCC): ICD-10-CM

## 2023-10-05 LAB
CA-I BLD-SCNC: 1.35 MMOL/L (ref 1.12–1.32)
PSA SERPL-MCNC: 5.83 NG/ML (ref 0–4)
URATE SERPL-MCNC: 4.8 MG/DL (ref 3.5–8.5)

## 2023-10-05 PROCEDURE — 90662 IIV NO PRSV INCREASED AG IM: CPT

## 2023-10-05 PROCEDURE — 99214 OFFICE O/P EST MOD 30 MIN: CPT | Performed by: STUDENT IN AN ORGANIZED HEALTH CARE EDUCATION/TRAINING PROGRAM

## 2023-10-05 PROCEDURE — G0008 ADMIN INFLUENZA VIRUS VAC: HCPCS

## 2023-10-05 PROCEDURE — 84550 ASSAY OF BLOOD/URIC ACID: CPT

## 2023-10-05 PROCEDURE — G0439 PPPS, SUBSEQ VISIT: HCPCS | Performed by: STUDENT IN AN ORGANIZED HEALTH CARE EDUCATION/TRAINING PROGRAM

## 2023-10-05 PROCEDURE — 82330 ASSAY OF CALCIUM: CPT

## 2023-10-05 NOTE — PATIENT INSTRUCTIONS
Medicare Preventive Visit Patient Instructions  Thank you for completing your Welcome to Medicare Visit or Medicare Annual Wellness Visit today. Your next wellness visit will be due in one year (10/5/2024). The screening/preventive services that you may require over the next 5-10 years are detailed below. Some tests may not apply to you based off risk factors and/or age. Screening tests ordered at today's visit but not completed yet may show as past due. Also, please note that scanned in results may not display below. Preventive Screenings:  Service Recommendations Previous Testing/Comments   Colorectal Cancer Screening  · Colonoscopy    · Fecal Occult Blood Test (FOBT)/Fecal Immunochemical Test (FIT)  · Fecal DNA/Cologuard Test  · Flexible Sigmoidoscopy Age: 43-73 years old   Colonoscopy: every 10 years (May be performed more frequently if at higher risk)  OR  FOBT/FIT: every 1 year  OR  Cologuard: every 3 years  OR  Sigmoidoscopy: every 5 years  Screening may be recommended earlier than age 39 if at higher risk for colorectal cancer. Also, an individualized decision between you and your healthcare provider will decide whether screening between the ages of 77-80 would be appropriate.  Colonoscopy: 03/24/2016  FOBT/FIT: Not on file  Cologuard: Not on file  Sigmoidoscopy: Not on file          Prostate Cancer Screening Individualized decision between patient and health care provider in men between ages of 53-66   Medicare will cover every 12 months beginning on the day after your 50th birthday PSA: 5.4 ng/mL     Screening Not Indicated     Hepatitis C Screening Once for adults born between 1945 and 1965  More frequently in patients at high risk for Hepatitis C Hep C Antibody: Not on file        Diabetes Screening 1-2 times per year if you're at risk for diabetes or have pre-diabetes Fasting glucose: 109 mg/dL (6/29/2023)  A1C: No results in last 5 years (No results in last 5 years)  Screening Current   Cholesterol Screening Once every 5 years if you don't have a lipid disorder. May order more often based on risk factors. Lipid panel: 10/20/2021  Screening Not Indicated  History Lipid Disorder      Other Preventive Screenings Covered by Medicare:  1. Abdominal Aortic Aneurysm (AAA) Screening: covered once if your at risk. You're considered to be at risk if you have a family history of AAA or a male between the age of 70-76 who smoking at least 100 cigarettes in your lifetime. 2. Lung Cancer Screening: covers low dose CT scan once per year if you meet all of the following conditions: (1) Age 48-67; (2) No signs or symptoms of lung cancer; (3) Current smoker or have quit smoking within the last 15 years; (4) You have a tobacco smoking history of at least 20 pack years (packs per day x number of years you smoked); (5) You get a written order from a healthcare provider. 3. Glaucoma Screening: covered annually if you're considered high risk: (1) You have diabetes OR (2) Family history of glaucoma OR (3)  aged 48 and older OR (3)  American aged 72 and older  3. Osteoporosis Screening: covered every 2 years if you meet one of the following conditions: (1) Have a vertebral abnormality; (2) On glucocorticoid therapy for more than 3 months; (3) Have primary hyperparathyroidism; (4) On osteoporosis medications and need to assess response to drug therapy. 5. HIV Screening: covered annually if you're between the age of 14-79. Also covered annually if you are younger than 13 and older than 72 with risk factors for HIV infection. For pregnant patients, it is covered up to 3 times per pregnancy.     Immunizations:  Immunization Recommendations   Influenza Vaccine Annual influenza vaccination during flu season is recommended for all persons aged >= 6 months who do not have contraindications   Pneumococcal Vaccine   * Pneumococcal conjugate vaccine = PCV13 (Prevnar 13), PCV15 (Vaxneuvance), PCV20 (Prevnar 20)  * Pneumococcal polysaccharide vaccine = PPSV23 (Pneumovax) Adults 2364 years old: 1-3 doses may be recommended based on certain risk factors  Adults 72 years old: 1-2 doses may be recommended based off what pneumonia vaccine you previously received   Hepatitis B Vaccine 3 dose series if at intermediate or high risk (ex: diabetes, end stage renal disease, liver disease)   Tetanus (Td) Vaccine - COST NOT COVERED BY MEDICARE PART B Following completion of primary series, a booster dose should be given every 10 years to maintain immunity against tetanus. Td may also be given as tetanus wound prophylaxis. Tdap Vaccine - COST NOT COVERED BY MEDICARE PART B Recommended at least once for all adults. For pregnant patients, recommended with each pregnancy. Shingles Vaccine (Shingrix) - COST NOT COVERED BY MEDICARE PART B  2 shot series recommended in those aged 48 and above     Health Maintenance Due:      Topic Date Due   • Colorectal Cancer Screening  03/24/2021     Immunizations Due:      Topic Date Due   • COVID-19 Vaccine (5 - Moderna series) 06/06/2022     Advance Directives   What are advance directives? Advance directives are legal documents that state your wishes and plans for medical care. These plans are made ahead of time in case you lose your ability to make decisions for yourself. Advance directives can apply to any medical decision, such as the treatments you want, and if you want to donate organs. What are the types of advance directives? There are many types of advance directives, and each state has rules about how to use them. You may choose a combination of any of the following:  · Living will: This is a written record of the treatment you want. You can also choose which treatments you do not want, which to limit, and which to stop at a certain time. This includes surgery, medicine, IV fluid, and tube feedings. · Durable power of  for healthcare [facility]):   This is a written record that states who you want to make healthcare choices for you when you are unable to make them for yourself. This person, called a proxy, is usually a family member or a friend. You may choose more than 1 proxy. · Do not resuscitate (DNR) order:  A DNR order is used in case your heart stops beating or you stop breathing. It is a request not to have certain forms of treatment, such as CPR. A DNR order may be included in other types of advance directives. · Medical directive: This covers the care that you want if you are in a coma, near death, or unable to make decisions for yourself. You can list the treatments you want for each condition. Treatment may include pain medicine, surgery, blood transfusions, dialysis, IV or tube feedings, and a ventilator (breathing machine). · Values history: This document has questions about your views, beliefs, and how you feel and think about life. This information can help others choose the care that you would choose. Why are advance directives important? An advance directive helps you control your care. Although spoken wishes may be used, it is better to have your wishes written down. Spoken wishes can be misunderstood, or not followed. Treatments may be given even if you do not want them. An advance directive may make it easier for your family to make difficult choices about your care. Fall Prevention    Fall prevention  includes ways to make your home and other areas safer. It also includes ways you can move more carefully to prevent a fall. Health conditions that cause changes in your blood pressure, vision, or muscle strength and coordination may increase your risk for falls. Medicines may also increase your risk for falls if they make you dizzy, weak, or sleepy. Fall prevention tips:   · Stand or sit up slowly. · Use assistive devices as directed. · Wear shoes that fit well and have soles that . · Wear a personal alarm. · Stay active.     · Manage your medical conditions. Home Safety Tips:  · Add items to prevent falls in the bathroom. · Keep paths clear. · Install bright lights in your home. · Keep items you use often on shelves within reach. · Paint or place reflective tape on the edges of your stairs. Weight Management   Why it is important to manage your weight:  Being overweight increases your risk of health conditions such as heart disease, high blood pressure, type 2 diabetes, and certain types of cancer. It can also increase your risk for osteoarthritis, sleep apnea, and other respiratory problems. Aim for a slow, steady weight loss. Even a small amount of weight loss can lower your risk of health problems. How to lose weight safely:  A safe and healthy way to lose weight is to eat fewer calories and get regular exercise. You can lose up about 1 pound a week by decreasing the number of calories you eat by 500 calories each day. Healthy meal plan for weight management:  A healthy meal plan includes a variety of foods, contains fewer calories, and helps you stay healthy. A healthy meal plan includes the following:  · Eat whole-grain foods more often. A healthy meal plan should contain fiber. Fiber is the part of grains, fruits, and vegetables that is not broken down by your body. Whole-grain foods are healthy and provide extra fiber in your diet. Some examples of whole-grain foods are whole-wheat breads and pastas, oatmeal, brown rice, and bulgur. · Eat a variety of vegetables every day. Include dark, leafy greens such as spinach, kale, citlaly greens, and mustard greens. Eat yellow and orange vegetables such as carrots, sweet potatoes, and winter squash. · Eat a variety of fruits every day. Choose fresh or canned fruit (canned in its own juice or light syrup) instead of juice. Fruit juice has very little or no fiber. · Eat low-fat dairy foods. Drink fat-free (skim) milk or 1% milk. Eat fat-free yogurt and low-fat cottage cheese. Try low-fat cheeses such as mozzarella and other reduced-fat cheeses. · Choose meat and other protein foods that are low in fat. Choose beans or other legumes such as split peas or lentils. Choose fish, skinless poultry (chicken or turkey), or lean cuts of red meat (beef or pork). Before you cook meat or poultry, cut off any visible fat. · Use less fat and oil. Try baking foods instead of frying them. Add less fat, such as margarine, sour cream, regular salad dressing and mayonnaise to foods. Eat fewer high-fat foods. Some examples of high-fat foods include french fries, doughnuts, ice cream, and cakes. · Eat fewer sweets. Limit foods and drinks that are high in sugar. This includes candy, cookies, regular soda, and sweetened drinks. Exercise:  Exercise at least 30 minutes per day on most days of the week. Some examples of exercise include walking, biking, dancing, and swimming. You can also fit in more physical activity by taking the stairs instead of the elevator or parking farther away from stores. Ask your healthcare provider about the best exercise plan for you. © Copyright [employer] 2018 Information is for End User's use only and may not be sold, redistributed or otherwise used for commercial purposes.  All illustrations and images included in CareNotes® are the copyrighted property of [employer] or [address] [place] [address]

## 2023-10-05 NOTE — PROGRESS NOTES
Mohinder Javier is here for his Subsequent Wellness visit. Last Medicare Wellness visit information reviewed, patient interviewed and updates made to the record today. Health Risk Assessment:   Patient rates overall health as good. Patient feels that their physical health rating is same. Patient is very satisfied with their life. Eyesight was rated as same. Hearing was rated as same. Patient feels that their emotional and mental health rating is slightly better. Patients states they are never, rarely angry. Patient states they are sometimes unusually tired/fatigued. Pain experienced in the last 7 days has been some. Patient's pain rating has been 5/10. Patient states that he has experienced no weight loss or gain in last 6 months. Depression Screening:   PHQ-2 Score: 0      Fall Risk Screening: In the past year, patient has experienced: history of falling in past year    Number of falls: 1  Injured during fall?: No    Feels unsteady when standing or walking?: No    Worried about falling?: No      Home Safety:  Patient does not have trouble with stairs inside or outside of their home. Patient has working smoke alarms and has working carbon monoxide detector. Home safety hazards include: none. Nutrition:   Current diet is Regular. Medications:   Patient is not currently taking any over-the-counter supplements. Patient is able to manage medications. Activities of Daily Living (ADLs)/Instrumental Activities of Daily Living (IADLs):   Walk and transfer into and out of bed and chair?: Yes  Dress and groom yourself?: Yes    Bathe or shower yourself?: Yes    Feed yourself? Yes  Do your laundry/housekeeping?: Yes  Manage your money, pay your bills and track your expenses?: Yes  Make your own meals?: Yes    Do your own shopping?: Yes    Previous Hospitalizations:   Any hospitalizations or ED visits within the last 12 months?: No      Advance Care Planning:   Living will: Yes    Durable POA for healthcare:  Yes Advanced directive: Yes      PREVENTIVE SCREENINGS      Cardiovascular Screening:    General: Screening Not Indicated and History Lipid Disorder      Diabetes Screening:     General: Screening Current      Prostate Cancer Screening:    General: Screening Not Indicated      Abdominal Aortic Aneurysm (AAA) Screening:    Risk factors include: tobacco use        Lung Cancer Screening:     General: Screening Not Indicated    Screening, Brief Intervention, and Referral to Treatment (SBIRT)    Screening  Typical number of drinks in a day: 0  Typical number of drinks in a week: 0  Interpretation: Low risk drinking behavior. AUDIT-C Screenin) How often did you have a drink containing alcohol in the past year? never  2) How many drinks did you have on a typical day when you were drinking in the past year? 0  3) How often did you have 6 or more drinks on one occasion in the past year? never    AUDIT-C Score: 0  Interpretation: Score 0-3 (male): Negative screen for alcohol misuse        [facility] OFFICE VISIT     PATIENT INFORMATION     Valorie Carbajal   78 y.o. male   MRN: [id number]    ASSESSMENT/PLAN     1. Medicare annual wellness visit, subsequent    2. Benign essential hypertension  - amlodipine 5 mg daily  - lisinopril 10 mg daily    3. Stage 3a chronic kidney disease (HCC)    4. Paroxysmal atrial fibrillation (HCC)  - Eliquis 5 mg BID  - metoprolol succinate 25 mg daily    5. Bilateral hip pain  -     XR hips bilateral 3-4 vw w pelvis if performed; Future; Expected date: 10/05/2023    6. Severe obesity with body mass index (BMI) of 35.0 to 39.9 with serious comorbidity ([address])    7.  Needs flu shot  -     influenza vaccine, high-dose, PF 0.7 mL (FLUZONE HIGH-DOSE)      HEALTH MAINTENANCE     Immunization History   Administered Date(s) Administered   • COVID-19 MODERNA VACC 0.5 ML IM 2021, 2021, 2021, 2022   • INFLUENZA 2008, 2009, 10/13/2010, 10/30/2015, 11/15/2016, 10/04/2017, 10/21/2022   • Influenza Split High Dose Preservative Free IM 10/04/2013, 09/24/2014, 10/30/2015, 11/15/2016, 10/04/2017   • Influenza, high dose seasonal 0.7 mL 10/02/2018, 11/13/2019, 11/25/2020, 11/02/2021, 10/21/2022, 10/05/2023   • Influenza, seasonal, injectable 11/11/2011, 11/06/2012   • Pneumococcal Conjugate 13-Valent 03/22/2016   • Pneumococcal Polysaccharide PPV23 05/10/2011   • Zoster 01/01/2011   • Zoster Vaccine Recombinant 07/30/2020, 10/05/2020         CHIEF COMPLAINT     Chief Complaint   Patient presents with   • Follow-up     Patient here for 3 month follow-up    • Medicare Wellness Visit   •      Patient due for:  COLORECTAL CANCER SCREENING    • Immunizations     HD flu       HISTORY OF PRESENT ILLNESS     Mr. Meenu Suh is an 78-year-old male with past medical history of primary hyperparathyroidism, hypertension, atrial fibrillation on Eliquis, osteopenia, chronic kidney disease, chronic bilateral low back pain, hyperlipidemia. Patient was last seen in office on 07/14/2001 23 for osteopenia and concern for primary hyperparathyroidism. On 06/29/223 PTH was elevated at 124.8, ionized calcium elevated at 1.33, vitamin D normal at 70.2. DEXA scan on 03/21/2023 showed osteopenia at left femoral neck. Plan was to repeat ionized calcium and if increasing would get endocrinology referral.  Patient had just completed blood work prior to this visit and so blood work is not yet complete and available at time of encounter. Patient presently denies any fever, chills, nausea, vomiting, diarrhea, constipation, chest pain, shortness of breath. Has no acute complaints or concerns at today's visit. REVIEW OF SYSTEMS     Review of Systems   Constitutional: Negative for chills, fatigue and fever. HENT: Negative for rhinorrhea and sore throat. Eyes: Negative for pain and redness. Respiratory: Negative for cough, shortness of breath and wheezing. Gastrointestinal: Negative for abdominal distention, abdominal pain, constipation, diarrhea, nausea and vomiting. Genitourinary: Negative for difficulty urinating, dysuria and frequency. Musculoskeletal: Positive for back pain. Negative for arthralgias. Skin: Negative for rash and wound. Neurological: Negative for dizziness, syncope, weakness and headaches. Psychiatric/Behavioral: Negative for agitation, behavioral problems and confusion. OBJECTIVE     Vitals:    10/05/23 0932   BP: 138/98   BP Location: Left arm   Patient Position: Sitting   Cuff Size: Standard   Pulse: 62   Temp: 98 °F (36.7 °C)   TempSrc: Temporal   SpO2: 96%   Weight: 113 kg (250 lb)   Height: 5' 10" (1.778 m)     Physical Exam  Constitutional:       General: He is not in acute distress. Appearance: Normal appearance. He is obese. He is not ill-appearing. HENT:      Right Ear: Tympanic membrane, ear canal and external ear normal.      Left Ear: Tympanic membrane, ear canal and external ear normal.      Nose: Nose normal. No congestion or rhinorrhea. Mouth/Throat:      Mouth: Mucous membranes are moist.      Pharynx: Oropharynx is clear. Eyes:      Extraocular Movements: Extraocular movements intact. Conjunctiva/sclera: Conjunctivae normal.      Pupils: Pupils are equal, round, and reactive to light. Cardiovascular:      Rate and Rhythm: Normal rate and regular rhythm. Pulses: Normal pulses. Heart sounds: Normal heart sounds. No murmur heard. Pulmonary:      Effort: Pulmonary effort is normal. No respiratory distress. Breath sounds: Normal breath sounds. No wheezing, rhonchi or rales. Abdominal:      General: Abdomen is flat. Bowel sounds are normal. There is no distension. Palpations: Abdomen is soft. Tenderness: There is no abdominal tenderness. Musculoskeletal:      Right lower leg: No edema. Left lower leg: No edema.    Neurological:      Mental Status: He is alert and oriented to person, place, and time. Motor: No weakness. Psychiatric:         Mood and Affect: Mood normal.         Behavior: Behavior normal.         Thought Content:  Thought content normal.       CURRENT MEDICATIONS     Current Outpatient Medications:   •  allopurinol (ZYLOPRIM) 300 mg tablet, Take 1 tablet (300 mg total) by mouth daily, Disp: 90 tablet, Rfl: 1  •  amLODIPine (NORVASC) 5 mg tablet, Take 1 tablet (5 mg total) by mouth daily, Disp: 90 tablet, Rfl: 1  •  apixaban (ELIQUIS) 5 mg, Take 1 tablet (5 mg total) by mouth 2 (two) times a day, Disp: 180 tablet, Rfl: 3  •  Carboxymethylcellulose Sodium (EYE DROPS OP), Apply to eye as needed, Disp: , Rfl:   •  Cholecalciferol (Vitamin D-3) 125 MCG (5000 UT) TABS, Take by mouth daily , Disp: , Rfl:   •  lisinopril (ZESTRIL) 10 mg tablet, Take 1 tablet (10 mg total) by mouth daily, Disp: 90 tablet, Rfl: 3  •  meclizine (ANTIVERT) 25 mg tablet, Take 1 tablet (25 mg total) by mouth 2 (two) times a day as needed for dizziness, Disp: 180 tablet, Rfl: 1  •  metoprolol succinate (TOPROL-XL) 25 mg 24 hr tablet, Take 1 tablet (25 mg total) by mouth daily, Disp: 90 tablet, Rfl: 3  •  simvastatin (ZOCOR) 40 mg tablet, Take 1 tablet (40 mg total) by mouth every other day, Disp: 45 tablet, Rfl: 1    PAST MEDICAL & SURGICAL HISTORY     Past Medical History:   Diagnosis Date   • Acute gouty arthropathy     last assessed-11/15/2013   • Cataract    • Diverticulitis of colon    • Enlarged prostate    • Gout    • Hearing loss    • Heart disease    • History of diverticulitis of colon    • History of dizziness    • Hypercholesterolemia    • Hyperlipidemia    • Hypertension    • Palpitations    • Sinus bradycardia      Past Surgical History:   Procedure Laterality Date   • ARM NEUROPLASTY Left     1977   • CARDIAC PACEMAKER PLACEMENT Left    • CARDIAC SURGERY  05/01/2019    pace maker placed   • CATARACT EXTRACTION, BILATERAL  2021   • COLONOSCOPY     • CYSTOSCOPY  12/27/2017 diagnostic   • FOREARM FRACTURE SURGERY     • ORCHIECTOMY      surgery testis   • [place] CYSTO INSERTION TRANSPROSTATIC IMPLANT SINGLE N/A 2018    Procedure: Donnal Level WITH INSERTION Jimmy Dunk;  Surgeon: Cosme Gallardo MD;  Location: [facility];  Service: Urology   • [place] CYSTOURETHROSCOPY WITH BIOPSY N/A 2018    Procedure: BLADDER BIOPSY;  Surgeon: Cosme Gallardo MD;  Location: [facility];  Service: Urology   • TESTICLE SURGERY Right      SOCIAL & FAMILY HISTORY     Social History     Socioeconomic History   • Marital status: /Civil Union     Spouse name: Not on file   • Number of children: Not on file   • Years of education: Not on file   • Highest education level: Not on file   Occupational History     Comment: retired from work   Tobacco Use   • Smoking status: Former     Packs/day: 0.25     Years: 10.00     Total pack years: 2.50     Types: Cigars, Pipe, Cigarettes     Start date:      Quit date:      Years since quittin.7   • Smokeless tobacco: Never   Vaping Use   • Vaping Use: Never used   Substance and Sexual Activity   • Alcohol use: Not Currently     Comment: rarely. .Per allscripts, drinks alcohol very infrequently   • Drug use: No   • Sexual activity: Not Currently   Other Topics Concern   • Not on file   Social History Narrative    Copy of advanced directive obtained    Exercising regularly-primary form of exercise is work    Recreational activities-he enjoys home crafts and wood working    Travel history-Pt denies being out of the country during 2014-11/10/2014     Social Determinants of Health     Financial Resource Strain: Low Risk  (10/5/2023)    Overall Financial Resource Strain (CARDIA)    • Difficulty of Paying Living Expenses: Not hard at all   Food Insecurity: Not on file   Transportation Needs: No Transportation Needs (10/5/2023)    PRAPARE - Transportation    • Lack of Transportation (Medical): No    • Lack of Transportation (Non-Medical):  No Physical Activity: Not on file   Stress: Not on file   Social Connections: Not on file   Intimate Partner Violence: Not on file   Housing Stability: Not on file     Social History     Substance and Sexual Activity   Alcohol Use Not Currently    Comment: rarely. .Per allscripts, drinks alcohol very infrequently       Social History     Substance and Sexual Activity   Drug Use No     Social History     Tobacco Use   Smoking Status Former   • Packs/day: 0.25   • Years: 10.00   • Total pack years: 2.50   • Types: Cigars, Pipe, Cigarettes   • Start date:    • Quit date: 24   • Years since quittin.7   Smokeless Tobacco Never     Family History   Problem Relation Age of Onset   • Coronary artery disease Mother    • Heart disease Mother    • Hypertension Mother    • Stroke Mother    • Coronary artery disease Father    • Stroke Family    • Heart attack Family         acute MI     ==  Mendy Hart, DO  Internal Medicine Residency, PGY-3

## 2023-10-06 ENCOUNTER — DOCUMENTATION (OUTPATIENT)
Dept: HEMATOLOGY ONCOLOGY | Facility: CLINIC | Age: 78
End: 2023-10-06

## 2023-10-06 NOTE — PROGRESS NOTES
I saw patient in June 2022. Today I received report of his PSA in my box and I forwarded the report to patient's primary physician Dr. Cielo Mercedes, urologist Dr. Maine Martinez and his PA Jaylen Begum.

## 2023-10-24 DIAGNOSIS — R42 DIZZINESS: ICD-10-CM

## 2023-10-24 DIAGNOSIS — M10.9 GOUT, UNSPECIFIED CAUSE, UNSPECIFIED CHRONICITY, UNSPECIFIED SITE: ICD-10-CM

## 2023-10-24 RX ORDER — ALLOPURINOL 300 MG/1
300 TABLET ORAL DAILY
Qty: 90 TABLET | Refills: 1 | Status: SHIPPED | OUTPATIENT
Start: 2023-10-24

## 2023-10-24 RX ORDER — MECLIZINE HYDROCHLORIDE 25 MG/1
25 TABLET ORAL 2 TIMES DAILY PRN
Qty: 180 TABLET | Refills: 1 | Status: SHIPPED | OUTPATIENT
Start: 2023-10-24

## 2023-12-05 ENCOUNTER — REMOTE DEVICE CLINIC VISIT (OUTPATIENT)
Dept: CARDIOLOGY CLINIC | Facility: CLINIC | Age: 78
End: 2023-12-05
Payer: COMMERCIAL

## 2023-12-05 DIAGNOSIS — Z95.0 PRESENCE OF CARDIAC PACEMAKER: Primary | ICD-10-CM

## 2023-12-05 PROCEDURE — 93296 REM INTERROG EVL PM/IDS: CPT | Performed by: INTERNAL MEDICINE

## 2023-12-05 PROCEDURE — 93294 REM INTERROG EVL PM/LDLS PM: CPT | Performed by: INTERNAL MEDICINE

## 2023-12-05 NOTE — PROGRESS NOTES
Results for orders placed or performed in visit on 12/05/23   Cardiac EP device report    Narrative    MDT-DUAL CHAMBER PPM (AAIR-DDDR MODE)/ACTIVE SYSTEM IS MRI CONDITIONAL  CARELINK TRANSMISSION: BATTERY VOLTAGE ADEQUATE (6.9 YRS). AP: 98.1%. : 99.2% (>40%~MVP-ON/70). ALL AVAILABLE LEAD PARAMETERS WITHIN NORMAL LIMITS. 2 VT EPISODES W/ EGMS SHOWING NSVT 11 BEATS @ 184 BPM & 8 BEATS @ 182 BPM. PT TAKES METOPROLOL SUCC, ELIQUIS. EF: 55% (ECHO 7/15/22). NORMAL DEVICE FUNCTION.  [id number] [address]

## 2024-01-22 ENCOUNTER — HOSPITAL ENCOUNTER (OUTPATIENT)
Dept: RADIOLOGY | Facility: IMAGING CENTER | Age: 79
Discharge: HOME | End: 2024-01-22
Payer: COMMERCIAL

## 2024-01-22 DIAGNOSIS — M25.552 BILATERAL HIP PAIN: ICD-10-CM

## 2024-01-22 DIAGNOSIS — M25.551 BILATERAL HIP PAIN: ICD-10-CM

## 2024-01-22 PROCEDURE — 73521 X-RAY EXAM HIPS BI 2 VIEWS: CPT

## 2024-01-25 ENCOUNTER — VBI (OUTPATIENT)
Dept: ADMINISTRATIVE | Facility: OTHER | Age: 79
End: 2024-01-25

## 2024-01-25 DIAGNOSIS — I10 BENIGN ESSENTIAL HYPERTENSION: ICD-10-CM

## 2024-01-25 RX ORDER — LISINOPRIL 10 MG/1
10 TABLET ORAL DAILY
Qty: 90 TABLET | Refills: 0 | Status: SHIPPED | OUTPATIENT
Start: 2024-01-25

## 2024-02-01 ENCOUNTER — RA CDI HCC (OUTPATIENT)
Dept: OTHER | Facility: HOSPITAL | Age: 79
End: 2024-02-01

## 2024-02-07 ENCOUNTER — OFFICE VISIT (OUTPATIENT)
Dept: INTERNAL MEDICINE CLINIC | Age: 79
End: 2024-02-07
Payer: COMMERCIAL

## 2024-02-07 VITALS
OXYGEN SATURATION: 95 % | HEART RATE: 76 BPM | HEIGHT: 70 IN | TEMPERATURE: 98.5 F | BODY MASS INDEX: 35.79 KG/M2 | SYSTOLIC BLOOD PRESSURE: 130 MMHG | DIASTOLIC BLOOD PRESSURE: 86 MMHG | WEIGHT: 250 LBS

## 2024-02-07 DIAGNOSIS — N18.31 STAGE 3A CHRONIC KIDNEY DISEASE (HCC): Primary | ICD-10-CM

## 2024-02-07 DIAGNOSIS — I48.0 PAROXYSMAL ATRIAL FIBRILLATION (HCC): ICD-10-CM

## 2024-02-07 DIAGNOSIS — E66.01 SEVERE OBESITY WITH BODY MASS INDEX (BMI) OF 35.0 TO 39.9 WITH SERIOUS COMORBIDITY (HCC): ICD-10-CM

## 2024-02-07 DIAGNOSIS — I10 BENIGN ESSENTIAL HYPERTENSION: ICD-10-CM

## 2024-02-07 DIAGNOSIS — D75.1 SECONDARY POLYCYTHEMIA: ICD-10-CM

## 2024-02-07 DIAGNOSIS — E21.0 PRIMARY HYPERPARATHYROIDISM (HCC): ICD-10-CM

## 2024-02-07 DIAGNOSIS — E78.00 HYPERCHOLESTEROLEMIA: ICD-10-CM

## 2024-02-07 PROBLEM — I47.29 NSVT (NONSUSTAINED VENTRICULAR TACHYCARDIA) (HCC): Status: RESOLVED | Noted: 2020-04-01 | Resolved: 2024-02-07

## 2024-02-07 PROBLEM — N32.9 HYPERVASCULAR LESION OF URINARY BLADDER: Status: RESOLVED | Noted: 2018-03-14 | Resolved: 2024-02-07

## 2024-02-07 PROBLEM — K82.4 GALLBLADDER POLYP: Status: RESOLVED | Noted: 2020-01-13 | Resolved: 2024-02-07

## 2024-02-07 PROBLEM — N18.2 STAGE 2 CHRONIC KIDNEY DISEASE: Status: RESOLVED | Noted: 2020-03-16 | Resolved: 2024-02-07

## 2024-02-07 PROBLEM — N28.1 RENAL CYST, LEFT: Status: RESOLVED | Noted: 2020-01-13 | Resolved: 2024-02-07

## 2024-02-07 PROCEDURE — 99215 OFFICE O/P EST HI 40 MIN: CPT | Performed by: INTERNAL MEDICINE

## 2024-02-07 NOTE — PROGRESS NOTES
Assessment/Plan:     Diagnoses and all orders for this visit:    Stage 3a chronic kidney disease (HCC)  -     CBC and differential; Future  -     Comprehensive metabolic panel; Future  -     Albumin / creatinine urine ratio; Future  -     Vitamin D 25 hydroxy; Future  -     UA w Reflex to Microscopic w Reflex to Culture; Future    Severe obesity with body mass index (BMI) of 35.0 to 39.9 with serious comorbidity (HCC)    Paroxysmal atrial fibrillation (HCC)  -     Lipid panel; Future  -     TSH, 3rd generation with Free T4 reflex; Future    Benign essential hypertension  -     CBC and differential; Future  -     Comprehensive metabolic panel; Future  -     Lipid panel; Future  -     TSH, 3rd generation with Free T4 reflex; Future  -     UA w Reflex to Microscopic w Reflex to Culture; Future    Hypercholesterolemia  -     Lipid panel; Future  -     TSH, 3rd generation with Free T4 reflex; Future    Primary hyperparathyroidism (HCC)    Secondary polycythemia           [employer] software was used to dictate this note.  It may contain errors with dictating incorrect words or incorrect spelling. Please contact the provider directly with any questions.    Subjective:   Chief Complaint   Patient presents with    Follow-up     4 m f/u visit for hypertension, no recent labs.  He c/o pain in his left upper arm since getting his flu vaccine in October.        Patient ID: Dawood Ramesh is a 78 y.o. male.    HPI  This is a very pleasant 78 years young gentleman who is here today for the regular follow-up    Usual problems are his bilateral shoulder more on the right side than the left than the left side, difficulty in raising the arm pain on the left but the right side is much more disabled because of the movement restrictions patient is lifting the arm above 90 degree he was seen by the orthopedic surgeon and surgery was recommended I do not think so that he wanted to go through the surgery for the rotator cuff.  So I  recommended physical therapy but he said that he had exercises he can do it at home he does not wanted to go for the physical therapy unless it gets too bad.  Patient is here today for the follow-up.   Hypertension. I reviewed antihypertensive medication, patient does not have any side effects of  medications, no signs or symptoms of hypertension ,hypotension or orthostatic hypotension.  Patient is compliant with medications.  Blood workup related to hypertensive diagnosis reviewed.  Plan is to continue with the present management.  We will follow-up as a scheduled and adjust the doses of the medication as indicated.  Hypercholesterolemia reviewed the lipid panel patient is on statin therapy.  Side effects of statin medications.  Patient is taking his medications  regularly.  Comorbidities include hypertension, diabetes, coronary artery disease.  plan is to continue with the present management continue to follow-up with the lipid panel.  Patient is here for the follow-up of chronic atrial fibrillation. doing well no new complaint related to atrial fibrillation.  Patient is on anticoagulation, no side effects of the medication.  No history of CVA or TIA.  Hypertension is well controlled.  No signs or symptoms of heart failure.  Recent echocardiogram noted.  Patient is followed up with the cardiology.  Patient is on apixaban 5 mg once a day and tolerating the medication no bleeding also he is followed up by the urology  The following portions of the patient's history were reviewed and updated as appropriate: allergies, current medications, past family history, past medical history, past social history, past surgical history, and problem list.    Review of Systems   Constitutional:  Positive for fatigue. Negative for appetite change and fever.   HENT:  Negative for congestion, ear pain, hearing loss, nosebleeds, sneezing, tinnitus and voice change.    Eyes:  Negative for pain, discharge and redness.   Respiratory:   Negative for cough, chest tightness and wheezing.    Cardiovascular:  Negative for chest pain, palpitations and leg swelling.   Gastrointestinal:  Negative for abdominal pain, blood in stool, constipation, diarrhea, nausea and vomiting.   Genitourinary:  Negative for difficulty urinating, dysuria, hematuria and urgency.   Musculoskeletal:  Positive for arthralgias (Bilateral shoulder pain and also difficulty in raising the arm over 90 degree more on the right than on the left) and back pain (This is a chronic problem with the chiropractor before). Negative for gait problem and joint swelling.   Skin:  Negative for rash and wound.   Allergic/Immunologic: Negative for environmental allergies.   Neurological:  Negative for dizziness, tremors, seizures, weakness, light-headedness and numbness.   Hematological:  Negative for adenopathy. Does not bruise/bleed easily.   Psychiatric/Behavioral:  Negative for behavioral problems and confusion. The patient is not nervous/anxious.          Past Medical History:   Diagnosis Date    Acute gouty arthropathy     last assessed-11/15/2013    Cataract     Diverticulitis of colon     Enlarged prostate     Gout     Hearing loss     Heart disease     History of diverticulitis of colon     History of dizziness     Hypercholesterolemia     Hyperlipidemia     Hypertension     Palpitations     Sinus bradycardia          Current Outpatient Medications:     allopurinol (ZYLOPRIM) 300 mg tablet, Take 1 tablet (300 mg total) by mouth daily, Disp: 90 tablet, Rfl: 1    amLODIPine (NORVASC) 5 mg tablet, Take 1 tablet (5 mg total) by mouth daily, Disp: 90 tablet, Rfl: 1    apixaban (ELIQUIS) 5 mg, Take 1 tablet (5 mg total) by mouth 2 (two) times a day, Disp: 180 tablet, Rfl: 3    Carboxymethylcellulose Sodium (EYE DROPS OP), Apply to eye as needed, Disp: , Rfl:     Cholecalciferol (Vitamin D-3) 125 MCG (5000 UT) TABS, Take by mouth daily , Disp: , Rfl:     lisinopril (ZESTRIL) 10 mg tablet, Take 1  "tablet (10 mg total) by mouth daily, Disp: 90 tablet, Rfl: 0    meclizine (ANTIVERT) 25 mg tablet, Take 1 tablet (25 mg total) by mouth 2 (two) times a day as needed for dizziness, Disp: 180 tablet, Rfl: 1    metoprolol succinate (TOPROL-XL) 25 mg 24 hr tablet, Take 1 tablet (25 mg total) by mouth daily, Disp: 90 tablet, Rfl: 3    simvastatin (ZOCOR) 40 mg tablet, Take 1 tablet (40 mg total) by mouth every other day, Disp: 45 tablet, Rfl: 1    Allergies   Allergen Reactions    Pollen Extract Allergic Rhinitis       Social History   Past Surgical History:   Procedure Laterality Date    ARM NEUROPLASTY Left     1977    CARDIAC PACEMAKER PLACEMENT Left     CARDIAC SURGERY  05/01/2019    pace maker placed    CATARACT EXTRACTION, BILATERAL  2021    COLONOSCOPY      CYSTOSCOPY  12/27/2017    diagnostic    FOREARM FRACTURE SURGERY      ORCHIECTOMY      surgery testis    [place] CYSTO INSERTION TRANSPROSTATIC IMPLANT SINGLE N/A 2/9/2018    Procedure: CYSTOSCOPY WITH INSERTION UROLIFT;  Surgeon: Bernardo Billingsley MD;  Location: [facility];  Service: Urology    [place] CYSTOURETHROSCOPY WITH BIOPSY N/A 2/9/2018    Procedure: BLADDER BIOPSY;  Surgeon: Bernardo Billingsley MD;  Location: [facility];  Service: Urology    TESTICLE SURGERY Right 1976     Family History   Problem Relation Age of Onset    Coronary artery disease Mother     Heart disease Mother     Hypertension Mother     Stroke Mother     Coronary artery disease Father     Stroke Family     Heart attack Family         acute MI       Objective:  /86 (BP Location: Left arm, Patient Position: Sitting, Cuff Size: Standard)   Pulse 76   Temp 98.5 °F (36.9 °C) (Temporal)   Ht 5' 10\" (1.778 m)   Wt 113 kg (250 lb)   SpO2 95%   BMI 35.87 kg/m²        Physical Exam  Constitutional:       Appearance: He is well-developed.   HENT:      Right Ear: Tympanic membrane and external ear normal.      Left Ear: Tympanic membrane normal.   Eyes:      Conjunctiva/sclera: " Conjunctivae normal.      Pupils: Pupils are equal, round, and reactive to light.   Neck:      Thyroid: No thyromegaly.      Vascular: No JVD.   Cardiovascular:      Rate and Rhythm: Normal rate and regular rhythm.      Heart sounds: Normal heart sounds.   Pulmonary:      Breath sounds: Normal breath sounds.   Abdominal:      General: Bowel sounds are normal.      Palpations: Abdomen is soft.   Musculoskeletal:      Right shoulder: Crepitus present. No swelling, deformity, effusion, laceration, tenderness or bony tenderness. Decreased range of motion. Decreased strength.      Left shoulder: Crepitus present. No swelling, deformity, effusion, laceration or bony tenderness. Decreased range of motion. Decreased strength.      Cervical back: Normal range of motion.   Lymphadenopathy:      Cervical: No cervical adenopathy.   Skin:     General: Skin is dry.   Neurological:      General: No focal deficit present.      Mental Status: He is alert and oriented to person, place, and time. Mental status is at baseline.      Deep Tendon Reflexes: Reflexes are normal and symmetric.   Psychiatric:         Mood and Affect: Mood normal.         Behavior: Behavior normal.

## 2024-02-14 NOTE — TELEPHONE ENCOUNTER
Requested medication(s) are due for refill today: Yes  Patient has already received a courtesy refill: No  Other reason request has been forwarded to provider: 3-5x/week

## 2024-02-22 DIAGNOSIS — I10 ESSENTIAL HYPERTENSION, BENIGN: ICD-10-CM

## 2024-02-22 RX ORDER — AMLODIPINE BESYLATE 5 MG/1
5 TABLET ORAL DAILY
Qty: 90 TABLET | Refills: 1 | Status: SHIPPED | OUTPATIENT
Start: 2024-02-22

## 2024-03-05 ENCOUNTER — REMOTE DEVICE CLINIC VISIT (OUTPATIENT)
Dept: CARDIOLOGY CLINIC | Facility: CLINIC | Age: 79
End: 2024-03-05
Payer: COMMERCIAL

## 2024-03-05 DIAGNOSIS — Z95.0 PRESENCE OF PERMANENT CARDIAC PACEMAKER: Primary | ICD-10-CM

## 2024-03-05 PROCEDURE — 93296 REM INTERROG EVL PM/IDS: CPT | Performed by: INTERNAL MEDICINE

## 2024-03-05 PROCEDURE — 93294 REM INTERROG EVL PM/LDLS PM: CPT | Performed by: INTERNAL MEDICINE

## 2024-03-05 NOTE — PROGRESS NOTES
Results for orders placed or performed in visit on 03/05/24   Cardiac EP device report    Narrative    MDT-DUAL CHAMBER PPM (AAIR-DDDR MODE)/ACTIVE SYSTEM IS MRI CONDITIONAL  CARELINK TRANSMISSION: BATTERY VOLTAGE ADEQUATE (6.6 YRS). AP 98%.  99.6% (>40%/AAIR-DDDR 70PPM). ALL AVAILABLE LEAD PARAMETERS WITHIN NORMAL LIMITS. 1 VT EPISODES W/ EGM SHOWING NSVT 7 @ 185 BPM. EF 55% (7/15/2022 ECHO); HX: NSVT; PT TAKES ELIQUIS, METOPROLOL SUCC. NORMAL DEVICE FUNCTION.  ES

## 2024-03-18 DIAGNOSIS — I48.0 PAROXYSMAL ATRIAL FIBRILLATION (HCC): ICD-10-CM

## 2024-03-18 DIAGNOSIS — I47.29 NSVT (NONSUSTAINED VENTRICULAR TACHYCARDIA) (HCC): ICD-10-CM

## 2024-03-18 DIAGNOSIS — M85.89 OSTEOPENIA OF MULTIPLE SITES: ICD-10-CM

## 2024-03-18 DIAGNOSIS — E78.5 HYPERLIPIDEMIA, UNSPECIFIED HYPERLIPIDEMIA TYPE: ICD-10-CM

## 2024-03-18 RX ORDER — SIMVASTATIN 40 MG
40 TABLET ORAL EVERY OTHER DAY
Qty: 45 TABLET | Refills: 1 | Status: SHIPPED | OUTPATIENT
Start: 2024-03-18

## 2024-03-20 RX ORDER — METOPROLOL SUCCINATE 25 MG/1
25 TABLET, EXTENDED RELEASE ORAL DAILY
Qty: 90 TABLET | Refills: 3 | Status: SHIPPED | OUTPATIENT
Start: 2024-03-20

## 2024-04-22 DIAGNOSIS — I10 BENIGN ESSENTIAL HYPERTENSION: ICD-10-CM

## 2024-04-22 DIAGNOSIS — R42 DIZZINESS: ICD-10-CM

## 2024-04-22 RX ORDER — MECLIZINE HYDROCHLORIDE 25 MG/1
25 TABLET ORAL 2 TIMES DAILY PRN
Qty: 180 TABLET | Refills: 1 | Status: SHIPPED | OUTPATIENT
Start: 2024-04-22

## 2024-04-22 NOTE — TELEPHONE ENCOUNTER
Reason for call:   [x] Refill   [] Prior Auth  [] Other:     Office:   [] PCP/Provider -   [x] Specialty/Provider - cardio    Medication: lisinopril (ZESTRIL) 10 mg tablet     Dose/Frequency: 10 mg, Oral, Daily     Quantity: 90    Pharmacy: [facility] - [address]    Does the patient have enough for 3 days?   [x] Yes   [] No - Send as HP to POD

## 2024-04-23 RX ORDER — LISINOPRIL 10 MG/1
10 TABLET ORAL DAILY
Qty: 90 TABLET | Refills: 1 | Status: SHIPPED | OUTPATIENT
Start: 2024-04-23

## 2024-04-30 DIAGNOSIS — M10.9 GOUT, UNSPECIFIED CAUSE, UNSPECIFIED CHRONICITY, UNSPECIFIED SITE: ICD-10-CM

## 2024-04-30 RX ORDER — ALLOPURINOL 300 MG/1
300 TABLET ORAL DAILY
Qty: 90 TABLET | Refills: 1 | Status: SHIPPED | OUTPATIENT
Start: 2024-04-30

## 2024-04-30 NOTE — TELEPHONE ENCOUNTER
Reason for call:   [x] Refill   [] Prior Auth  [] Other:     Office:   [x] PCP/Provider - [facility]  [] Specialty/Provider -     Medication:    Does the patient have enough for 3 days?   [] Yes   [x] No - Send as HP to POD

## 2024-06-04 ENCOUNTER — REMOTE DEVICE CLINIC VISIT (OUTPATIENT)
Dept: CARDIOLOGY CLINIC | Facility: CLINIC | Age: 79
End: 2024-06-04
Payer: COMMERCIAL

## 2024-06-04 DIAGNOSIS — Z95.0 PRESENCE OF PERMANENT CARDIAC PACEMAKER: Primary | ICD-10-CM

## 2024-06-04 PROCEDURE — 93294 REM INTERROG EVL PM/LDLS PM: CPT | Performed by: INTERNAL MEDICINE

## 2024-06-04 PROCEDURE — 93296 REM INTERROG EVL PM/IDS: CPT | Performed by: INTERNAL MEDICINE

## 2024-06-04 NOTE — PROGRESS NOTES
Results for orders placed or performed in visit on 06/04/24   Cardiac EP device report    Narrative    MDT-DUAL CHAMBER PPM (AAIR-DDDR MODE)/ACTIVE SYSTEM IS MRI CONDITIONAL  CARELINK TRANSMISSION: BATTERY VOLTAGE ADEQUATE (6.1 YRS). AP 98%.  99.8% (>40%/AAIR-DDDR 70PPM). ALL AVAILABLE LEAD PARAMETERS WITHIN NORMAL LIMITS. 1 AT/AF EPISODE FOR PAF - DURATION 01:12 MINS. AT/AF BURDEN <0.1%. EF 55% (7/15/2022 ECHO); PT TAKES ELIQUIS, METOPROLOL SUCC. NORMAL DEVICE FUNCTION.  ES

## 2024-06-27 ENCOUNTER — TELEPHONE (OUTPATIENT)
Dept: CARDIOLOGY CLINIC | Facility: CLINIC | Age: 79
End: 2024-06-27

## 2024-07-09 ENCOUNTER — APPOINTMENT (OUTPATIENT)
Dept: LAB | Age: 79
End: 2024-07-09
Payer: COMMERCIAL

## 2024-07-09 DIAGNOSIS — I48.0 PAROXYSMAL ATRIAL FIBRILLATION (HCC): ICD-10-CM

## 2024-07-09 DIAGNOSIS — E78.00 HYPERCHOLESTEROLEMIA: ICD-10-CM

## 2024-07-09 DIAGNOSIS — I10 BENIGN ESSENTIAL HYPERTENSION: ICD-10-CM

## 2024-07-09 DIAGNOSIS — N18.31 STAGE 3A CHRONIC KIDNEY DISEASE (HCC): ICD-10-CM

## 2024-07-09 LAB
25(OH)D3 SERPL-MCNC: 70.2 NG/ML (ref 30–100)
BASOPHILS # BLD AUTO: 0.04 THOUSANDS/ÂΜL (ref 0–0.1)
BASOPHILS NFR BLD AUTO: 1 % (ref 0–1)
BILIRUB UR QL STRIP: NEGATIVE
CLARITY UR: CLEAR
COLOR UR: NORMAL
CREAT UR-MCNC: 120 MG/DL
EOSINOPHIL # BLD AUTO: 0.19 THOUSAND/ÂΜL (ref 0–0.61)
EOSINOPHIL NFR BLD AUTO: 2 % (ref 0–6)
ERYTHROCYTE [DISTWIDTH] IN BLOOD BY AUTOMATED COUNT: 13.9 % (ref 11.6–15.1)
GLUCOSE UR STRIP-MCNC: NEGATIVE MG/DL
HCT VFR BLD AUTO: 51.3 % (ref 36.5–49.3)
HGB BLD-MCNC: 17.3 G/DL (ref 12–17)
HGB UR QL STRIP.AUTO: NEGATIVE
IMM GRANULOCYTES # BLD AUTO: 0.02 THOUSAND/UL (ref 0–0.2)
IMM GRANULOCYTES NFR BLD AUTO: 0 % (ref 0–2)
KETONES UR STRIP-MCNC: NEGATIVE MG/DL
LEUKOCYTE ESTERASE UR QL STRIP: NEGATIVE
LYMPHOCYTES # BLD AUTO: 1.93 THOUSANDS/ÂΜL (ref 0.6–4.47)
LYMPHOCYTES NFR BLD AUTO: 25 % (ref 14–44)
MCH RBC QN AUTO: 32.2 PG (ref 26.8–34.3)
MCHC RBC AUTO-ENTMCNC: 33.7 G/DL (ref 31.4–37.4)
MCV RBC AUTO: 95 FL (ref 82–98)
MICROALBUMIN UR-MCNC: 11.7 MG/L
MICROALBUMIN/CREAT 24H UR: 10 MG/G CREATININE (ref 0–30)
MONOCYTES # BLD AUTO: 0.75 THOUSAND/ÂΜL (ref 0.17–1.22)
MONOCYTES NFR BLD AUTO: 10 % (ref 4–12)
NEUTROPHILS # BLD AUTO: 4.84 THOUSANDS/ÂΜL (ref 1.85–7.62)
NEUTS SEG NFR BLD AUTO: 62 % (ref 43–75)
NITRITE UR QL STRIP: NEGATIVE
NRBC BLD AUTO-RTO: 0 /100 WBCS
PH UR STRIP.AUTO: 6 [PH]
PLATELET # BLD AUTO: 198 THOUSANDS/UL (ref 149–390)
PMV BLD AUTO: 11.5 FL (ref 8.9–12.7)
PROT UR STRIP-MCNC: NEGATIVE MG/DL
RBC # BLD AUTO: 5.38 MILLION/UL (ref 3.88–5.62)
SP GR UR STRIP.AUTO: 1.02 (ref 1–1.03)
TSH SERPL DL<=0.05 MIU/L-ACNC: 2.4 UIU/ML (ref 0.45–4.5)
UROBILINOGEN UR STRIP-ACNC: <2 MG/DL
WBC # BLD AUTO: 7.77 THOUSAND/UL (ref 4.31–10.16)

## 2024-07-09 PROCEDURE — 82043 UR ALBUMIN QUANTITATIVE: CPT

## 2024-07-09 PROCEDURE — 36415 COLL VENOUS BLD VENIPUNCTURE: CPT

## 2024-07-09 PROCEDURE — 81003 URINALYSIS AUTO W/O SCOPE: CPT

## 2024-07-09 PROCEDURE — 84443 ASSAY THYROID STIM HORMONE: CPT

## 2024-07-09 PROCEDURE — 82306 VITAMIN D 25 HYDROXY: CPT

## 2024-07-09 PROCEDURE — 85025 COMPLETE CBC W/AUTO DIFF WBC: CPT

## 2024-07-09 PROCEDURE — 82570 ASSAY OF URINE CREATININE: CPT

## 2024-07-22 ENCOUNTER — OFFICE VISIT (OUTPATIENT)
Dept: INTERNAL MEDICINE CLINIC | Age: 79
End: 2024-07-22
Payer: COMMERCIAL

## 2024-07-22 ENCOUNTER — APPOINTMENT (OUTPATIENT)
Dept: LAB | Age: 79
End: 2024-07-22
Payer: COMMERCIAL

## 2024-07-22 VITALS
SYSTOLIC BLOOD PRESSURE: 142 MMHG | DIASTOLIC BLOOD PRESSURE: 80 MMHG | BODY MASS INDEX: 35.22 KG/M2 | OXYGEN SATURATION: 95 % | TEMPERATURE: 97.8 F | HEIGHT: 70 IN | WEIGHT: 246 LBS | HEART RATE: 74 BPM

## 2024-07-22 DIAGNOSIS — E21.0 PRIMARY HYPERPARATHYROIDISM (HCC): ICD-10-CM

## 2024-07-22 DIAGNOSIS — R97.20 ELEVATED PSA: ICD-10-CM

## 2024-07-22 DIAGNOSIS — E83.52 HYPERCALCEMIA: ICD-10-CM

## 2024-07-22 DIAGNOSIS — I48.0 PAROXYSMAL ATRIAL FIBRILLATION (HCC): ICD-10-CM

## 2024-07-22 DIAGNOSIS — E66.01 SEVERE OBESITY WITH BODY MASS INDEX (BMI) OF 35.0 TO 39.9 WITH SERIOUS COMORBIDITY (HCC): ICD-10-CM

## 2024-07-22 DIAGNOSIS — N18.31 STAGE 3A CHRONIC KIDNEY DISEASE (HCC): ICD-10-CM

## 2024-07-22 DIAGNOSIS — I10 BENIGN ESSENTIAL HYPERTENSION: Primary | ICD-10-CM

## 2024-07-22 LAB
ANION GAP SERPL CALCULATED.3IONS-SCNC: 9 MMOL/L (ref 4–13)
BASOPHILS # BLD AUTO: 0.06 THOUSANDS/ÂΜL (ref 0–0.1)
BASOPHILS NFR BLD AUTO: 1 % (ref 0–1)
BUN SERPL-MCNC: 18 MG/DL (ref 5–25)
CA-I BLD-SCNC: 1.36 MMOL/L (ref 1.12–1.32)
CALCIUM SERPL-MCNC: 10.9 MG/DL (ref 8.4–10.2)
CHLORIDE SERPL-SCNC: 102 MMOL/L (ref 96–108)
CO2 SERPL-SCNC: 28 MMOL/L (ref 21–32)
CREAT SERPL-MCNC: 1.28 MG/DL (ref 0.6–1.3)
EOSINOPHIL # BLD AUTO: 0.16 THOUSAND/ÂΜL (ref 0–0.61)
EOSINOPHIL NFR BLD AUTO: 2 % (ref 0–6)
ERYTHROCYTE [DISTWIDTH] IN BLOOD BY AUTOMATED COUNT: 13.5 % (ref 11.6–15.1)
GFR SERPL CREATININE-BSD FRML MDRD: 52 ML/MIN/1.73SQ M
GLUCOSE P FAST SERPL-MCNC: 97 MG/DL (ref 65–99)
HCT VFR BLD AUTO: 53.7 % (ref 36.5–49.3)
HGB BLD-MCNC: 17.9 G/DL (ref 12–17)
IMM GRANULOCYTES # BLD AUTO: 0.03 THOUSAND/UL (ref 0–0.2)
IMM GRANULOCYTES NFR BLD AUTO: 0 % (ref 0–2)
LYMPHOCYTES # BLD AUTO: 1.93 THOUSANDS/ÂΜL (ref 0.6–4.47)
LYMPHOCYTES NFR BLD AUTO: 23 % (ref 14–44)
MCH RBC QN AUTO: 31.8 PG (ref 26.8–34.3)
MCHC RBC AUTO-ENTMCNC: 33.3 G/DL (ref 31.4–37.4)
MCV RBC AUTO: 95 FL (ref 82–98)
MONOCYTES # BLD AUTO: 0.78 THOUSAND/ÂΜL (ref 0.17–1.22)
MONOCYTES NFR BLD AUTO: 9 % (ref 4–12)
NEUTROPHILS # BLD AUTO: 5.32 THOUSANDS/ÂΜL (ref 1.85–7.62)
NEUTS SEG NFR BLD AUTO: 65 % (ref 43–75)
NRBC BLD AUTO-RTO: 0 /100 WBCS
PLATELET # BLD AUTO: 212 THOUSANDS/UL (ref 149–390)
PMV BLD AUTO: 11.3 FL (ref 8.9–12.7)
POTASSIUM SERPL-SCNC: 4.7 MMOL/L (ref 3.5–5.3)
PTH-INTACT SERPL-MCNC: 110.7 PG/ML (ref 12–88)
RBC # BLD AUTO: 5.63 MILLION/UL (ref 3.88–5.62)
SODIUM SERPL-SCNC: 139 MMOL/L (ref 135–147)
WBC # BLD AUTO: 8.28 THOUSAND/UL (ref 4.31–10.16)

## 2024-07-22 PROCEDURE — 99214 OFFICE O/P EST MOD 30 MIN: CPT | Performed by: INTERNAL MEDICINE

## 2024-07-22 PROCEDURE — 36415 COLL VENOUS BLD VENIPUNCTURE: CPT

## 2024-07-22 PROCEDURE — 85025 COMPLETE CBC W/AUTO DIFF WBC: CPT

## 2024-07-22 PROCEDURE — 80048 BASIC METABOLIC PNL TOTAL CA: CPT

## 2024-07-22 PROCEDURE — 82330 ASSAY OF CALCIUM: CPT

## 2024-07-22 PROCEDURE — 83970 ASSAY OF PARATHORMONE: CPT

## 2024-07-22 PROCEDURE — G2211 COMPLEX E/M VISIT ADD ON: HCPCS | Performed by: INTERNAL MEDICINE

## 2024-07-22 NOTE — PROGRESS NOTES
Assessment/Plan:     Diagnoses and all orders for this visit:    Benign essential hypertension    Elevated PSA  Up by the urology he has an appointment with the urologist  Hypercalcemia  -     Basic metabolic panel; Future    Paroxysmal atrial fibrillation (HCC)  Followed up by the cardiology  Stage 3a chronic kidney disease (HCC)  -     CBC and differential; Future  Stable kidney functions  Severe obesity with body mass index (BMI) of 35.0 to 39.9 with serious comorbidity (HCC)    Primary hyperparathyroidism (HCC)  -     Basic metabolic panel; Future  -     Calcium, ionized; Future  -     PTH, intact; Future           M*Piece [employer] software was used to dictate this note.  It may contain errors with dictating incorrect words or incorrect spelling. Please contact the provider directly with any questions.    Subjective:   Chief Complaint   Patient presents with    Follow-up     4 month follow up   Labs 7/9         Depression screening, fall risk         Patient ID: Dawood Ramesh is a 79 y.o. male.    HPI  This is a very pleasant 79 years young gentleman who is here today for the regular follow-up he is doing well review of system is essentially unremarkable except for the arthralgias and myalgias previous history of dizziness is much improved he is status post pacemaker and also history of atrial fibrillation.  Hypercalcemia with associated increase in PTH level before and also increase in the ionized calcium within normal symptoms I will repeat the numbers again I did the DEXA scan last year which shows a osteopenia there was no osteoporosis we will repeat the DEXA scan in 1 year and decide whether to proceed or do anything for the hyper parathyroidism.    Hypertension could be better controlled systolic blood pressure is 142 I will leave him on the same medication and no change  Patient is here for the follow-up of chronic atrial fibrillation. doing well no new complaint related to atrial fibrillation.  Patient is on  anticoagulation, no side effects of the medication.  No history of CVA or TIA.  Hypertension is well controlled.  No signs or symptoms of heart failure.  Recent echocardiogram noted.  Patient is followed up with the cardiology.  No symptoms followed up by the cardiology pacemaker interrogations are done on time and he has a follow-up appointment with electrophysiologist    obesity discussed about the diet exercise and weight loss it is difficult for him to do that because of his arthritic problems  The following portions of the patient's history were reviewed and updated as appropriate: allergies, current medications, past family history, past medical history, past social history, past surgical history, and problem list.    Review of Systems   Constitutional:  Negative for appetite change, fatigue and fever.   HENT:  Negative for congestion, ear pain, hearing loss, nosebleeds, sneezing, tinnitus and voice change.    Eyes:  Negative for pain, discharge and redness.   Respiratory:  Negative for cough, chest tightness and wheezing.    Cardiovascular:  Negative for chest pain, palpitations and leg swelling.   Gastrointestinal:  Negative for abdominal pain, blood in stool, constipation, diarrhea, nausea and vomiting.   Genitourinary:  Negative for difficulty urinating, dysuria, hematuria and urgency.   Musculoskeletal:  Positive for arthralgias, back pain and myalgias. Negative for gait problem and joint swelling.   Skin:  Negative for rash and wound.   Allergic/Immunologic: Negative for environmental allergies.   Neurological:  Positive for dizziness (Doing better than before) and light-headedness. Negative for tremors, seizures, weakness and numbness.   Hematological:  Negative for adenopathy. Does not bruise/bleed easily.   Psychiatric/Behavioral:  Negative for behavioral problems and confusion. The patient is not nervous/anxious.          Past Medical History:   Diagnosis Date    Acute gouty arthropathy     last  assessed-11/15/2013    Cataract     Diverticulitis of colon     Enlarged prostate     Gout     Hearing loss     Heart disease     History of diverticulitis of colon     History of dizziness     Hypercholesterolemia     Hyperlipidemia     Hypertension     Palpitations     Sinus bradycardia          Current Outpatient Medications:     allopurinol (ZYLOPRIM) 300 mg tablet, Take 1 tablet (300 mg total) by mouth daily, Disp: 90 tablet, Rfl: 1    amLODIPine (NORVASC) 5 mg tablet, Take 1 tablet (5 mg total) by mouth daily, Disp: 90 tablet, Rfl: 1    apixaban (ELIQUIS) 5 mg, Take 1 tablet (5 mg total) by mouth 2 (two) times a day, Disp: 180 tablet, Rfl: 3    Carboxymethylcellulose Sodium (EYE DROPS OP), Apply to eye as needed, Disp: , Rfl:     lisinopril (ZESTRIL) 10 mg tablet, Take 1 tablet (10 mg total) by mouth daily, Disp: 90 tablet, Rfl: 1    meclizine (ANTIVERT) 25 mg tablet, Take 1 tablet (25 mg total) by mouth 2 (two) times a day as needed for dizziness, Disp: 180 tablet, Rfl: 1    metoprolol succinate (TOPROL-XL) 25 mg 24 hr tablet, Take 1 tablet (25 mg total) by mouth daily, Disp: 90 tablet, Rfl: 3    simvastatin (ZOCOR) 40 mg tablet, Take 1 tablet (40 mg total) by mouth every other day, Disp: 45 tablet, Rfl: 1    Allergies   Allergen Reactions    Pollen Extract Allergic Rhinitis       Social History   Past Surgical History:   Procedure Laterality Date    ARM NEUROPLASTY Left     1977    CARDIAC PACEMAKER PLACEMENT Left     CARDIAC SURGERY  05/01/2019    pace maker placed    CATARACT EXTRACTION, BILATERAL  2021    COLONOSCOPY      CYSTOSCOPY  12/27/2017    diagnostic    FOREARM FRACTURE SURGERY      ORCHIECTOMY      surgery testis    MS CYSTO INSERTION TRANSPROSTATIC IMPLANT SINGLE N/A 2/9/2018    Procedure: CYSTOSCOPY WITH INSERTION UROLIFT;  Surgeon: Bernardo Billingsley MD;  Location: [facility];  Service: Urology    MS CYSTOURETHROSCOPY WITH BIOPSY N/A 2/9/2018    Procedure: BLADDER BIOPSY;  Surgeon:  "Bernardo Billingsley MD;  Location: [facility];  Service: Urology    TESTICLE SURGERY Right 1976     Family History   Problem Relation Age of Onset    Coronary artery disease Mother     Heart disease Mother     Hypertension Mother     Stroke Mother     Coronary artery disease Father     Stroke Family     Heart attack Family         acute MI       Objective:  /80 (BP Location: Left arm, Patient Position: Sitting, Cuff Size: Large)   Pulse 74   Temp 97.8 °F (36.6 °C) (Temporal)   Ht 5' 10\" (1.778 m)   Wt 112 kg (246 lb)   SpO2 95%   BMI 35.30 kg/m²        Physical Exam  Constitutional:       Appearance: He is well-developed. He is obese.   HENT:      Right Ear: Tympanic membrane and external ear normal.      Left Ear: Tympanic membrane normal.   Eyes:      Conjunctiva/sclera: Conjunctivae normal.      Pupils: Pupils are equal, round, and reactive to light.   Neck:      Thyroid: No thyromegaly.      Vascular: No JVD.   Cardiovascular:      Rate and Rhythm: Normal rate and regular rhythm.      Heart sounds: Normal heart sounds.   Pulmonary:      Breath sounds: Normal breath sounds.   Abdominal:      General: Bowel sounds are normal.      Palpations: Abdomen is soft.   Musculoskeletal:         General: Normal range of motion.      Cervical back: Normal range of motion.   Lymphadenopathy:      Cervical: No cervical adenopathy.   Skin:     General: Skin is dry.   Neurological:      General: No focal deficit present.      Mental Status: He is alert and oriented to person, place, and time. Mental status is at baseline.      Deep Tendon Reflexes: Reflexes are normal and symmetric.   Psychiatric:         Mood and Affect: Mood normal.         Behavior: Behavior normal.           "

## 2024-09-03 ENCOUNTER — IN-CLINIC DEVICE VISIT (OUTPATIENT)
Dept: CARDIOLOGY CLINIC | Facility: CLINIC | Age: 79
End: 2024-09-03
Payer: COMMERCIAL

## 2024-09-03 DIAGNOSIS — Z95.0 PRESENCE OF CARDIAC PACEMAKER: Primary | ICD-10-CM

## 2024-09-03 DIAGNOSIS — I48.0 PAROXYSMAL ATRIAL FIBRILLATION (HCC): ICD-10-CM

## 2024-09-03 PROCEDURE — 93280 PM DEVICE PROGR EVAL DUAL: CPT | Performed by: INTERNAL MEDICINE

## 2024-09-03 NOTE — PROGRESS NOTES
Results for orders placed or performed in visit on 09/03/24   Cardiac EP device report    Narrative    MDT-DUAL CHAMBER PPM (AAIR-DDDR MODE)/ACTIVE SYSTEM IS MRI CONDITIONAL  DEVICE INTERROGATED IN THE [place] OFFICE. BATTERY VOLTAGE ADEQUATE (5.5 YRS).  AP 98%  99.5% (>40% SSS MVP ON).  ALL LEAD PARAMETERS WITHIN NORMAL LIMITS.  3 AT/AF EPISODES SINCE LAST REMOTE (6/8/2024).  ALL EGM'S SHOW AF, LONGEST EPISODE 11 HRS 28 MINS.  TAKING ELIQUIS, METOPROLOL SUCC.  EF 55% (ECHO 7/15/2022).  TIME IN AT/AF 0.4%.  NO PROGRAMMING CHANGES MADE TO DEVICE PARAMETERS. NORMAL DEVICE FUNCTION. PAS/GV

## 2024-09-12 DIAGNOSIS — M85.89 OSTEOPENIA OF MULTIPLE SITES: ICD-10-CM

## 2024-09-12 DIAGNOSIS — E78.5 HYPERLIPIDEMIA, UNSPECIFIED HYPERLIPIDEMIA TYPE: ICD-10-CM

## 2024-09-12 RX ORDER — SIMVASTATIN 40 MG
40 TABLET ORAL EVERY OTHER DAY
Qty: 45 TABLET | Refills: 0 | Status: SHIPPED | OUTPATIENT
Start: 2024-09-12

## 2024-09-12 NOTE — TELEPHONE ENCOUNTER
Patient needs updated blood work. Please place orders. A courtesy refill was provided.   Lipid Panel

## 2024-09-12 NOTE — TELEPHONE ENCOUNTER
Reason for call:   [x] Refill   [] Prior Auth  [] Other:     Office:   [x] PCP/Provider -  August Domínguez MD   [] Specialty/Provider -         Does the patient have enough for 3 days?   [x] Yes   [] No - Send as HP to POD

## 2024-09-30 ENCOUNTER — TELEPHONE (OUTPATIENT)
Age: 79
End: 2024-09-30

## 2024-09-30 NOTE — TELEPHONE ENCOUNTER
Caller: Michelle Ramesh    Doctor: Dr Chan    Reason for call: Patient's wife called in stating he will be needing dental surgery as soon as possible. No date is set yet until he has a clearance. They also stated he will need to hold his elequis and they need that information. Patient is scheduled to see Dr. Chan is Dec. But I couldn't find any sooner appts. Patient wants to know if he needs to be seen or can he be cleared. The dental work will be done at [facility] PHONE# [phone]. Please advise patient.     Call back#: [phone]

## 2024-09-30 NOTE — TELEPHONE ENCOUNTER
Pt was last seen almost 2 years ago on 12/7/22. Would you be willing to clear or should be have pt be seen by Mickey?    Please advise.

## 2024-10-02 NOTE — TELEPHONE ENCOUNTER
Dental clearance form faxed to [facility] @ [phone] for completion by Dr. Domínguez.

## 2024-10-02 NOTE — TELEPHONE ENCOUNTER
I agree to Dr. Chan when I saw him he was doing good if it is needed I can see him in the office for medical clearance next week otherwise we can clear him for surgery

## 2024-10-02 NOTE — TELEPHONE ENCOUNTER
Please let us know if you will clear this pt w/o seeing him or need him to come in b/c it has been almost 2 years since his last appt.

## 2024-10-04 DIAGNOSIS — I10 BENIGN ESSENTIAL HYPERTENSION: ICD-10-CM

## 2024-10-04 RX ORDER — LISINOPRIL 10 MG/1
10 TABLET ORAL DAILY
Qty: 90 TABLET | Refills: 3 | Status: SHIPPED | OUTPATIENT
Start: 2024-10-04

## 2024-10-07 NOTE — TELEPHONE ENCOUNTER
Spoke to patients wife and she stated they will keep his appointment for 10/10/24 so the form can be filled out

## 2024-10-07 NOTE — TELEPHONE ENCOUNTER
Wife called to follow up on dental work clearance. Please return call to wife with final decision concerning clearance/clearance form completion. Thank you

## 2024-10-08 DIAGNOSIS — R42 DIZZINESS: ICD-10-CM

## 2024-10-08 DIAGNOSIS — I10 ESSENTIAL HYPERTENSION, BENIGN: ICD-10-CM

## 2024-10-09 RX ORDER — MECLIZINE HYDROCHLORIDE 25 MG/1
TABLET ORAL
Qty: 180 TABLET | Refills: 1 | Status: SHIPPED | OUTPATIENT
Start: 2024-10-09 | End: 2024-10-10 | Stop reason: SDUPTHER

## 2024-10-09 RX ORDER — AMLODIPINE BESYLATE 5 MG/1
5 TABLET ORAL DAILY
Qty: 90 TABLET | Refills: 1 | Status: SHIPPED | OUTPATIENT
Start: 2024-10-09 | End: 2024-10-10 | Stop reason: SDUPTHER

## 2024-10-10 ENCOUNTER — OFFICE VISIT (OUTPATIENT)
Dept: INTERNAL MEDICINE CLINIC | Age: 79
End: 2024-10-10
Payer: COMMERCIAL

## 2024-10-10 ENCOUNTER — TELEPHONE (OUTPATIENT)
Dept: CARDIOLOGY CLINIC | Facility: CLINIC | Age: 79
End: 2024-10-10

## 2024-10-10 VITALS
HEIGHT: 70 IN | OXYGEN SATURATION: 98 % | HEART RATE: 82 BPM | WEIGHT: 251 LBS | SYSTOLIC BLOOD PRESSURE: 120 MMHG | DIASTOLIC BLOOD PRESSURE: 72 MMHG | BODY MASS INDEX: 35.93 KG/M2 | TEMPERATURE: 97.8 F

## 2024-10-10 DIAGNOSIS — E66.01 SEVERE OBESITY WITH BODY MASS INDEX (BMI) OF 35.0 TO 39.9 WITH SERIOUS COMORBIDITY (HCC): ICD-10-CM

## 2024-10-10 DIAGNOSIS — R42 DIZZINESS: ICD-10-CM

## 2024-10-10 DIAGNOSIS — E21.0 PRIMARY HYPERPARATHYROIDISM (HCC): ICD-10-CM

## 2024-10-10 DIAGNOSIS — I48.0 PAROXYSMAL ATRIAL FIBRILLATION (HCC): Primary | ICD-10-CM

## 2024-10-10 DIAGNOSIS — I10 ESSENTIAL HYPERTENSION, BENIGN: ICD-10-CM

## 2024-10-10 DIAGNOSIS — N18.31 STAGE 3A CHRONIC KIDNEY DISEASE (HCC): ICD-10-CM

## 2024-10-10 DIAGNOSIS — Z23 ENCOUNTER FOR IMMUNIZATION: ICD-10-CM

## 2024-10-10 PROCEDURE — G0008 ADMIN INFLUENZA VIRUS VAC: HCPCS

## 2024-10-10 PROCEDURE — G0439 PPPS, SUBSEQ VISIT: HCPCS | Performed by: INTERNAL MEDICINE

## 2024-10-10 PROCEDURE — 99214 OFFICE O/P EST MOD 30 MIN: CPT | Performed by: INTERNAL MEDICINE

## 2024-10-10 PROCEDURE — 90662 IIV NO PRSV INCREASED AG IM: CPT

## 2024-10-10 RX ORDER — MECLIZINE HYDROCHLORIDE 25 MG/1
25 TABLET ORAL EVERY 8 HOURS PRN
Qty: 180 TABLET | Refills: 1 | Status: SHIPPED | OUTPATIENT
Start: 2024-10-10

## 2024-10-10 RX ORDER — AMLODIPINE BESYLATE 5 MG/1
5 TABLET ORAL DAILY
Qty: 90 TABLET | Refills: 1 | Status: SHIPPED | OUTPATIENT
Start: 2024-10-10

## 2024-10-10 NOTE — PATIENT INSTRUCTIONS
Medicare Preventive Visit Patient Instructions  Thank you for completing your Welcome to Medicare Visit or Medicare Annual Wellness Visit today. Your next wellness visit will be due in one year (10/11/2025).  The screening/preventive services that you may require over the next 5-10 years are detailed below. Some tests may not apply to you based off risk factors and/or age. Screening tests ordered at today's visit but not completed yet may show as past due. Also, please note that scanned in results may not display below.  Preventive Screenings:  Service Recommendations Previous Testing/Comments   Colorectal Cancer Screening  Colonoscopy    Fecal Occult Blood Test (FOBT)/Fecal Immunochemical Test (FIT)  Fecal DNA/Cologuard Test  Flexible Sigmoidoscopy Age: 45-75 years old   Colonoscopy: every 10 years (May be performed more frequently if at higher risk)  OR  FOBT/FIT: every 1 year  OR  Cologuard: every 3 years  OR  Sigmoidoscopy: every 5 years  Screening may be recommended earlier than age 45 if at higher risk for colorectal cancer. Also, an individualized decision between you and your healthcare provider will decide whether screening between the ages of 76-85 would be appropriate. Colonoscopy: 03/24/2016  FOBT/FIT: Not on file  Cologuard: Not on file  Sigmoidoscopy: Not on file          Prostate Cancer Screening Individualized decision between patient and health care provider in men between ages of 55-69   Medicare will cover every 12 months beginning on the day after your 50th birthday PSA: 5.83 ng/mL     Screening Not Indicated     Hepatitis C Screening Once for adults born between 1945 and 1965  More frequently in patients at high risk for Hepatitis C Hep C Antibody: Not on file        Diabetes Screening 1-2 times per year if you're at risk for diabetes or have pre-diabetes Fasting glucose: 97 mg/dL (7/22/2024)  A1C: No results in last 5 years (No results in last 5 years)  Screening Current   Cholesterol Screening  Once every 5 years if you don't have a lipid disorder. May order more often based on risk factors. Lipid panel: 10/20/2021  Screening Not Indicated  History Lipid Disorder      Other Preventive Screenings Covered by Medicare:  Abdominal Aortic Aneurysm (AAA) Screening: covered once if your at risk. You're considered to be at risk if you have a family history of AAA or a male between the age of 65-75 who smoking at least 100 cigarettes in your lifetime.  Lung Cancer Screening: covers low dose CT scan once per year if you meet all of the following conditions: (1) Age 55-77; (2) No signs or symptoms of lung cancer; (3) Current smoker or have quit smoking within the last 15 years; (4) You have a tobacco smoking history of at least 20 pack years (packs per day x number of years you smoked); (5) You get a written order from a healthcare provider.  Glaucoma Screening: covered annually if you're considered high risk: (1) You have diabetes OR (2) Family history of glaucoma OR (3)  aged 50 and older OR (4)  American aged 65 and older  Osteoporosis Screening: covered every 2 years if you meet one of the following conditions: (1) Have a vertebral abnormality; (2) On glucocorticoid therapy for more than 3 months; (3) Have primary hyperparathyroidism; (4) On osteoporosis medications and need to assess response to drug therapy.  HIV Screening: covered annually if you're between the age of 15-65. Also covered annually if you are younger than 15 and older than 65 with risk factors for HIV infection. For pregnant patients, it is covered up to 3 times per pregnancy.    Immunizations:  Immunization Recommendations   Influenza Vaccine Annual influenza vaccination during flu season is recommended for all persons aged >= 6 months who do not have contraindications   Pneumococcal Vaccine   * Pneumococcal conjugate vaccine = PCV13 (Prevnar 13), PCV15 (Vaxneuvance), PCV20 (Prevnar 20)  * Pneumococcal polysaccharide  vaccine = PPSV23 (Pneumovax) Adults 19-65 yo with certain risk factors or if 65+ yo  If never received any pneumonia vaccine: recommend Prevnar 20 (PCV20)  Give PCV20 if previously received 1 dose of PCV13 or PPSV23   Hepatitis B Vaccine 3 dose series if at intermediate or high risk (ex: diabetes, end stage renal disease, liver disease)   Respiratory syncytial virus (RSV) Vaccine - COVERED BY MEDICARE PART D  * RSVPreF3 (Arexvy) CDC recommends that adults 60 years of age and older may receive a single dose of RSV vaccine using shared clinical decision-making (SCDM)   Tetanus (Td) Vaccine - COST NOT COVERED BY MEDICARE PART B Following completion of primary series, a booster dose should be given every 10 years to maintain immunity against tetanus. Td may also be given as tetanus wound prophylaxis.   Tdap Vaccine - COST NOT COVERED BY MEDICARE PART B Recommended at least once for all adults. For pregnant patients, recommended with each pregnancy.   Shingles Vaccine (Shingrix) - COST NOT COVERED BY MEDICARE PART B  2 shot series recommended in those 19 years and older who have or will have weakened immune systems or those 50 years and older     Health Maintenance Due:      Topic Date Due   • Colorectal Cancer Screening  Discontinued     Immunizations Due:      Topic Date Due   • Influenza Vaccine (1) 09/01/2024     Advance Directives   What are advance directives?  Advance directives are legal documents that state your wishes and plans for medical care. These plans are made ahead of time in case you lose your ability to make decisions for yourself. Advance directives can apply to any medical decision, such as the treatments you want, and if you want to donate organs.   What are the types of advance directives?  There are many types of advance directives, and each state has rules about how to use them. You may choose a combination of any of the following:  Living will:  This is a written record of the treatment you  want. You can also choose which treatments you do not want, which to limit, and which to stop at a certain time. This includes surgery, medicine, IV fluid, and tube feedings.   Durable power of  for healthcare (DPAHC):  This is a written record that states who you want to make healthcare choices for you when you are unable to make them for yourself. This person, called a proxy, is usually a family member or a friend. You may choose more than 1 proxy.  Do not resuscitate (DNR) order:  A DNR order is used in case your heart stops beating or you stop breathing. It is a request not to have certain forms of treatment, such as CPR. A DNR order may be included in other types of advance directives.  Medical directive:  This covers the care that you want if you are in a coma, near death, or unable to make decisions for yourself. You can list the treatments you want for each condition. Treatment may include pain medicine, surgery, blood transfusions, dialysis, IV or tube feedings, and a ventilator (breathing machine).  Values history:  This document has questions about your views, beliefs, and how you feel and think about life. This information can help others choose the care that you would choose.  Why are advance directives important?  An advance directive helps you control your care. Although spoken wishes may be used, it is better to have your wishes written down. Spoken wishes can be misunderstood, or not followed. Treatments may be given even if you do not want them. An advance directive may make it easier for your family to make difficult choices about your care.   Weight Management   Why it is important to manage your weight:  Being overweight increases your risk of health conditions such as heart disease, high blood pressure, type 2 diabetes, and certain types of cancer. It can also increase your risk for osteoarthritis, sleep apnea, and other respiratory problems. Aim for a slow, steady weight loss. Even a  small amount of weight loss can lower your risk of health problems.  How to lose weight safely:  A safe and healthy way to lose weight is to eat fewer calories and get regular exercise. You can lose up about 1 pound a week by decreasing the number of calories you eat by 500 calories each day.   Healthy meal plan for weight management:  A healthy meal plan includes a variety of foods, contains fewer calories, and helps you stay healthy. A healthy meal plan includes the following:  Eat whole-grain foods more often.  A healthy meal plan should contain fiber. Fiber is the part of grains, fruits, and vegetables that is not broken down by your body. Whole-grain foods are healthy and provide extra fiber in your diet. Some examples of whole-grain foods are whole-wheat breads and pastas, oatmeal, brown rice, and bulgur.  Eat a variety of vegetables every day.  Include dark, leafy greens such as spinach, kale, citlaly greens, and mustard greens. Eat yellow and orange vegetables such as carrots, sweet potatoes, and winter squash.   Eat a variety of fruits every day.  Choose fresh or canned fruit (canned in its own juice or light syrup) instead of juice. Fruit juice has very little or no fiber.  Eat low-fat dairy foods.  Drink fat-free (skim) milk or 1% milk. Eat fat-free yogurt and low-fat cottage cheese. Try low-fat cheeses such as mozzarella and other reduced-fat cheeses.  Choose meat and other protein foods that are low in fat.  Choose beans or other legumes such as split peas or lentils. Choose fish, skinless poultry (chicken or turkey), or lean cuts of red meat (beef or pork). Before you cook meat or poultry, cut off any visible fat.   Use less fat and oil.  Try baking foods instead of frying them. Add less fat, such as margarine, sour cream, regular salad dressing and mayonnaise to foods. Eat fewer high-fat foods. Some examples of high-fat foods include french fries, doughnuts, ice cream, and cakes.  Eat fewer sweets.   Limit foods and drinks that are high in sugar. This includes candy, cookies, regular soda, and sweetened drinks.  Exercise:  Exercise at least 30 minutes per day on most days of the week. Some examples of exercise include walking, biking, dancing, and swimming. You can also fit in more physical activity by taking the stairs instead of the elevator or parking farther away from stores. Ask your healthcare provider about the best exercise plan for you.      © Copyright [employer] 2018 Information is for End User's use only and may not be sold, redistributed or otherwise used for commercial purposes. All illustrations and images included in CareNotes® are the copyrighted property of [employer] or [employer]

## 2024-10-10 NOTE — PROGRESS NOTES
Ambulatory Visit  Name: Dawood Ramesh      : 1945      MRN: [id number]  Encounter Provider: August Domínguez MD  Encounter Date: 10/10/2024   Encounter department: [facility]    Assessment & Plan  Essential hypertension, benign    Orders:    amLODIPine (NORVASC) 5 mg tablet; Take 1 tablet (5 mg total) by mouth daily    Basic metabolic panel; Future    Dizziness    Orders:    meclizine (ANTIVERT) 25 mg tablet; Take 1 tablet (25 mg total) by mouth every 8 (eight) hours as needed for dizziness    Severe obesity with body mass index (BMI) of 35.0 to 39.9 with serious comorbidity (HCC)           Paroxysmal atrial fibrillation (HCC)         Stage 3a chronic kidney disease (HCC)  Lab Results   Component Value Date    EGFR 52 2024    EGFR 50 10/05/2023    EGFR 53 2023    CREATININE 1.28 2024    CREATININE 1.33 (H) 10/05/2023    CREATININE 1.26 2023       Orders:    Basic metabolic panel; Future    Primary hyperparathyroidism (HCC)    Orders:    Basic metabolic panel; Future    Calcium, ionized; Future    PTH, intact; Future    Basic metabolic panel; Future       Preventive health issues were discussed with patient, and age appropriate screening tests were ordered as noted in patient's After Visit Summary. Personalized health advice and appropriate referrals for health education or preventive services given if needed, as noted in patient's After Visit Summary.    History of Present Illness     This is a very pleasant 79 years young gentleman who is here today for the regular follow-up he is doing well no new complaints review of system is essentially unremarkable except for the back pain and the shortness of breath on exertion if he does more than usual.  His weight is stable at this time he gained little bit of weight but he is in mostly 250s    Patient is here today for the follow-up.   Hypertension. I reviewed antihypertensive medication, patient does not  have any side effects of  medications, no signs or symptoms of hypertension ,hypotension or orthostatic hypotension.  Patient is compliant with medications.  Blood workup related to hypertensive diagnosis reviewed.  Plan is to continue with the present management.  We will follow-up as a scheduled and adjust the doses of the medication as indicated.    Patient is here for the follow-up of chronic atrial fibrillation. doing well no new complaint related to atrial fibrillation.  Patient is on anticoagulation, no side effects of the medication.  No history of CVA or TIA.  Hypertension is well controlled.  No signs or symptoms of heart failure.  Recent echocardiogram noted.  Patient is followed up with the cardiology.    Dizziness is chronic and he is doing well with that he use the medication as needed    Patient is going for the tooth extraction discussed with him regarding his anticoagulant he will stop about 48 hours before although lot of dentist now they are doing the tooth extraction without stopping any anticoagulant because of the high risk of his stroke I will recommend him to talk to his dentist but if they cannot do it then he have to stop his anticoagulant and restart the same day when he comes back home from surgery    Asymptomatic hypercalcemia with the increased PTH most likely primary hyperparathyroidism is a DEXA scan was once essentially unremarkable except for the osteopenia there is no osteoporosis as he is asymptomatic I will just continue to follow    Pre-op Exam    Pre-operative Risk Factors:    History of cerebrovascular disease: No    History of ischemic heart disease: No  Pre-operative treatment with insulin: No  Pre-operative creatinine >2 mg/dL: No    History of congestive heart failure: No     Patient Care Team:  August Domínguez MD as PCP - General  MD August Yeung MD    Review of Systems   Constitutional:  Positive for fatigue. Negative for appetite change and  fever.   HENT:  Negative for congestion, ear pain, hearing loss, nosebleeds, sneezing, tinnitus and voice change.    Eyes:  Negative for pain, discharge and redness.   Respiratory:  Negative for cough, chest tightness and wheezing.    Cardiovascular:  Negative for chest pain, palpitations and leg swelling.   Gastrointestinal:  Negative for abdominal pain, blood in stool, constipation, diarrhea, nausea and vomiting.   Genitourinary:  Negative for difficulty urinating, dysuria, hematuria and urgency.   Musculoskeletal:  Positive for back pain. Negative for arthralgias, gait problem and joint swelling.   Skin:  Negative for rash and wound.   Allergic/Immunologic: Negative for environmental allergies.   Neurological:  Negative for dizziness, tremors, seizures, weakness, light-headedness and numbness.   Hematological:  Negative for adenopathy. Does not bruise/bleed easily.   Psychiatric/Behavioral:  Negative for behavioral problems and confusion. The patient is not nervous/anxious.      Medical History Reviewed by provider this encounter:       Annual Wellness Visit Questionnaire   Dawood is here for his Subsequent Wellness visit. Last Medicare Wellness visit information reviewed, patient interviewed and updates made to the record today.      Health Risk Assessment:   Patient rates overall health as good. Patient feels that their physical health rating is same. Patient is very satisfied with their life. Eyesight was rated as same. Hearing was rated as same. Patient feels that their emotional and mental health rating is same. Patients states they are never, rarely angry. Patient states they are never, rarely unusually tired/fatigued. Pain experienced in the last 7 days has been some. Patient's pain rating has been 4/10. Patient states that he has experienced no weight loss or gain in last 6 months.     Depression Screening:   PHQ-2 Score: 0      Fall Risk Screening:   In the past year, patient has experienced: no history  of falling in past year      Home Safety:  Patient does not have trouble with stairs inside or outside of their home. Patient has working smoke alarms and has working carbon monoxide detector. Home safety hazards include: none.     Nutrition:   Current diet is Regular and Limited junk food.     Medications:   Patient is not currently taking any over-the-counter supplements. Patient is able to manage medications.     Activities of Daily Living (ADLs)/Instrumental Activities of Daily Living (IADLs):   Walk and transfer into and out of bed and chair?: Yes  Dress and groom yourself?: Yes    Bathe or shower yourself?: Yes    Feed yourself? Yes  Do your laundry/housekeeping?: Yes  Manage your money, pay your bills and track your expenses?: Yes  Make your own meals?: Yes    Do your own shopping?: Yes    Previous Hospitalizations:   Any hospitalizations or ED visits within the last 12 months?: No      Advance Care Planning:   Living will: Yes    Durable POA for healthcare: Yes    Advanced directive: Yes    Advanced directive counseling given: Yes      PREVENTIVE SCREENINGS      Cardiovascular Screening:    General: Screening Not Indicated and History Lipid Disorder      Diabetes Screening:     General: Screening Current and Screening Not Indicated      Colorectal Cancer Screening:     General: Screening Not Indicated      Prostate Cancer Screening:    General: Screening Not Indicated      Osteoporosis Screening:    General: Screening Current      Abdominal Aortic Aneurysm (AAA) Screening:    Risk factors include: tobacco use        General: Screening Current      Lung Cancer Screening:     General: Screening Not Indicated      Hepatitis C Screening:    General: Screening Not Indicated    Other Counseling Topics:   Calcium and vitamin D intake and regular weightbearing exercise.     Social Determinants of Health     Financial Resource Strain: Low Risk  (10/5/2023)    Overall Financial Resource Strain (CARDIA)     Difficulty  "of Paying Living Expenses: Not hard at all   Transportation Needs: No Transportation Needs (10/5/2023)    PRAPARE - Transportation     Lack of Transportation (Medical): No     Lack of Transportation (Non-Medical): No     No results found.    Objective     /72 (BP Location: Left arm, Patient Position: Sitting, Cuff Size: Large)   Pulse 82   Temp 97.8 °F (36.6 °C) (Temporal)   Ht 5' 10\" (1.778 m)   Wt 114 kg (251 lb)   SpO2 98%   BMI 36.01 kg/m²     Physical Exam  Vitals and nursing note reviewed.   Constitutional:       Appearance: Normal appearance. He is obese.   HENT:      Head: Normocephalic and atraumatic.      Right Ear: Tympanic membrane normal.      Left Ear: Tympanic membrane normal.      Nose: Nose normal.      Mouth/Throat:      Mouth: Mucous membranes are moist.   Eyes:      Extraocular Movements: Extraocular movements intact.      Pupils: Pupils are equal, round, and reactive to light.   Cardiovascular:      Rate and Rhythm: Normal rate and regular rhythm.      Pulses: Normal pulses.      Heart sounds: Normal heart sounds.   Pulmonary:      Effort: Pulmonary effort is normal.      Breath sounds: Normal breath sounds.   Abdominal:      General: Abdomen is flat. Bowel sounds are normal.      Palpations: Abdomen is soft.   Musculoskeletal:         General: No swelling, tenderness or deformity. Normal range of motion.      Cervical back: Normal range of motion and neck supple.   Skin:     General: Skin is warm.      Capillary Refill: Capillary refill takes 2 to 3 seconds.   Neurological:      General: No focal deficit present.      Mental Status: He is alert and oriented to person, place, and time. Mental status is at baseline.   Psychiatric:         Mood and Affect: Mood normal.         Behavior: Behavior normal.         "

## 2024-10-10 NOTE — ASSESSMENT & PLAN NOTE
Lab Results   Component Value Date    EGFR 52 07/22/2024    EGFR 50 10/05/2023    EGFR 53 06/29/2023    CREATININE 1.28 07/22/2024    CREATININE 1.33 (H) 10/05/2023    CREATININE 1.26 06/29/2023       Orders:    Basic metabolic panel; Future

## 2024-10-10 NOTE — ASSESSMENT & PLAN NOTE
Orders:    Basic metabolic panel; Future    Calcium, ionized; Future    PTH, intact; Future    Basic metabolic panel; Future

## 2024-10-28 DIAGNOSIS — M10.9 GOUT, UNSPECIFIED CAUSE, UNSPECIFIED CHRONICITY, UNSPECIFIED SITE: ICD-10-CM

## 2024-10-29 RX ORDER — ALLOPURINOL 300 MG/1
300 TABLET ORAL DAILY
Qty: 90 TABLET | Refills: 1 | Status: SHIPPED | OUTPATIENT
Start: 2024-10-29

## 2024-12-04 ENCOUNTER — REMOTE DEVICE CLINIC VISIT (OUTPATIENT)
Dept: CARDIOLOGY CLINIC | Facility: CLINIC | Age: 79
End: 2024-12-04
Payer: COMMERCIAL

## 2024-12-04 ENCOUNTER — RESULTS FOLLOW-UP (OUTPATIENT)
Dept: CARDIOLOGY CLINIC | Facility: CLINIC | Age: 79
End: 2024-12-04

## 2024-12-04 DIAGNOSIS — Z95.0 CARDIAC PACEMAKER IN SITU: Primary | ICD-10-CM

## 2024-12-04 PROCEDURE — 93294 REM INTERROG EVL PM/LDLS PM: CPT | Performed by: STUDENT IN AN ORGANIZED HEALTH CARE EDUCATION/TRAINING PROGRAM

## 2024-12-04 PROCEDURE — 93296 REM INTERROG EVL PM/IDS: CPT | Performed by: STUDENT IN AN ORGANIZED HEALTH CARE EDUCATION/TRAINING PROGRAM

## 2024-12-04 NOTE — PROGRESS NOTES
"Results for orders placed or performed in visit on 12/04/24   Cardiac EP device report    Narrative    MDT-DUAL CHAMBER PPM (AAIR-DDDR MODE)/ACTIVE SYSTEM IS MRI CONDITIONAL  CARELINK TRANSMISSION: BATTERY STATUS \"5 YRS.\" AP 94%  100%. ALL AVAILABLE LEAD PARAMETERS WITHIN NORMAL LIMITS. 3 NSVT NOTED; RATES 170-190 BPM; PT ON METO SUCC & EF 55% (ECHO 2022). 3 AT/AF NOTED; 3% BURDEN; PT ON ELIQUIS. LONGEST 24:52 HRS. NORMAL DEVICE FUNCTION. NC         "

## 2024-12-13 DIAGNOSIS — E78.5 HYPERLIPIDEMIA, UNSPECIFIED HYPERLIPIDEMIA TYPE: ICD-10-CM

## 2024-12-13 DIAGNOSIS — M85.89 OSTEOPENIA OF MULTIPLE SITES: ICD-10-CM

## 2024-12-13 NOTE — TELEPHONE ENCOUNTER
Reason for call:   [x] Refill   [] Prior Auth  [] Other:     Office:   [x] PCP/Provider - August Domínguez MD  [] Specialty/Provider -     Medication: simvastatin (ZOCOR) 40 mg tablet     Dose/Frequency: 40 mg, Oral, Every other day     Quantity: 45    Pharmacy:  [facility] - [place], [place] - [address] [place] [address]    Does the patient have enough for 3 days?   [x] Yes   [] No - Send as HP to POD

## 2024-12-16 RX ORDER — SIMVASTATIN 40 MG
40 TABLET ORAL EVERY OTHER DAY
Qty: 45 TABLET | Refills: 0 | Status: SHIPPED | OUTPATIENT
Start: 2024-12-16

## 2024-12-17 ENCOUNTER — OFFICE VISIT (OUTPATIENT)
Dept: CARDIOLOGY CLINIC | Facility: CLINIC | Age: 79
End: 2024-12-17
Payer: COMMERCIAL

## 2024-12-17 VITALS
SYSTOLIC BLOOD PRESSURE: 124 MMHG | BODY MASS INDEX: 36.71 KG/M2 | HEART RATE: 77 BPM | HEIGHT: 70 IN | WEIGHT: 256.4 LBS | DIASTOLIC BLOOD PRESSURE: 86 MMHG

## 2024-12-17 DIAGNOSIS — I48.0 PAROXYSMAL ATRIAL FIBRILLATION (HCC): ICD-10-CM

## 2024-12-17 DIAGNOSIS — I10 BENIGN ESSENTIAL HYPERTENSION: ICD-10-CM

## 2024-12-17 DIAGNOSIS — Z95.0 PACEMAKER: ICD-10-CM

## 2024-12-17 DIAGNOSIS — Z95.0 CARDIAC PACEMAKER IN SITU: Primary | ICD-10-CM

## 2024-12-17 DIAGNOSIS — R00.1 SYMPTOMATIC BRADYCARDIA: ICD-10-CM

## 2024-12-17 PROCEDURE — 99213 OFFICE O/P EST LOW 20 MIN: CPT | Performed by: INTERNAL MEDICINE

## 2024-12-17 PROCEDURE — 93000 ELECTROCARDIOGRAM COMPLETE: CPT | Performed by: INTERNAL MEDICINE

## 2025-01-18 ENCOUNTER — VBI (OUTPATIENT)
Dept: ADMINISTRATIVE | Facility: OTHER | Age: 80
End: 2025-01-18

## 2025-01-18 NOTE — TELEPHONE ENCOUNTER
01/18/25 11:52 AM     Chart reviewed for Blood Pressure ; nothing is submitted to the patient's insurance at this time.     Andrez Villavicencio MA   [facility]

## 2025-01-19 ENCOUNTER — VBI (OUTPATIENT)
Dept: ADMINISTRATIVE | Facility: OTHER | Age: 80
End: 2025-01-19

## 2025-01-19 NOTE — TELEPHONE ENCOUNTER
01/19/25 10:07 AM     Chart reviewed for Blood Pressure was/were submitted to the patient's insurance.     Andrez Villavicencio MA   [facility]

## 2025-02-10 ENCOUNTER — OFFICE VISIT (OUTPATIENT)
Dept: UROLOGY | Facility: CLINIC | Age: 80
End: 2025-02-10
Payer: COMMERCIAL

## 2025-02-10 VITALS
DIASTOLIC BLOOD PRESSURE: 82 MMHG | TEMPERATURE: 97.2 F | OXYGEN SATURATION: 98 % | BODY MASS INDEX: 22.62 KG/M2 | WEIGHT: 158 LBS | HEART RATE: 83 BPM | RESPIRATION RATE: 18 BRPM | HEIGHT: 70 IN | SYSTOLIC BLOOD PRESSURE: 142 MMHG

## 2025-02-10 DIAGNOSIS — R33.9 URINARY RETENTION: ICD-10-CM

## 2025-02-10 DIAGNOSIS — R35.0 BENIGN PROSTATIC HYPERPLASIA WITH URINARY FREQUENCY: Primary | ICD-10-CM

## 2025-02-10 DIAGNOSIS — N40.1 BENIGN PROSTATIC HYPERPLASIA WITH URINARY FREQUENCY: Primary | ICD-10-CM

## 2025-02-10 PROCEDURE — 99213 OFFICE O/P EST LOW 20 MIN: CPT

## 2025-02-10 RX ORDER — CHOLECALCIFEROL (VITAMIN D3) 1250 MCG
CAPSULE ORAL
COMMUNITY

## 2025-02-10 RX ORDER — TAMSULOSIN HYDROCHLORIDE 0.4 MG/1
0.4 CAPSULE ORAL
Qty: 30 CAPSULE | Refills: 6 | Status: SHIPPED | OUTPATIENT
Start: 2025-02-10 | End: 2025-02-21 | Stop reason: SDUPTHER

## 2025-02-10 RX ORDER — FINASTERIDE 5 MG/1
5 TABLET, FILM COATED ORAL DAILY
Qty: 30 TABLET | Refills: 6 | Status: SHIPPED | OUTPATIENT
Start: 2025-02-10

## 2025-02-10 NOTE — ASSESSMENT & PLAN NOTE
S/p UROLIFT 2018  He reports stable baseline symptoms and denies complaints.  Patient incidentally found to be retaining large volume greater than 900 mL.  Patient was able to void while in the office but only 200 mL.  I recommended placement of Correa catheter with plan to start Flomax 0.4 mg at bedtime and finasteride 5 mg daily with void trial in 1 to 2 weeks.  Patient refused Correa catheter.  I did also offer to teach him clean intermittent catheterization but he declined this as well.  Patient is otherwise asymptomatic and denies any abdominal pain or flank pain.  He is capable of making informed medical decisions.  I also discussed recommendations with his wife as well today.  Patient was counseled on potential risk of leaving his acute retention untreated.  Risk including but not limited to pain, infection, acute versus chronic kidney injury/failure, potential for death.  After lengthy discussion, patient continues to decline placement of Correa catheter or CIC.  He is however agreeable to have BMP and renal ultrasound completed within 1 to 2 weeks and is agreeable to start Flomax and finasteride.  I will let him know the results of his blood work and ultrasound.  If these show evidence of injury or hydronephrosis he would be agreeable to have catheter placement.    Orders:    Basic metabolic panel; Future    US kidney and bladder with pvr; Future    tamsulosin (FLOMAX) 0.4 mg; Take 1 capsule (0.4 mg total) by mouth daily with dinner    finasteride (PROSCAR) 5 mg tablet; Take 1 tablet (5 mg total) by mouth daily

## 2025-02-10 NOTE — PROGRESS NOTES
Name: Dawood Ramesh      : 1945      MRN: [id number]  Encounter Provider: BRENNAN Armstrong  Encounter Date: 2/10/2025   Encounter department: [facility] [place]    :  Assessment & Plan  Benign prostatic hyperplasia with urinary frequency  S/p UROLIFT   He reports stable baseline symptoms and denies complaints.  Patient incidentally found to be retaining large volume greater than 900 mL.  Patient was able to void while in the office but only 200 mL.  I recommended placement of Correa catheter with plan to start Flomax 0.4 mg at bedtime and finasteride 5 mg daily with void trial in 1 to 2 weeks.  Patient refused Correa catheter.  I did also offer to teach him clean intermittent catheterization but he declined this as well.  Patient is otherwise asymptomatic and denies any abdominal pain or flank pain.  He is capable of making informed medical decisions.  I also discussed recommendations with his wife as well today.  Patient was counseled on potential risk of leaving his acute retention untreated.  Risk including but not limited to pain, infection, acute versus chronic kidney injury/failure, potential for death.  After lengthy discussion, patient continues to decline placement of Correa catheter or CIC.  He is however agreeable to have BMP and renal ultrasound completed within 1 to 2 weeks and is agreeable to start Flomax and finasteride.  I will let him know the results of his blood work and ultrasound.  If these show evidence of injury or hydronephrosis he would be agreeable to have catheter placement.    Orders:    Basic metabolic panel; Future    US kidney and bladder with pvr; Future    tamsulosin (FLOMAX) 0.4 mg; Take 1 capsule (0.4 mg total) by mouth daily with dinner    finasteride (PROSCAR) 5 mg tablet; Take 1 tablet (5 mg total) by mouth daily    Urinary retention  This was incidentally noted and random bladder scan showed estimated volume of 920 mL.  He did void a small  amount while in the office of approximately 200 mL.  Patient declining Correa catheter or CIC.  See additional discussion above.  This is likely a large part due to history of BPH however he does report issues with constipation and has been constipated the last couple days.  This can potentially also be contributing.  Recommend Flomax 0.4 mg at bedtime and finasteride 5 mg daily.  He will have ultrasound and BMP completed and I will call with those results.  See additional discussion above.    Orders:    Basic metabolic panel; Future    US kidney and bladder with pvr; Future    tamsulosin (FLOMAX) 0.4 mg; Take 1 capsule (0.4 mg total) by mouth daily with dinner    finasteride (PROSCAR) 5 mg tablet; Take 1 tablet (5 mg total) by mouth daily          Interval HPI:    Doing well, feels as though Urolift is failing with gradual worsening of LUTS. He reports urinary hesitancy, weaker urinary stream, sensation of incomplete emptying, and nocturia. Symptoms are stable since his last visit. He voided prior to arrival today about 15 minutes or so. Random bladder scan showed estimated volume of 928 mL, he was able to void while in the office but only about 200 mL.     History of Present Illness     Established patient but new to me last seen by jAay Morrell PA-C in May 2023.  He has a history of BPH status post UroLift in 2018.  He has multiple medical problems and sleep apnea.  He has reported hourly nocturia and was offered Flomax in the past but declined.  He started an over-the-counter herbal medication.    He also has a history of elevated PSA, his PSA at the time of his last visit was 5.4 as of 7/6/2020.  Current PSA is 5.83 as of 10/5/2023.  At his last visit with Marvin, he recommended no further PSA testing.      Objective   There were no vitals taken for this visit.    Review of Systems   Constitutional:  Negative for chills and fever.   HENT:  Negative for congestion and sore throat.    Respiratory:  Negative for  cough and shortness of breath.    Cardiovascular:  Negative for chest pain and leg swelling.   Gastrointestinal:  Positive for constipation. Negative for abdominal pain and diarrhea.   Genitourinary:  Positive for frequency. Negative for difficulty urinating, dysuria, hematuria and urgency.        Weak urinary stream, hesitancy, sensation of incomplete emptying   Musculoskeletal:  Negative for back pain and gait problem.   Skin:  Negative for wound.   Allergic/Immunologic: Negative for immunocompromised state.   Hematological:  Does not bruise/bleed easily.       Physical Exam  Vitals and nursing note reviewed.   Constitutional:       General: He is not in acute distress.     Appearance: He is well-developed.   HENT:      Head: Normocephalic and atraumatic.   Eyes:      Conjunctiva/sclera: Conjunctivae normal.   Cardiovascular:      Rate and Rhythm: Normal rate and regular rhythm.      Heart sounds: No murmur heard.  Pulmonary:      Effort: Pulmonary effort is normal. No respiratory distress.      Breath sounds: Normal breath sounds.   Abdominal:      General: There is no distension.      Palpations: Abdomen is soft.      Tenderness: There is no abdominal tenderness. There is no right CVA tenderness or left CVA tenderness.   Musculoskeletal:         General: No swelling.      Cervical back: Neck supple.   Skin:     General: Skin is warm and dry.      Capillary Refill: Capillary refill takes less than 2 seconds.   Neurological:      Mental Status: He is alert.   Psychiatric:         Mood and Affect: Mood normal.           Imaging:          Please Note:  Voice dictation software has been used to create this document. There may be inadvertent transcriptions errors.     BRENNAN Armstrong 02/10/25

## 2025-02-11 ENCOUNTER — APPOINTMENT (OUTPATIENT)
Dept: LAB | Age: 80
End: 2025-02-11
Payer: COMMERCIAL

## 2025-02-11 DIAGNOSIS — I10 ESSENTIAL HYPERTENSION, BENIGN: ICD-10-CM

## 2025-02-11 DIAGNOSIS — R33.9 URINARY RETENTION: ICD-10-CM

## 2025-02-11 DIAGNOSIS — R35.0 BENIGN PROSTATIC HYPERPLASIA WITH URINARY FREQUENCY: ICD-10-CM

## 2025-02-11 DIAGNOSIS — N40.1 BENIGN PROSTATIC HYPERPLASIA WITH URINARY FREQUENCY: ICD-10-CM

## 2025-02-11 DIAGNOSIS — N18.31 STAGE 3A CHRONIC KIDNEY DISEASE (HCC): ICD-10-CM

## 2025-02-11 DIAGNOSIS — E21.0 PRIMARY HYPERPARATHYROIDISM (HCC): ICD-10-CM

## 2025-02-11 LAB
ANION GAP SERPL CALCULATED.3IONS-SCNC: 8 MMOL/L (ref 4–13)
BUN SERPL-MCNC: 16 MG/DL (ref 5–25)
CA-I BLD-SCNC: 1.32 MMOL/L (ref 1.12–1.32)
CALCIUM SERPL-MCNC: 10.8 MG/DL (ref 8.4–10.2)
CHLORIDE SERPL-SCNC: 103 MMOL/L (ref 96–108)
CO2 SERPL-SCNC: 29 MMOL/L (ref 21–32)
CREAT SERPL-MCNC: 1.26 MG/DL (ref 0.6–1.3)
GFR SERPL CREATININE-BSD FRML MDRD: 53 ML/MIN/1.73SQ M
GLUCOSE P FAST SERPL-MCNC: 118 MG/DL (ref 65–99)
POTASSIUM SERPL-SCNC: 4.7 MMOL/L (ref 3.5–5.3)
PTH-INTACT SERPL-MCNC: 118.4 PG/ML (ref 12–88)
SODIUM SERPL-SCNC: 140 MMOL/L (ref 135–147)

## 2025-02-11 PROCEDURE — 36415 COLL VENOUS BLD VENIPUNCTURE: CPT

## 2025-02-11 PROCEDURE — 83970 ASSAY OF PARATHORMONE: CPT

## 2025-02-11 PROCEDURE — 80048 BASIC METABOLIC PNL TOTAL CA: CPT

## 2025-02-11 PROCEDURE — 82330 ASSAY OF CALCIUM: CPT

## 2025-02-14 ENCOUNTER — HOSPITAL ENCOUNTER (OUTPATIENT)
Dept: ULTRASOUND IMAGING | Facility: HOSPITAL | Age: 80
End: 2025-02-14
Payer: COMMERCIAL

## 2025-02-14 DIAGNOSIS — N40.1 BENIGN PROSTATIC HYPERPLASIA WITH URINARY FREQUENCY: ICD-10-CM

## 2025-02-14 DIAGNOSIS — R35.0 BENIGN PROSTATIC HYPERPLASIA WITH URINARY FREQUENCY: ICD-10-CM

## 2025-02-14 DIAGNOSIS — R33.9 URINARY RETENTION: ICD-10-CM

## 2025-02-14 PROCEDURE — 76770 US EXAM ABDO BACK WALL COMP: CPT

## 2025-02-19 ENCOUNTER — RESULTS FOLLOW-UP (OUTPATIENT)
Dept: UROLOGY | Facility: CLINIC | Age: 80
End: 2025-02-19

## 2025-02-20 ENCOUNTER — RA CDI HCC (OUTPATIENT)
Dept: OTHER | Facility: HOSPITAL | Age: 80
End: 2025-02-20

## 2025-02-21 ENCOUNTER — TELEPHONE (OUTPATIENT)
Dept: UROLOGY | Facility: CLINIC | Age: 80
End: 2025-02-21

## 2025-02-21 ENCOUNTER — OFFICE VISIT (OUTPATIENT)
Dept: UROLOGY | Facility: CLINIC | Age: 80
End: 2025-02-21
Payer: COMMERCIAL

## 2025-02-21 VITALS
HEART RATE: 80 BPM | HEIGHT: 70 IN | SYSTOLIC BLOOD PRESSURE: 138 MMHG | TEMPERATURE: 97.7 F | WEIGHT: 258 LBS | BODY MASS INDEX: 36.94 KG/M2 | RESPIRATION RATE: 18 BRPM | OXYGEN SATURATION: 99 % | DIASTOLIC BLOOD PRESSURE: 72 MMHG

## 2025-02-21 DIAGNOSIS — R33.9 URINARY RETENTION: Primary | ICD-10-CM

## 2025-02-21 DIAGNOSIS — R35.0 BENIGN PROSTATIC HYPERPLASIA WITH URINARY FREQUENCY: ICD-10-CM

## 2025-02-21 DIAGNOSIS — N40.1 BENIGN PROSTATIC HYPERPLASIA WITH URINARY FREQUENCY: ICD-10-CM

## 2025-02-21 LAB — POST-VOID RESIDUAL VOLUME, ML POC: 507 ML

## 2025-02-21 PROCEDURE — 51798 US URINE CAPACITY MEASURE: CPT

## 2025-02-21 PROCEDURE — 99213 OFFICE O/P EST LOW 20 MIN: CPT

## 2025-02-21 RX ORDER — TAMSULOSIN HYDROCHLORIDE 0.4 MG/1
0.4 CAPSULE ORAL 2 TIMES DAILY
Qty: 180 CAPSULE | Refills: 3 | Status: SHIPPED | OUTPATIENT
Start: 2025-02-21

## 2025-02-21 NOTE — TELEPHONE ENCOUNTER
----- Message from BRENNAN Vicente sent at 2/21/2025  8:27 AM EST -----  Please call patient to schedule Cystosocpy with Dr. Billingsley for BPH with incomplete emptying. Hx of Urolift by Dr. Billingsley. May need simple prostatectomy or possible HoLEP.

## 2025-02-21 NOTE — TELEPHONE ENCOUNTER
Schedule cysto with Vin in [place] on March 13th at 8am. LVM for pt and asked for a CB to confirm appt

## 2025-02-21 NOTE — TELEPHONE ENCOUNTER
Patient's spouse returned missed call and confirmed 3/13 cysto with Dr Ayala in [facility]. The office's address was provided.

## 2025-02-21 NOTE — PROGRESS NOTES
Name: Dawood Ramesh      : 1945      MRN: [id number]  Encounter Provider: BRENNAN Armstrong  Encounter Date: 2025   Encounter department: [facility] [place]    :  Assessment & Plan  Benign prostatic hyperplasia with urinary frequency  History of UroLift in 2018 by Dr. Billingsley.  Recently seen for large volume urinary tension with random bladder scan of 900 mL.  Patient declined Correa catheter or CIC.  He was started on Flomax 0.4 mg and finasteride 5 mg daily.  He reports noticeable improvement in his urinary symptoms since starting Flomax and finasteride.  Still with incomplete emptying with random bladder scan showing estimated volume 500 mL.  Renal ultrasound from 1 week ago also showed postvoid residual volume of 500 mL, no hydronephrosis bilaterally.  Prostatomegaly with volume of 162.5 mL.  Creatinine 1.26 (2025)  Patient is otherwise asymptomatic today.  He again declined CIC or Correa catheter placement as he feels his urination has improved.  I am recommending he increase Flomax to 0.4 mg twice daily and continue with finasteride 5 mg daily.  I have also recommended we set him up for cystoscopy evaluation for repeat outlet obstructive surgery.  Due to the large size of his prostate gland he likely would be better suited for either simple prostatectomy or HoLEP.  As he saw Dr. Billingsley in the past he would like to go back to him as he was satisfied with his care previously.    Orders:    tamsulosin (FLOMAX) 0.4 mg; Take 1 capsule (0.4 mg total) by mouth 2 (two) times a day          Interval HPI:    He presents today reporting doing much better since being started on Flomax and finasteride.  He reports notable improved urinary stream and emptying.  He voided prior to arrival today and random bladder scan shows estimated volume of 500 mL.  Unable to void in the office again.  Denies any abdominal pain or flank pain.  Otherwise asymptomatic.    History of Present  Illness     Established patient seen by me on 2/10/2025 for annual follow-up evaluation with history of BPH status post UroLift in 2018. He has multiple medical problems and sleep apnea. He has reported hourly nocturia and was offered Flomax in the past but declined. He started an over-the-counter herbal medication.     At his last visit, he endorsed feelings as though his UroLift was failing with gradual worsening of LUTS.  He complained of urinary hesitancy, weak urinary stream, sensation of incomplete emptying, and nocturia.  Symptoms were stable though since his last visit.  He had voided prior to arrival about 15 minutes or so.  Random bladder scan showed estimated volume of 920 mL.  He was able to void again while in the office but only 200 mL.  He was offered placement of Correa catheter or teaching of clean intermittent catheterization.  He ultimately declined both options.  He was otherwise asymptomatic without abdominal pain or flank pain.  He was restarted on Flomax 0.4 mg daily as well as finasteride 5 mg daily.  I also recommended an ultrasound and BMP to ensure there is no hydronephrosis or CARISSA.  Creatinine is stable at 1.26 as of 2/11/2025.  Renal ultrasound from 2/14/2025 showed large postvoid residual of 500 mL, no hydronephrosis bilaterally.  He does have bilateral renal simple cyst largest measuring 8.7 cm on the left.  Mild echogenic kidneys compatible with chronic medical renal disease.  Prostatomegaly noted measuring 6 x 8.2 x 6.4 cm, volume 162.5 mL.      Objective   There were no vitals taken for this visit.    Review of Systems   Constitutional:  Negative for chills and fever.   HENT:  Negative for congestion and sore throat.    Respiratory:  Negative for cough and shortness of breath.    Cardiovascular:  Negative for chest pain and leg swelling.   Gastrointestinal:  Negative for abdominal pain, constipation and diarrhea.   Genitourinary:  Negative for difficulty urinating, dysuria,  frequency, hematuria and urgency.   Musculoskeletal:  Negative for back pain and gait problem.   Skin:  Negative for wound.   Allergic/Immunologic: Negative for immunocompromised state.   Hematological:  Does not bruise/bleed easily.       Physical Exam  Vitals and nursing note reviewed.   Constitutional:       General: He is not in acute distress.     Appearance: He is well-developed.   HENT:      Head: Normocephalic and atraumatic.   Eyes:      Conjunctiva/sclera: Conjunctivae normal.   Cardiovascular:      Rate and Rhythm: Normal rate and regular rhythm.      Heart sounds: No murmur heard.  Pulmonary:      Effort: Pulmonary effort is normal. No respiratory distress.      Breath sounds: Normal breath sounds.   Abdominal:      Palpations: Abdomen is soft.      Tenderness: There is no abdominal tenderness.   Musculoskeletal:         General: No swelling.      Cervical back: Neck supple.   Skin:     General: Skin is warm and dry.      Capillary Refill: Capillary refill takes less than 2 seconds.   Neurological:      Mental Status: He is alert.   Psychiatric:         Mood and Affect: Mood normal.           [id number]    RENAL ULTRASOUND WITH PVR     INDICATION: N40.1: Benign prostatic hyperplasia with lower urinary tract symptoms  R35.0: Frequency of micturition  R33.9: Retention of urine, unspecified.     COMPARISON: Renal ultrasound 2022     TECHNIQUE: Ultrasound of the retroperitoneum was performed with a curvilinear transducer utilizing volumetric sweeps and still imaging techniques.     FINDINGS:     KIDNEYS:  Symmetric and normal size.  Right kidney: 10.7 x 7.7 x 4.9 cm. Volume 213.2 mL  Left kidney: 10.9 x 4.8 x 4.3 cm. Volume 116.9 mL     Right kidney  Mildly echogenic.  No mass is identified. 1.6 cm lower interpolar simple cyst. No significant interval change.  No hydronephrosis.  No shadowing calculi.  No perinephric fluid collections.     Left kidney  Mildly echogenic.  No mass is identified.  8.7 cm exophytic and partially parapelvic simple cyst. No significant interval change.  No hydronephrosis.  No shadowing calculi.  No perinephric fluid collections.     URETERS:  Nonvisualized.     BLADDER:  Normally distended.  No focal thickening or mass lesions. Large bladder diverticulum.  Bilateral ureteral jets detected.  Prevoid: 611.6 mL  No significant post void volume. Measured post void volume in mL: 500.0     Prostate gland measures 6 x 8.2 x 6.4 cm, volume 162.5 mL.        IMPRESSION:     Mildly echogenic kidneys compatible with chronic medical renal disease.     Bilateral renal simple cysts, left larger than right. No significant interval change.     Large bladder postvoid residual of 500 mL.     Prostatomegaly.          Please Note:  Voice dictation software has been used to create this document. There may be inadvertent transcriptions errors.     BRENNAN Armstrong 02/21/25

## 2025-02-21 NOTE — Clinical Note
Please call patient to schedule Cystosocpy with Dr. Billingsley for BPH with incomplete emptying. Hx of Urolift by Dr. Billingsley. May need simple prostatectomy or possible HoLEP.

## 2025-02-21 NOTE — ASSESSMENT & PLAN NOTE
History of UroLift in 2018 by Dr. Billingsley.  Recently seen for large volume urinary tension with random bladder scan of 900 mL.  Patient declined Correa catheter or CIC.  He was started on Flomax 0.4 mg and finasteride 5 mg daily.  He reports noticeable improvement in his urinary symptoms since starting Flomax and finasteride.  Still with incomplete emptying with random bladder scan showing estimated volume 500 mL.  Renal ultrasound from 1 week ago also showed postvoid residual volume of 500 mL, no hydronephrosis bilaterally.  Prostatomegaly with volume of 162.5 mL.  Creatinine 1.26 (2/11/2025)  Patient is otherwise asymptomatic today.  He again declined CIC or Correa catheter placement as he feels his urination has improved.  I am recommending he increase Flomax to 0.4 mg twice daily and continue with finasteride 5 mg daily.  I have also recommended we set him up for cystoscopy evaluation for repeat outlet obstructive surgery.  Due to the large size of his prostate gland he likely would be better suited for either simple prostatectomy or HoLEP.  As he saw Dr. Billingsley in the past he would like to go back to him as he was satisfied with his care previously.    Orders:    tamsulosin (FLOMAX) 0.4 mg; Take 1 capsule (0.4 mg total) by mouth 2 (two) times a day

## 2025-02-21 NOTE — TELEPHONE ENCOUNTER
Spoke with Tony Londono through teams. Advised him of Dr. Greene next availability. Tony states it is ok to schedule sooner with any MD who does HOLEPs or suprapubic prostatectomies     Message sent out to other office to try to get patient in sooner for cysto.

## 2025-02-26 ENCOUNTER — OFFICE VISIT (OUTPATIENT)
Dept: INTERNAL MEDICINE CLINIC | Age: 80
End: 2025-02-26
Payer: COMMERCIAL

## 2025-02-26 VITALS
BODY MASS INDEX: 36.94 KG/M2 | TEMPERATURE: 98.5 F | WEIGHT: 258 LBS | HEIGHT: 70 IN | SYSTOLIC BLOOD PRESSURE: 126 MMHG | OXYGEN SATURATION: 94 % | HEART RATE: 84 BPM | DIASTOLIC BLOOD PRESSURE: 76 MMHG

## 2025-02-26 DIAGNOSIS — I48.0 PAROXYSMAL ATRIAL FIBRILLATION (HCC): ICD-10-CM

## 2025-02-26 DIAGNOSIS — E78.00 HYPERCHOLESTEROLEMIA: ICD-10-CM

## 2025-02-26 DIAGNOSIS — E66.01 SEVERE OBESITY WITH BODY MASS INDEX (BMI) OF 35.0 TO 39.9 WITH SERIOUS COMORBIDITY (HCC): ICD-10-CM

## 2025-02-26 DIAGNOSIS — N40.1 BENIGN PROSTATIC HYPERPLASIA WITH URINARY OBSTRUCTION: ICD-10-CM

## 2025-02-26 DIAGNOSIS — E79.0 HYPERURICEMIA: ICD-10-CM

## 2025-02-26 DIAGNOSIS — E21.0 PRIMARY HYPERPARATHYROIDISM (HCC): ICD-10-CM

## 2025-02-26 DIAGNOSIS — D75.1 SECONDARY POLYCYTHEMIA: ICD-10-CM

## 2025-02-26 DIAGNOSIS — N13.8 BENIGN PROSTATIC HYPERPLASIA WITH URINARY OBSTRUCTION: ICD-10-CM

## 2025-02-26 DIAGNOSIS — N18.31 STAGE 3A CHRONIC KIDNEY DISEASE (HCC): ICD-10-CM

## 2025-02-26 DIAGNOSIS — Z12.5 SCREENING FOR PROSTATE CANCER: ICD-10-CM

## 2025-02-26 DIAGNOSIS — I10 BENIGN ESSENTIAL HYPERTENSION: Primary | ICD-10-CM

## 2025-02-26 PROCEDURE — 99214 OFFICE O/P EST MOD 30 MIN: CPT | Performed by: INTERNAL MEDICINE

## 2025-02-26 PROCEDURE — G2211 COMPLEX E/M VISIT ADD ON: HCPCS | Performed by: INTERNAL MEDICINE

## 2025-02-26 NOTE — ASSESSMENT & PLAN NOTE
Asymptomatic hyperparathyroidism will get the DEXA scan if the DEXA scan showing any problems with the osteoporosis maybe we need to ask him to see the general surgery  Orders:    DXA bone density spine hip and pelvis; Future    Vitamin D 25 hydroxy; Future

## 2025-02-26 NOTE — TELEPHONE ENCOUNTER
Patient's wife, Michelle, called to ask questions about the patients procedure on 3/13. I was able to answer all of her questions.

## 2025-02-26 NOTE — PROGRESS NOTES
Name: Dawood Ramesh      : 1945      MRN: [id number]  Encounter Provider: August Domínguez MD  Encounter Date: 2025   Encounter department: [facility]  :  Assessment & Plan  Benign prostatic hyperplasia with urinary obstruction  .  Patient is followed up by the urology since he was started on Flomax or tamsulosin and finasteride he is doing much better       Benign essential hypertension  Hypertension is very well-controlled  Orders:    CBC and differential; Future    Comprehensive metabolic panel; Future    Lipid panel; Future    UA w Reflex to Microscopic w Reflex to Culture; Future    Severe obesity with body mass index (BMI) of 35.0 to 39.9 with serious comorbidity (HCC)           Primary hyperparathyroidism (HCC)  Asymptomatic hyperparathyroidism will get the DEXA scan if the DEXA scan showing any problems with the osteoporosis maybe we need to ask him to see the general surgery  Orders:    DXA bone density spine hip and pelvis; Future    Vitamin D 25 hydroxy; Future    Secondary polycythemia  Polycythemia is stable  Orders:    CBC and differential; Future    Stage 3a chronic kidney disease (HCC)  Lab Results   Component Value Date    EGFR 53 2025    EGFR 52 2024    EGFR 50 10/05/2023    CREATININE 1.26 2025    CREATININE 1.28 2024    CREATININE 1.33 (H) 10/05/2023     Renal functions are much improved  Orders:    CBC and differential; Future    Comprehensive metabolic panel; Future    Hyperuricemia  Follow-up with the uric acid level       Hypercholesterolemia  Will follow-up with the lipid panel  Orders:    Lipid panel; Future    Paroxysmal atrial fibrillation (HCC)  Regular sinus rhythm right now       Screening for prostate cancer    Orders:    PSA, Total Screen; Future           History of Present Illness   This is an 79 years young gentleman is here for today for the regular follow-up    Patient is here today for the follow-up.    Hypertension. I reviewed antihypertensive medication, patient does not have any side effects of  medications, no signs or symptoms of hypertension ,hypotension or orthostatic hypotension.  Patient is compliant with medications.  Blood workup related to hypertensive diagnosis reviewed.  Plan is to continue with the present management.  We will follow-up as a scheduled and adjust the doses of the medication as indicated.    Hyperparathyroidism with a slightly increased total calcium ionized calcium and also PTH level patient does not have any symptoms last DEXA scan shows a osteopenia we will repeat the DEXA scan if he continue to lose bone density then he may need to do the further workup and followed up by the general surgery    BPH followed up by the urology recently he is having problem with retention of the urine and unable to empty the bladder he was having some frequency and urgency which is much better since he was started on his medication he does not have any burning any blood in the urine    Polycythemia he is doing very well with the with that no increase in the RBC count or hemoglobin and hematocrit    Patient is here for the follow-up of proximal atrial fibrillation. doing well no new complaint related to atrial fibrillation.  Patient is on anticoagulation, no side effects of the medication.  No history of CVA or TIA.  Hypertension is well controlled.  No signs or symptoms of heart failure.  Recent echocardiogram noted.  Patient is followed up with the cardiology.      Review of Systems   Constitutional:  Negative for appetite change, fatigue and fever.   HENT:  Negative for congestion, ear pain, hearing loss, nosebleeds, sneezing, tinnitus and voice change.    Eyes:  Negative for pain, discharge and redness.   Respiratory:  Negative for cough, chest tightness and wheezing.    Cardiovascular:  Negative for chest pain, palpitations and leg swelling.   Gastrointestinal:  Negative for abdominal pain, blood  "in stool, constipation, diarrhea, nausea and vomiting.   Genitourinary:  Positive for urgency. Negative for difficulty urinating, dysuria and hematuria.        Post void  residual well to empty the bladder completely   Musculoskeletal:  Positive for back pain (Pain on ambulation). Negative for arthralgias, gait problem and joint swelling.   Skin:  Negative for rash and wound.   Allergic/Immunologic: Negative for environmental allergies.   Neurological:  Negative for dizziness, tremors, seizures, weakness, light-headedness and numbness.   Hematological:  Negative for adenopathy. Does not bruise/bleed easily.   Psychiatric/Behavioral:  Negative for behavioral problems and confusion. The patient is not nervous/anxious.        Objective   /76 (BP Location: Left arm, Patient Position: Sitting, Cuff Size: Standard)   Pulse 84   Temp 98.5 °F (36.9 °C) (Temporal)   Ht 5' 10\" (1.778 m)   Wt 117 kg (258 lb)   SpO2 94%   BMI 37.02 kg/m²      Physical Exam  Vitals and nursing note reviewed.   Constitutional:       Appearance: Normal appearance. He is obese.   HENT:      Head: Normocephalic and atraumatic.      Right Ear: Tympanic membrane normal.      Left Ear: Tympanic membrane normal.      Nose: Nose normal.      Mouth/Throat:      Mouth: Mucous membranes are moist.   Eyes:      Extraocular Movements: Extraocular movements intact.      Pupils: Pupils are equal, round, and reactive to light.   Cardiovascular:      Rate and Rhythm: Normal rate and regular rhythm.      Pulses: Normal pulses.      Heart sounds: Normal heart sounds.   Pulmonary:      Effort: Pulmonary effort is normal.      Breath sounds: Normal breath sounds.   Abdominal:      General: Abdomen is flat. Bowel sounds are normal.      Palpations: Abdomen is soft.   Musculoskeletal:         General: No swelling, tenderness or deformity. Normal range of motion.      Cervical back: Normal range of motion and neck supple.   Skin:     General: Skin is warm. "      Capillary Refill: Capillary refill takes 2 to 3 seconds.   Neurological:      General: No focal deficit present.      Mental Status: He is alert and oriented to person, place, and time. Mental status is at baseline.   Psychiatric:         Mood and Affect: Mood normal.         Behavior: Behavior normal.

## 2025-02-26 NOTE — ASSESSMENT & PLAN NOTE
.  Patient is followed up by the urology since he was started on Flomax or tamsulosin and finasteride he is doing much better

## 2025-02-26 NOTE — ASSESSMENT & PLAN NOTE
Lab Results   Component Value Date    EGFR 53 02/11/2025    EGFR 52 07/22/2024    EGFR 50 10/05/2023    CREATININE 1.26 02/11/2025    CREATININE 1.28 07/22/2024    CREATININE 1.33 (H) 10/05/2023     Renal functions are much improved  Orders:    CBC and differential; Future    Comprehensive metabolic panel; Future

## 2025-02-26 NOTE — ASSESSMENT & PLAN NOTE
Hypertension is very well-controlled  Orders:    CBC and differential; Future    Comprehensive metabolic panel; Future    Lipid panel; Future    UA w Reflex to Microscopic w Reflex to Culture; Future

## 2025-03-06 ENCOUNTER — REMOTE DEVICE CLINIC VISIT (OUTPATIENT)
Dept: CARDIOLOGY CLINIC | Facility: CLINIC | Age: 80
End: 2025-03-06
Payer: COMMERCIAL

## 2025-03-06 ENCOUNTER — RESULTS FOLLOW-UP (OUTPATIENT)
Dept: CARDIOLOGY CLINIC | Facility: CLINIC | Age: 80
End: 2025-03-06

## 2025-03-06 DIAGNOSIS — Z95.0 CARDIAC PACEMAKER IN SITU: Primary | ICD-10-CM

## 2025-03-06 PROCEDURE — 93294 REM INTERROG EVL PM/LDLS PM: CPT | Performed by: STUDENT IN AN ORGANIZED HEALTH CARE EDUCATION/TRAINING PROGRAM

## 2025-03-06 PROCEDURE — 93296 REM INTERROG EVL PM/IDS: CPT | Performed by: STUDENT IN AN ORGANIZED HEALTH CARE EDUCATION/TRAINING PROGRAM

## 2025-03-06 NOTE — PROGRESS NOTES
"Results for orders placed or performed in visit on 03/06/25   Cardiac EP device report    Narrative    MDT-DUAL CHAMBER PPM (AAIR-DDDR MODE)/ACTIVE SYSTEM IS MRI CONDITIONAL  CARELINK TRANSMISSION: PRESENTING EGRAM AP @ 80 BPM. BATTERY STATUS \"4 YRS.\" AP 93%  100%. ALL AVAILABLE LEAD PARAMETERS WITHIN NORMAL LIMITS. 4 AT/AF NOTED; 2% BURDEN; LONGEST 29.18 HRS; PT ON ELIQUIS. AVAIL EGRAMS SHOWING AF. NORMAL DEVICE FUNCTION. NC         "

## 2025-03-13 ENCOUNTER — PROCEDURE VISIT (OUTPATIENT)
Dept: UROLOGY | Facility: AMBULATORY SURGERY CENTER | Age: 80
End: 2025-03-13
Payer: COMMERCIAL

## 2025-03-13 VITALS
HEIGHT: 70 IN | OXYGEN SATURATION: 98 % | SYSTOLIC BLOOD PRESSURE: 154 MMHG | BODY MASS INDEX: 36.94 KG/M2 | HEART RATE: 74 BPM | DIASTOLIC BLOOD PRESSURE: 96 MMHG | WEIGHT: 258 LBS

## 2025-03-13 DIAGNOSIS — R35.0 BENIGN PROSTATIC HYPERPLASIA WITH URINARY FREQUENCY: Primary | ICD-10-CM

## 2025-03-13 DIAGNOSIS — N40.1 BENIGN PROSTATIC HYPERPLASIA WITH URINARY FREQUENCY: Primary | ICD-10-CM

## 2025-03-13 DIAGNOSIS — N32.3 BLADDER DIVERTICULUM: ICD-10-CM

## 2025-03-13 PROCEDURE — 52000 CYSTOURETHROSCOPY: CPT | Performed by: UROLOGY

## 2025-03-13 NOTE — ASSESSMENT & PLAN NOTE
The patient has longstanding issues with large prostate and voiding issues prior UroLift 7 years ago with progressive symptoms since associated with poor emptying now back on Flomax and finasteride and feels symptomatically better but is still emptying with poor efficiency based on PVR which is in large part related to his large diverticulum.      His renal ultrasound shows no hydronephrosis his kidney function is stable so his retention issues do not appear to be affecting overall kidney function.    Discussed the 2 main options are observation versus intervention.    We discussed with observation the potential that he could develop harris retention or recurrent urinary tract infection issues or renal dysfunction although I think is unlikely he developed renal dysfunction based on data thus far    Discussed outlet surgery which I think would be best served via robot simple prostatectomy with concurrent diverticulectomy given his large diverticulum and obstructive prostate.    The patient does not appear eager for surgery.  He will think about options and let us know if he wants to move forward.  Will follow-up with an appointment in 2 months with a recheck of PVR.    Orders:    POCT urine dip

## 2025-03-13 NOTE — PROGRESS NOTES
Name: Dawood Ramesh      : 1945      MRN: [id number]  Encounter Provider: Roger Banuelos MD  Encounter Date: 3/13/2025   Encounter department: [facility] UROLOGY [place]  :  Assessment & Plan  Benign prostatic hyperplasia with urinary frequency  The patient has longstanding issues with large prostate and voiding issues prior UroLift 7 years ago with progressive symptoms since associated with poor emptying now back on Flomax and finasteride and feels symptomatically better but is still emptying with poor efficiency based on PVR which is in large part related to his large diverticulum.      His renal ultrasound shows no hydronephrosis his kidney function is stable so his retention issues do not appear to be affecting overall kidney function.    Discussed the 2 main options are observation versus intervention.    We discussed with observation the potential that he could develop harris retention or recurrent urinary tract infection issues or renal dysfunction although I think is unlikely he developed renal dysfunction based on data thus far    Discussed outlet surgery which I think would be best served via robot simple prostatectomy with concurrent diverticulectomy given his large diverticulum and obstructive prostate.    The patient does not appear eager for surgery.  He will think about options and let us know if he wants to move forward.  Will follow-up with an appointment in 2 months with a recheck of PVR.    Orders:    POCT urine dip    Bladder diverticulum  The patient has a very large bladder diverticulum.  If we were to pursue surgery to address his prostate would recommend robotic prostatectomy so we can perform a diverticulectomy at the same time.  We could also consider HoLEP surgery which would not address his bladder diverticulum and therefore would have limited benefit for his overall voiding           History of Present Illness   Dawood Ramesh is a 79 y.o. male with known and long  history of BPH and voiding issues status post UroLift surgery in 2018.      he endorsed feelings as though his UroLift was failing with gradual worsening of LUTS.  He complained of urinary hesitancy, weak urinary stream, sensation of incomplete emptying, and nocturia every hour.  Evaluation has shown that he is not acting his bladder well with a average PVR of proximately 500 cc.    He was offered placement of Correa catheter or teaching of clean intermittent catheterization.  He ultimately declined both options.  He was otherwise asymptomatic without abdominal pain or flank pain.  He was restarted on Flomax 0.4 mg daily as well as finasteride 5 mg daily.  I also recommended an ultrasound and BMP to ensure there is no hydronephrosis or CARISSA.  Creatinine is stable at 1.26 as of 2/11/2025.  Renal ultrasound from 2/14/2025 showed large postvoid residual of 500 mL, no hydronephrosis bilaterally.  He does have bilateral renal simple cyst largest measuring 8.7 cm on the left.  Mild echogenic kidneys compatible with chronic medical renal disease.  Prostatomegaly noted measuring 6 x 8.2 x 6.4 cm, volume 162.5 mL.    Patient reports ever since restarting Flomax and finasteride he thinks his voiding is symptomatically improved.  He is overall quite satisfied with his current voiding.  However PVR after cystoscopy today was 660 cc.    The patient underwent cystoscopy today which showed bilobar hypertrophy the prostate with mild extrusion into the bladder with a exposed UroLift mendoza and bladder with significant trabeculations and a very large diverticulum coming off the right anterior lateral wall of the bladder measuring approximately 300 cc.     Review of Systems   Constitutional:  Negative for chills and fever.   HENT:  Negative for ear pain and sore throat.    Eyes:  Negative for pain and visual disturbance.   Respiratory:  Negative for cough and shortness of breath.    Cardiovascular:  Negative for chest pain and  "palpitations.   Gastrointestinal:  Negative for abdominal pain and vomiting.   Genitourinary:  Negative for dysuria and hematuria.   Musculoskeletal:  Negative for arthralgias and back pain.   Skin:  Negative for color change and rash.   Neurological:  Negative for seizures and syncope.   All other systems reviewed and are negative.         Objective   /96 (BP Location: Left arm, Patient Position: Sitting, Cuff Size: Standard)   Pulse 74   Ht 5' 10\" (1.778 m)   Wt 117 kg (258 lb)   SpO2 98%   BMI 37.02 kg/m²     Physical Exam  Vitals and nursing note reviewed.   Constitutional:       Appearance: He is well-developed.   HENT:      Head: Normocephalic and atraumatic.   Eyes:      Conjunctiva/sclera: Conjunctivae normal.   Cardiovascular:      Rate and Rhythm: Normal rate and regular rhythm.      Heart sounds: No murmur heard.  Pulmonary:      Effort: Pulmonary effort is normal. No respiratory distress.      Breath sounds: Normal breath sounds.   Abdominal:      Palpations: Abdomen is soft.      Tenderness: There is no abdominal tenderness.   Genitourinary:     Penis: Normal.       Testes: Normal.   Musculoskeletal:      Cervical back: Neck supple.   Skin:     General: Skin is warm and dry.   Neurological:      Mental Status: He is alert.        Cystoscopy     Date/Time  3/13/2025 8:00 AM     Performed by  Roger Banuelos MD   Authorized by  BRENNAN Armstrong     Universal Protocol:  procedure performed by consultantConsent: Written consent obtained.  Risks and benefits: risks, benefits and alternatives were discussed  Consent given by: patient  Time out: Immediately prior to procedure a \"time out\" was called to verify the correct patient, procedure, equipment, support staff and site/side marked as required.  Patient understanding: patient states understanding of the procedure being performed  Patient consent: the patient's understanding of the procedure matches consent given  Procedure consent: procedure " consent matches procedure scheduled  Patient identity confirmed: verbally with patient      Procedure Details:  Procedure type: cystoscopy    Patient tolerance: Patient tolerated the procedure well with no immediate complications    Additional Procedure Details: A time-out was performed identifying the correct patient site and procedure.  A MA chaperone was in the room.  A flexible cystoscope was introduced into the urethra.  The pendulous urethra was normal other than a mild bulbar urethral stricture which the scope could pass through..  The prostatic urethra showed significant kissing bilateral lobar hypertrophy without a median lobe but with moderate prostatic extrusion into the bladder and an exposed UroLift mendoza anteriorly.  The bladder did not have any lesions concerning for malignancy.  There were significant trabeculations and the bladder was distended and there was a very large diverticulum coming off the right anterior lateral aspect of the bladder wall with a moderate size neck and a volume of approximately 300 cc..  The ureteral orifices were in orthotopic position.        Results   Lab Results   Component Value Date    PSA 5.83 (H) 10/05/2023    PSA 5.4 (H) 07/06/2020    PSA 3.2 10/23/2017     Lab Results   Component Value Date    CALCIUM 10.8 (H) 02/11/2025    K 4.7 02/11/2025    CO2 29 02/11/2025     02/11/2025    BUN 16 02/11/2025    CREATININE 1.26 02/11/2025     Lab Results   Component Value Date    WBC 8.28 07/22/2024    HGB 17.9 (H) 07/22/2024    HCT 53.7 (H) 07/22/2024    MCV 95 07/22/2024     07/22/2024       Office Urine Dip  No results found for this or any previous visit (from the past hour).

## 2025-03-13 NOTE — ASSESSMENT & PLAN NOTE
The patient has a very large bladder diverticulum.  If we were to pursue surgery to address his prostate would recommend robotic prostatectomy so we can perform a diverticulectomy at the same time.  We could also consider HoLEP surgery which would not address his bladder diverticulum and therefore would have limited benefit for his overall voiding

## 2025-03-14 DIAGNOSIS — I47.29 NSVT (NONSUSTAINED VENTRICULAR TACHYCARDIA) (HCC): ICD-10-CM

## 2025-03-14 DIAGNOSIS — E78.5 HYPERLIPIDEMIA, UNSPECIFIED HYPERLIPIDEMIA TYPE: ICD-10-CM

## 2025-03-14 DIAGNOSIS — M85.89 OSTEOPENIA OF MULTIPLE SITES: ICD-10-CM

## 2025-03-14 RX ORDER — METOPROLOL SUCCINATE 25 MG/1
25 TABLET, EXTENDED RELEASE ORAL DAILY
Qty: 90 TABLET | Refills: 1 | Status: SHIPPED | OUTPATIENT
Start: 2025-03-14

## 2025-03-14 RX ORDER — SIMVASTATIN 40 MG
40 TABLET ORAL EVERY OTHER DAY
Qty: 45 TABLET | Refills: 0 | Status: SHIPPED | OUTPATIENT
Start: 2025-03-14

## 2025-03-14 NOTE — TELEPHONE ENCOUNTER
Reason for call:   [x] Refill   [] Prior Auth  [] Other:     Office:   [x] PCP/Provider -   [] Specialty/Provider -     Medication: metoprolol succinate (TOPROL-XL) 25 mg 24 hr tablet     Dose/Frequency:  Take 1 tablet (25 mg total) by mouth daily,     Quantity: : 90 tablet     Pharmacy: [facility] - [address]       Mail Away Pharmacy   Does the patient have enough for 10 days?   [x] Yes   [] No - Send as HP to POD

## 2025-03-14 NOTE — TELEPHONE ENCOUNTER
Reason for call:   [x] Refill   [] Prior Auth  [] Other:     Office:   [x] PCP/Provider - August Domínguez MD   [facility]   [] Specialty/Provider -     Medication: Simvastatin    Dose/Frequency: 40mg     One tablet Every Other Day    Quantity: 45    [facility]    Local Pharmacy   Does the patient have enough for 3 days?   [] Yes   [] No - Send as HP to POD    Mail Away Pharmacy   Does the patient have enough for 10 days?   [x] Yes   [] No - Send as HP to POD

## 2025-03-18 ENCOUNTER — PROCEDURE VISIT (OUTPATIENT)
Dept: UROLOGY | Facility: AMBULATORY SURGERY CENTER | Age: 80
End: 2025-03-18
Payer: COMMERCIAL

## 2025-03-18 ENCOUNTER — NURSE TRIAGE (OUTPATIENT)
Age: 80
End: 2025-03-18

## 2025-03-18 VITALS
HEART RATE: 64 BPM | HEIGHT: 69 IN | DIASTOLIC BLOOD PRESSURE: 100 MMHG | BODY MASS INDEX: 38.54 KG/M2 | OXYGEN SATURATION: 96 % | WEIGHT: 260.2 LBS | SYSTOLIC BLOOD PRESSURE: 140 MMHG

## 2025-03-18 DIAGNOSIS — R33.9 URINARY RETENTION: Primary | ICD-10-CM

## 2025-03-18 LAB — POST-VOID RESIDUAL VOLUME, ML POC: 785 ML

## 2025-03-18 PROCEDURE — 51798 US URINE CAPACITY MEASURE: CPT

## 2025-03-18 PROCEDURE — 51702 INSERT TEMP BLADDER CATH: CPT

## 2025-03-18 NOTE — PROGRESS NOTES
"3/18/2025  Dawood Ramesh is a 79 y.o. male  [id number]    Diagnosis:  Chief Complaint    Urinary Retention         Patient presents for follow up post void residual s/p unable to urinate managed by Dr. Banuelos    Plan:    Patient scheduled for 1 month for next catheter exchange and also is waiting to have discussion about having surgery for retention with Dr Banuelos      Assessment:      Vitals:    03/18/25 1327   BP: 140/100   Pulse: 64   SpO2: 96%   Weight: 118 kg (260 lb 3.2 oz)   Height: 5' 9\" (1.753 m)           Patient voided 20  mL in the office.  Post void residual measured via bladder scan to be 785 mL     Recent Results (from the past 6 hours)   POCT Measure PVR    Collection Time: 03/18/25  1:29 PM   Result Value Ref Range    POST-VOID RESIDUAL VOLUME, ML  mL     18 f coude catheter inserted and 1600 ml of odorous elanna urine was expelled. Attached to 1000 ml leg bag. Educated him and his wife of care of catheter and how to change leg bag to night bag    Lashonda Goodwin RN     "

## 2025-03-18 NOTE — TELEPHONE ENCOUNTER
Patient presented for void trial today. Scan showed 785ml - 1500 ml  urine expelled with insertion of catheter, with 50 ml on urine drained into bag Patient would like to discuss with Dr Banuelos surgery moving forward as he is realizes it is a necessitiy

## 2025-03-18 NOTE — TELEPHONE ENCOUNTER
Agree with recommendations.  If he has not urinated since 530 this morning he is most definitely going to need a catheter placed.

## 2025-03-18 NOTE — TELEPHONE ENCOUNTER
FOLLOW UP: Today 1pm with nurse for PVR, possible pereira placement     REASON FOR CONVERSATION: Urinary Retention    SYMPTOMS: unable to urinate since 5:30 am, pressure and pain in stomach and suprapubic area.     OTHER: Patient has drank 3 cups of water and 2  cups of coffee. Patient was into see Dr. Banuelos 3/13/25 and has longstanding issues with large obstructive prostate and voiding issues. patient has had bowel movement today , so not constipated.     DISPOSITION: See Today or Tomorrow in Office    Reason for Disposition   The patient has difficulty urinating and does not feel bladder is empty or is unable to urinate during office hours    Answer Assessment - Initial Assessment Questions  1. When was the last time you voided?   - 5:30am  2. Are you straining to void?   -Yes  3. Do you have any abdominal pain? If yes, can you please describe it? Do you have suprapubic or pelvic pressure?   -abdominal pain and pelvic pressure  4. Do you have other urinary symptoms such as frequency, urgency, incontinence, dysuria?   - Feels like he needs to go frequently and unable too at this time  5. Are your bowel movements regular?   -Yes , last BM today   6. Do you have a history of BPH with obstruction or urethral stricture?  -Yes    Protocols used: Urology-Difficulty / Unable To Void-ADULT-[place]

## 2025-03-20 ENCOUNTER — PREP FOR PROCEDURE (OUTPATIENT)
Dept: UROLOGY | Facility: AMBULATORY SURGERY CENTER | Age: 80
End: 2025-03-20

## 2025-03-20 DIAGNOSIS — N32.3 ACQUIRED BLADDER DIVERTICULUM: Primary | ICD-10-CM

## 2025-03-20 RX ORDER — CEFAZOLIN SODIUM 2 G/50ML
2000 SOLUTION INTRAVENOUS ONCE
OUTPATIENT
Start: 2025-03-20 | End: 2025-03-20

## 2025-04-03 DIAGNOSIS — I48.0 PAROXYSMAL ATRIAL FIBRILLATION (HCC): ICD-10-CM

## 2025-04-03 NOTE — TELEPHONE ENCOUNTER
Reason for call:   [x] Refill   [] Prior Auth  [] Other:     Office:   [] PCP/Provider -   [x] Specialty/Provider - Cardio    Medication:   - Eliquis 5 mg- take 1 tablet by 2 times a day      Pharmacy: [facility]    Local Pharmacy   Does the patient have enough for 3 days?   [] Yes   [] No - Send as HP to POD    Mail Away Pharmacy   Does the patient have enough for 10 days?   [x] Yes   [] No - Send as HP to POD

## 2025-04-04 ENCOUNTER — PREP FOR PROCEDURE (OUTPATIENT)
Dept: UROLOGY | Facility: AMBULATORY SURGERY CENTER | Age: 80
End: 2025-04-04

## 2025-04-04 ENCOUNTER — TELEPHONE (OUTPATIENT)
Dept: UROLOGY | Facility: AMBULATORY SURGERY CENTER | Age: 80
End: 2025-04-04

## 2025-04-04 DIAGNOSIS — Z01.818 PRE-OPERATIVE CLEARANCE: ICD-10-CM

## 2025-04-04 DIAGNOSIS — R39.89 SUSPECTED UTI: ICD-10-CM

## 2025-04-04 DIAGNOSIS — Z01.810 PRE-OPERATIVE CARDIOVASCULAR EXAMINATION: ICD-10-CM

## 2025-04-04 DIAGNOSIS — Z79.01 LONG-TERM (CURRENT) USE OF ANTICOAGULANTS, INR GOAL 2.5-3.5: ICD-10-CM

## 2025-04-04 DIAGNOSIS — Z01.812 PRE-OPERATIVE LABORATORY EXAMINATION: ICD-10-CM

## 2025-04-04 DIAGNOSIS — R33.9 URINARY RETENTION: Primary | ICD-10-CM

## 2025-04-04 NOTE — TELEPHONE ENCOUNTER
Spoke with patient and confirmed surgery date of  6/25/25  Type of surgery: Robotic Simple Prostatectomy/ Robotic Diverticulectomy   Operating physician:Dr. Banuelos  Location of surgery: [facility]    Verbally went over prep with patient on 4/4/25  NPO  Bowel prep? Yes  Hospital calls afternoon prior with arrival time (calls Friday afternoon for Monday surgery)  Patient needs ride to and from surgery   outpatient with a 23 hour hold   Pre-op testing to be done 2 weeks prior to surgery. All testing can be done as a walk-in. EKG can only be done as a walk-in at any [facility].  CBC, BMP, PTT, PTINR, T/S, UCX, EKG  Blood thinners:   Eliquis  Clearances needed: Cardiac    Mailed to patient   Copy of packet scanned into Media  Labs in packet and in electronic record   Soap and Bowel  prep in packet  post-op in packet     Consent: in media     Cardiac Clearance:  Appt with: Dr. Chan   Appt date and time: TBD   Date clearance form faxed: 4/4/25  Best fax number: [phone]    Medication Suspension of: Eliquis  Ordering provider:  Faxed Medication Suspension form on:  Best fax number:    RBC blood bank done on: 4/4/25

## 2025-04-15 ENCOUNTER — PROCEDURE VISIT (OUTPATIENT)
Dept: UROLOGY | Facility: AMBULATORY SURGERY CENTER | Age: 80
End: 2025-04-15
Payer: COMMERCIAL

## 2025-04-15 DIAGNOSIS — N40.1 URINARY RETENTION DUE TO BENIGN PROSTATIC HYPERPLASIA: Primary | ICD-10-CM

## 2025-04-15 DIAGNOSIS — R33.8 URINARY RETENTION DUE TO BENIGN PROSTATIC HYPERPLASIA: Primary | ICD-10-CM

## 2025-04-15 PROCEDURE — 51702 INSERT TEMP BLADDER CATH: CPT

## 2025-04-15 NOTE — PROGRESS NOTES
4/15/2025  Dawood Ramesh is a 79 y.o. male  [id number]    Diagnosis:  Chief Complaint    Urinary Retention         Patient presents for routine catheter exchange managed by Dr. Banuelos    Plan:  Patient will follow up in 4 weeks  Patient instructed to call with any questions or concerns in the meantime.     Procedure:      Bladder catheterization    Date/Time: 4/15/2025 10:30 AM    Performed by: Lashonad Goodwin RN  Authorized by: Roger Banuelos MD    Pre-procedure details:     Procedure purpose:  Diagnostic  Procedure details:     Catheter insertion:  Indwelling    Approach: natural orifice      Catheter type:  Coude    Catheter size:  18 Fr    Number of attempts:  1    Successful placement: yes      Urine characteristics:  Dark  Post-procedure details:     Patient tolerance of procedure:  Tolerated well, no immediate complications    Complications (if applicable):  650 ml of dark urine expelled old catheter was encrusted attached new catheter to 1000ml leg bag  Comments:      Patient is scheduled for prostatectomy  6/25/25          Lashonda Goodwin RN    WDL

## 2025-04-28 DIAGNOSIS — M10.9 GOUT, UNSPECIFIED CAUSE, UNSPECIFIED CHRONICITY, UNSPECIFIED SITE: ICD-10-CM

## 2025-04-28 NOTE — TELEPHONE ENCOUNTER
Reason for call:   [x] Refill   [] Prior Auth  [] Other:     Office:   [x] PCP/Provider - August Domínguez MD  [] Specialty/Provider -     Medication: allopurinol (ZYLOPRIM) 300 mg tablet     Dose/Frequency: TAKE 1 TABLET DAILY     Quantity: 90 tablet     Pharmacy: [facility]         Does the patient have enough for 3 days?   [x] Yes   [] No - Send as HP to POD

## 2025-04-29 RX ORDER — ALLOPURINOL 300 MG/1
300 TABLET ORAL DAILY
Qty: 90 TABLET | Refills: 1 | Status: SHIPPED | OUTPATIENT
Start: 2025-04-29

## 2025-04-30 ENCOUNTER — HOSPITAL ENCOUNTER (OUTPATIENT)
Dept: NON INVASIVE DIAGNOSTICS | Facility: CLINIC | Age: 80
Discharge: HOME | End: 2025-04-30
Attending: INTERNAL MEDICINE
Payer: COMMERCIAL

## 2025-04-30 VITALS
WEIGHT: 260 LBS | DIASTOLIC BLOOD PRESSURE: 100 MMHG | SYSTOLIC BLOOD PRESSURE: 140 MMHG | HEIGHT: 69 IN | BODY MASS INDEX: 38.51 KG/M2 | HEART RATE: 75 BPM

## 2025-04-30 DIAGNOSIS — I48.0 PAROXYSMAL ATRIAL FIBRILLATION (HCC): ICD-10-CM

## 2025-04-30 DIAGNOSIS — Z95.0 CARDIAC PACEMAKER IN SITU: ICD-10-CM

## 2025-04-30 LAB
AORTIC ROOT: 4.3 CM
ASCENDING AORTA: 3.8 CM
BSA FOR ECHO PROCEDURE: 2.31 M2
E WAVE DECELERATION TIME: 230 MS
E/A RATIO: 0.46
FRACTIONAL SHORTENING: 32 (ref 28–44)
INTERVENTRICULAR SEPTUM IN DIASTOLE (PARASTERNAL SHORT AXIS VIEW): 1.5 CM
INTERVENTRICULAR SEPTUM: 1.5 CM (ref 0.6–1.1)
LAAS-AP2: 17.9 CM2
LAAS-AP4: 18.2 CM2
LEFT ATRIUM SIZE: 4.8 CM
LEFT ATRIUM VOLUME (MOD BIPLANE): 52 ML
LEFT ATRIUM VOLUME INDEX (MOD BIPLANE): 22.5 ML/M2
LEFT INTERNAL DIMENSION IN SYSTOLE: 3.4 CM (ref 2.1–4)
LEFT VENTRICULAR INTERNAL DIMENSION IN DIASTOLE: 5 CM (ref 3.5–6)
LEFT VENTRICULAR POSTERIOR WALL IN END DIASTOLE: 1.2 CM
LEFT VENTRICULAR STROKE VOLUME: 73 ML
LV EF US.2D.A4C+ESTIMATED: 54 %
LVSV (TEICH): 73 ML
MV E'TISSUE VEL-SEP: 5 CM/S
MV PEAK A VEL: 0.85 M/S
MV PEAK E VEL: 39 CM/S
MV STENOSIS PRESSURE HALF TIME: 67 MS
MV VALVE AREA P 1/2 METHOD: 3.28
RIGHT ATRIAL 2D VOLUME: 41 ML
RIGHT ATRIUM AREA SYSTOLE A4C: 17.7 CM2
SINOTUBULAR JUNCTION: 2.6 CM
SL CV LEFT ATRIUM LENGTH A2C: 5 CM
SL CV LV EF: 55
SL CV PED ECHO LEFT VENTRICLE DIASTOLIC VOLUME (MOD BIPLANE) 2D: 120 ML
SL CV PED ECHO LEFT VENTRICLE SYSTOLIC VOLUME (MOD BIPLANE) 2D: 46 ML
SL CV SINUS OF VALSALVA 2D: 4.3 CM
STJ: 2.6 CM
TR MAX PG: 33 MMHG
TR PEAK VELOCITY: 2.9 M/S
TRICUSPID ANNULAR PLANE SYSTOLIC EXCURSION: 1.2 CM
TRICUSPID VALVE PEAK REGURGITATION VELOCITY: 2.88 M/S

## 2025-04-30 PROCEDURE — 93306 TTE W/DOPPLER COMPLETE: CPT

## 2025-04-30 PROCEDURE — 93306 TTE W/DOPPLER COMPLETE: CPT | Performed by: INTERNAL MEDICINE

## 2025-05-05 ENCOUNTER — TELEPHONE (OUTPATIENT)
Dept: NON INVASIVE DIAGNOSTICS | Facility: CLINIC | Age: 80
End: 2025-05-05

## 2025-05-05 DIAGNOSIS — I77.810 AORTIC ROOT DILATATION (HCC): Primary | ICD-10-CM

## 2025-05-05 NOTE — TELEPHONE ENCOUNTER
LVM for patient sharing results and informing him of CT scan that is ordered and provided number to have it scheduled.

## 2025-05-05 NOTE — TELEPHONE ENCOUNTER
----- Message from Zaida Chan DO sent at 5/5/2025  9:14 AM EDT -----  Please tell him that his echo looks fine.  Function normal.  I would like to get a CT scan of his aorta to make sure it is ok.  It was a little dilated on the echo. Thanks . zaida

## 2025-05-05 NOTE — TELEPHONE ENCOUNTER
Caller: Michelle ( Spouse)     Doctor: Dr. Chan     Reason for call: Michelle called returned VM received in reference to patient. Tried to call office unable to reach anyone. Michelle is requesting a call back     Call back#: [phone]

## 2025-05-09 ENCOUNTER — HOSPITAL ENCOUNTER (OUTPATIENT)
Dept: CT IMAGING | Facility: HOSPITAL | Age: 80
End: 2025-05-09
Attending: INTERNAL MEDICINE
Payer: COMMERCIAL

## 2025-05-09 DIAGNOSIS — I77.810 AORTIC ROOT DILATATION (HCC): ICD-10-CM

## 2025-05-09 PROCEDURE — 71250 CT THORAX DX C-: CPT

## 2025-05-13 ENCOUNTER — PROCEDURE VISIT (OUTPATIENT)
Dept: UROLOGY | Facility: AMBULATORY SURGERY CENTER | Age: 80
End: 2025-05-13
Payer: COMMERCIAL

## 2025-05-13 VITALS
BODY MASS INDEX: 37.98 KG/M2 | WEIGHT: 256.4 LBS | SYSTOLIC BLOOD PRESSURE: 130 MMHG | HEIGHT: 69 IN | DIASTOLIC BLOOD PRESSURE: 80 MMHG | HEART RATE: 78 BPM | OXYGEN SATURATION: 96 %

## 2025-05-13 DIAGNOSIS — N40.1 URINARY RETENTION DUE TO BENIGN PROSTATIC HYPERPLASIA: Primary | ICD-10-CM

## 2025-05-13 DIAGNOSIS — R33.8 URINARY RETENTION DUE TO BENIGN PROSTATIC HYPERPLASIA: Primary | ICD-10-CM

## 2025-05-13 PROCEDURE — 51702 INSERT TEMP BLADDER CATH: CPT

## 2025-05-13 NOTE — PROGRESS NOTES
"5/13/2025  Dawood Ramesh is a 79 y.o. male  [id number]    Diagnosis:  Chief Complaint    Urinary Retention         Patient presents for routine catheter exchange managed by Dr. Banuelos    Plan:  Patient has followup with Dr Banuelos on 6/4 and then appointment for catheter exchange on 6/10  Patient instructed to call with any questions or concerns in the meantime.          Vitals:    05/13/25 1047   BP: 130/80   Pulse: 78   SpO2: 96%   Weight: 116 kg (256 lb 6.4 oz)   Height: 5' 9\" (1.753 m)           Procedure:      Bladder catheterization    Date/Time: 5/13/2025 10:30 AM    Performed by: Lashonda Goodwin RN  Authorized by: Roger Banuelos MD    Procedure details:     Catheter insertion:  Indwelling    Approach: natural orifice      Catheter type:  Coude    Catheter size:  18 Fr    Number of attempts:  1    Successful placement: yes      Urine characteristics:  Blood-tinged  Comments:      Attached to 1000 ml leg bag. Extra supplies given until next exchange          Lashonda Goodwin RN   "

## 2025-05-16 ENCOUNTER — TELEPHONE (OUTPATIENT)
Dept: ANESTHESIOLOGY | Facility: CLINIC | Age: 80
End: 2025-05-16

## 2025-05-16 DIAGNOSIS — Z01.89 ENCOUNTER FOR GERIATRIC ASSESSMENT: Primary | ICD-10-CM

## 2025-05-18 PROBLEM — R91.1 PULMONARY NODULE: Status: ACTIVE | Noted: 2025-05-18

## 2025-05-24 ENCOUNTER — HOSPITAL ENCOUNTER (EMERGENCY)
Facility: HOSPITAL | Age: 80
Discharge: HOME | End: 2025-05-24
Attending: EMERGENCY MEDICINE | Admitting: EMERGENCY MEDICINE
Payer: COMMERCIAL

## 2025-05-24 ENCOUNTER — NURSE TRIAGE (OUTPATIENT)
Dept: OTHER | Facility: OTHER | Age: 80
End: 2025-05-24

## 2025-05-24 VITALS
BODY MASS INDEX: 37.77 KG/M2 | HEIGHT: 69 IN | HEART RATE: 77 BPM | WEIGHT: 255 LBS | DIASTOLIC BLOOD PRESSURE: 85 MMHG | RESPIRATION RATE: 20 BRPM | TEMPERATURE: 98.9 F | OXYGEN SATURATION: 94 % | SYSTOLIC BLOOD PRESSURE: 137 MMHG

## 2025-05-24 DIAGNOSIS — T83.091A OBSTRUCTION OF FOLEY CATHETER, INITIAL ENCOUNTER (HCC): Primary | ICD-10-CM

## 2025-05-24 DIAGNOSIS — Z46.6 ENCOUNTER FOR FOLEY CATHETER REPLACEMENT: ICD-10-CM

## 2025-05-24 PROCEDURE — 99283 EMERGENCY DEPT VISIT LOW MDM: CPT

## 2025-05-24 PROCEDURE — 99284 EMERGENCY DEPT VISIT MOD MDM: CPT

## 2025-05-24 NOTE — ED PROVIDER NOTES
Time reflects when diagnosis was documented in both MDM as applicable and the Disposition within this note       Time User Action Codes Description Comment    5/24/2025  8:19 AM Amish Dyana Add [T83.091A] Obstruction of Pereira catheter, initial encounter (HCC)     5/24/2025  8:19 AM Dyana Wellington Add [Z46.6] Encounter for Pereira catheter replacement           ED Disposition       ED Disposition   Discharge    Condition   Stable    Date/Time   Sat May 24, 2025  8:18 AM    Comment   Dawood Ramesh discharge to home/self care.                   Assessment & Plan       Medical Decision Making  79 y.o. male presents to ED for evaluation of reported blocked catheter, little to no urine output from Pereira catheter x 1 day. Further details in HPI.     Ddx: Blocked Pereira catheter could be caused by but not limited to encrustation, clots, biofilm, kinking of the catheter or tubing.  Considered but do not UTI, obstruction from stone given no reported abdominal or flank pain, absence of nausea or vomiting, fevers or chills, and continued observation of clear urine output.     Plan for pereira catheter replacement at bedside by nursing staff. Patient declining pain medication at this time.    On re-evaluation: well appearing in NAD, vital signs are normal. A&O x 3, airway patent, no chest pain or respiratory distress, Abdomen non distended, non tender. M/S no gross deformity, GCS 15.  Urine output clear, yellow, approximately 800ml output.     Final Evaluation:  (see ED course for additional MDM)  Given relief of symptoms and obstruction with pereira catheter replacement, patient deemed stable for outpatient management. Encouraged follow up with urology for further evaluation and management of pereira catheter. Given ED return precautions. Patient expressed understanding, all questions answered.    -Stable for discharge and outpatient management.   -Reviewed diagnosis, treatment plan, and expectant coarse  -Verbal and written  instructions given to follow up with pcp and recommended specialist    -Discussed reasons to Return to ED.  Patient verbalized understanding of reasons return to the ED.   -Opportunity provided for questions and all questions were answered.               Medications - No data to display    ED Risk Strat Scores                    (ISAR) Identification of Seniors at Risk  Before the illness or injury that brought you to the Emergency, did you need someone to help you on a regular basis?: 0  In the last 24 hours, have you needed more help than usual?: 0  Have you been hospitalized for one or more nights during the past 6 months?: 0  In general, do you see well?: 1  In general, do you have serious problems with your memory?: 0  Do you take more than three different medications every day?: 0  ISAR Score: 1            SBIRT 20yo+      Flowsheet Row Most Recent Value   Initial Alcohol Screen: US AUDIT-C     1. How often do you have a drink containing alcohol? 0 Filed at: 05/24/2025 0758   2. How many drinks containing alcohol do you have on a typical day you are drinking?  0 Filed at: 05/24/2025 0758   3a. Male UNDER 65: How often do you have five or more drinks on one occasion? 0 Filed at: 05/24/2025 0758   3b. FEMALE Any Age, or MALE 65+: How often do you have 4 or more drinks on one occassion? 0 Filed at: 05/24/2025 0758   Audit-C Score 0 Filed at: 05/24/2025 0758   JILL: How many times in the past year have you...    Used an illegal drug or used a prescription medication for non-medical reasons? Never Filed at: 05/24/2025 0758                            History of Present Illness       Chief Complaint   Patient presents with    Urinary Catheter Insertion or Check     Pt coming from home c/o of blocked catheter since cutting the grass yesterday; reports no urine going into bag, however, reports leakage from penis       Past Medical History[1]   Past Surgical History[2]   Family History[3]   Social History[4]    E-Cigarette/Vaping    E-Cigarette Use Never User       E-Cigarette/Vaping Substances    Nicotine No     THC No     CBD No       I have reviewed and agree with the history as documented.     Patient is an 79-year-old male with history of acquired bladder diverticulum, BPH with catheter placement scheduled for prostatectomy and bladder diverticulectomy June 2025 presenting with complaint of blocked catheter.  States yesterday at approximately 4 to 5 PM he noticed no urine output into the bag.  Over the next couple of hours, states that he has been urged to urinate he reports leakage from his penis - clear and yellow colored, no blood or purulent discharge. Describes feeling of bladder pressure, though no abdominal or flank pain. Reports noticing only very small amounts of urine in the bag to current. No fevers or chills at home. Denies chest pain, shortness of breath, palpitations, abdominal pain, lightheadedness or dizziness, nausea or vomiting.      Urinary Catheter Insertion or Check  Associated symptoms: no abdominal pain, no chest pain, no cough, no ear pain, no fever, no rash, no shortness of breath, no sore throat and no vomiting        Review of Systems   Constitutional:  Negative for chills and fever.   HENT:  Negative for ear pain and sore throat.    Eyes:  Negative for pain and visual disturbance.   Respiratory:  Negative for cough and shortness of breath.    Cardiovascular:  Negative for chest pain and palpitations.   Gastrointestinal:  Negative for abdominal pain and vomiting.   Genitourinary:  Positive for difficulty urinating. Negative for flank pain, hematuria, penile discharge, penile pain, penile swelling, scrotal swelling, testicular pain and urgency.   Musculoskeletal:  Negative for arthralgias and back pain.   Skin:  Negative for color change and rash.   Neurological:  Negative for seizures and syncope.   All other systems reviewed and are negative.          Objective       ED Triage Vitals  [05/24/25 0758]   Temperature Pulse Blood Pressure Respirations SpO2 Patient Position - Orthostatic VS   98.9 °F (37.2 °C) 77 137/85 20 94 % Sitting      Temp src Heart Rate Source BP Location FiO2 (%) Pain Score    -- Monitor Left arm -- No Pain      Vitals      Date and Time Temp Pulse SpO2 Resp BP Pain Score FACES Pain Rating User   05/24/25 0758 98.9 °F (37.2 °C) 77 94 % 20 137/85 No Pain -- TAB            Physical Exam  Vitals and nursing note reviewed. Exam conducted with a chaperone present.   Constitutional:       General: He is not in acute distress.     Appearance: He is not ill-appearing.   HENT:      Head: Normocephalic and atraumatic.      Right Ear: External ear normal.      Left Ear: External ear normal.      Nose: Nose normal.      Mouth/Throat:      Pharynx: Oropharynx is clear. No oropharyngeal exudate or posterior oropharyngeal erythema.     Eyes:      General: No scleral icterus.     Conjunctiva/sclera: Conjunctivae normal.       Cardiovascular:      Rate and Rhythm: Normal rate.      Pulses: Normal pulses.   Pulmonary:      Effort: Pulmonary effort is normal. No respiratory distress.      Breath sounds: No wheezing, rhonchi or rales.   Chest:      Chest wall: No tenderness.   Abdominal:      General: There is no distension.      Palpations: Abdomen is soft.      Tenderness: There is no abdominal tenderness. There is no guarding or rebound.   Genitourinary:     Comments: Correa catheter present, little urine output in bag, less than 10mL of clear yellow output. No hematuria, no purulent discharge or visible sediment in output.   No penile drainage, no surrounding erythema or swelling.     Musculoskeletal:         General: Normal range of motion.      Cervical back: Normal range of motion. No rigidity.      Right lower leg: No edema.      Left lower leg: No edema.     Skin:     General: Skin is warm and dry.      Findings: No erythema.     Neurological:      General: No focal deficit present.       Mental Status: He is alert and oriented to person, place, and time.     Psychiatric:         Mood and Affect: Mood normal.         Behavior: Behavior normal.         Results Reviewed       None            No orders to display       Procedures    ED Medication and Procedure Management   Prior to Admission Medications   Prescriptions Last Dose Informant Patient Reported? Taking?   Carboxymethylcellulose Sodium (EYE DROPS OP)  Self Yes No   Sig: Apply to eye as needed   Cholecalciferol (Vitamin D3) 1.25 MG (99193 UT) CAPS  Self Yes No   Sig: Take by mouth every 7 days   allopurinol (ZYLOPRIM) 300 mg tablet   No No   Sig: Take 1 tablet (300 mg total) by mouth daily   amLODIPine (NORVASC) 5 mg tablet  Self No No   Sig: Take 1 tablet (5 mg total) by mouth daily   apixaban (ELIQUIS) 5 mg   No No   Sig: Take 1 tablet (5 mg total) by mouth 2 (two) times a day   finasteride (PROSCAR) 5 mg tablet  Self No No   Sig: Take 1 tablet (5 mg total) by mouth daily   lisinopril (ZESTRIL) 10 mg tablet  Self No No   Sig: TAKE 1 TABLET DAILY   meclizine (ANTIVERT) 25 mg tablet  Self No No   Sig: Take 1 tablet (25 mg total) by mouth every 8 (eight) hours as needed for dizziness   metoprolol succinate (TOPROL-XL) 25 mg 24 hr tablet   No No   Sig: Take 1 tablet (25 mg total) by mouth daily   simvastatin (ZOCOR) 40 mg tablet   No No   Sig: Take 1 tablet (40 mg total) by mouth every other day   tamsulosin (FLOMAX) 0.4 mg  Self No No   Sig: Take 1 capsule (0.4 mg total) by mouth 2 (two) times a day      Facility-Administered Medications: None     Discharge Medication List as of 5/24/2025  8:20 AM        CONTINUE these medications which have NOT CHANGED    Details   allopurinol (ZYLOPRIM) 300 mg tablet Take 1 tablet (300 mg total) by mouth daily, Starting Tue 4/29/2025, Normal      amLODIPine (NORVASC) 5 mg tablet Take 1 tablet (5 mg total) by mouth daily, Starting Thu 10/10/2024, Normal      apixaban (ELIQUIS) 5 mg Take 1 tablet (5 mg total) by  mouth 2 (two) times a day, Starting Thu 4/3/2025, Normal      Carboxymethylcellulose Sodium (EYE DROPS OP) Apply to eye as needed, Historical Med      Cholecalciferol (Vitamin D3) 1.25 MG (11348 UT) CAPS Take by mouth every 7 days, Historical Med      finasteride (PROSCAR) 5 mg tablet Take 1 tablet (5 mg total) by mouth daily, Starting Mon 2/10/2025, Normal      lisinopril (ZESTRIL) 10 mg tablet TAKE 1 TABLET DAILY, Starting Fri 10/4/2024, Normal      meclizine (ANTIVERT) 25 mg tablet Take 1 tablet (25 mg total) by mouth every 8 (eight) hours as needed for dizziness, Starting Thu 10/10/2024, Normal      metoprolol succinate (TOPROL-XL) 25 mg 24 hr tablet Take 1 tablet (25 mg total) by mouth daily, Starting Fri 3/14/2025, Normal      simvastatin (ZOCOR) 40 mg tablet Take 1 tablet (40 mg total) by mouth every other day, Starting Fri 3/14/2025, Normal      tamsulosin (FLOMAX) 0.4 mg Take 1 capsule (0.4 mg total) by mouth 2 (two) times a day, Starting Fri 2/21/2025, Normal           No discharge procedures on file.  ED SEPSIS DOCUMENTATION   Time reflects when diagnosis was documented in both MDM as applicable and the Disposition within this note       Time User Action Codes Description Comment    5/24/2025  8:19 AM Dyana Wellington Add [T83.091A] Obstruction of Correa catheter, initial encounter ([facility])     5/24/2025  8:19 AM Dyana Wellington Add [Z46.6] Encounter for Correa catheter replacement                    [1]   Past Medical History:  Diagnosis Date    Acute gouty arthropathy     last assessed-11/15/2013    Cataract     Diverticulitis of colon     Enlarged prostate     Gout     Hearing loss     Heart disease     History of diverticulitis of colon     History of dizziness     Hypercholesterolemia     Hyperlipidemia     Hypertension     Palpitations     Sinus bradycardia    [2]   Past Surgical History:  Procedure Laterality Date    ARM NEUROPLASTY Left     1977    CARDIAC PACEMAKER PLACEMENT Left     CARDIAC SURGERY   2019    pace maker placed    CATARACT EXTRACTION, BILATERAL      COLONOSCOPY      CYSTOSCOPY  2017    diagnostic    FOREARM FRACTURE SURGERY      ORCHIECTOMY      surgery testis    IA CYSTO INSERTION TRANSPROSTATIC IMPLANT SINGLE N/A 2018    Procedure: CYSTOSCOPY WITH INSERTION UROLIFT;  Surgeon: Bernardo Billingsley MD;  Location: [facility];  Service: Urology    IA CYSTOURETHROSCOPY WITH BIOPSY N/A 2018    Procedure: BLADDER BIOPSY;  Surgeon: Bernardo Billingsley MD;  Location: [facility];  Service: Urology    TESTICLE SURGERY Right    [3]   Family History  Problem Relation Name Age of Onset    Coronary artery disease Mother      Heart disease Mother      Hypertension Mother      Stroke Mother      Coronary artery disease Father      Stroke Family fx     Heart attack Family fx         acute MI   [4]   Social History  Tobacco Use    Smoking status: Former     Current packs/day: 0.00     Average packs/day: 0.3 packs/day for 10.0 years (2.5 ttl pk-yrs)     Types: Cigars, Pipe, Cigarettes     Start date:      Quit date:      Years since quittin.4    Smokeless tobacco: Never   Vaping Use    Vaping status: Never Used   Substance Use Topics    Alcohol use: Not Currently     Comment: rarely..Per allscripts, drinks alcohol very infrequently    Drug use: No        Dyana Wellington PA-C  [phone]

## 2025-05-24 NOTE — TELEPHONE ENCOUNTER
"FOLLOW UP: Please follow up with patient when office is open    REASON FOR CONVERSATION: Urinary Catheter Problem    SYMPTOMS: No urine in collection bag since yesterday evening and pain when patient needs to urinate. Patients wife reports some drainage around catheter with a little bit of blood.    OTHER: Advised ER for evaluation. Patient and wife agreeable to disposition.    DISPOSITION: See HPC Within 4 Hours (Or PCP Triage)      Reason for Disposition  • [1] Catheter is broken AND [2] is not working (does not function normally)    Answer Assessment - Initial Assessment Questions  1. SYMPTOMS: \"What symptoms are you concerned about?\"        No urine in catheter bag and pain     2. ONSET:  \"When did the symptoms start?\"        Yesterday eveing    3. FEVER: \"Is there a fever?\" If Yes, ask: \"What is the temperature, how was it measured, and when did it start?\"        Denies    4. ABDOMEN PAIN: \"Is there any abdomen pain?\" (Scale 1-10; or mild, moderate, severe)        \"Burns like crazy and when he has to urinate there is pain\"    5. URINE COLOR: \"What color is the urine?\"  \"Is there blood present in the urine?\" (e.g., clear, yellow, cloudy, tea-colored, blood streaks, bright red)        A Little bit of blood but yellow urine draining around catheter    6. URINE AMOUNT: \"When did you last empty the urine from the collection bag?\" \"How much urine was in the bag at that time?\" How much urine is in the collection bag now?\"        Yesterday evening    7. INSERTION: \"How long have you (they) had the catheter?\"        5/13/25 catheter was exchanged in office but has had a catheter for 2 months    8. OTHER SYMPTOMS: \"Are there any other symptoms?\" (e.g., abdomen swelling, back pain, bladder spasms, constipation, foul smelling urine, leaking of urine)         Leakage of urine, pain around catheter \"when he has to go everything hurts. Denies constipated    Protocols used: Urinary Catheter (e.g., Correa) Symptoms and " Questions-Adult-AH

## 2025-05-24 NOTE — DISCHARGE INSTRUCTIONS
Please follow up with urology for further evaluation and management of pereira catheter.  Please monitor for adequate pereira catheter output and appropriate color of urine.  If you develop any new or worsening symptoms please immediately return to your nearest emergency department.

## 2025-05-24 NOTE — TELEPHONE ENCOUNTER
"Regarding: catheter complication  ----- Message from Nataliya ISLAS sent at 5/24/2025  6:39 AM EDT -----  Patient's wife stated, \"My  has a blocked catheter. He is experiencing a lot of discomfort. The bag is currently empty. I am wondering what we should do.\"    "

## 2025-05-27 ENCOUNTER — VBI (OUTPATIENT)
Dept: INTERNAL MEDICINE CLINIC | Age: 80
End: 2025-05-27

## 2025-05-27 NOTE — TELEPHONE ENCOUNTER
05/27/25 1:06 PM    Patient contacted post ED visit, [facility] department spoke with patient/caregiver and outreach was successful.    Thank you.  Karina Lopez MA  [facility]

## 2025-05-31 ENCOUNTER — NURSE TRIAGE (OUTPATIENT)
Dept: OTHER | Facility: OTHER | Age: 80
End: 2025-05-31

## 2025-05-31 ENCOUNTER — HOSPITAL ENCOUNTER (EMERGENCY)
Facility: HOSPITAL | Age: 80
Discharge: HOME | End: 2025-05-31
Attending: FAMILY MEDICINE | Admitting: FAMILY MEDICINE
Payer: COMMERCIAL

## 2025-05-31 VITALS
HEIGHT: 69 IN | HEART RATE: 76 BPM | BODY MASS INDEX: 37.77 KG/M2 | OXYGEN SATURATION: 96 % | TEMPERATURE: 98.2 F | SYSTOLIC BLOOD PRESSURE: 148 MMHG | RESPIRATION RATE: 18 BRPM | DIASTOLIC BLOOD PRESSURE: 70 MMHG | WEIGHT: 255 LBS

## 2025-05-31 DIAGNOSIS — T83.9XXA PROBLEM WITH FOLEY CATHETER, INITIAL ENCOUNTER (HCC): Primary | ICD-10-CM

## 2025-05-31 DIAGNOSIS — Z46.6 ENCOUNTER FOR FOLEY CATHETER REPLACEMENT: ICD-10-CM

## 2025-05-31 PROCEDURE — 99283 EMERGENCY DEPT VISIT LOW MDM: CPT

## 2025-05-31 PROCEDURE — 99283 EMERGENCY DEPT VISIT LOW MDM: CPT | Performed by: FAMILY MEDICINE

## 2025-05-31 NOTE — TELEPHONE ENCOUNTER
"REASON FOR CONVERSATION: Urinary Catheter Problem    SYMPTOMS: Blocked urinary catheter    OTHER HEALTH INFORMATION: No urine output in at least 6 hours     PROTOCOL DISPOSITION: Go to ED Now (overriding See HPC Within 4 Hours (Or PCP Triage))    CARE ADVICE PROVIDED: ED eval for catheter care    PRACTICE FOLLOW-UP: Please call for appointment at wife's request      Reason for Disposition   [1] No urine in bag > 4 hours AND [2] catheter is not kinked    Answer Assessment - Initial Assessment Questions  1. SYMPTOMS: \"What symptoms are you concerned about?\"      Catheter has been blocked all night     2. ONSET:  \"When did the symptoms start?\"      About midnight       4. ABDOMINAL PAIN: \"Is there any abdominal pain?\" (e.g., Scale 1-10; or mild, moderate, severe)      At times     5. URINE COLOR: \"What color is the urine?\"  \"Is there blood present in the urine?\" (e.g., clear, yellow, cloudy, tea-colored, blood streaks, bright red)      No urine output           7. OTHER SYMPTOMS: \"Do you have any other symptoms?\" (e.g., back pain, bad urine odor)       No blood in bag    Protocols used: Urinary Catheter Symptoms and Questions-ADULT-AH    "

## 2025-05-31 NOTE — ED PROVIDER NOTES
Time reflects when diagnosis was documented in both MDM as applicable and the Disposition within this note       Time User Action Codes Description Comment    5/31/2025  8:35 AM Catracho Adrian Add [T83.9XXA] Problem with Correa catheter, initial encounter ([facility])     5/31/2025  8:35 AM Catracho Adrian Add [Z46.6] Encounter for Correa catheter replacement           ED Disposition       ED Disposition   Discharge    Condition   Stable    Date/Time   Sat May 31, 2025  8:35 AM    Comment   Dawood Ramesh discharge to home/self care.                   Assessment & Plan       Medical Decision Making  79-year-old male presented to ED for evaluation of blocked Correa catheter.  States that it is not leaking around.  Denies any complaint.  Differential diagnoses include possible clot kinking of the catheter tubing/UTI obstruction from stone patient does not report any abdominal flank pain.  Correa catheter is replaced by nursing staff  Patient tolerated the procedure please see nursing note for Correa catheter 18 Serbian coudé was placed draining appropriately.  No complication  Disposition discharge home recommend following up with PCP as well as urology.       Medications - No data to display    ED Risk Strat Scores                    (ISAR) Identification of Seniors at Risk  Before the illness or injury that brought you to the Emergency, did you need someone to help you on a regular basis?: 0  In the last 24 hours, have you needed more help than usual?: 0  Have you been hospitalized for one or more nights during the past 6 months?: 0  In general, do you see well?: 1  In general, do you have serious problems with your memory?: 0  Do you take more than three different medications every day?: 1  ISAR Score: 2            SBIRT 22yo+      Flowsheet Row Most Recent Value   Initial Alcohol Screen: US AUDIT-C     1. How often do you have a drink containing alcohol? 0 Filed at: 05/31/2025 0714   2. How many drinks containing alcohol do you  have on a typical day you are drinking?  0 Filed at: 05/31/2025 0714   3a. Male UNDER 65: How often do you have five or more drinks on one occasion? 0 Filed at: 05/31/2025 0714   3b. FEMALE Any Age, or MALE 65+: How often do you have 4 or more drinks on one occassion? 0 Filed at: 05/31/2025 0714   Audit-C Score 0 Filed at: 05/31/2025 0714   JILL: How many times in the past year have you...    Used an illegal drug or used a prescription medication for non-medical reasons? Never Filed at: 05/31/2025 0714                            History of Present Illness       Chief Complaint   Patient presents with    Urinary Catheter Insertion or Check       Pt c/o of blocked cathter as of last night       Past Medical History[1]   Past Surgical History[2]   Family History[3]   Social History[4]   E-Cigarette/Vaping    E-Cigarette Use Never User       E-Cigarette/Vaping Substances    Nicotine No     THC No     CBD No       I have reviewed and agree with the history as documented.       Urinary Catheter Insertion or Check  Associated symptoms: no abdominal pain, no chest pain, no cough, no diarrhea, no fever, no headaches, no myalgias, no nausea, no rash, no rhinorrhea, no shortness of breath, no sore throat and no vomiting        Review of Systems   Constitutional:  Negative for chills and fever.   HENT:  Negative for rhinorrhea and sore throat.    Eyes:  Negative for visual disturbance.   Respiratory:  Negative for cough and shortness of breath.    Cardiovascular:  Negative for chest pain and leg swelling.   Gastrointestinal:  Negative for abdominal pain, diarrhea, nausea and vomiting.   Genitourinary:  Positive for difficulty urinating. Negative for dysuria.   Musculoskeletal:  Negative for back pain and myalgias.   Skin:  Negative for rash.   Neurological:  Negative for dizziness and headaches.   Psychiatric/Behavioral:  Negative for confusion.    All other systems reviewed and are negative.          Objective       ED Triage  Vitals   Temperature Pulse Blood Pressure Respirations SpO2 Patient Position - Orthostatic VS   05/31/25 0715 05/31/25 0715 05/31/25 0716 05/31/25 0715 05/31/25 0715 05/31/25 0715   98.2 °F (36.8 °C) 90 161/74 20 98 % Sitting      Temp Source Heart Rate Source BP Location FiO2 (%) Pain Score    05/31/25 0715 05/31/25 0715 05/31/25 0715 -- 05/31/25 0714    Temporal Monitor Left arm  No Pain      Vitals      Date and Time Temp Pulse SpO2 Resp BP Pain Score FACES Pain Rating User   05/31/25 0716 -- -- -- -- 161/74 -- -- TAB   05/31/25 0715 98.2 °F (36.8 °C) 90 98 % 20 -- -- -- TAB   05/31/25 0714 -- -- -- -- -- No Pain -- TAB            Physical Exam  Vitals and nursing note reviewed.   Constitutional:       General: He is not in acute distress.     Appearance: He is well-developed. He is not diaphoretic.   HENT:      Head: Normocephalic and atraumatic.      Right Ear: External ear normal.      Left Ear: External ear normal.      Nose: Nose normal.      Mouth/Throat:      Mouth: Mucous membranes are moist.      Pharynx: Oropharynx is clear. No posterior oropharyngeal erythema.     Eyes:      Conjunctiva/sclera: Conjunctivae normal.      Pupils: Pupils are equal, round, and reactive to light.       Cardiovascular:      Rate and Rhythm: Normal rate and regular rhythm.      Pulses: Normal pulses.      Heart sounds: Normal heart sounds.   Pulmonary:      Effort: Pulmonary effort is normal. No respiratory distress.      Breath sounds: Normal breath sounds. No wheezing.   Abdominal:      General: Bowel sounds are normal. There is no distension.      Palpations: Abdomen is soft.      Tenderness: There is no abdominal tenderness.   Genitourinary:     Comments: Blocked pereira     Musculoskeletal:         General: Normal range of motion.      Cervical back: Normal range of motion and neck supple.   Lymphadenopathy:      Cervical: No cervical adenopathy.     Skin:     General: Skin is warm and dry.      Capillary Refill: Capillary  refill takes less than 2 seconds.     Neurological:      Mental Status: He is alert and oriented to person, place, and time.     Psychiatric:         Mood and Affect: Mood normal.         Behavior: Behavior normal.         Results Reviewed       None            No orders to display       Procedures    ED Medication and Procedure Management   Prior to Admission Medications   Prescriptions Last Dose Informant Patient Reported? Taking?   Carboxymethylcellulose Sodium (EYE DROPS OP)  Self Yes No   Sig: Apply to eye as needed   Cholecalciferol (Vitamin D3) 1.25 MG (38603 UT) CAPS  Self Yes No   Sig: Take by mouth every 7 days   allopurinol (ZYLOPRIM) 300 mg tablet   No No   Sig: Take 1 tablet (300 mg total) by mouth daily   amLODIPine (NORVASC) 5 mg tablet  Self No No   Sig: Take 1 tablet (5 mg total) by mouth daily   apixaban (ELIQUIS) 5 mg   No No   Sig: Take 1 tablet (5 mg total) by mouth 2 (two) times a day   finasteride (PROSCAR) 5 mg tablet  Self No No   Sig: Take 1 tablet (5 mg total) by mouth daily   lisinopril (ZESTRIL) 10 mg tablet  Self No No   Sig: TAKE 1 TABLET DAILY   meclizine (ANTIVERT) 25 mg tablet  Self No No   Sig: Take 1 tablet (25 mg total) by mouth every 8 (eight) hours as needed for dizziness   metoprolol succinate (TOPROL-XL) 25 mg 24 hr tablet   No No   Sig: Take 1 tablet (25 mg total) by mouth daily   simvastatin (ZOCOR) 40 mg tablet   No No   Sig: Take 1 tablet (40 mg total) by mouth every other day   tamsulosin (FLOMAX) 0.4 mg  Self No No   Sig: Take 1 capsule (0.4 mg total) by mouth 2 (two) times a day      Facility-Administered Medications: None     Current Discharge Medication List        CONTINUE these medications which have NOT CHANGED    Details   allopurinol (ZYLOPRIM) 300 mg tablet Take 1 tablet (300 mg total) by mouth daily  Qty: 90 tablet, Refills: 1    Associated Diagnoses: Gout, unspecified cause, unspecified chronicity, unspecified site      amLODIPine (NORVASC) 5 mg tablet Take 1  tablet (5 mg total) by mouth daily  Qty: 90 tablet, Refills: 1    Associated Diagnoses: Essential hypertension, benign      apixaban (ELIQUIS) 5 mg Take 1 tablet (5 mg total) by mouth 2 (two) times a day  Qty: 180 tablet, Refills: 1    Associated Diagnoses: Paroxysmal atrial fibrillation (HCC)      Carboxymethylcellulose Sodium (EYE DROPS OP) Apply to eye as needed      Cholecalciferol (Vitamin D3) 1.25 MG (12718 UT) CAPS Take by mouth every 7 days      finasteride (PROSCAR) 5 mg tablet Take 1 tablet (5 mg total) by mouth daily  Qty: 30 tablet, Refills: 6    Associated Diagnoses: Benign prostatic hyperplasia with urinary frequency; Urinary retention      lisinopril (ZESTRIL) 10 mg tablet TAKE 1 TABLET DAILY  Qty: 90 tablet, Refills: 3    Associated Diagnoses: Benign essential hypertension      meclizine (ANTIVERT) 25 mg tablet Take 1 tablet (25 mg total) by mouth every 8 (eight) hours as needed for dizziness  Qty: 180 tablet, Refills: 1    Associated Diagnoses: Dizziness      metoprolol succinate (TOPROL-XL) 25 mg 24 hr tablet Take 1 tablet (25 mg total) by mouth daily  Qty: 90 tablet, Refills: 1    Associated Diagnoses: NSVT (nonsustained ventricular tachycardia) (HCC)      simvastatin (ZOCOR) 40 mg tablet Take 1 tablet (40 mg total) by mouth every other day  Qty: 45 tablet, Refills: 0    Associated Diagnoses: Hyperlipidemia, unspecified hyperlipidemia type; Osteopenia of multiple sites      tamsulosin (FLOMAX) 0.4 mg Take 1 capsule (0.4 mg total) by mouth 2 (two) times a day  Qty: 180 capsule, Refills: 3    Associated Diagnoses: Urinary retention; Benign prostatic hyperplasia with urinary frequency           No discharge procedures on file.  ED SEPSIS DOCUMENTATION   Time reflects when diagnosis was documented in both MDM as applicable and the Disposition within this note       Time User Action Codes Description Comment    5/31/2025  8:35 AM Catracho Adrian Add [T83.9XXA] Problem with Correa catheter, initial  encounter ([facility])     2025  8:35 AM Catracho Adrian Add [Z46.6] Encounter for Correa catheter replacement                      [1]   Past Medical History:  Diagnosis Date    Acute gouty arthropathy     last assessed-11/15/2013    Cataract     Diverticulitis of colon     Enlarged prostate     Gout     Hearing loss     Heart disease     History of diverticulitis of colon     History of dizziness     Hypercholesterolemia     Hyperlipidemia     Hypertension     Palpitations     Sinus bradycardia    [2]   Past Surgical History:  Procedure Laterality Date    ARM NEUROPLASTY Left         CARDIAC PACEMAKER PLACEMENT Left     CARDIAC SURGERY  2019    pace maker placed    CATARACT EXTRACTION, BILATERAL      COLONOSCOPY      CYSTOSCOPY  2017    diagnostic    FOREARM FRACTURE SURGERY      ORCHIECTOMY      surgery testis    PA CYSTO INSERTION TRANSPROSTATIC IMPLANT SINGLE N/A 2018    Procedure: CYSTOSCOPY WITH INSERTION UROLIFT;  Surgeon: Bernardo Billingsley MD;  Location: [facility];  Service: Urology    PA CYSTOURETHROSCOPY WITH BIOPSY N/A 2018    Procedure: BLADDER BIOPSY;  Surgeon: Bernardo Billingsley MD;  Location: [facility];  Service: Urology    TESTICLE SURGERY Right    [3]   Family History  Problem Relation Name Age of Onset    Coronary artery disease Mother      Heart disease Mother      Hypertension Mother      Stroke Mother      Coronary artery disease Father      Stroke Family fx     Heart attack Family fx         acute MI   [4]   Social History  Tobacco Use    Smoking status: Former     Current packs/day: 0.00     Average packs/day: 0.3 packs/day for 10.0 years (2.5 ttl pk-yrs)     Types: Cigars, Pipe, Cigarettes     Start date:      Quit date:      Years since quittin.4    Smokeless tobacco: Never   Vaping Use    Vaping status: Never Used   Substance Use Topics    Alcohol use: Not Currently     Comment: rarely..Per allscripts, drinks alcohol very infrequently     Drug use: No        Catracho Adrian MD  05/31/25 08

## 2025-06-02 ENCOUNTER — LAB REQUISITION (OUTPATIENT)
Dept: LAB | Facility: HOSPITAL | Age: 80
End: 2025-06-02
Payer: COMMERCIAL

## 2025-06-02 ENCOUNTER — TELEPHONE (OUTPATIENT)
Dept: UROLOGY | Facility: AMBULATORY SURGERY CENTER | Age: 80
End: 2025-06-02

## 2025-06-02 ENCOUNTER — APPOINTMENT (OUTPATIENT)
Dept: LAB | Facility: HOSPITAL | Age: 80
End: 2025-06-02
Payer: COMMERCIAL

## 2025-06-02 ENCOUNTER — TELEPHONE (OUTPATIENT)
Age: 80
End: 2025-06-02

## 2025-06-02 DIAGNOSIS — Z01.818 PRE-OPERATIVE CLEARANCE: ICD-10-CM

## 2025-06-02 DIAGNOSIS — Z79.01 LONG-TERM (CURRENT) USE OF ANTICOAGULANTS, INR GOAL 2.5-3.5: ICD-10-CM

## 2025-06-02 DIAGNOSIS — Z01.812 ENCOUNTER FOR PREPROCEDURAL LABORATORY EXAMINATION: ICD-10-CM

## 2025-06-02 DIAGNOSIS — R39.89 SUSPECTED UTI: ICD-10-CM

## 2025-06-02 DIAGNOSIS — R33.9 URINARY RETENTION: ICD-10-CM

## 2025-06-02 DIAGNOSIS — Z01.812 PRE-OPERATIVE LABORATORY EXAMINATION: ICD-10-CM

## 2025-06-02 LAB
ABO GROUP BLD: NORMAL
ANION GAP SERPL CALCULATED.3IONS-SCNC: 8 MMOL/L (ref 4–13)
APTT PPP: 40 SECONDS (ref 23–34)
BASOPHILS # BLD AUTO: 0.05 THOUSANDS/ÂΜL (ref 0–0.1)
BASOPHILS NFR BLD AUTO: 1 % (ref 0–1)
BLD GP AB SCN SERPL QL: NEGATIVE
BUN SERPL-MCNC: 21 MG/DL (ref 5–25)
CALCIUM SERPL-MCNC: 10.4 MG/DL (ref 8.4–10.2)
CHLORIDE SERPL-SCNC: 106 MMOL/L (ref 96–108)
CO2 SERPL-SCNC: 25 MMOL/L (ref 21–32)
CREAT SERPL-MCNC: 1.13 MG/DL (ref 0.6–1.3)
EOSINOPHIL # BLD AUTO: 0.28 THOUSAND/ÂΜL (ref 0–0.61)
EOSINOPHIL NFR BLD AUTO: 3 % (ref 0–6)
ERYTHROCYTE [DISTWIDTH] IN BLOOD BY AUTOMATED COUNT: 13.9 % (ref 11.6–15.1)
GFR SERPL CREATININE-BSD FRML MDRD: 60 ML/MIN/1.73SQ M
GLUCOSE SERPL-MCNC: 103 MG/DL (ref 65–140)
HCT VFR BLD AUTO: 50.3 % (ref 36.5–49.3)
HGB BLD-MCNC: 16.3 G/DL (ref 12–17)
IMM GRANULOCYTES # BLD AUTO: 0.02 THOUSAND/UL (ref 0–0.2)
IMM GRANULOCYTES NFR BLD AUTO: 0 % (ref 0–2)
INR PPP: 1.11 (ref 0.85–1.19)
LYMPHOCYTES # BLD AUTO: 1.79 THOUSANDS/ÂΜL (ref 0.6–4.47)
LYMPHOCYTES NFR BLD AUTO: 20 % (ref 14–44)
MCH RBC QN AUTO: 31.4 PG (ref 26.8–34.3)
MCHC RBC AUTO-ENTMCNC: 32.4 G/DL (ref 31.4–37.4)
MCV RBC AUTO: 97 FL (ref 82–98)
MONOCYTES # BLD AUTO: 0.81 THOUSAND/ÂΜL (ref 0.17–1.22)
MONOCYTES NFR BLD AUTO: 9 % (ref 4–12)
NEUTROPHILS # BLD AUTO: 5.89 THOUSANDS/ÂΜL (ref 1.85–7.62)
NEUTS SEG NFR BLD AUTO: 67 % (ref 43–75)
NRBC BLD AUTO-RTO: 0 /100 WBCS
PLATELET # BLD AUTO: 210 THOUSANDS/UL (ref 149–390)
PMV BLD AUTO: 10.6 FL (ref 8.9–12.7)
POTASSIUM SERPL-SCNC: 4.2 MMOL/L (ref 3.5–5.3)
PROTHROMBIN TIME: 14.8 SECONDS (ref 12.3–15)
RBC # BLD AUTO: 5.19 MILLION/UL (ref 3.88–5.62)
RH BLD: POSITIVE
SODIUM SERPL-SCNC: 139 MMOL/L (ref 135–147)
SPECIMEN EXPIRATION DATE: NORMAL
WBC # BLD AUTO: 8.84 THOUSAND/UL (ref 4.31–10.16)

## 2025-06-02 PROCEDURE — 87086 URINE CULTURE/COLONY COUNT: CPT

## 2025-06-02 PROCEDURE — 85025 COMPLETE CBC W/AUTO DIFF WBC: CPT

## 2025-06-02 PROCEDURE — 86850 RBC ANTIBODY SCREEN: CPT | Performed by: UROLOGY

## 2025-06-02 PROCEDURE — 85730 THROMBOPLASTIN TIME PARTIAL: CPT

## 2025-06-02 PROCEDURE — 87186 SC STD MICRODIL/AGAR DIL: CPT

## 2025-06-02 PROCEDURE — 87077 CULTURE AEROBIC IDENTIFY: CPT

## 2025-06-02 PROCEDURE — 85610 PROTHROMBIN TIME: CPT

## 2025-06-02 PROCEDURE — 80048 BASIC METABOLIC PNL TOTAL CA: CPT

## 2025-06-02 PROCEDURE — 86901 BLOOD TYPING SEROLOGIC RH(D): CPT | Performed by: UROLOGY

## 2025-06-02 PROCEDURE — 86900 BLOOD TYPING SEROLOGIC ABO: CPT | Performed by: UROLOGY

## 2025-06-02 PROCEDURE — 36415 COLL VENOUS BLD VENIPUNCTURE: CPT

## 2025-06-02 NOTE — TELEPHONE ENCOUNTER
Called and left a friendly reminder to have his PSA drawn for his upcoming appt this wed 6/4/25. I also left the urology phone number if he has any further questions. If this pt calls back please relay this message again. Thank you

## 2025-06-02 NOTE — TELEPHONE ENCOUNTER
Patient's wife called back regarding message about PSA. Explained what the test is and where they can go to have it done. She acknowledged understanding.

## 2025-06-02 NOTE — TELEPHONE ENCOUNTER
Patient's wife called in stating Correa needed to be exchanged in ED again over the weekend d/t blockage. She is inquiring how to prevent this. States 2 times it happened after he was riding the . Reviewed ensuring no kinks or tugging on tubing. Reviewed hydration and avoidance of bladder irritants. Patient has appt upcoming Wednesday

## 2025-06-03 ENCOUNTER — ANESTHESIA EVENT (OUTPATIENT)
Dept: PERIOP | Facility: HOSPITAL | Age: 80
DRG: 654 | End: 2025-06-03
Payer: COMMERCIAL

## 2025-06-03 ENCOUNTER — TELEMEDICINE (OUTPATIENT)
Dept: ANESTHESIOLOGY | Facility: CLINIC | Age: 80
End: 2025-06-03
Attending: UROLOGY
Payer: COMMERCIAL

## 2025-06-03 VITALS — HEART RATE: 92 BPM | DIASTOLIC BLOOD PRESSURE: 85 MMHG | SYSTOLIC BLOOD PRESSURE: 131 MMHG

## 2025-06-03 DIAGNOSIS — Z95.0 PACEMAKER: ICD-10-CM

## 2025-06-03 DIAGNOSIS — Z01.89 ENCOUNTER FOR GERIATRIC ASSESSMENT: ICD-10-CM

## 2025-06-03 DIAGNOSIS — I48.0 PAROXYSMAL ATRIAL FIBRILLATION (HCC): ICD-10-CM

## 2025-06-03 DIAGNOSIS — I10 BENIGN ESSENTIAL HYPERTENSION: Primary | ICD-10-CM

## 2025-06-03 DIAGNOSIS — G47.33 OBSTRUCTIVE SLEEP APNEA: ICD-10-CM

## 2025-06-03 PROCEDURE — 99212 OFFICE O/P EST SF 10 MIN: CPT | Performed by: NURSE PRACTITIONER

## 2025-06-03 NOTE — PROGRESS NOTES
SOC CONSULT   HIGH RISK GERIATRIC SURGERY      Brief Visit  Name: Dawood Ramesh      : 1945      MRN: [id number]  Encounter Provider: BRENNAN Kuhn  Encounter Date: 6/3/2025   Encounter department: [facility]      ASSESSMENT     80   year old male referred to SOC for pre-surgery geriatric screening 2.2 age   Other consult concerns include:  surgical optimization & BEST program. 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]   Pre-op diagnosis: Acquired bladder diverticulum [N32.3]   Location: [facility] ROOM 04 / [facility]   Surgeons: Roger Banuelos MD       LAST ANESTHESIA   PATIENT DENIES EVER HAVING A PROBLEM WITH ANESTHESIA IN HIS PAST       Surgical Site Infection Risk (SSI) Risk  LOW  Encouraged high protein diet   Encourage an extra 30 grams protein daily   Non smoker   Non diabetic   BMI <40 @  37      High risk geriatric surgery patient  I discussed the risk and benefits of general anesthesia including; but not limit too, the risk of postoperative respiratory distress, postop pneumonia, postop cognitive impairment (both temporarily and/or permanent), postop urinary retention (CARISSA), postop deconditioning, and death.  The patient verbalized they are fully aware of these risks and wishes to proceed.      In preparation for general anesthesia we started our BEST protocol placing emphasis on     Breathing  Encouraged exercise lungs prior to surgery VIA DEEP BREATHING EXERCISES     Eating  Encouraged to increase protein prior to surgery    Self  Encouraged to prepare their home for recovery  Encouraged to sleep 8- 10 hours nightly   Encouraged to limit napping during the day   Encouraged to  limit stress   Encouraged to exercise the mind daily-this can be done through completing crossword puzzles, sudoku puzzles, reading, self reflecting through journaling, adult number coloring, flash cards, board games, game shows on TV, and staying socially active    Train   Encouraged to stay active prior to surgery  Encouraged to increase exercise prior to surgery     Assessment & Plan  Benign essential hypertension  STABLE TODAY     6/3/2025     10:11 AM      Blood Pressure 131/85   Pulse 92        Obstructive sleep apnea  STABLE TODAY   Denies diagnosis of sleep apnea  Paroxysmal atrial fibrillation (HCC)  STABLE today  Follows cardiology  Has received cardiac clearance via letter on April 7, 2025  Pacemaker  STABLE   INSERTED IN 2019   AWAIT CIED FORM   Encounter for geriatric assessment  RECOMMEND INPATIENT GERIATRIC CONSULT POST-OP -consult should not delay discharge to home  Recommend delirium precautions  Recommend the use of a geriatric pain protocol             History of Present Illness   HPI      Dawood is an 80-year-old male who was referred to SOC for presurgery optimization.  Other consult concerns include advanced age, having surgery, and receiving anesthesia.  Per his wife they have been monitoring his prostate for years and this last couple months have been progressively getting worse.  Now electing to have it removed.    I spoke to Dawood and his wife over the telephone.  We did not connect via video.  She was offered a live visit and declined.  States at their age it is hard to get around in the telephone is extremely convenient.    I spoke to his wife because Dawood is hard of hearing.  Dawood was okay with me speaking with his wife.  They have been  50+ years.  They deny any new developments in his health.  Denies chest pain.  Denies shortness of breath.  Admits to being well today.  Past medical history reviewed is stable.  Denies sleep apnea.    He has a history of A-fib and pacemaker.   He has received cardiac clearance from his cardiologist.    Geriatric assessment completed.  He is hard of hearing.  Self-care patient for his wife.  His wife denies any cognitive concerns.  He still drives.  They have 1 adult son and 1 grandchild.  Geriatric concerns for advanced age.  Recommend inpatient geriatric consult postop.  This consult should not delay discharge to home.  Recommend geriatric pain protocol.  Recommend delirium precautions on admit.      Denies fevers and denies chills  Denies congestion and denies sore throat  Denies chest pain  Denies palpitations  Denies shortness of breath  Denies abdominal pain  Denies nausea, vomiting, and diarrhea  Denies any issues with their skin... example NO open wounds or sores  Denies rashes  Denies difficulty urinating  Denies any issues with their urine... example a dark color or an odor  Denies dizziness  Denies headaches  Denies confusion and denies hallucinations    Admits to being in well health today       Physical Assessment   We did not connect via video  Patient was alert and orientated times 3  Mood appropriate  Thought appropriate    I heard no respiratory distress over the phone today      Administrative Statements   Encounter provider BRENNAN Kuhn    The Patient is located at Home and in the following state in which I hold an active license [place].    The patient was identified by name and date of birth. Dawood RAJINDER Ramesh was informed that this is a telemedicine visit and that the visit is being conducted through the Epic Embedded platform. He agrees to proceed..  My office door was closed. No one else was in the room.  He acknowledged consent and understanding of Privacy and security of the platform. The patient has agreed to participate and understands they can discontinue the visit at any time.    I have spent a total time of 20 minutes in caring for this patient on the day of the visit/encounter including Patient and family education, not  including the time spent for establishing the audio/video connection.

## 2025-06-03 NOTE — PROGRESS NOTES
THE [facility] (SOC)  CONSULT   Patient has the ability to take their vital signs at home____  Allergies reviewed today   PMH reviewed today   Medications reviewed today     See Geriatric Assessment below...  Falls (last 6 months): no  Christopher Total Score: 22  PHQ- 9 Depression Scale: 1  Nutrition Assessment Score: 14  METS:7.34  DX SLEEP APNEA; NO:SCORE 6  Health goals:  -What are your overall health goals?    -What brings you strength? wife    -What activities are important to you? Work with tools     As always we discussed having your BEST surgery, and BEST recovery.  Surgery goals reviewed today.      Breathing exercises   Patient was encouraged to begin lung exercises today.  This could be accomplished through deep breathing and cough exercises.  Eating/nutrition   Encouraged patient to increase oral protein intake prior to surgery.  This can be accomplished by consuming chicken, fish, tuna fish, cottage cheese, cheese, eggs, Greek yogurt, and protein shakes as needed.  I encouraged use of protein shakes such ENLIVE.  I also recommended making your own protein shakes with protein powder.   Sleep/Stress management  Patient was encouraged to rest their body prior to surgery.  Encouraged attempting to get 8 hours of sleep at night.  Avoid stress.  Avoid sick contacts.  Encouraged to find a relaxing hobby such as reading, meditation, listening to music.  Training exercises  Patient was encouraged to remain active as possible.  Today bilateral lower extremity generic exercises were taught for muscle strengthening and balance.  All exercises to be done sitting down.

## 2025-06-03 NOTE — PRE-PROCEDURE INSTRUCTIONS
Pre-Surgery Instructions:   Medication Instructions    allopurinol (ZYLOPRIM) 300 mg tablet Take day of surgery.    amLODIPine (NORVASC) 5 mg tablet Take day of surgery.    apixaban (ELIQUIS) 5 mg Instructions provided by MD-per CC hold x 3 days    Cholecalciferol (Vitamin D3) 1.25 MG (01345 UT) CAPS Stop taking 7 days prior to surgery.    finasteride (PROSCAR) 5 mg tablet Take day of surgery.    lisinopril (ZESTRIL) 10 mg tablet Hold day of surgery.    meclizine (ANTIVERT) 25 mg tablet Uses PRN- OK to take day of surgery    metoprolol succinate (TOPROL-XL) 25 mg 24 hr tablet Take day of surgery.    simvastatin (ZOCOR) 40 mg tablet Take night before surgery if due, takes every other day    tamsulosin (FLOMAX) 0.4 mg Take day of surgery.      Medication instructions for day of surgery reviewed with spouse, per pt request. Pt states he is [place].  Please take all instructed medications with only a sip of water. Please do not take any over the counter (non-prescribed) vitamins or supplements for one week prior to date of surgery.      You will receive a call one business day prior to surgery with an arrival time and hospital directions. If your surgery is scheduled on a Monday, the hospital will be calling you on the Friday prior to your surgery. If you have not heard from anyone by 8pm, please call the hospital supervisor through the hospital  at [phone]. ([place] [phone] or [place] [phone]).    Do not eat or drink anything after midnight the night before your surgery, including candy, mints, lifesavers, or chewing gum. Do not drink alcohol 24hrs before your surgery. Try not to smoke at least 24hrs before your surgery.       Follow the pre surgery showering instructions as listed in the “My Surgical Experience Booklet” or otherwise provided by your surgeon's office. Do not use a blade to shave the surgical area 1 week before surgery. It is okay to use a clean electric clippers up to 24 hours  before surgery. Do not apply any lotions, creams, including makeup, cologne, deodorant, or perfumes after showering on the day of your surgery. Do not use dry shampoo, hair spray, hair gel, or any type of hair products.     No contact lenses, eye make-up, or artificial eyelashes. Remove nail polish, including gel polish, and any artificial, gel, or acrylic nails if possible. Remove all jewelry including rings and body piercing jewelry.     Wear causal clothing that is easy to take on and off. Consider your type of surgery.    Keep any valuables, jewelry, piercings at home. Please bring any specially ordered equipment (sling, braces) if indicated.    Arrange for a responsible person to drive you to and from the hospital on the day of your surgery. Please confirm the visitor policy for the day of your procedure when you receive your phone call with an arrival time.     Call the surgeon's office with any new illnesses, exposures, or additional questions prior to surgery.    Please reference your “My Surgical Experience Booklet” for additional information to prepare for your upcoming surgery.

## 2025-06-03 NOTE — ASSESSMENT & PLAN NOTE
RECOMMEND INPATIENT GERIATRIC CONSULT POST-OP -consult should not delay discharge to home  Recommend delirium precautions  Recommend the use of a geriatric pain protocol

## 2025-06-04 ENCOUNTER — OFFICE VISIT (OUTPATIENT)
Dept: UROLOGY | Facility: AMBULATORY SURGERY CENTER | Age: 80
End: 2025-06-04
Payer: COMMERCIAL

## 2025-06-04 VITALS
DIASTOLIC BLOOD PRESSURE: 100 MMHG | HEIGHT: 69 IN | SYSTOLIC BLOOD PRESSURE: 144 MMHG | BODY MASS INDEX: 37.71 KG/M2 | WEIGHT: 254.6 LBS | HEART RATE: 88 BPM | OXYGEN SATURATION: 98 %

## 2025-06-04 DIAGNOSIS — I48.0 PAROXYSMAL ATRIAL FIBRILLATION (HCC): ICD-10-CM

## 2025-06-04 DIAGNOSIS — R97.20 ELEVATED PSA: ICD-10-CM

## 2025-06-04 DIAGNOSIS — N32.3 BLADDER DIVERTICULUM: ICD-10-CM

## 2025-06-04 DIAGNOSIS — N40.1 URINARY RETENTION DUE TO BENIGN PROSTATIC HYPERPLASIA: Primary | ICD-10-CM

## 2025-06-04 DIAGNOSIS — R33.8 URINARY RETENTION DUE TO BENIGN PROSTATIC HYPERPLASIA: Primary | ICD-10-CM

## 2025-06-04 DIAGNOSIS — E66.01 SEVERE OBESITY WITH BODY MASS INDEX (BMI) OF 35.0 TO 39.9 WITH SERIOUS COMORBIDITY (HCC): ICD-10-CM

## 2025-06-04 DIAGNOSIS — N18.31 STAGE 3A CHRONIC KIDNEY DISEASE (HCC): ICD-10-CM

## 2025-06-04 LAB — BACTERIA UR CULT: ABNORMAL

## 2025-06-04 PROCEDURE — 99214 OFFICE O/P EST MOD 30 MIN: CPT | Performed by: UROLOGY

## 2025-06-04 NOTE — H&P (VIEW-ONLY)
Assessment/Plan:    Elevated PSA  Patient's PSA was elevated to 5.8 in 2023.  A repeat PSA ordered but not done this year.  It is unlikely to be very accurate with a catheter in place now.  We discussed the possibility of finding prostate cancer via resection of his adenoma and this could warrant additional treatment such as radiation.  He understands.    Bladder diverticulum  The patient has a very large bladder diverticulum.  If we were to pursue surgery to address his prostate would recommend robotic prostatectomy so we can perform a diverticulectomy at the same time.  We could also consider HoLEP surgery which would not address his bladder diverticulum and therefore would have limited benefit for his overall voiding. He understands and we are going to attempt diverticulectomy along with simple prostatectomy        Paroxysmal atrial fibrillation (HCC)  Patient follows with cardiology.  They have cleared him for surgery.  He has a pacemaker.  He is on track to hold his Eliquis for a few days before surgery.  Will restart after when safe to do so.    Severe obesity with body mass index (BMI) of 35.0 to 39.9 with serious comorbidity (HCC)  BMI will make surgery more challenging and risk of complications higher    Urinary retention due to benign prostatic hyperplasia  The patient has a very large prostate gland associated with persistent urinary retention.  Also has a large diverticulum.  We have previously discussed options such as a HoLEP versus robotic prostatectomy versus embolization.  I think he is best served by robotic prostatectomy so we can attempt to treat his diverticulectomy at the same time.  He voices understanding of this.  He wants to move forward with surgery.  This is scheduled for 3 weeks from now.  We discussed the risks of surgery including bleeding, infection, urine leak which is higher because we are working on the bladder for both diverticulum as well as prostate.  There is also risk for  ureteral injury and bowel injury.  We specifically discussed the very real risk that even with outlet surgery he still may not able to void and need a catheter.  He consent was signed.      Urinary tract infection associated with indwelling urethral catheter  (HCC)  The patient is at higher risk for urinary tract infections because of his chronic catheter associated with large bladder diverticulum.  To this point his urine culture from June 2, 2025 grew Proteus in his urine.  Because of his older age he is at high risk for infection related complications including hospitalization.  Robotic simple prostatectomy and concurrent diverticulectomy is the most direct way to address these issues.          Subjective:      Patient ID: Dawood Ramesh is a 80 y.o. male.    HPI    Dawood Ramesh is a 80 y.o. male with known and long history of BPH and voiding issues status post UroLift surgery in 2018 who has then progressed to harris urinary retention with failed trials of void now scheduled for robotic simple prostatectomy with diverticulectomy.       He endorsed feelings as though his UroLift was failing with gradual worsening of LUTS.  He complained of urinary hesitancy, weak urinary stream, sensation of incomplete emptying, and nocturia every hour.  Evaluation has shown that he is not acting his bladder well with a average PVR of proximately 500 cc.     He was offered placement of Correa catheter or teaching of clean intermittent catheterization.  He ultimately declined both options.  He was otherwise asymptomatic without abdominal pain or flank pain.  He was restarted on Flomax 0.4 mg daily as well as finasteride 5 mg daily.  I also recommended an ultrasound and BMP to ensure there is no hydronephrosis or CARISSA.  Creatinine is stable at 1.26 as of 2/11/2025.  Renal ultrasound from 2/14/2025 showed large postvoid residual of 500 mL, no hydronephrosis bilaterally.  He does have bilateral renal simple cyst largest measuring 8.7  cm on the left.  Mild echogenic kidneys compatible with chronic medical renal disease.  Prostatomegaly noted measuring 6 x 8.2 x 6.4 cm, volume 162.5 mL.     Patient reports ever since restarting Flomax and finasteride he thinks his voiding is symptomatically improved.  He is overall quite satisfied with his current voiding.  However PVR after cystoscopy today was 660 cc.     The patient underwent cystoscopy in March 2025 which showed bilobar hypertrophy the prostate with mild extrusion into the bladder with a exposed UroLift mendoza and bladder with significant trabeculations and a very large diverticulum coming off the right anterior lateral wall of the bladder measuring approximately 300 cc.    Later in March 2025 the patient went to [facility] retention.  A catheter was placed.  Over liter was drained.  He had a trial of void which he failed.  He has been living with a catheter since.  He has had to go to the ER twice for catheter obstruction issues.    No past abdominal surgical history  He is on Eliquis for atrial fibrillation.  He also has a pacemaker.  He was seen by cardiology in preparation for surgery and they have cleared him.    The patient's last PSA was 5.8 in 2023.  Repeat PSA was ordered but not done.    Past Surgical History[1]     Past Medical History[2]          Review of Systems   Constitutional:  Negative for chills and fever.   HENT:  Negative for ear pain and sore throat.    Eyes:  Negative for pain and visual disturbance.   Respiratory:  Negative for cough and shortness of breath.    Cardiovascular:  Negative for chest pain and palpitations.   Gastrointestinal:  Negative for abdominal pain and vomiting.   Genitourinary:  Negative for dysuria and hematuria.   Musculoskeletal:  Negative for arthralgias and back pain.   Skin:  Negative for color change and rash.   Neurological:  Negative for seizures and syncope.   All other systems reviewed and are negative.        Objective:      /100 (BP  "Location: Left arm, Patient Position: Sitting, Cuff Size: Standard)   Pulse 88   Ht 5' 9\" (1.753 m)   Wt 115 kg (254 lb 9.6 oz)   SpO2 98%   BMI 37.60 kg/m²     Lab Results   Component Value Date    PSA 5.83 (H) 10/05/2023    PSA 5.4 (H) 07/06/2020    PSA 3.2 10/23/2017    PSA 3.1 06/30/2017    PSA 3.9 12/30/2016    PSA 2.9 10/04/2016    PSA 4.4 (H) 05/13/2016          Physical Exam  Vitals reviewed.   Constitutional:       Appearance: Normal appearance. He is normal weight.   HENT:      Head: Normocephalic and atraumatic.     Eyes:      Pupils: Pupils are equal, round, and reactive to light.     Abdominal:      General: Abdomen is flat.     Neurological:      General: No focal deficit present.      Mental Status: He is alert and oriented to person, place, and time.     Psychiatric:         Mood and Affect: Mood normal.         Thought Content: Thought content normal.           RENAL ULTRASOUND WITH PVR     INDICATION: N40.1: Benign prostatic hyperplasia with lower urinary tract symptoms  R35.0: Frequency of micturition  R33.9: Retention of urine, unspecified.     COMPARISON: Renal ultrasound 4/2/2022     TECHNIQUE: Ultrasound of the retroperitoneum was performed with a curvilinear transducer utilizing volumetric sweeps and still imaging techniques.     FINDINGS:     KIDNEYS:  Symmetric and normal size.  Right kidney: 10.7 x 7.7 x 4.9 cm. Volume 213.2 mL  Left kidney: 10.9 x 4.8 x 4.3 cm. Volume 116.9 mL     Right kidney  Mildly echogenic.  No mass is identified. 1.6 cm lower interpolar simple cyst. No significant interval change.  No hydronephrosis.  No shadowing calculi.  No perinephric fluid collections.     Left kidney  Mildly echogenic.  No mass is identified. 8.7 cm exophytic and partially parapelvic simple cyst. No significant interval change.  No hydronephrosis.  No shadowing calculi.  No perinephric fluid collections.     URETERS:  Nonvisualized.     BLADDER:  Normally distended.  No focal thickening " or mass lesions. Large bladder diverticulum.  Bilateral ureteral jets detected.  Prevoid: 611.6 mL  No significant post void volume. Measured post void volume in mL: 500.0     Prostate gland measures 6 x 8.2 x 6.4 cm, volume 162.5 mL.    Orders  No orders of the defined types were placed in this encounter.         [1]   Past Surgical History:  Procedure Laterality Date    ARM NEUROPLASTY Left     1977    CARDIAC PACEMAKER PLACEMENT Left     CARDIAC SURGERY  05/01/2019    pace maker placed    CATARACT EXTRACTION, BILATERAL  2021    COLONOSCOPY      CYSTOSCOPY  12/27/2017    diagnostic    FOREARM FRACTURE SURGERY      ORCHIECTOMY      surgery testis    LA CYSTO INSERTION TRANSPROSTATIC IMPLANT SINGLE N/A 2/9/2018    Procedure: CYSTOSCOPY WITH INSERTION UROLIFT;  Surgeon: Bernardo Billingsley MD;  Location: [facility];  Service: Urology    LA CYSTOURETHROSCOPY WITH BIOPSY N/A 2/9/2018    Procedure: BLADDER BIOPSY;  Surgeon: Bernardo Billingsley MD;  Location: [facility];  Service: Urology    TESTICLE SURGERY Right 1976   [2]   Past Medical History:  Diagnosis Date    Acute gouty arthropathy     last assessed-11/15/2013    Cataract     Diverticulitis of colon     Enlarged prostate     Gout     Hearing loss     Heart disease     History of diverticulitis of colon     History of dizziness     Hypercholesterolemia     Hyperlipidemia     Hypertension     Irregular heart beat     atrial fibrillation    Palpitations     Sinus bradycardia

## 2025-06-04 NOTE — ASSESSMENT & PLAN NOTE
The patient has a very large prostate gland associated with persistent urinary retention.  Also has a large diverticulum.  We have previously discussed options such as a HoLEP versus robotic prostatectomy versus embolization.  I think he is best served by robotic prostatectomy so we can attempt to treat his diverticulectomy at the same time.  He voices understanding of this.  He wants to move forward with surgery.  This is scheduled for 3 weeks from now.  We discussed the risks of surgery including bleeding, infection, urine leak which is higher because we are working on the bladder for both diverticulum as well as prostate.  There is also risk for ureteral injury and bowel injury.  We specifically discussed the very real risk that even with outlet surgery he still may not able to void and need a catheter.  He consent was signed.

## 2025-06-04 NOTE — ASSESSMENT & PLAN NOTE
The patient has a very large bladder diverticulum.  If we were to pursue surgery to address his prostate would recommend robotic prostatectomy so we can perform a diverticulectomy at the same time.  We could also consider HoLEP surgery which would not address his bladder diverticulum and therefore would have limited benefit for his overall voiding. He understands and we are going to attempt diverticulectomy along with simple prostatectomy

## 2025-06-04 NOTE — PROGRESS NOTES
Assessment/Plan:    Elevated PSA  Patient's PSA was elevated to 5.8 in 2023.  A repeat PSA ordered but not done this year.  It is unlikely to be very accurate with a catheter in place now.  We discussed the possibility of finding prostate cancer via resection of his adenoma and this could warrant additional treatment such as radiation.  He understands.    Bladder diverticulum  The patient has a very large bladder diverticulum.  If we were to pursue surgery to address his prostate would recommend robotic prostatectomy so we can perform a diverticulectomy at the same time.  We could also consider HoLEP surgery which would not address his bladder diverticulum and therefore would have limited benefit for his overall voiding. He understands and we are going to attempt diverticulectomy along with simple prostatectomy        Paroxysmal atrial fibrillation (HCC)  Patient follows with cardiology.  They have cleared him for surgery.  He has a pacemaker.  He is on track to hold his Eliquis for a few days before surgery.  Will restart after when safe to do so.    Severe obesity with body mass index (BMI) of 35.0 to 39.9 with serious comorbidity (HCC)  BMI will make surgery more challenging and risk of complications higher    Urinary retention due to benign prostatic hyperplasia  The patient has a very large prostate gland associated with persistent urinary retention.  Also has a large diverticulum.  We have previously discussed options such as a HoLEP versus robotic prostatectomy versus embolization.  I think he is best served by robotic prostatectomy so we can attempt to treat his diverticulectomy at the same time.  He voices understanding of this.  He wants to move forward with surgery.  This is scheduled for 3 weeks from now.  We discussed the risks of surgery including bleeding, infection, urine leak which is higher because we are working on the bladder for both diverticulum as well as prostate.  There is also risk for  ureteral injury and bowel injury.  We specifically discussed the very real risk that even with outlet surgery he still may not able to void and need a catheter.  He consent was signed.          Subjective:      Patient ID: Dawood Ramesh is a 80 y.o. male.    HPI    Dawood Ramesh is a 80 y.o. male with known and long history of BPH and voiding issues status post UroLift surgery in 2018 who has then progressed to harris urinary retention with failed trials of void now scheduled for robotic simple prostatectomy with diverticulectomy.       He endorsed feelings as though his UroLift was failing with gradual worsening of LUTS.  He complained of urinary hesitancy, weak urinary stream, sensation of incomplete emptying, and nocturia every hour.  Evaluation has shown that he is not acting his bladder well with a average PVR of proximately 500 cc.     He was offered placement of Correa catheter or teaching of clean intermittent catheterization.  He ultimately declined both options.  He was otherwise asymptomatic without abdominal pain or flank pain.  He was restarted on Flomax 0.4 mg daily as well as finasteride 5 mg daily.  I also recommended an ultrasound and BMP to ensure there is no hydronephrosis or CARISSA.  Creatinine is stable at 1.26 as of 2/11/2025.  Renal ultrasound from 2/14/2025 showed large postvoid residual of 500 mL, no hydronephrosis bilaterally.  He does have bilateral renal simple cyst largest measuring 8.7 cm on the left.  Mild echogenic kidneys compatible with chronic medical renal disease.  Prostatomegaly noted measuring 6 x 8.2 x 6.4 cm, volume 162.5 mL.     Patient reports ever since restarting Flomax and finasteride he thinks his voiding is symptomatically improved.  He is overall quite satisfied with his current voiding.  However PVR after cystoscopy today was 660 cc.     The patient underwent cystoscopy in March 2025 which showed bilobar hypertrophy the prostate with mild extrusion into the bladder with  "a exposed UroLift mendoza and bladder with significant trabeculations and a very large diverticulum coming off the right anterior lateral wall of the bladder measuring approximately 300 cc.    Later in March 2025 the patient went to [facility] retention.  A catheter was placed.  Over liter was drained.  He had a trial of void which he failed.  He has been living with a catheter since.  He has had to go to the ER twice for catheter obstruction issues.    No past abdominal surgical history  He is on Eliquis for atrial fibrillation.  He also has a pacemaker.  He was seen by cardiology in preparation for surgery and they have cleared him.    The patient's last PSA was 5.8 in 2023.  Repeat PSA was ordered but not done.    Past Surgical History[1]     Past Medical History[2]          Review of Systems   Constitutional:  Negative for chills and fever.   HENT:  Negative for ear pain and sore throat.    Eyes:  Negative for pain and visual disturbance.   Respiratory:  Negative for cough and shortness of breath.    Cardiovascular:  Negative for chest pain and palpitations.   Gastrointestinal:  Negative for abdominal pain and vomiting.   Genitourinary:  Negative for dysuria and hematuria.   Musculoskeletal:  Negative for arthralgias and back pain.   Skin:  Negative for color change and rash.   Neurological:  Negative for seizures and syncope.   All other systems reviewed and are negative.        Objective:      /100 (BP Location: Left arm, Patient Position: Sitting, Cuff Size: Standard)   Pulse 88   Ht 5' 9\" (1.753 m)   Wt 115 kg (254 lb 9.6 oz)   SpO2 98%   BMI 37.60 kg/m²     Lab Results   Component Value Date    PSA 5.83 (H) 10/05/2023    PSA 5.4 (H) 07/06/2020    PSA 3.2 10/23/2017    PSA 3.1 06/30/2017    PSA 3.9 12/30/2016    PSA 2.9 10/04/2016    PSA 4.4 (H) 05/13/2016          Physical Exam  Vitals reviewed.   Constitutional:       Appearance: Normal appearance. He is normal weight.   HENT:      Head: Normocephalic " and atraumatic.     Eyes:      Pupils: Pupils are equal, round, and reactive to light.     Abdominal:      General: Abdomen is flat.     Neurological:      General: No focal deficit present.      Mental Status: He is alert and oriented to person, place, and time.     Psychiatric:         Mood and Affect: Mood normal.         Thought Content: Thought content normal.           RENAL ULTRASOUND WITH PVR     INDICATION: N40.1: Benign prostatic hyperplasia with lower urinary tract symptoms  R35.0: Frequency of micturition  R33.9: Retention of urine, unspecified.     COMPARISON: Renal ultrasound 4/2/2022     TECHNIQUE: Ultrasound of the retroperitoneum was performed with a curvilinear transducer utilizing volumetric sweeps and still imaging techniques.     FINDINGS:     KIDNEYS:  Symmetric and normal size.  Right kidney: 10.7 x 7.7 x 4.9 cm. Volume 213.2 mL  Left kidney: 10.9 x 4.8 x 4.3 cm. Volume 116.9 mL     Right kidney  Mildly echogenic.  No mass is identified. 1.6 cm lower interpolar simple cyst. No significant interval change.  No hydronephrosis.  No shadowing calculi.  No perinephric fluid collections.     Left kidney  Mildly echogenic.  No mass is identified. 8.7 cm exophytic and partially parapelvic simple cyst. No significant interval change.  No hydronephrosis.  No shadowing calculi.  No perinephric fluid collections.     URETERS:  Nonvisualized.     BLADDER:  Normally distended.  No focal thickening or mass lesions. Large bladder diverticulum.  Bilateral ureteral jets detected.  Prevoid: 611.6 mL  No significant post void volume. Measured post void volume in mL: 500.0     Prostate gland measures 6 x 8.2 x 6.4 cm, volume 162.5 mL.    Orders  No orders of the defined types were placed in this encounter.         [1]   Past Surgical History:  Procedure Laterality Date    ARM NEUROPLASTY Left     1977    CARDIAC PACEMAKER PLACEMENT Left     CARDIAC SURGERY  05/01/2019    pace maker placed    CATARACT EXTRACTION,  BILATERAL  2021    COLONOSCOPY      CYSTOSCOPY  12/27/2017    diagnostic    FOREARM FRACTURE SURGERY      ORCHIECTOMY      surgery testis    ME CYSTO INSERTION TRANSPROSTATIC IMPLANT SINGLE N/A 2/9/2018    Procedure: CYSTOSCOPY WITH INSERTION UROLIFT;  Surgeon: Bernardo Billingsley MD;  Location: [facility];  Service: Urology    ME CYSTOURETHROSCOPY WITH BIOPSY N/A 2/9/2018    Procedure: BLADDER BIOPSY;  Surgeon: Bernardo Billingsley MD;  Location: [facility];  Service: Urology    TESTICLE SURGERY Right 1976   [2]   Past Medical History:  Diagnosis Date    Acute gouty arthropathy     last assessed-11/15/2013    Cataract     Diverticulitis of colon     Enlarged prostate     Gout     Hearing loss     Heart disease     History of diverticulitis of colon     History of dizziness     Hypercholesterolemia     Hyperlipidemia     Hypertension     Irregular heart beat     atrial fibrillation    Palpitations     Sinus bradycardia

## 2025-06-04 NOTE — ASSESSMENT & PLAN NOTE
Patient's PSA was elevated to 5.8 in 2023.  A repeat PSA ordered but not done this year.  It is unlikely to be very accurate with a catheter in place now.  We discussed the possibility of finding prostate cancer via resection of his adenoma and this could warrant additional treatment such as radiation.  He understands.

## 2025-06-04 NOTE — ASSESSMENT & PLAN NOTE
Patient follows with cardiology.  They have cleared him for surgery.  He has a pacemaker.  He is on track to hold his Eliquis for a few days before surgery.  Will restart after when safe to do so.

## 2025-06-05 DIAGNOSIS — N39.0 URINARY TRACT INFECTION WITHOUT HEMATURIA, SITE UNSPECIFIED: Primary | ICD-10-CM

## 2025-06-05 RX ORDER — SULFAMETHOXAZOLE AND TRIMETHOPRIM 800; 160 MG/1; MG/1
1 TABLET ORAL EVERY 12 HOURS SCHEDULED
Qty: 10 TABLET | Refills: 0 | Status: SHIPPED | OUTPATIENT
Start: 2025-06-20 | End: 2025-06-25

## 2025-06-05 NOTE — TELEPHONE ENCOUNTER
Wife calling to clarify abx sent to pharmacy, discussed and answered all questions/concerns. Patient will start them 5 days before surgery   FAMILY HISTORY:  Father  Still living? Unknown  Family history of abdominal aortic aneurysm (AAA), Age at diagnosis: Age Unknown    Mother  Still living? Unknown  Family history of lung cancer, Age at diagnosis: Age Unknown

## 2025-06-09 ENCOUNTER — REMOTE DEVICE CLINIC VISIT (OUTPATIENT)
Dept: CARDIOLOGY CLINIC | Facility: CLINIC | Age: 80
End: 2025-06-09
Payer: COMMERCIAL

## 2025-06-09 ENCOUNTER — RESULTS FOLLOW-UP (OUTPATIENT)
Dept: NON INVASIVE DIAGNOSTICS | Facility: HOSPITAL | Age: 80
End: 2025-06-09

## 2025-06-09 DIAGNOSIS — Z95.0 PRESENCE OF PERMANENT CARDIAC PACEMAKER: Primary | ICD-10-CM

## 2025-06-09 PROCEDURE — 93296 REM INTERROG EVL PM/IDS: CPT | Performed by: STUDENT IN AN ORGANIZED HEALTH CARE EDUCATION/TRAINING PROGRAM

## 2025-06-09 PROCEDURE — 93294 REM INTERROG EVL PM/LDLS PM: CPT | Performed by: STUDENT IN AN ORGANIZED HEALTH CARE EDUCATION/TRAINING PROGRAM

## 2025-06-09 NOTE — PROGRESS NOTES
Results for orders placed or performed in visit on 06/09/25   Cardiac EP device report    Narrative    MDT-DUAL CHAMBER PPM (AAIR-DDDR MODE)/ACTIVE SYSTEM IS MRI CONDITIONAL  CARELINK TRANSMISSION: BATTERY VOLTAGE ADEQUATE. (4.1 YRS) AP 97%  99%. ALL AVAILABLE LEAD PARAMETERS WITHIN NORMAL LIMITS. NO SIGNIFICANT HIGH RATE EPISODES. 2 AT/AF EPISODES DETECTED <5 HOURS. PATIENT IS ON ELIQUIS AND METOPROLOL. SINGLE PVC COUNT 1.9/HOUR. NORMAL DEVICE FUNCTION.---LINDSAY

## 2025-06-13 DIAGNOSIS — M85.89 OSTEOPENIA OF MULTIPLE SITES: ICD-10-CM

## 2025-06-13 DIAGNOSIS — E78.5 HYPERLIPIDEMIA, UNSPECIFIED HYPERLIPIDEMIA TYPE: ICD-10-CM

## 2025-06-13 NOTE — TELEPHONE ENCOUNTER
Reason for call:   [x] Refill   [] Prior Auth  [] Other:     Office:   [x] PCP/Provider - Laura Flood DO    [facility]   [] Specialty/Provider -     Medication: Simvastatin    Dose/Frequency: 40mg      Every other day     Quantity: 45    Pharmacy: [facility]    Local Pharmacy   Does the patient have enough for 3 days?   [] Yes   [] No - Send as HP to POD    Mail Away Pharmacy   Does the patient have enough for 10 days?   [x] Yes   [] No - Send as HP to POD

## 2025-06-14 RX ORDER — SIMVASTATIN 40 MG
40 TABLET ORAL EVERY OTHER DAY
Qty: 45 TABLET | Refills: 1 | Status: SHIPPED | OUTPATIENT
Start: 2025-06-14

## 2025-06-24 PROBLEM — T83.511A URINARY TRACT INFECTION ASSOCIATED WITH INDWELLING URETHRAL CATHETER  (HCC): Status: ACTIVE | Noted: 2025-06-24

## 2025-06-24 PROBLEM — N39.0 URINARY TRACT INFECTION ASSOCIATED WITH INDWELLING URETHRAL CATHETER  (HCC): Status: ACTIVE | Noted: 2025-06-24

## 2025-06-25 ENCOUNTER — HOSPITAL ENCOUNTER (INPATIENT)
Facility: HOSPITAL | Age: 80
LOS: 1 days | Discharge: HOME | DRG: 654 | End: 2025-06-26
Attending: UROLOGY | Admitting: UROLOGY
Payer: COMMERCIAL

## 2025-06-25 ENCOUNTER — ANESTHESIA (OUTPATIENT)
Dept: PERIOP | Facility: HOSPITAL | Age: 80
DRG: 654 | End: 2025-06-25
Payer: COMMERCIAL

## 2025-06-25 DIAGNOSIS — I48.0 PAROXYSMAL ATRIAL FIBRILLATION (HCC): ICD-10-CM

## 2025-06-25 DIAGNOSIS — Z01.89 ENCOUNTER FOR GERIATRIC ASSESSMENT: ICD-10-CM

## 2025-06-25 DIAGNOSIS — N18.31 STAGE 3A CHRONIC KIDNEY DISEASE (HCC): Primary | ICD-10-CM

## 2025-06-25 DIAGNOSIS — R42 DIZZINESS: ICD-10-CM

## 2025-06-25 DIAGNOSIS — N32.3 ACQUIRED BLADDER DIVERTICULUM: ICD-10-CM

## 2025-06-25 PROBLEM — I12.9 BENIGN HYPERTENSION WITH CKD (CHRONIC KIDNEY DISEASE) STAGE III (HCC): Status: ACTIVE | Noted: 2025-06-25

## 2025-06-25 PROBLEM — N18.30 BENIGN HYPERTENSION WITH CKD (CHRONIC KIDNEY DISEASE) STAGE III (HCC): Status: ACTIVE | Noted: 2025-06-25

## 2025-06-25 PROBLEM — Z01.818 ENCOUNTER FOR PERIOPERATIVE CONSULTATION: Status: ACTIVE | Noted: 2025-06-25

## 2025-06-25 LAB
ABO GROUP BLD: NORMAL
ANION GAP SERPL CALCULATED.3IONS-SCNC: 11 MMOL/L (ref 4–13)
BUN SERPL-MCNC: 28 MG/DL (ref 5–25)
CALCIUM SERPL-MCNC: 9.6 MG/DL (ref 8.4–10.2)
CHLORIDE SERPL-SCNC: 105 MMOL/L (ref 96–108)
CO2 SERPL-SCNC: 20 MMOL/L (ref 21–32)
CREAT SERPL-MCNC: 2.19 MG/DL (ref 0.6–1.3)
ERYTHROCYTE [DISTWIDTH] IN BLOOD BY AUTOMATED COUNT: 14.1 % (ref 11.6–15.1)
GFR SERPL CREATININE-BSD FRML MDRD: 27 ML/MIN/1.73SQ M
GLUCOSE SERPL-MCNC: 126 MG/DL (ref 65–140)
GLUCOSE SERPL-MCNC: 211 MG/DL (ref 65–140)
HCT VFR BLD AUTO: 45.5 % (ref 36.5–49.3)
HGB BLD-MCNC: 15 G/DL (ref 12–17)
MCH RBC QN AUTO: 31.9 PG (ref 26.8–34.3)
MCHC RBC AUTO-ENTMCNC: 33 G/DL (ref 31.4–37.4)
MCV RBC AUTO: 97 FL (ref 82–98)
PLATELET # BLD AUTO: 204 THOUSANDS/UL (ref 149–390)
PMV BLD AUTO: 10.6 FL (ref 8.9–12.7)
POTASSIUM SERPL-SCNC: 4.7 MMOL/L (ref 3.5–5.3)
RBC # BLD AUTO: 4.7 MILLION/UL (ref 3.88–5.62)
RH BLD: POSITIVE
SODIUM SERPL-SCNC: 136 MMOL/L (ref 135–147)
WBC # BLD AUTO: 23.75 THOUSAND/UL (ref 4.31–10.16)

## 2025-06-25 PROCEDURE — 55867 LAPS SURG PRST8ECT SMPL STOT: CPT | Performed by: UROLOGY

## 2025-06-25 PROCEDURE — 99205 OFFICE O/P NEW HI 60 MIN: CPT | Performed by: STUDENT IN AN ORGANIZED HEALTH CARE EDUCATION/TRAINING PROGRAM

## 2025-06-25 PROCEDURE — 86923 COMPATIBILITY TEST ELECTRIC: CPT

## 2025-06-25 PROCEDURE — 55867 LAPS SURG PRST8ECT SMPL STOT: CPT

## 2025-06-25 PROCEDURE — 0TBC4ZZ EXCISION OF BLADDER NECK, PERCUTANEOUS ENDOSCOPIC APPROACH: ICD-10-PCS | Performed by: UROLOGY

## 2025-06-25 PROCEDURE — 51999 UNLISTED LAPS PX BLADDER: CPT

## 2025-06-25 PROCEDURE — S2900 ROBOTIC SURGICAL SYSTEM: HCPCS

## 2025-06-25 PROCEDURE — 85027 COMPLETE CBC AUTOMATED: CPT | Performed by: UROLOGY

## 2025-06-25 PROCEDURE — 88307 TISSUE EXAM BY PATHOLOGIST: CPT | Performed by: PATHOLOGY

## 2025-06-25 PROCEDURE — 80048 BASIC METABOLIC PNL TOTAL CA: CPT | Performed by: UROLOGY

## 2025-06-25 PROCEDURE — 51999 UNLISTED LAPS PX BLADDER: CPT | Performed by: UROLOGY

## 2025-06-25 PROCEDURE — 82948 REAGENT STRIP/BLOOD GLUCOSE: CPT

## 2025-06-25 PROCEDURE — 8E0W4CZ ROBOTIC ASSISTED PROCEDURE OF TRUNK REGION, PERCUTANEOUS ENDOSCOPIC APPROACH: ICD-10-PCS | Performed by: UROLOGY

## 2025-06-25 PROCEDURE — 0VB04ZZ EXCISION OF PROSTATE, PERCUTANEOUS ENDOSCOPIC APPROACH: ICD-10-PCS | Performed by: UROLOGY

## 2025-06-25 PROCEDURE — NC001 PR NO CHARGE

## 2025-06-25 PROCEDURE — 88309 TISSUE EXAM BY PATHOLOGIST: CPT | Performed by: PATHOLOGY

## 2025-06-25 PROCEDURE — S2900 ROBOTIC SURGICAL SYSTEM: HCPCS | Performed by: UROLOGY

## 2025-06-25 RX ORDER — METOPROLOL SUCCINATE 25 MG/1
25 TABLET, EXTENDED RELEASE ORAL DAILY
Status: DISCONTINUED | OUTPATIENT
Start: 2025-06-25 | End: 2025-06-26 | Stop reason: HOSPADM

## 2025-06-25 RX ORDER — DEXAMETHASONE SODIUM PHOSPHATE 10 MG/ML
INJECTION, SOLUTION INTRAMUSCULAR; INTRAVENOUS AS NEEDED
Status: DISCONTINUED | OUTPATIENT
Start: 2025-06-25 | End: 2025-06-25

## 2025-06-25 RX ORDER — PROPOFOL 10 MG/ML
INJECTION, EMULSION INTRAVENOUS AS NEEDED
Status: DISCONTINUED | OUTPATIENT
Start: 2025-06-25 | End: 2025-06-25

## 2025-06-25 RX ORDER — MAGNESIUM HYDROXIDE 1200 MG/15ML
LIQUID ORAL AS NEEDED
Status: DISCONTINUED | OUTPATIENT
Start: 2025-06-25 | End: 2025-06-25 | Stop reason: HOSPADM

## 2025-06-25 RX ORDER — DOCUSATE SODIUM 100 MG/1
100 CAPSULE, LIQUID FILLED ORAL 2 TIMES DAILY
Status: DISCONTINUED | OUTPATIENT
Start: 2025-06-25 | End: 2025-06-26 | Stop reason: HOSPADM

## 2025-06-25 RX ORDER — CEFAZOLIN SODIUM 2 G/50ML
2000 SOLUTION INTRAVENOUS ONCE
Status: COMPLETED | OUTPATIENT
Start: 2025-06-25 | End: 2025-06-25

## 2025-06-25 RX ORDER — FENTANYL CITRATE/PF 50 MCG/ML
25 SYRINGE (ML) INJECTION
Status: COMPLETED | OUTPATIENT
Start: 2025-06-25 | End: 2025-06-25

## 2025-06-25 RX ORDER — AMLODIPINE BESYLATE 5 MG/1
5 TABLET ORAL DAILY
Status: DISCONTINUED | OUTPATIENT
Start: 2025-06-25 | End: 2025-06-25

## 2025-06-25 RX ORDER — BUPIVACAINE HYDROCHLORIDE 5 MG/ML
INJECTION, SOLUTION EPIDURAL; INTRACAUDAL; PERINEURAL AS NEEDED
Status: DISCONTINUED | OUTPATIENT
Start: 2025-06-25 | End: 2025-06-25 | Stop reason: HOSPADM

## 2025-06-25 RX ORDER — ALBUMIN HUMAN 50 G/1000ML
SOLUTION INTRAVENOUS CONTINUOUS PRN
Status: DISCONTINUED | OUTPATIENT
Start: 2025-06-25 | End: 2025-06-25

## 2025-06-25 RX ORDER — ONDANSETRON 2 MG/ML
4 INJECTION INTRAMUSCULAR; INTRAVENOUS ONCE AS NEEDED
Status: DISCONTINUED | OUTPATIENT
Start: 2025-06-25 | End: 2025-06-25 | Stop reason: HOSPADM

## 2025-06-25 RX ORDER — FENTANYL CITRATE 50 UG/ML
INJECTION, SOLUTION INTRAMUSCULAR; INTRAVENOUS AS NEEDED
Status: DISCONTINUED | OUTPATIENT
Start: 2025-06-25 | End: 2025-06-25

## 2025-06-25 RX ORDER — ALLOPURINOL 300 MG/1
300 TABLET ORAL DAILY
Status: DISCONTINUED | OUTPATIENT
Start: 2025-06-25 | End: 2025-06-26 | Stop reason: HOSPADM

## 2025-06-25 RX ORDER — SENNOSIDES 8.6 MG
1 TABLET ORAL DAILY
Status: DISCONTINUED | OUTPATIENT
Start: 2025-06-25 | End: 2025-06-26 | Stop reason: HOSPADM

## 2025-06-25 RX ORDER — HYDROMORPHONE HCL/PF 1 MG/ML
0.5 SYRINGE (ML) INJECTION EVERY 2 HOUR PRN
Status: DISCONTINUED | OUTPATIENT
Start: 2025-06-25 | End: 2025-06-26 | Stop reason: HOSPADM

## 2025-06-25 RX ORDER — SODIUM CHLORIDE, SODIUM LACTATE, POTASSIUM CHLORIDE, CALCIUM CHLORIDE 600; 310; 30; 20 MG/100ML; MG/100ML; MG/100ML; MG/100ML
125 INJECTION, SOLUTION INTRAVENOUS CONTINUOUS
Status: DISCONTINUED | OUTPATIENT
Start: 2025-06-25 | End: 2025-06-26

## 2025-06-25 RX ORDER — ACETAMINOPHEN 325 MG/1
650 TABLET ORAL EVERY 6 HOURS SCHEDULED
Status: DISCONTINUED | OUTPATIENT
Start: 2025-06-25 | End: 2025-06-26 | Stop reason: HOSPADM

## 2025-06-25 RX ORDER — ONDANSETRON 2 MG/ML
4 INJECTION INTRAMUSCULAR; INTRAVENOUS EVERY 6 HOURS PRN
Status: DISCONTINUED | OUTPATIENT
Start: 2025-06-25 | End: 2025-06-26 | Stop reason: HOSPADM

## 2025-06-25 RX ORDER — HEPARIN SODIUM 5000 [USP'U]/ML
5000 INJECTION, SOLUTION INTRAVENOUS; SUBCUTANEOUS EVERY 8 HOURS SCHEDULED
Status: DISCONTINUED | OUTPATIENT
Start: 2025-06-25 | End: 2025-06-26 | Stop reason: HOSPADM

## 2025-06-25 RX ORDER — AMLODIPINE BESYLATE 5 MG/1
5 TABLET ORAL DAILY
Status: DISCONTINUED | OUTPATIENT
Start: 2025-06-26 | End: 2025-06-26 | Stop reason: HOSPADM

## 2025-06-25 RX ORDER — ONDANSETRON 2 MG/ML
INJECTION INTRAMUSCULAR; INTRAVENOUS AS NEEDED
Status: DISCONTINUED | OUTPATIENT
Start: 2025-06-25 | End: 2025-06-25

## 2025-06-25 RX ORDER — GABAPENTIN 100 MG/1
100 CAPSULE ORAL 3 TIMES DAILY
Status: DISCONTINUED | OUTPATIENT
Start: 2025-06-25 | End: 2025-06-26 | Stop reason: HOSPADM

## 2025-06-25 RX ORDER — SODIUM CHLORIDE, SODIUM LACTATE, POTASSIUM CHLORIDE, CALCIUM CHLORIDE 600; 310; 30; 20 MG/100ML; MG/100ML; MG/100ML; MG/100ML
INJECTION, SOLUTION INTRAVENOUS CONTINUOUS PRN
Status: DISCONTINUED | OUTPATIENT
Start: 2025-06-25 | End: 2025-06-25

## 2025-06-25 RX ORDER — CEFAZOLIN SODIUM 2 G/50ML
2000 SOLUTION INTRAVENOUS EVERY 8 HOURS
Status: COMPLETED | OUTPATIENT
Start: 2025-06-25 | End: 2025-06-25

## 2025-06-25 RX ORDER — CEFAZOLIN SODIUM 1 G/3ML
INJECTION, POWDER, FOR SOLUTION INTRAMUSCULAR; INTRAVENOUS AS NEEDED
Status: DISCONTINUED | OUTPATIENT
Start: 2025-06-25 | End: 2025-06-25

## 2025-06-25 RX ORDER — PRAVASTATIN SODIUM 80 MG/1
80 TABLET ORAL
Status: DISCONTINUED | OUTPATIENT
Start: 2025-06-25 | End: 2025-06-26 | Stop reason: HOSPADM

## 2025-06-25 RX ORDER — ROCURONIUM BROMIDE 10 MG/ML
INJECTION, SOLUTION INTRAVENOUS AS NEEDED
Status: DISCONTINUED | OUTPATIENT
Start: 2025-06-25 | End: 2025-06-25

## 2025-06-25 RX ORDER — LIDOCAINE HYDROCHLORIDE 10 MG/ML
INJECTION, SOLUTION EPIDURAL; INFILTRATION; INTRACAUDAL; PERINEURAL AS NEEDED
Status: DISCONTINUED | OUTPATIENT
Start: 2025-06-25 | End: 2025-06-25

## 2025-06-25 RX ADMIN — PHENYLEPHRINE HYDROCHLORIDE 200 MCG: 50 INJECTION INTRAVENOUS at 13:23

## 2025-06-25 RX ADMIN — ALLOPURINOL 300 MG: 300 TABLET ORAL at 17:15

## 2025-06-25 RX ADMIN — GABAPENTIN 100 MG: 100 CAPSULE ORAL at 22:31

## 2025-06-25 RX ADMIN — METOPROLOL SUCCINATE 25 MG: 25 TABLET, EXTENDED RELEASE ORAL at 17:15

## 2025-06-25 RX ADMIN — ROCURONIUM 10 MG: 50 INJECTION, SOLUTION INTRAVENOUS at 10:48

## 2025-06-25 RX ADMIN — FENTANYL CITRATE 25 MCG: 50 INJECTION INTRAMUSCULAR; INTRAVENOUS at 14:15

## 2025-06-25 RX ADMIN — PHENYLEPHRINE HYDROCHLORIDE 100 MCG: 50 INJECTION INTRAVENOUS at 13:18

## 2025-06-25 RX ADMIN — GABAPENTIN 100 MG: 100 CAPSULE ORAL at 17:15

## 2025-06-25 RX ADMIN — FENTANYL CITRATE 25 MCG: 50 INJECTION INTRAMUSCULAR; INTRAVENOUS at 14:01

## 2025-06-25 RX ADMIN — PHENYLEPHRINE HYDROCHLORIDE 200 MCG: 50 INJECTION INTRAVENOUS at 12:08

## 2025-06-25 RX ADMIN — PHENOL 1 SPRAY: 1.5 LIQUID ORAL at 23:06

## 2025-06-25 RX ADMIN — ACETAMINOPHEN 650 MG: 325 TABLET ORAL at 22:34

## 2025-06-25 RX ADMIN — SODIUM CHLORIDE, SODIUM LACTATE, POTASSIUM CHLORIDE, AND CALCIUM CHLORIDE: .6; .31; .03; .02 INJECTION, SOLUTION INTRAVENOUS at 12:45

## 2025-06-25 RX ADMIN — ROCURONIUM 20 MG: 50 INJECTION, SOLUTION INTRAVENOUS at 08:33

## 2025-06-25 RX ADMIN — CEFAZOLIN SODIUM 2000 MG: 2 SOLUTION INTRAVENOUS at 07:54

## 2025-06-25 RX ADMIN — ROCURONIUM 20 MG: 50 INJECTION, SOLUTION INTRAVENOUS at 10:06

## 2025-06-25 RX ADMIN — FENTANYL CITRATE 25 MCG: 50 INJECTION INTRAMUSCULAR; INTRAVENOUS at 14:25

## 2025-06-25 RX ADMIN — SODIUM CHLORIDE, SODIUM LACTATE, POTASSIUM CHLORIDE, AND CALCIUM CHLORIDE 125 ML/HR: .6; .31; .03; .02 INJECTION, SOLUTION INTRAVENOUS at 16:51

## 2025-06-25 RX ADMIN — SODIUM CHLORIDE, SODIUM LACTATE, POTASSIUM CHLORIDE, AND CALCIUM CHLORIDE: .6; .31; .03; .02 INJECTION, SOLUTION INTRAVENOUS at 07:29

## 2025-06-25 RX ADMIN — LIDOCAINE HYDROCHLORIDE 50 MG: 10 INJECTION, SOLUTION EPIDURAL; INFILTRATION; INTRACAUDAL at 07:36

## 2025-06-25 RX ADMIN — DOCUSATE SODIUM 100 MG: 100 CAPSULE, LIQUID FILLED ORAL at 17:15

## 2025-06-25 RX ADMIN — SENNOSIDES 8.6 MG: 8.6 TABLET, FILM COATED ORAL at 17:15

## 2025-06-25 RX ADMIN — CEFAZOLIN 2000 MG: 1 INJECTION, POWDER, FOR SOLUTION INTRAMUSCULAR; INTRAVENOUS at 11:47

## 2025-06-25 RX ADMIN — ONDANSETRON 4 MG: 2 INJECTION INTRAMUSCULAR; INTRAVENOUS at 13:00

## 2025-06-25 RX ADMIN — SUGAMMADEX 250 MG: 100 INJECTION, SOLUTION INTRAVENOUS at 13:28

## 2025-06-25 RX ADMIN — ALBUMIN (HUMAN): 12.5 INJECTION, SOLUTION INTRAVENOUS at 11:40

## 2025-06-25 RX ADMIN — ROCURONIUM 40 MG: 50 INJECTION, SOLUTION INTRAVENOUS at 07:36

## 2025-06-25 RX ADMIN — CEFAZOLIN SODIUM 2000 MG: 2 SOLUTION INTRAVENOUS at 22:36

## 2025-06-25 RX ADMIN — HEPARIN SODIUM 5000 UNITS: 5000 INJECTION INTRAVENOUS; SUBCUTANEOUS at 17:16

## 2025-06-25 RX ADMIN — PRAVASTATIN SODIUM 80 MG: 80 TABLET ORAL at 17:15

## 2025-06-25 RX ADMIN — PHENYLEPHRINE HYDROCHLORIDE 30 MCG/MIN: 50 INJECTION INTRAVENOUS at 07:45

## 2025-06-25 RX ADMIN — PROPOFOL 120 MG: 10 INJECTION, EMULSION INTRAVENOUS at 07:36

## 2025-06-25 RX ADMIN — FENTANYL CITRATE 50 MCG: 50 INJECTION INTRAMUSCULAR; INTRAVENOUS at 07:36

## 2025-06-25 RX ADMIN — FENTANYL CITRATE 25 MCG: 50 INJECTION INTRAMUSCULAR; INTRAVENOUS at 13:53

## 2025-06-25 RX ADMIN — DEXAMETHASONE SODIUM PHOSPHATE 10 MG: 10 INJECTION INTRAMUSCULAR; INTRAVENOUS at 07:36

## 2025-06-25 RX ADMIN — HEPARIN SODIUM 5000 UNITS: 5000 INJECTION INTRAVENOUS; SUBCUTANEOUS at 22:31

## 2025-06-25 RX ADMIN — ROCURONIUM 20 MG: 50 INJECTION, SOLUTION INTRAVENOUS at 07:56

## 2025-06-25 RX ADMIN — ALBUMIN (HUMAN): 12.5 INJECTION, SOLUTION INTRAVENOUS at 09:54

## 2025-06-25 RX ADMIN — ROCURONIUM 20 MG: 50 INJECTION, SOLUTION INTRAVENOUS at 09:17

## 2025-06-25 RX ADMIN — ROCURONIUM 10 MG: 50 INJECTION, SOLUTION INTRAVENOUS at 11:31

## 2025-06-25 RX ADMIN — CEFAZOLIN SODIUM 2000 MG: 2 SOLUTION INTRAVENOUS at 17:12

## 2025-06-25 RX ADMIN — ROCURONIUM 10 MG: 50 INJECTION, SOLUTION INTRAVENOUS at 08:31

## 2025-06-25 RX ADMIN — FENTANYL CITRATE 50 MCG: 50 INJECTION INTRAMUSCULAR; INTRAVENOUS at 08:35

## 2025-06-25 RX ADMIN — PROPOFOL 30 MG: 10 INJECTION, EMULSION INTRAVENOUS at 12:20

## 2025-06-25 NOTE — RESPIRATORY THERAPY NOTE
"RT Protocol Note  Dawood Ramesh 80 y.o. male MRN: [id number]  Unit/Bed#: S -01 Encounter: [id number]    Assessment    Active Problems:    Elevated PSA    Stage 3a chronic kidney disease (HCC)    Encounter for geriatric assessment    Urinary retention due to benign prostatic hyperplasia    Encounter for perioperative consultation    Benign hypertension with CKD (chronic kidney disease) stage III (HCC)    Acquired bladder diverticulum      Home Pulmonary Medications: None  Home Devices/Therapy: Other (Comment) (none)    Past Medical History[1]  Social History[2]    Subjective         Objective    Physical Exam:   Assessment Type: (P) Assess only  General Appearance: (P) Alert, Awake  Respiratory Pattern: (P) Normal, Symmetrical  Chest Assessment: (P) Chest expansion symmetrical  Bilateral Breath Sounds: (P) Diminished, Clear    Vitals:  Blood pressure 123/53, pulse 73, temperature 97.6 °F (36.4 °C), resp. rate 15, height 5' 10\" (1.778 m), weight 113 kg (249 lb), SpO2 96%.          Imaging and other studies: Results Review Statement: No pertinent imaging studies reviewed.          Plan    Respiratory Plan: No distress/Pulmonary history        Resp Comments: (P) Pt have no history or regime at home. Pt have no respiratory distress at this time. pt on RA        [1]   Past Medical History:  Diagnosis Date    Acute gouty arthropathy     last assessed-11/15/2013    Cataract     Diverticulitis of colon     Enlarged prostate     Gout     Hearing loss     Heart disease     History of diverticulitis of colon     History of dizziness     Hypercholesterolemia     Hyperlipidemia     Hypertension     Irregular heart beat     atrial fibrillation    Palpitations     Sinus bradycardia    [2]   Social History  Socioeconomic History    Marital status: /Civil Union   Occupational History     Comment: retired from work   Tobacco Use    Smoking status: Former     Current packs/day: 0.00     Average packs/day: 0.3 packs/day " for 10.0 years (2.5 ttl pk-yrs)     Types: Cigars, Pipe, Cigarettes     Start date:      Quit date: 1971     Years since quittin.5    Smokeless tobacco: Never   Vaping Use    Vaping status: Never Used   Substance and Sexual Activity    Alcohol use: Not Currently     Comment: rarely..Per allscripts, drinks alcohol very infrequently    Drug use: No    Sexual activity: Not Currently   Social History Narrative    Copy of advanced directive obtained    Exercising regularly-primary form of exercise is work    Recreational activities-he enjoys home [employer] and wood working    Travel history-Pt denies being out of the country during 2014-11/10/2014     Social Drivers of Health     Financial Resource Strain: Low Risk  (10/5/2023)    Overall Financial Resource Strain (CARDIA)     Difficulty of Paying Living Expenses: Not hard at all   Food Insecurity: No Food Insecurity (2025)    Nursing - Inadequate Food Risk Classification     Worried About Running Out of Food in the Last Year: Never true     Ran Out of Food in the Last Year: Never true     Ran Out of Food in the Last Year: Never true   Transportation Needs: No Transportation Needs (2025)    Nursing - Transportation Risk Classification     Lack of Transportation: No   Intimate Partner Violence: Unknown (2025)    Nursing IPS     Physically Hurt by Someone: No     Hurt or Threatened by Someone: No   Housing Stability: Unknown (2025)    Nursing: Inadequate Housing Risk Classification     Unable to Pay for Housing in the Last Year: No     Has Housing: No

## 2025-06-25 NOTE — ANESTHESIA POSTPROCEDURE EVALUATION
Post-Op Assessment Note    CV Status:  Stable    Pain management: adequate       Mental Status:  Alert and awake   Hydration Status:  Euvolemic   PONV Controlled:  Controlled   Airway Patency:  Patent     Post Op Vitals Reviewed: Yes    No anethesia notable event occurred.    Staff: Anesthesiologist         Last Filed PACU Vitals:  Vitals Value Taken Time   Temp 98.8 °F (37.1 °C) 06/25/25 15:00   Pulse 68 06/25/25 15:00   /61 06/25/25 15:00   Resp 16 06/25/25 15:00   SpO2 94 % 06/25/25 15:00   Vitals shown include unfiled device data.    Modified Magdy:     Vitals Value Taken Time   Activity 2 06/25/25 15:00   Respiration 2 06/25/25 15:00   Circulation 2 06/25/25 15:00   Consciousness 2 06/25/25 15:00   Oxygen Saturation 1 06/25/25 15:00     Modified Magdy Score: 9

## 2025-06-25 NOTE — OP NOTE
OPERATIVE REPORT  PATIENT NAME: Dawood Ramesh    :  1945  MRN: [id number]  Pt Location: [facility] ROOM 04    SURGERY DATE: 2025    Surgeons and Role:     * Roger Banuelos MD - Primary     * Marion Gleason PA-C - Assisting    Preop Diagnosis:  Urinary retention secondary to enlarged prostate and outlet obstruction  Acquired bladder diverticulum [N32.3]    Post-Op Diagnosis Codes:     * Acquired bladder diverticulum [N32.3]  Urinary retention secondary to enlarged prostate and outlet obstruction    Procedure(s):  PROSTATECTOMY.SUPRPUBIC LAPAROSCOPIC WITH ROBOTICS  DIVERTICULECTOMY BLADDER/URETHRAL LAPAROSCOPIC W ROBOTICS    Specimen(s):  ID Type Source Tests Collected by Time Destination   1 : Prostate adenoma Tissue Prostate TISSUE EXAM Roger Banuelos MD 2025 1209    2 : Baldder diverticulum Tissue Urinary Bladder TISSUE EXAM Roger Banuelos MD 2025 1210        Estimated Blood Loss:   300 mL    Drains:  Closed/Suction Drain LLQ Bulb 19 Fr. (Active)   Number of days: 0       NG/OG/Enteral Tube Orogastric 18 Fr Center mouth (Active)   Number of days: 0       Urethral Catheter Non-latex;Three way 24 Fr. (Active)   Reasons to continue Urinary Catheter  Accurate I&O assessment in critically ill patients (48 hr. max) 25 1257   Goal for Removal Voiding trial when ambulation improves 25 1257   Site Assessment Clean;Skin intact 25 1257   Collection Container Standard drainage bag 25 1257   Securement Method Securing device (Describe) 25 1257   CBI Rate Other (Comment) 25 1206   CBI Irrigant Normal saline 25 1206   Number of days: 0       [REMOVED] Urethral Catheter Coude 18 Fr. (Removed)   Number of days: 28       [REMOVED] Urethral Catheter Coude 18 Fr. (Removed)   Number of days: 28       [REMOVED] Urethral Catheter Coude 18 Fr. (Removed)   Number of days: 11       [REMOVED] Urethral Catheter Coude 18 Fr. (Removed)   Number of days: 7       [REMOVED] Urethral  Catheter Coude 18 Fr. (Removed)   Number of days: 25       Anesthesia Type:   General    Operative Indications:  Patient with history of longstanding voiding issues status post UroLift in 2018 with large prostate who developed a large right anterior lateral wall bladder diverticulum with elevated PVRs and then went on to harris retention with catheter in place who elected for surgery with the hopes of becoming catheter free.  Given the presence of enlarged prostate as well as a very large bladder diverticulum he was recommended to undergo a robotic diverticulectomy as well as a robotic simple prostatectomy as it was thought that outlet surgery alone may not be enough to help him void efficiently.    Operative Findings:  Large right lateral wall bladder diverticulum adherent to surrounding tissue.  Right stent temporary placed during dissection to reduce risk of ureteral injury.  Robotic simple prostatectomy performed with circumferential vesicourethral anastomosis       Complications:   None    Procedure and Technique:    The patient was brought to the operating room.  Anesthesia was induced.  He was given antibiotics in the form of Ancef.  He was moved to dorsal lithotomy position.  He was prepped and draped in standard sterile fashion.    A time-out was performed identifying the correct patient site and procedure.  A Correa catheter was placed into the bladder.  A Veress needle was used in the midline above the umbilicus to gain access to the peritoneum which was confirmed with a drop test of saline.  The abdomen was insufflated to a pressure 15 mmHg without issues.  A 8 mm robotic trocar was placed at the midline 18 cm cephalad to pubic symphysis.  A 30 camera was passed through the trocar.  The abdomen was surveyed.  The remainder of the port sites were mapped out and placed under direct vision after first anesthetizing each location with a mixture of bupivacaine with Exparel.  This included 3 other robotic ports  as well as a 12 mm AirSeal port and a 5 mm port.    The placed was placed in steep Trendelenburg position.  The bladder was distended with saline.  With this we could appreciate the bladder filling as well as right side diverticulum.  The peritoneum over the right sided diverticulum was incised.  The diverticulum was identified.  He had a significant inflammatory rind around it.  We carefully dissected around the diverticulum.  This was challenging because of inflammatory tissue and multiple vessels of varying sizes.  What appeared to be a small arterial bleeder appreciated likely coming off the internal iliac was oversewn with a 4-0 Prolene.  As we dissected around the diverticulum and then she was made near the neck anteriorly.  This was closed with a Vicryl suture but with further dissection other small entries were created because the diverticulum tissue had adherent rind around it.  As we got closer to circumferentially dissecting out the diverticulum we then incised the bladder in the midline using cautery on the peritoneum and muscle and sharply incising the mucosa.  The bladder wall was quite thick.  He was open for a length of approximately 12 cm.  The balloon was taken down.  We identified the diverticular neck and stuffed the diverticulum with a lap pad to help show its borders more distinctly.  We placed a 5 Icelandic open-ended catheter in the right ureter up to 25 cm to help identify the ureter.  We then continued dissection of the diverticulum.  Great care was taken to the right on the diverticulum posteriorly especially where the ureter would have been expected to potentially course along the diverticulum.  With time and effort we eventually were able to get the diverticulum to its neck only.  It was then incised internally as well as externally and the diverticulum removed.  There were no signs of ureteral injury.   The opening in bladder created was approximately 2 cm superior lateral to the right  ureteral orifice.  We closed the diverticular opening using first a 3-0 v-Loc on the mucosa internally and then a 2-0 Vicryl on the muscle externally. The tissue came together well.  The stent in the ureter moves easily suggesting it had not been incorporated in a suture.    We now placed stay sutures were placed at 4 locations in the quadrants of the incised bladder to create a open working field inside the bladder.    Attention was now turned to simple prostatectomy.  The mucosa around the bladder neck was scored circumferentially.  It was then entered and the prostate adenoma was dissected with a combination of sharp and blunt dissection with cautery as needed.  A robotic Cobra grasper was used through the 4th arm to help gain traction on the adenoma to facilitate visualization and dissection as we got deeper.  Care was taken to ensure the correct tissue plane throughout dissection.  There was moderate bleeding which was addressed as much as possible during dissection with spot cautery.  As we got closer to removing the adenoma the catheter was advanced back into the bladder to ensure the urethra could be visualized.  The urethra was transected and this freed the adenoma.  It was removed from the bladder and placed in a specimen bag.    Attention was now turned to obtaining hemostasis within the prostatic fossa.  Using a combination of monopolar and bipolar energy bleeding points in the fossa were cauterized with good effect.  We turned the pressure down and evaluated the prostatic fossa as well as the area of diverticular dissection outside the bladder and there was no signs of significant bleeding.    Attention was now turned to mucosal advancement which was performed in a circumferential fashion using two 3-0 V lock sutures advancing the bladder neck mucosa to the urethra with good effect.  The ureteral orifice ease were evaluated during closure and both were seen to make clear urine.    A new 24 French 3 way  Correa catheter was then placed into the bladder without difficulty.  Attention was turned to bladder closure which was performed in 2 layers 1st using a 3-0 V lock and after the bladder was closed it was filled with approximately 300 cc of saline and did not show evidence of leak from the midline cystotomy nor the bladder diverticulum reconstruction area.  A second layer suture was placed to close the peritoneum over cystotomy site.  The bladder was drained and the catheter was hooked up to CBI.    A drain was placed through the left lateral robotic port site.  The 12 mm assistant port was removed and the area closed with an endo-close using 0 Vicryl suture.    The robotic ports were then removed and the abdomen was desufflated.  The midline incision was opened further and the specimen bag was extracted through this site.  It was then closed using 2 0 Vicryl sutures.  All port sites were then closed superficially with 4-0 Monocryl suture and skin glue.    The patient's urine remained a light pink on CBI.    The patient was woken from anesthesia transferred to PACU in stable condition.    An assistant surgeon was required for this case because of the complex nature of the dissection and reconstruction required for his large gland     A qualified resident physician was not available    Patient Disposition:  PACU       Plan:  The patient will be monitored overnight on CBI.  If his CBI is able to be weaned off with appropriate labs than he will be discharged home with a catheter in place with plan for removal in 2 weeks after which the catheter will be removed.      A qualified resident physician was not available.    Patient Disposition:  PACU          SIGNATURE: Roger Banuelos MD  DATE: June 25, 2025  TIME: 1:03 PM

## 2025-06-25 NOTE — ASSESSMENT & PLAN NOTE
No history of prior memory issues or cognitive impairment   Alert and oriented x 4 on exam today  Patient remains independent with ADLs/IADLs  Drives, able to manage medications at home  Denies any recent falls  Most recent TSH on 07/09/2024 noted to be 2.398  No vitamin B12 level to review  Recommend checking vitamin b12 level outpatient with PCP  Recommend vitamin b12 supplementation for optimal level > 400  No brain/head imaging available to review  No MOCA charted in epic   Keep physically, mentally, and socially active    Monitor blood pressure with those diagnosed with hypertension, studies show that lowering blood pressure can help prevent cognitive decline

## 2025-06-25 NOTE — CONSULTS
NEPHROLOGY HOSPITAL CONSULTATION   Dawood Ramesh 80 y.o. male MRN: [id number]  Unit/Bed#: OR [place] Encounter: [id number]    Assessment & Plan  Stage 3a chronic kidney disease (HCC)  Lab Results   Component Value Date    EGFR 60 06/02/2025    EGFR 53 02/11/2025    EGFR 52 07/22/2024    CREATININE 1.13 06/02/2025    CREATININE 1.26 02/11/2025    CREATININE 1.28 07/22/2024   Baseline creatinine around 1.1-1.3.  No nephrology follow up outpatient.    CARISSA in 2019 and 2020 noted.  Patient has been stable from a renal lab standpoint since then.   Benign hypertension with CKD (chronic kidney disease) stage III (HCC)  - BP acceptable currently  - avoid operative hypotension  - hold lisinopril  - continue amlodipine  Encounter for perioperative consultation  - repeat BMP post op  - avoid nephrotoxins  - avoid NSAIDs  - avoid IV contrast  - avoid hypotension  Elevated PSA  - OR 6/25/25 for prostatectomy and bladder diverticulectomy  Urinary retention due to benign prostatic hyperplasia  - chronic pereira since March 2025  - urology following      HISTORY OF PRESENT ILLNESS:  Requesting Physician: Roger Banuelos MD  Reason for Consult: perioperative    Dawood Ramesh is a 80 y.o. male who was admitted to [facility] after presenting for prostatectomy and bladder diverticulectomy.  As an outpatient he had an elevated PSA and has a chronic pereira catheter since March 2025 due to urinary retention and BPH.  He had urolift procedure in 2018.  A renal consultation is requested today for assistance in the management of perioperative management of his CKD and prevention of CARISSA.  He is currently seen in the PACU.  He denies SOB.  He has pain at incision sites.  Nurse aware and is administering pain medication.  He denies knowledge of kidney problems in the past.      PAST MEDICAL HISTORY:  Past Medical History[1]    PAST SURGICAL HISTORY:  Past Surgical History[2]    ALLERGIES:  Allergies[3]    SOCIAL HISTORY:  Social  "History     Substance and Sexual Activity   Alcohol Use Not Currently    Comment: rarely..Per allscripts, drinks alcohol very infrequently     Social History     Substance and Sexual Activity   Drug Use No     Tobacco Use History[4]    FAMILY HISTORY:  Family History[5]    MEDICATIONS:  Current Medications[6]    REVIEW OF SYSTEMS:  All the systems were reviewed and were negative except as documented on the HPI.    PHYSICAL EXAM:  Current Weight: Weight - Scale: 113 kg (249 lb)  First Weight: Weight - Scale: 116 kg (255 lb)  Vitals:    06/03/25 1045 06/25/25 0655   BP:  121/77   Pulse:  75   Resp:  18   Temp:  (!) 96.9 °F (36.1 °C)   TempSrc:  Temporal   SpO2:  91%   Weight: 116 kg (255 lb) 113 kg (249 lb)   Height: 5' 9\" (1.753 m) 5' 10\" (1.778 m)     No intake or output data in the 24 hours ending 06/25/25 0855  Physical Exam  General: NAD  Neuro: alert awake  Psych: mood and affect appropriate  Skin: no rash  Eyes: anicteric  ENMT: mm moist  Neck: no masses  Respiratory: ctab  Cardiovascular: rrr  Extremities: no edema  Gastrointestinal: soft nt nd    Invasive Devices:   Urethral Catheter Coude 18 Fr. (Active)     Lab Results:   Pending this admission.  Outpatient blood work reviewed.          [1]   Past Medical History:  Diagnosis Date    Acute gouty arthropathy     last assessed-11/15/2013    Cataract     Diverticulitis of colon     Enlarged prostate     Gout     Hearing loss     Heart disease     History of diverticulitis of colon     History of dizziness     Hypercholesterolemia     Hyperlipidemia     Hypertension     Irregular heart beat     atrial fibrillation    Palpitations     Sinus bradycardia    [2]   Past Surgical History:  Procedure Laterality Date    ARM NEUROPLASTY Left     1977    CARDIAC PACEMAKER PLACEMENT Left     CARDIAC SURGERY  05/01/2019    pace maker placed    CATARACT EXTRACTION, BILATERAL  2021    COLONOSCOPY      CYSTOSCOPY  12/27/2017    diagnostic    FOREARM FRACTURE SURGERY      " ORCHIECTOMY      surgery testis    AR CYSTO INSERTION TRANSPROSTATIC IMPLANT SINGLE N/A 2018    Procedure: CYSTOSCOPY WITH INSERTION UROLIFT;  Surgeon: Bernardo Billingsley MD;  [facility];  Service: Urology    AR CYSTOURETHROSCOPY WITH BIOPSY N/A 2018    Procedure: BLADDER BIOPSY;  Surgeon: Bernardo Billingsley MD;  [facility];  Service: Urology    TESTICLE SURGERY Right    [3] No Known Allergies  [4]   Social History  Tobacco Use   Smoking Status Former    Current packs/day: 0.00    Average packs/day: 0.3 packs/day for 10.0 years (2.5 ttl pk-yrs)    Types: Cigars, Pipe, Cigarettes    Start date:     Quit date:     Years since quittin.5   Smokeless Tobacco Never   [5]   Family History  Problem Relation Name Age of Onset    Coronary artery disease Mother      Heart disease Mother      Hypertension Mother      Stroke Mother      Coronary artery disease Father      Stroke Family fx     Heart attack Family fx         acute MI   [6] No current facility-administered medications for this encounter.    Facility-Administered Medications Ordered in Other Encounters:     dexamethasone (PF) (DECADRON) injection, , Intravenous, PRN, Apple Handy CRNA, 10 mg at 25 0736    fentaNYL injection, , Intravenous, PRN, Apple Handy CRNA, 50 mcg at 25 0835    lactated ringers infusion, , Intravenous, Continuous PRN, Apple Handy CRNA, New Bag at 25 0729    lidocaine (PF) (XYLOCAINE-MPF) 1 % injection, , Intravenous, PRN, Apple Handy CRNA, 50 mg at 25 0736    phenylephrine (PRATIBHA-SYNEPHRINE) 50 mg (STANDARD CONCENTRATION) in sodium chloride 0.9% 250 mL, , Intravenous, Continuous PRN, Apple Handy CRNA, Stopped at 25 0810    propofol (DIPRIVAN) 200 MG/20ML bolus injection, , Intravenous, PRN, Apple Handy CRNA, 120 mg at 25 0736    ROCuronium (ZEMURON) injection, , Intravenous, PRN, Apple  Stefanie Handy CRNA, 20 mg at 06/25/25 0735

## 2025-06-25 NOTE — ASSESSMENT & PLAN NOTE
- BP acceptable currently  - avoid operative hypotension  - hold lisinopril  - continue amlodipine

## 2025-06-25 NOTE — ASSESSMENT & PLAN NOTE
Lab Results   Component Value Date    EGFR 53 06/26/2025    EGFR 27 06/25/2025    EGFR 60 06/02/2025    CREATININE 1.25 06/26/2025    CREATININE 2.19 (H) 06/25/2025    CREATININE 1.13 06/02/2025     Nephrology following

## 2025-06-25 NOTE — ASSESSMENT & PLAN NOTE
Lab Results   Component Value Date    EGFR 53 06/26/2025    EGFR 27 06/25/2025    EGFR 60 06/02/2025    CREATININE 1.25 06/26/2025    CREATININE 2.19 (H) 06/25/2025    CREATININE 1.13 06/02/2025     Baseline creatinine: 1-1.0  Continue to monitor kidney function  Monitor I/O's  Encourage PO hydration   Avoid hypotension  Avoid nephrotoxins, IV contrast

## 2025-06-25 NOTE — INTERVAL H&P NOTE
H&P reviewed. After examining the patient I find no changes in the patients condition since the H&P had been written.    Vitals:    06/25/25 0655   BP: 121/77   Pulse: 75   Resp: 18   Temp: (!) 96.9 °F (36.1 °C)   SpO2: 91%     The patient was seen and examined in preoperative holding.  We we discussed the plan for robotic simple prostatectomy and bladder diverticulectomy with postoperative hospital stay with catheter and drain.    His preoperative urine culture grew Proteus and he was put on Bactrim antibiotic    Exam  No acute distress  Regular rate rhythm  Clear to auscultation bilaterally  Abdomen soft nontender nondistended  Correa catheter in place draining yellow slightly cloudy urine.  No CVA tenderness to palpation bilaterally  Lower extremities no edema

## 2025-06-25 NOTE — ANESTHESIA PREPROCEDURE EVALUATION
"Procedure:  PROSTATECTOMY,SUPRPUBIC LAPAROSCOPIC WITH ROBOTICS (Abdomen)  DIVERTICULECTOMY BLADDER/URETHRAL LAPAROSCOPIC W ROBOTICS (Flank)    Relevant Problems   CARDIO   (+) Benign essential hypertension   (+) Hypercholesterolemia   (+) PVCs (premature ventricular contractions)   (+) Pacemaker   (+) Paroxysmal atrial fibrillation (HCC)      ENDO   (+) Primary hyperparathyroidism (HCC)      /RENAL   (+) Stage 3a chronic kidney disease (HCC)      MUSCULOSKELETAL   (+) Chronic bilateral low back pain without sciatica      NEURO/PSYCH   (+) Chronic bilateral low back pain without sciatica      PULMONARY   (+) Obstructive sleep apnea      ECHO 4/30/25:   Left Ventricle: Left ventricular cavity size is normal. Wall thickness is moderately increased. There is moderate asymmetric hypertrophy. The left ventricular ejection fraction is 55%. Systolic function is normal. Wall motion is normal. Diastolic function is mildly abnormal, consistent with grade I (abnormal) relaxation.    IVS: There is abnormal septal motion consistent with right ventricular pacing.    Right Ventricle: Systolic function is mildly reduced.    Aortic Valve: There is aortic valve sclerosis.    Mitral Valve: There is mild regurgitation.    Tricuspid Valve: There is mild to moderate regurgitation.    Aorta: The aortic root is upper normal in size based on BSA. The ascending aorta is normal in size. The aortic root is 4.30 cm (1.86cm/m2). The ascending aorta is 3.8 cm (1.64 cm/m2).    MDT-DUAL CHAMBER PPM (AAIR-DDDR MODE)/ACTIVE SYSTEM IS MRI CONDITIONAL  CARELINK TRANSMISSION: PRESENTING EGRAM AP @ 80 BPM. BATTERY STATUS \"4 YRS.\" AP 93%  100%. ALL AVAILABLE LEAD PARAMETERS WITHIN NORMAL LIMITS. 4 AT/AF NOTED; 2% BURDEN; LONGEST 29.18 HRS; PT ON ELIQUIS. AVAIL EGRAMS SHOWING AF. NORMAL DEVICE FUNCTION. NC         Specimen Collected: 03/06/25 01:31 Last Resulted: 03/06/25 01:31        Physical Exam    Airway     Mallampati score: II  TM Distance: >3 " FB  Neck ROM: full      Cardiovascular      Dental        Pulmonary      Neurological    He appears awake, alert and oriented x3.      Other Findings        Anesthesia Plan  ASA Score- 3     Anesthesia Type- general with ASA Monitors.         Additional Monitors:     Airway Plan: Oral ETT.           Plan Factors-Exercise tolerance (METS): >4 METS.    Chart reviewed. EKG reviewed.   Patient summary reviewed.    Patient is not a current smoker.  Patient did not smoke on day of surgery.            Induction- intravenous.    Postoperative Plan- Plan for postoperative opioid use.   Monitoring Plan - Monitoring plan - standard ASA monitoring  Post Operative Pain Plan - plan for postoperative opioid use and multimodal analgesia    Perioperative Resuscitation Plan - Level 1 - Full Code.       Informed Consent- Anesthetic plan and risks discussed with patient.  I personally reviewed this patient with the CRNA. Discussed and agreed on the Anesthesia Plan with the CRNA..      NPO Status:  Vitals Value Taken Time   Date of last liquid 06/24/25 06/25/25 06:56   Time of last liquid 2300 06/25/25 06:56   Date of last solid 06/24/25 06/25/25 06:56   Time of last solid 1300 06/25/25 06:56

## 2025-06-25 NOTE — PLAN OF CARE
Problem: PAIN - ADULT  Goal: Verbalizes/displays adequate comfort level or baseline comfort level  Description: Interventions:  - Encourage patient to monitor pain and request assistance  - Assess pain using appropriate pain scale  - Administer analgesics as ordered based on type and severity of pain and evaluate response  - Implement non-pharmacological measures as appropriate and evaluate response  - Consider cultural and social influences on pain and pain management  - Notify physician/advanced practitioner if interventions unsuccessful or patient reports new pain  - Educate patient/family on pain management process including their role and importance of  reporting pain   - Provide non-pharmacologic/complimentary pain relief interventions  Outcome: Progressing     Problem: INFECTION - ADULT  Goal: Absence or prevention of progression during hospitalization  Description: INTERVENTIONS:  - Assess and monitor for signs and symptoms of infection  - Monitor lab/diagnostic results  - Monitor all insertion sites, i.e. indwelling lines, tubes, and drains  - Monitor endotracheal if appropriate and nasal secretions for changes in amount and color  - [place] appropriate cooling/warming therapies per order  - Administer medications as ordered  - Instruct and encourage patient and family to use good hand hygiene technique  - Identify and instruct in appropriate isolation precautions for identified infection/condition  Outcome: Progressing  Goal: Absence of fever/infection during neutropenic period  Description: INTERVENTIONS:  - Monitor WBC  - Perform strict hand hygiene  - Limit to healthy visitors only  - No plants, dried, fresh or silk flowers with bermeo in patient room  Outcome: Progressing     Problem: SAFETY ADULT  Goal: Patient will remain free of falls  Description: INTERVENTIONS:  - Educate patient/family on patient safety including physical limitations  - Instruct patient to call for assistance with activity   -  Consider consulting OT/PT to assist with strengthening/mobility based on AM PAC & JH-HLM score  - Consult OT/PT to assist with strengthening/mobility   - Keep Call bell within reach  - Keep bed low and locked with side rails adjusted as appropriate  - Keep care items and personal belongings within reach  - Initiate and maintain comfort rounds  - Make Fall Risk Sign visible to staff  - Offer Toileting every  Hours, in advance of need  - Initiate/Maintain alarm  - Obtain necessary fall risk management equipment:   - Apply yellow socks and bracelet for high fall risk patients  - Consider moving patient to room near nurses station  Outcome: Progressing  Goal: Maintain or return to baseline ADL function  Description: INTERVENTIONS:  -  Assess patient's ability to carry out ADLs; assess patient's baseline for ADL function and identify physical deficits which impact ability to perform ADLs (bathing, care of mouth/teeth, toileting, grooming, dressing, etc.)  - Assess/evaluate cause of self-care deficits   - Assess range of motion  - Assess patient's mobility; develop plan if impaired  - Assess patient's need for assistive devices and provide as appropriate  - Encourage maximum independence but intervene and supervise when necessary  - Involve family in performance of ADLs  - Assess for home care needs following discharge   - Consider OT consult to assist with ADL evaluation and planning for discharge  - Provide patient education as appropriate  - Monitor functional capacity and physical performance, use of AM PAC & JH-HLM   - Monitor gait, balance and fatigue with ambulation    Outcome: Progressing  Goal: Maintains/Returns to pre admission functional level  Description: INTERVENTIONS:  - Perform AM-PAC 6 Click Basic Mobility/ Daily Activity assessment daily.  - Set and communicate daily mobility goal to care team and patient/family/caregiver.   - Collaborate with rehabilitation services on mobility goals if consulted  -  Perform Range of Motion  times a day.  - Reposition patient every  hours.  - Dangle patient  times a day  - Stand patient  times a day  - Ambulate patient  times a day  - Out of bed to chair  times a day   - Out of bed for meals times a day  - Out of bed for toileting  - Record patient progress and toleration of activity level   Outcome: Progressing     Problem: DISCHARGE PLANNING  Goal: Discharge to home or other facility with appropriate resources  Description: INTERVENTIONS:  - Identify barriers to discharge w/patient and caregiver  - Arrange for needed discharge resources and transportation as appropriate  - Identify discharge learning needs (meds, wound care, etc.)  - Arrange for interpretive services to assist at discharge as needed  - Refer to Case Management Department for coordinating discharge planning if the patient needs post-hospital services based on physician/advanced practitioner order or complex needs related to functional status, cognitive ability, or social support system  Outcome: Progressing     Problem: Knowledge Deficit  Goal: Patient/family/caregiver demonstrates understanding of disease process, treatment plan, medications, and discharge instructions  Description: Complete learning assessment and assess knowledge base.  Interventions:  - Provide teaching at level of understanding  - Provide teaching via preferred learning methods  Outcome: Progressing

## 2025-06-25 NOTE — ASSESSMENT & PLAN NOTE
Lab Results   Component Value Date    EGFR 60 06/02/2025    EGFR 53 02/11/2025    EGFR 52 07/22/2024    CREATININE 1.13 06/02/2025    CREATININE 1.26 02/11/2025    CREATININE 1.28 07/22/2024   Baseline creatinine around 1.1-1.3.  No nephrology follow up outpatient.    CARISSA in 2019 and 2020 noted.  Patient has been stable from a renal lab standpoint since then.

## 2025-06-25 NOTE — CONSULTS
Consultation - Geriatric Medicine   Name: Dawood Ramesh 80 y.o. male I MRN: [id number]  Unit/Bed#: S -01 I Date of Admission: 6/25/2025   Date of Service: 6/26/2025 I Hospital Day: 1   Inpatient consult to Gerontology  Consult performed by: BRENNAN Colon  Consult ordered by: BRENNAN Powers        Physician Requesting Evaluation: Roger Banuelos MD   Reason for Evaluation / Principal Problem: geriatric assessment     Assessment & Plan  Encounter for geriatric assessment  No history of prior memory issues or cognitive impairment   Alert and oriented x 4 on exam today  Patient remains independent with ADLs/IADLs  Drives, able to manage medications at home  Denies any recent falls  Most recent TSH on 07/09/2024 noted to be 2.398  No vitamin B12 level to review  Recommend checking vitamin b12 level outpatient with PCP  Recommend vitamin b12 supplementation for optimal level > 400  No brain/head imaging available to review  No MOCA charted in epic   Keep physically, mentally, and socially active    Monitor blood pressure with those diagnosed with hypertension, studies show that lowering blood pressure can help prevent cognitive decline    Elevated PSA  PSA on 10/05/2023 ws 5.83  Follows outpatient with [facility]  S/p robotic simple prostatectomy and bladder diverticulectomy   Stage 3a chronic kidney disease (HCC)  Lab Results   Component Value Date    EGFR 53 06/26/2025    EGFR 27 06/25/2025    EGFR 60 06/02/2025    CREATININE 1.25 06/26/2025    CREATININE 2.19 (H) 06/25/2025    CREATININE 1.13 06/02/2025     Baseline creatinine: 1-1.0  Continue to monitor kidney function  Monitor I/O's  Encourage PO hydration   Avoid hypotension  Avoid nephrotoxins, IV contrast    Urinary retention due to benign prostatic hyperplasia  Follows with [facility] outpatient, seen on 06/04/2025  S/p robotic prostatectomy and diverticulectomy on 06/25  Correa catheter draining well  Benign hypertension with  CKD (chronic kidney disease) stage III ([facility])  Lab Results   Component Value Date    EGFR 53 06/26/2025    EGFR 27 06/25/2025    EGFR 60 06/02/2025    CREATININE 1.25 06/26/2025    CREATININE 2.19 (H) 06/25/2025    CREATININE 1.13 06/02/2025     Nephrology following   Acquired bladder diverticulum  Status post robotic prostatectomy and diverticulectomy  POD#1  Currently denies pain, catheter draining well, GEOVANY drain noted in LLQ  CARISSA (acute kidney injury) ([facility])  Nephrology consulted  Secondary to hemodynamic changes in setting of recent surgery      History of Present Illness   Hx and PE limited by: N/A  HPI: Dawood Ramesh is a 80 y.o. year old male who presents with medical problems including, not limited to, CKD, hypertension, PAF, BPH, and ELLYN.    Dawood presented to the hospital for an elective robotic simple prostatectomy with diverticulectomy with Urology.  He is seen today for a geriatric assessment on POD#1.      Dawood is seen today POD#1.  He is sitting up out of bed, in the recliner.  Alert and oriented x 4.  Currently denies pain.  States his surgery went well.  States he did not sleep well overnight.  He lives with his wife, at baseline he does not walk with an assistive device.  He is independent with ADLs/IADLs.  Drives.  He states his wife helps to re-order his medications because he has difficulty hearing on the phone, but he manages them. Denies difficulty eating/drinking/taking medications.      Review of Systems   Constitutional:  Negative for activity change, appetite change, fatigue and fever.   HENT:  Negative for congestion and rhinorrhea.    Eyes:  Negative for pain and visual disturbance.        Reading glasses   Respiratory:  Negative for cough and shortness of breath.    Cardiovascular:  Negative for chest pain and leg swelling.   Gastrointestinal:  Negative for abdominal pain and constipation.   Genitourinary:  Positive for difficulty urinating. Negative for dysuria.   Musculoskeletal:   Negative for arthralgias.   Skin:  Negative for color change and rash.   Neurological:  Negative for dizziness, syncope and weakness.   Psychiatric/Behavioral:  Negative for behavioral problems and confusion.    All other systems reviewed and are negative.          Medical History Review: I have reviewed the patient's PMH, PSH, Social History, Family History, Meds, and Allergies   Historical Information   Past Medical History[1]  Past Surgical History[2]  Social History[3]  E-Cigarette/Vaping    E-Cigarette Use Never User      E-Cigarette/Vaping Substances    Nicotine No     THC No     CBD No     Flavoring No     Other No     Unknown No      Family History[4]  Social History[5]    Current Facility-Administered Medications:     acetaminophen (TYLENOL) tablet 650 mg, Q6H SHANTE    allopurinol (ZYLOPRIM) tablet 300 mg, Daily    amLODIPine (NORVASC) tablet 5 mg, Daily    docusate sodium (COLACE) capsule 100 mg, BID    gabapentin (NEURONTIN) capsule 100 mg, TID    heparin (porcine) subcutaneous injection 5,000 Units, Q8H SHANTE    HYDROmorphone (DILAUDID) injection 0.5 mg, Q2H PRN    lactated ringers bolus 1,000 mL, Once PRN **AND** lactated ringers bolus 1,000 mL, Once PRN    metoprolol succinate (TOPROL-XL) 24 hr tablet 25 mg, Daily    ondansetron (ZOFRAN) injection 4 mg, Q6H PRN    phenol (CHLORASEPTIC) 1.4 % mucosal liquid 1 spray, Q2H PRN    pravastatin (PRAVACHOL) tablet 80 mg, Q48H    senna (SENOKOT) tablet 8.6 mg, Daily    sodium chloride 0.9 % bolus 1,000 mL, Once PRN **AND** sodium chloride 0.9 % bolus 1,000 mL, Once PRN  Patient has no known allergies.    Meds/Allergies   Home medication review  Simvastatin 40 mg tablet every other day  Allopurinol 300 mg tablet daily  Eliquis 5 mg tablet BID  Metoprolol succinate 25 mg tablet daily  Tamsulosin 0.4 mg capsule BID  Finasteride 5 mg tablet daily  Amlodipine 5 mg tablet daily  Meclizine 25 mg tablet, patient takes 1 tablet in AM, 0.5 tablet at HS  Lisinopril 10 mg  tablet daily    Personally confirmed with patient     Objective :  Temp:  [97.1 °F (36.2 °C)-98.8 °F (37.1 °C)] 97.9 °F (36.6 °C)  HR:  [68-81] 73  BP: (105-144)/(52-83) 131/81  Resp:  [12-37] 18  SpO2:  [91 %-99 %] 95 %  O2 Device: None (Room air)    Physical Exam  Vitals and nursing note reviewed.   Constitutional:       General: He is not in acute distress.     Appearance: Normal appearance. He is well-developed.   HENT:      Head: Normocephalic and atraumatic.     Eyes:      Conjunctiva/sclera: Conjunctivae normal.       Cardiovascular:      Rate and Rhythm: Normal rate and regular rhythm.      Heart sounds: No murmur heard.  Pulmonary:      Effort: Pulmonary effort is normal. No respiratory distress.      Breath sounds: Normal breath sounds.   Abdominal:      Palpations: Abdomen is soft.      Tenderness: There is no abdominal tenderness.      Comments: GEOVANY drain in LLQ   Genitourinary:     Comments: catheter    Musculoskeletal:         General: No swelling.      Cervical back: Neck supple.     Skin:     General: Skin is warm and dry.      Capillary Refill: Capillary refill takes less than 2 seconds.     Neurological:      Mental Status: He is alert and oriented to person, place, and time. Mental status is at baseline.     Psychiatric:         Mood and Affect: Mood normal.         Behavior: Behavior normal.         Thought Content: Thought content normal.         Judgment: Judgment normal.           Lab Results: I have reviewed the following results:CBC/BMP:   .     06/26/25  0202   WBC 15.32*   HGB 12.1   HCT 36.9      SODIUM 136   K 4.5      CO2 20*   BUN 21   CREATININE 1.25   GLUC 147*    , TSH:       Imaging Results Review: I reviewed radiology reports from this admission including: Ultrasound(s) and Echocardiogram.  Other Study Results Review: No additional pertinent studies reviewed.      VTE Prophylaxis: VTE covered by:  heparin (porcine), Subcutaneous, 5,000 Units at 06/26/25 0530     and  Sequential compression device (Venodyne)     Code Status: Prior  Advance Directive and Living Will: Yes        Family and Social Support: Spouse: Michelle, Son: Rik       Goals of Care: TBD    I have spent a total time of 75 minutes in caring for this patient on the day of the visit/encounter including Diagnostic results, Prognosis, Risks and benefits of tx options, Instructions for management, Patient and family education, Importance of tx compliance, Risk factor reductions, Impressions, Counseling / Coordination of care, Documenting in the medical record, Reviewing/placing orders in the medical record (including tests, medications, and/or procedures), Obtaining or reviewing history  , and Communicating with other healthcare professionals .         [1]   Past Medical History:  Diagnosis Date    Acute gouty arthropathy     last assessed-11/15/2013    Cataract     Diverticulitis of colon     Enlarged prostate     Gout     Hearing loss     Heart disease     History of diverticulitis of colon     History of dizziness     Hypercholesterolemia     Hyperlipidemia     Hypertension     Irregular heart beat     atrial fibrillation    Palpitations     Sinus bradycardia    [2]   Past Surgical History:  Procedure Laterality Date    ARM NEUROPLASTY Left     1977    CARDIAC PACEMAKER PLACEMENT Left     CARDIAC SURGERY  05/01/2019    pace maker placed    CATARACT EXTRACTION, BILATERAL  2021    COLONOSCOPY      CYSTOSCOPY  12/27/2017    diagnostic    FOREARM FRACTURE SURGERY      ORCHIECTOMY      surgery testis    TN CYSTO INSERTION TRANSPROSTATIC IMPLANT SINGLE N/A 2/9/2018    Procedure: CYSTOSCOPY WITH INSERTION UROLIFT;  Surgeon: Bernardo Billingsley MD;  Location: [facility];  Service: Urology    TN CYSTOTOMY EXCISE BLADDER DIVERTICULUM 1/MULTIPLE N/A 6/25/2025    Procedure: DIVERTICULECTOMY BLADDER/URETHRAL LAPAROSCOPIC W ROBOTICS;  Surgeon: Roger Banuelos MD;  Location: [facility];  Service: Urology    TN  CYSTOURETHROSCOPY WITH BIOPSY N/A 2018    Procedure: BLADDER BIOPSY;  Surgeon: Bernardo Billingsley MD;  [facility];  Service: Urology    HI LAPS SURG [id number] RPBIC RAD W/NRV SPARING ROBOT N/A 2025    Procedure: PROSTATECTOMY,SUPRPUBIC LAPAROSCOPIC WITH ROBOTICS;  Surgeon: Roger Banuelos MD;  Location: [facility];  Service: Urology    TESTICLE SURGERY Right    [3]   Social History  Tobacco Use    Smoking status: Former     Current packs/day: 0.00     Average packs/day: 0.3 packs/day for 10.0 years (2.5 ttl pk-yrs)     Types: Cigars, Pipe, Cigarettes     Start date:      Quit date:      Years since quittin.5    Smokeless tobacco: Never   Vaping Use    Vaping status: Never Used   Substance and Sexual Activity    Alcohol use: Not Currently     Comment: rarely..Per allscripts, drinks alcohol very infrequently    Drug use: No    Sexual activity: Not Currently   [4]   Family History  Problem Relation Name Age of Onset    Coronary artery disease Mother      Heart disease Mother      Hypertension Mother      Stroke Mother      Coronary artery disease Father      Stroke Family fx     Heart attack Family fx         acute MI   [5]   Social History  Tobacco Use    Smoking status: Former     Current packs/day: 0.00     Average packs/day: 0.3 packs/day for 10.0 years (2.5 ttl pk-yrs)     Types: Cigars, Pipe, Cigarettes     Start date:      Quit date:      Years since quittin.5    Smokeless tobacco: Never   Vaping Use    Vaping status: Never Used   Substance and Sexual Activity    Alcohol use: Not Currently     Comment: rarely..Per allscripts, drinks alcohol very infrequently    Drug use: No    Sexual activity: Not Currently

## 2025-06-25 NOTE — ASSESSMENT & PLAN NOTE
PSA on 10/05/2023 ws 5.83  Follows outpatient with [facility]'s Urology  S/p robotic simple prostatectomy and bladder diverticulectomy

## 2025-06-25 NOTE — ASSESSMENT & PLAN NOTE
- repeat BMP post op  - avoid nephrotoxins  - avoid NSAIDs  - avoid IV contrast  - avoid hypotension

## 2025-06-25 NOTE — ANESTHESIA POSTPROCEDURE EVALUATION
Post-Op Assessment Note    CV Status:  Stable  Pain Score: 0    Pain management: adequate       Mental Status:  Awake   Hydration Status:  Stable   PONV Controlled:  None   Airway Patency:  Patent  Two or more mitigation strategies used for obstructive sleep apnea   Post Op Vitals Reviewed: Yes    No anethesia notable event occurred.    Staff: CRNA           Last Filed PACU Vitals:  Vitals Value Taken Time   Temp 98.4    Pulse 74 06/25/25 13:41   /63    Resp 39 06/25/25 13:41   SpO2 94 % 06/25/25 13:41   Vitals shown include unfiled device data.

## 2025-06-26 ENCOUNTER — TELEPHONE (OUTPATIENT)
Age: 80
End: 2025-06-26

## 2025-06-26 ENCOUNTER — TELEPHONE (OUTPATIENT)
Dept: CARDIOLOGY CLINIC | Facility: CLINIC | Age: 80
End: 2025-06-26

## 2025-06-26 VITALS
BODY MASS INDEX: 35.65 KG/M2 | TEMPERATURE: 97.9 F | SYSTOLIC BLOOD PRESSURE: 131 MMHG | HEART RATE: 73 BPM | WEIGHT: 249 LBS | DIASTOLIC BLOOD PRESSURE: 81 MMHG | OXYGEN SATURATION: 95 % | RESPIRATION RATE: 18 BRPM | HEIGHT: 70 IN

## 2025-06-26 DIAGNOSIS — N32.3 BLADDER DIVERTICULUM: Primary | ICD-10-CM

## 2025-06-26 DIAGNOSIS — N40.1 URINARY RETENTION DUE TO BENIGN PROSTATIC HYPERPLASIA: ICD-10-CM

## 2025-06-26 DIAGNOSIS — R33.8 URINARY RETENTION DUE TO BENIGN PROSTATIC HYPERPLASIA: ICD-10-CM

## 2025-06-26 DIAGNOSIS — T83.511D URINARY TRACT INFECTION ASSOCIATED WITH INDWELLING URETHRAL CATHETER, SUBSEQUENT ENCOUNTER: ICD-10-CM

## 2025-06-26 DIAGNOSIS — N39.0 URINARY TRACT INFECTION ASSOCIATED WITH INDWELLING URETHRAL CATHETER, SUBSEQUENT ENCOUNTER: ICD-10-CM

## 2025-06-26 PROBLEM — N17.9 AKI (ACUTE KIDNEY INJURY) (HCC): Status: ACTIVE | Noted: 2025-06-26

## 2025-06-26 LAB
ANION GAP SERPL CALCULATED.3IONS-SCNC: 8 MMOL/L (ref 4–13)
BUN SERPL-MCNC: 21 MG/DL (ref 5–25)
CALCIUM SERPL-MCNC: 8.5 MG/DL (ref 8.4–10.2)
CHLORIDE SERPL-SCNC: 108 MMOL/L (ref 96–108)
CO2 SERPL-SCNC: 20 MMOL/L (ref 21–32)
CREAT SERPL-MCNC: 1.25 MG/DL (ref 0.6–1.3)
ERYTHROCYTE [DISTWIDTH] IN BLOOD BY AUTOMATED COUNT: 14.3 % (ref 11.6–15.1)
GFR SERPL CREATININE-BSD FRML MDRD: 53 ML/MIN/1.73SQ M
GLUCOSE SERPL-MCNC: 147 MG/DL (ref 65–140)
HCT VFR BLD AUTO: 36.9 % (ref 36.5–49.3)
HGB BLD-MCNC: 12.1 G/DL (ref 12–17)
MCH RBC QN AUTO: 31.7 PG (ref 26.8–34.3)
MCHC RBC AUTO-ENTMCNC: 32.5 G/DL (ref 31.4–37.4)
MCV RBC AUTO: 97 FL (ref 82–98)
PLATELET # BLD AUTO: 154 THOUSANDS/UL (ref 149–390)
PMV BLD AUTO: 11 FL (ref 8.9–12.7)
POTASSIUM SERPL-SCNC: 4.5 MMOL/L (ref 3.5–5.3)
RBC # BLD AUTO: 3.79 MILLION/UL (ref 3.88–5.62)
SODIUM SERPL-SCNC: 136 MMOL/L (ref 135–147)
WBC # BLD AUTO: 15.32 THOUSAND/UL (ref 4.31–10.16)

## 2025-06-26 PROCEDURE — 85027 COMPLETE CBC AUTOMATED: CPT | Performed by: UROLOGY

## 2025-06-26 PROCEDURE — 80048 BASIC METABOLIC PNL TOTAL CA: CPT | Performed by: UROLOGY

## 2025-06-26 PROCEDURE — 99223 1ST HOSP IP/OBS HIGH 75: CPT

## 2025-06-26 PROCEDURE — 99232 SBSQ HOSP IP/OBS MODERATE 35: CPT | Performed by: STUDENT IN AN ORGANIZED HEALTH CARE EDUCATION/TRAINING PROGRAM

## 2025-06-26 PROCEDURE — 99024 POSTOP FOLLOW-UP VISIT: CPT | Performed by: UROLOGY

## 2025-06-26 RX ORDER — LEVOFLOXACIN 500 MG/1
500 TABLET, FILM COATED ORAL EVERY 24 HOURS
Qty: 3 TABLET | Refills: 0 | Status: SHIPPED | OUTPATIENT
Start: 2025-06-26 | End: 2025-06-29

## 2025-06-26 RX ORDER — MECLIZINE HYDROCHLORIDE 25 MG/1
25 TABLET ORAL EVERY 8 HOURS PRN
Qty: 30 TABLET | Refills: 0 | Status: SHIPPED | OUTPATIENT
Start: 2025-06-26

## 2025-06-26 RX ADMIN — DOCUSATE SODIUM 100 MG: 100 CAPSULE, LIQUID FILLED ORAL at 08:45

## 2025-06-26 RX ADMIN — ACETAMINOPHEN 650 MG: 325 TABLET ORAL at 11:14

## 2025-06-26 RX ADMIN — SODIUM CHLORIDE, SODIUM LACTATE, POTASSIUM CHLORIDE, AND CALCIUM CHLORIDE 125 ML/HR: .6; .31; .03; .02 INJECTION, SOLUTION INTRAVENOUS at 01:52

## 2025-06-26 RX ADMIN — ACETAMINOPHEN 650 MG: 325 TABLET ORAL at 05:30

## 2025-06-26 RX ADMIN — METOPROLOL SUCCINATE 25 MG: 25 TABLET, EXTENDED RELEASE ORAL at 08:45

## 2025-06-26 RX ADMIN — ALLOPURINOL 300 MG: 300 TABLET ORAL at 08:46

## 2025-06-26 RX ADMIN — AMLODIPINE BESYLATE 5 MG: 5 TABLET ORAL at 08:45

## 2025-06-26 RX ADMIN — GABAPENTIN 100 MG: 100 CAPSULE ORAL at 08:45

## 2025-06-26 RX ADMIN — HEPARIN SODIUM 5000 UNITS: 5000 INJECTION INTRAVENOUS; SUBCUTANEOUS at 05:30

## 2025-06-26 RX ADMIN — SENNOSIDES 8.6 MG: 8.6 TABLET, FILM COATED ORAL at 08:45

## 2025-06-26 NOTE — ASSESSMENT & PLAN NOTE
Lab Results   Component Value Date    EGFR 53 06/26/2025    EGFR 27 06/25/2025    EGFR 60 06/02/2025    CREATININE 1.25 06/26/2025    CREATININE 2.19 (H) 06/25/2025    CREATININE 1.13 06/02/2025   #CKD G3a  Baseline creatinine: 1 to 1.2 mg/dL  Current creatinine: 1.25 mg/dL, back to baseline  Etiology: Likely secondary to nephrosclerosis in the settings of cardiovascular disease  Plan:  Follow-up with nephrology or PCP on discharge

## 2025-06-26 NOTE — TELEPHONE ENCOUNTER
Pt's wife Michelle called wanting to speak to Dr. Chan's clinical staff. Warm transferred call to Traci.

## 2025-06-26 NOTE — PLAN OF CARE
Problem: PAIN - ADULT  Goal: Verbalizes/displays adequate comfort level or baseline comfort level  Description: Interventions:  - Encourage patient to monitor pain and request assistance  - Assess pain using appropriate pain scale  - Administer analgesics as ordered based on type and severity of pain and evaluate response  - Implement non-pharmacological measures as appropriate and evaluate response  - Consider cultural and social influences on pain and pain management  - Notify physician/advanced practitioner if interventions unsuccessful or patient reports new pain  - Educate patient/family on pain management process including their role and importance of  reporting pain   - Provide non-pharmacologic/complimentary pain relief interventions  Outcome: Progressing     Problem: INFECTION - ADULT  Goal: Absence or prevention of progression during hospitalization  Description: INTERVENTIONS:  - Assess and monitor for signs and symptoms of infection  - Monitor lab/diagnostic results  - Monitor all insertion sites, i.e. indwelling lines, tubes, and drains  - Monitor endotracheal if appropriate and nasal secretions for changes in amount and color  - Saxon appropriate cooling/warming therapies per order  - Administer medications as ordered  - Instruct and encourage patient and family to use good hand hygiene technique  - Identify and instruct in appropriate isolation precautions for identified infection/condition  Outcome: Progressing  Goal: Absence of fever/infection during neutropenic period  Description: INTERVENTIONS:  - Monitor WBC  - Perform strict hand hygiene  - Limit to healthy visitors only  - No plants, dried, fresh or silk flowers with bermeo in patient room  Outcome: Progressing     Problem: SAFETY ADULT  Goal: Patient will remain free of falls  Description: INTERVENTIONS:  - Educate patient/family on patient safety including physical limitations  - Instruct patient to call for assistance with activity   -  Consider consulting OT/PT to assist with strengthening/mobility based on AM PAC & JH-HLM score  - Consult OT/PT to assist with strengthening/mobility   - Keep Call bell within reach  - Keep bed low and locked with side rails adjusted as appropriate  - Keep care items and personal belongings within reach  - Initiate and maintain comfort rounds  - Make Fall Risk Sign visible to staff  - Offer Toileting every 2 Hours, in advance of need  - Initiate/Maintain bed/chair alarm  - Obtain necessary fall risk management equipment  - Apply yellow socks and bracelet for high fall risk patients  - Consider moving patient to room near nurses station  Outcome: Progressing  Goal: Maintain or return to baseline ADL function  Description: INTERVENTIONS:  -  Assess patient's ability to carry out ADLs; assess patient's baseline for ADL function and identify physical deficits which impact ability to perform ADLs (bathing, care of mouth/teeth, toileting, grooming, dressing, etc.)  - Assess/evaluate cause of self-care deficits   - Assess range of motion  - Assess patient's mobility; develop plan if impaired  - Assess patient's need for assistive devices and provide as appropriate  - Encourage maximum independence but intervene and supervise when necessary  - Involve family in performance of ADLs  - Assess for home care needs following discharge   - Consider OT consult to assist with ADL evaluation and planning for discharge  - Provide patient education as appropriate  - Monitor functional capacity and physical performance, use of AM PAC & JH-HLM   - Monitor gait, balance and fatigue with ambulation    Outcome: Progressing  Goal: Maintains/Returns to pre admission functional level  Description: INTERVENTIONS:  - Perform AM-PAC 6 Click Basic Mobility/ Daily Activity assessment daily.  - Set and communicate daily mobility goal to care team and patient/family/caregiver.   - Collaborate with rehabilitation services on mobility goals if  consulted  - Perform Range of Motion 3 times a day.  - Reposition patient every 2 hours.  - Dangle patient 3 times a day  - Stand patient 3 times a day  - Ambulate patient 3 times a day  - Out of bed to chair 3 times a day   - Out of bed for meals 3 times a day  - Out of bed for toileting  - Record patient progress and toleration of activity level   Outcome: Progressing     Problem: DISCHARGE PLANNING  Goal: Discharge to home or other facility with appropriate resources  Description: INTERVENTIONS:  - Identify barriers to discharge w/patient and caregiver  - Arrange for needed discharge resources and transportation as appropriate  - Identify discharge learning needs (meds, wound care, etc.)  - Arrange for interpretive services to assist at discharge as needed  - Refer to Case Management Department for coordinating discharge planning if the patient needs post-hospital services based on physician/advanced practitioner order or complex needs related to functional status, cognitive ability, or social support system  Outcome: Progressing

## 2025-06-26 NOTE — PROGRESS NOTES
"  UROLOGY PROGRESS NOTE   Patient Identifiers: Dawood Ramesh (MRN [id number])  Date of Service: 6/26/2025    Subjective:   Awake and alert.  Urine has been clear.  Afebrile.  H&H 12.1 and 36.9.  Creatinine 1.25.  GEOVANY drain 70 cc total.    Patient has  no complaints.      Objective:     VITALS:    Vitals:    06/26/25 0718   BP: 135/80   Pulse: 70   Resp: 17   Temp: 97.5 °F (36.4 °C)   SpO2: 93%           LABS:  Lab Results   Component Value Date    HGB 12.1 06/26/2025    HCT 36.9 06/26/2025    WBC 15.32 (H) 06/26/2025     06/26/2025   ]    Lab Results   Component Value Date    K 4.5 06/26/2025     06/26/2025    CO2 20 (L) 06/26/2025    BUN 21 06/26/2025    CREATININE 1.25 06/26/2025    CALCIUM 8.5 06/26/2025   ]        INPATIENT MEDS:  Current Medications[1]      Physical Exam:   /80   Pulse 70   Temp 97.5 °F (36.4 °C)   Resp 17   Ht 5' 10\" (1.778 m)   Wt 113 kg (249 lb)   SpO2 93%   BMI 35.73 kg/m²   GEN: no acute distress    RESP: breathing comfortably with no accessory muscle use    ABD: soft, non-tender, non-distended   INCISION: clean, dry, intact   EXT: no significant peripheral edema   (Male): Penis circumcised, phallus normal, meatus patent.  Testicles descended into scrotum bilaterally without masses nor tenderness.  No inguinal hernias bilaterally.  ESTRADA: draining pink clear urine  no clots slow CBI    RADIOLOGY:   None     Assessment:   #1.  PROSTATECTOMY.SUPRPUBIC LAPAROSCOPIC WITH ROBOTICS  DIVERTICULECTOMY BLADDER/URETHRAL LAPAROSCOPIC W ROBOTICS   #2.  Chronic kidney disease    Plan:   -Regular diet and out of bed to the chair this morning  -Will DC his GEOVANY drain and plan for discharge later today  -  -  -               [1]   Current Facility-Administered Medications:     acetaminophen (TYLENOL) tablet 650 mg, 650 mg, Oral, Q6H [facility], 650 mg at 06/26/25 0530    allopurinol (ZYLOPRIM) tablet 300 mg, 300 mg, Oral, Daily, Roger Banuelos MD, 300 mg at 06/25/25 " 1715    amLODIPine (NORVASC) tablet 5 mg, 5 mg, Oral, Daily, Feli Watts PA-C    docusate sodium (COLACE) capsule 100 mg, 100 mg, Oral, BID, Roger Banuelos MD, 100 mg at 06/25/25 1715    gabapentin (NEURONTIN) capsule 100 mg, 100 mg, Oral, TID, Roger Banuelos MD, 100 mg at 06/25/25 2231    heparin (porcine) subcutaneous injection 5,000 Units, 5,000 Units, Subcutaneous, Q8H SHANTE, Roger Banuelos MD, 5,000 Units at 06/26/25 0530    HYDROmorphone (DILAUDID) injection 0.5 mg, 0.5 mg, Intravenous, Q2H PRN, Roger Banuelos MD    lactated ringers bolus 1,000 mL, 1,000 mL, Intravenous, Once PRN **AND** lactated ringers bolus 1,000 mL, 1,000 mL, Intravenous, Once PRN, Roger Banuelos MD    lactated ringers infusion, 125 mL/hr, Intravenous, Continuous, Roger Banuelos MD, Last Rate: 125 mL/hr at 06/26/25 0152, 125 mL/hr at 06/26/25 0152    metoprolol succinate (TOPROL-XL) 24 hr tablet 25 mg, 25 mg, Oral, Daily, Roger Banuelos MD, 25 mg at 06/25/25 1715    ondansetron (ZOFRAN) injection 4 mg, 4 mg, Intravenous, Q6H PRN, Roger Banuelos MD    phenol (CHLORASEPTIC) 1.4 % mucosal liquid 1 spray, 1 spray, Mouth/Throat, Q2H PRN, Armando Paul PA-C, 1 spray at 06/25/25 2306    pravastatin (PRAVACHOL) tablet 80 mg, 80 mg, Oral, Q48H, Roger Banuelos MD, 80 mg at 06/25/25 1715    senna (SENOKOT) tablet 8.6 mg, 1 tablet, Oral, Daily, Roger Banuelos MD, 8.6 mg at 06/25/25 1715    sodium chloride 0.9 % bolus 1,000 mL, 1,000 mL, Intravenous, Once PRN **AND** sodium chloride 0.9 % bolus 1,000 mL, 1,000 mL, Intravenous, Once PRN, Roger Banuelos MD

## 2025-06-26 NOTE — PROGRESS NOTES
I called pt and wife  He is doing well at home  We discussed restarting Eliquis in a few days.  I have written for an antibiotic to [facility] for time around catheter removal.

## 2025-06-26 NOTE — ASSESSMENT & PLAN NOTE
Status post robotic prostatectomy and diverticulectomy  POD#1  Currently denies pain, catheter draining well, GEOVANY drain noted in LLQ

## 2025-06-26 NOTE — ASSESSMENT & PLAN NOTE
Volume: Euvolemic  Blood pressure: Normotensive, /80  Recommend:  Discontinue IV fluids  Amlodipine 5  Metoprolol 25 daily

## 2025-06-26 NOTE — QUICK NOTE
Out of bed in the chair.  Urine yellow.  Tolerating regular diet and passing flatus.  GEOVANY drain removed.  Patient okay for discharge.

## 2025-06-26 NOTE — PROGRESS NOTES
Progress Note - Nephrology   Name: Dawood Ramesh 80 y.o. male I MRN: [id number]  Unit/Bed#: S -01 I Date of Admission: 6/25/2025   Date of Service: 6/26/2025 I Hospital Day: 1     Assessment & Plan  CARISSA (acute kidney injury) ([facility])  #Non-Oliguric KDIGO CARISSA stage 2 on CKD G3a  Etiology: Likely secondary to hemodynamic changes in the settings of recent surgery  Baseline creatinine 1 to 1.0 mg/dL  Peak creatinine:2.1 mg/dL  Current creatinine: 1.2 mg/dL, back to baseline   Treatment:  Kidney function back to baseline, no indication of dialysis discontinue fluids   stable for discharge from my end  Stage 3a chronic kidney disease (HCC)  Lab Results   Component Value Date    EGFR 53 06/26/2025    EGFR 27 06/25/2025    EGFR 60 06/02/2025    CREATININE 1.25 06/26/2025    CREATININE 2.19 (H) 06/25/2025    CREATININE 1.13 06/02/2025   #CKD G3a  Baseline creatinine: 1 to 1.2 mg/dL  Current creatinine: 1.25 mg/dL, back to baseline  Etiology: Likely secondary to nephrosclerosis in the settings of cardiovascular disease  Plan:  Follow-up with nephrology or PCP on discharge    Benign hypertension with CKD (chronic kidney disease) stage III ([facility])  Volume: Euvolemic  Blood pressure: Normotensive, /80  Recommend:  Discontinue IV fluids  Amlodipine 5  Metoprolol 25 daily  Urinary retention due to benign prostatic hyperplasia  Status post prostatectomy  Acquired bladder diverticulum  Status post diverticulectomy    I have reviewed the nephrology recommendations including continue IV fluids, kidney function back to baseline, with primary team, and we are in agreement with renal plan including the information outlined above. I have discussed the above management plan in detail with the primary service.     Subjective   Brief History of Admission - 79 yo with PMH of BPH status post UroLift in 2018, A-fib, obesity, bladder diverticulum,, secondary chest pain, hypertension.  Patient admitted for prostatectomy and bladder  "diverticulectomy.  Nephrology is consulted for management of CARISSA prevention     Feels well, no shortness of breath, no chest pain.  Had surgery yesterday, no complications    Objective :  Temp:  [97.1 °F (36.2 °C)-98.8 °F (37.1 °C)] 97.5 °F (36.4 °C)  HR:  [68-81] 70  BP: (105-144)/(52-83) 135/80  Resp:  [12-37] 17  SpO2:  [91 %-99 %] 93 %  O2 Device: None (Room air)    Current Weight: Weight - Scale: 113 kg (249 lb)  First Weight: Weight - Scale: 116 kg (255 lb)  I/O         06/24 0701 06/25 0700 06/25 0701  06/26 0700 06/26 0701  06/27 0700    P.O.   655    I.V. (mL/kg)  2427.1 (21.5)     IV Piggyback  500     Total Intake(mL/kg)  2927.1 (25.9) 655 (5.8)    Urine (mL/kg/hr)  2850 (1.1)     Emesis/NG output  50     Drains  70     Blood  300     Total Output  3270     Net  -342.9 +655                 Physical Exam  General:  no acute distress at this time  Skin:  No acute rash  Eyes:  No scleral icterus and noninjected  ENT:  mucous membranes moist  Neck:  no carotid bruits  Chest:  Clear to auscultation percussion, good respiratory effort, no use of accessory respiratory muscles  CVS:  Regular rate and rhythm without rub   Abdomen:  soft and nontender   Extremities: no significant lower extremity edema  Neuro:  No gross focality  Psych:  Alert , cooperative   Correa catheter: pink urine    Medications:  Current Medications[1]      Lab Results: I have reviewed the following results:  Results from last 7 days   Lab Units 06/26/25  0202 06/25/25  1440 06/25/25  1439   WBC Thousand/uL 15.32* 23.75*  --    HEMOGLOBIN g/dL 12.1 15.0  --    HEMATOCRIT % 36.9 45.5  --    PLATELETS Thousands/uL 154 204  --    POTASSIUM mmol/L 4.5  --  4.7   CHLORIDE mmol/L 108  --  105   CO2 mmol/L 20*  --  20*   BUN mg/dL 21  --  28*   CREATININE mg/dL 1.25  --  2.19*   CALCIUM mg/dL 8.5  --  9.6       Administrative Statements     Portions of the record may have been created with voice recognition software. Occasional wrong word or \"sound " "a like\" substitutions may have occurred due to the inherent limitations of voice recognition software. Read the chart carefully and recognize, using context, where substitutions have occurred.If you have any questions, please contact the dictating provider.       [1]   Current Facility-Administered Medications:     acetaminophen (TYLENOL) tablet 650 mg, 650 mg, Oral, Q6H [facility], Roger Banuelos MD, 650 mg at 06/26/25 1114    allopurinol (ZYLOPRIM) tablet 300 mg, 300 mg, Oral, Daily, Roger Banuelos MD, 300 mg at 06/26/25 0846    amLODIPine (NORVASC) tablet 5 mg, 5 mg, Oral, Daily, Feli Watts PA-C, 5 mg at 06/26/25 0845    docusate sodium (COLACE) capsule 100 mg, 100 mg, Oral, BID, Roger Banuelos MD, 100 mg at 06/26/25 0845    gabapentin (NEURONTIN) capsule 100 mg, 100 mg, Oral, TID, Roger Banuelos MD, 100 mg at 06/26/25 0845    heparin (porcine) subcutaneous injection 5,000 Units, 5,000 Units, Subcutaneous, Q8H SHANTE, Roger Banuelos MD, 5,000 Units at 06/26/25 0530    HYDROmorphone (DILAUDID) injection 0.5 mg, 0.5 mg, Intravenous, Q2H PRN, Roger Banuelos MD    lactated ringers bolus 1,000 mL, 1,000 mL, Intravenous, Once PRN **AND** lactated ringers bolus 1,000 mL, 1,000 mL, Intravenous, Once PRN, Roger Banuelos MD    metoprolol succinate (TOPROL-XL) 24 hr tablet 25 mg, 25 mg, Oral, Daily, Roger Banuelos MD, 25 mg at 06/26/25 0845    ondansetron (ZOFRAN) injection 4 mg, 4 mg, Intravenous, Q6H PRN, Roger Banuelos MD    phenol (CHLORASEPTIC) 1.4 % mucosal liquid 1 spray, 1 spray, Mouth/Throat, Q2H PRN, Armando Paul PA-C, 1 spray at 06/25/25 2306    pravastatin (PRAVACHOL) tablet 80 mg, 80 mg, Oral, Q48H, Roger Banuelos MD, 80 mg at 06/25/25 1715    senna (SENOKOT) tablet 8.6 mg, 1 tablet, Oral, Daily, Roegr Banuelos MD, 8.6 mg at 06/26/25 0845    sodium chloride 0.9 % bolus 1,000 mL, 1,000 mL, Intravenous, Once PRN **AND** sodium chloride 0.9 % bolus 1,000 mL, 1,000 mL, Intravenous, Once PRN, Roger Banuelos MD    "

## 2025-06-26 NOTE — UTILIZATION REVIEW
Initial Clinical Review    Elective  Inpatient  surgical procedure  Age/Sex: 80 y.o. male    Surgery Date: 6/25/2025    Procedure:   PROSTATECTOMY.SUPRPUBIC LAPAROSCOPIC WITH ROBOTICS  DIVERTICULECTOMY BLADDER/URETHRAL LAPAROSCOPIC W ROBOTICS    Anesthesia:  General     Operative Findings: Large right lateral wall bladder diverticulum adherent to surrounding tissue.  Right stent temporary placed during dissection to reduce risk of ureteral injury.  Robotic simple prostatectomy performed with circumferential vesicourethral anastomosis    POD#1 Progress Note:  H & H 12.1/36.9, GEOVANY drain 700 total , Urine has been  pink clear, no clots slow CBI.    Plan: regular diet, OOB to chair, terry d/c GEOVANY drain.     Admission Orders: Date/Time/Statement:   Admission Orders (From admission, onward)       Ordered        06/25/25 1302  Inpatient Admission  Once                          Orders Placed This Encounter   Procedures    Inpatient Admission     Standing Status:   Standing     Number of Occurrences:   1     Level of Care:   Med Surg [16]     Estimated length of stay:   Inpatient Only Surgery     Diet: Regular House   Mobility:  OOB to chair tid - to up as tolerated   DVT Prophylaxis:  SCD's to le's ,  Heparin sc q8    Medications/Pain Control:   Scheduled Medications:  acetaminophen, 650 mg, Oral, Q6H SHANTE  allopurinol, 300 mg, Oral, Daily  amLODIPine, 5 mg, Oral, Daily  docusate sodium, 100 mg, Oral, BID  gabapentin, 100 mg, Oral, TID  heparin (porcine), 5,000 Units, Subcutaneous, Q8H SHANTE  metoprolol succinate, 25 mg, Oral, Daily  pravastatin, 80 mg, Oral, Q48H  senna, 1 tablet, Oral, Daily  Ancef 2000 mg iv q8 -6/25 x 3       Continuous IV Infusions:  lactated ringers, 125 mL/hr, Intravenous, Continuous      PRN Meds:  HYDROmorphone, 0.5 mg, Intravenous, Q2H PRN  lactated ringers, 1,000 mL, Intravenous, Once PRN   And  lactated ringers, 1,000 mL, Intravenous, Once PRN  ondansetron, 4 mg, Intravenous, Q6H PRN  phenol, 1 spray,  Mouth/Throat, Q2H PRN  sodium chloride, 1,000 mL, Intravenous, Once PRN   And  sodium chloride, 1,000 mL, Intravenous, Once PRN      Vital Signs (last 3 days)       Date/Time Temp Pulse Resp BP MAP (mmHg) SpO2 O2 Flow Rate (L/min) O2 Device Cardiac (WDL) Latta Coma Scale Score Pain    06/26/25 07:18:56 97.5 °F (36.4 °C) 70 17 135/80 98 93 % -- -- -- -- --    06/26/25 0530 -- -- -- -- -- -- -- -- -- -- No Pain    06/26/25 02:37:31 97.5 °F (36.4 °C) 73 16 127/74 92 94 % -- -- -- 15 No Pain    06/26/25 0105 -- -- -- -- -- -- -- -- -- -- 4    06/25/25 22:46:13 97.5 °F (36.4 °C) 70 -- 144/72 96 94 % -- -- -- -- --    06/25/25 2234 -- -- -- -- -- -- -- -- -- -- 4    06/25/25 20:33:26 97.1 °F (36.2 °C) 74 16 142/81 101 93 % -- -- -- -- --    06/25/25 19:08:26 97.9 °F (36.6 °C) 76 15 143/83 103 93 % -- -- -- -- --    06/25/25 18:22:52 98.2 °F (36.8 °C) 72 15 122/63 83 93 % -- -- -- -- --    06/25/25 18:21:40 98.2 °F (36.8 °C) 70 15 122/63 83 93 % -- -- -- -- --    06/25/25 1742 -- 73 -- -- -- 96 % -- -- -- -- --    06/25/25 1720 -- -- -- -- -- 93 % -- None (Room air) -- 15 No Pain    06/25/25 17:13:53 97.6 °F (36.4 °C) 70 15 123/53 76 95 % -- -- -- -- --    06/25/25 16:49:21 97.4 °F (36.3 °C) 74 15 127/64 85 96 % -- -- -- -- --    06/25/25 1600 -- 81 18 128/66 -- 94 % -- None (Room air) -- -- No Pain    06/25/25 1545 -- 70 18 108/58 -- 94 % -- None (Room air) -- -- No Pain    06/25/25 1530 -- 70 20 105/56 -- 96 % -- None (Room air) -- -- No Pain    06/25/25 1515 97.6 °F (36.4 °C) 70 18 118/52 -- 95 % 4 L/min Nasal cannula -- 15 No Pain    06/25/25 1500 98.8 °F (37.1 °C) 68 16 111/61 80 94 % 4 L/min Nasal cannula WDL 15 2    06/25/25 1445 97.5 °F (36.4 °C) 70 12 109/61 80 95 % 4 L/min Nasal cannula WDL 15 2    06/25/25 1430 97.5 °F (36.4 °C) 68 16 114/55 77 94 % 4 L/min Nasal cannula WDL 15 4    06/25/25 1415 97.8 °F (36.6 °C) 68 20 107/57 75 95 % 4 L/min Simple mask WDL 15 5    06/25/25 1400 98.4 °F (36.9 °C) 70 22 110/56  76 96 % 4 L/min Simple mask WDL 15 7    06/25/25 1358 -- 72 37 -- -- 95 % -- -- -- -- 8    06/25/25 1353 -- 70 29 -- -- 99 % -- -- -- -- 8    06/25/25 1345 98.4 °F (36.9 °C) 70 31 129/74 96 94 % 4 L/min Simple mask WDL 15 No Pain    06/25/25 1338 98.4 °F (36.9 °C) 76 18 135/66 90 91 % 4 L/min Simple mask WDL 15 No Pain    06/25/25 0655 96.9 °F (36.1 °C) 75 18 121/77 -- 91 % -- None (Room air) -- -- No Pain          Weight (last 2 days)       Date/Time Weight    06/25/25 0655 113 (249)            Pertinent Labs/Diagnostic Test Results:   Radiology:  No orders to display     Cardiology:  No orders to display     GI:  No orders to display           Results from last 7 days   Lab Units 06/26/25  0202 06/25/25  1440   WBC Thousand/uL 15.32* 23.75*   HEMOGLOBIN g/dL 12.1 15.0   HEMATOCRIT % 36.9 45.5   PLATELETS Thousands/uL 154 204         Results from last 7 days   Lab Units 06/26/25  0202 06/25/25  1439   SODIUM mmol/L 136 136   POTASSIUM mmol/L 4.5 4.7   CHLORIDE mmol/L 108 105   CO2 mmol/L 20* 20*   ANION GAP mmol/L 8 11   BUN mg/dL 21 28*   CREATININE mg/dL 1.25 2.19*   EGFR ml/min/1.73sq m 53 27   CALCIUM mg/dL 8.5 9.6         Results from last 7 days   Lab Units 06/25/25  0706   POC GLUCOSE mg/dl 126     Results from last 7 days   Lab Units 06/26/25  0202 06/25/25  1439   GLUCOSE RANDOM mg/dL 147* 211*       Results from last 7 days   Lab Units 06/25/25  0730   UNIT PRODUCT CODE  [id number]  [id number]   UNIT NUMBER  [id number]  [id number]   UNITABO  O  O   UNITRH  POS  POS   CROSSMATCH  Compatible  Compatible   UNIT DISPENSE STATUS  Crossmatched  Crossmatched   UNIT PRODUCT VOL ml 300  300         Network Utilization Review Department  ATTENTION: Please call with any questions or concerns to [phone] and carefully listen to the prompts so that you are directed to the right person. All voicemails are confidential.   For Discharge needs, contact Care Management DC Support Team at [phone] opt.  2  Send all requests for admission clinical reviews, approved or denied determinations and any other requests to dedicated fax number below belonging to the campus where the patient is receiving treatment. List of dedicated fax numbers for the Facilities:  FACILITY NAME UR FAX NUMBER   ADMISSION DENIALS (Administrative/Medical Necessity) [phone]   DISCHARGE SUPPORT TEAM (NETWORK) [phone]   PARENT CHILD HEALTH (Maternity/NICU/Pediatrics) [phone]   [facility] [phone]   [facility] [phone]   [facility] [phone]   [facility] [phone]   [facility] [phone]   [facility] [phone]   [facility] [phone]   [facility] [phone]   [facility] [phone]   [facility] [phone]   [facility] [phone]   [facility] [phone]

## 2025-06-26 NOTE — TELEPHONE ENCOUNTER
Pt's spouse Michelle returned call; Provided her with message found per Mickey Wood, below. Michelle verbalized understanding and will hold the patient's Eliquis until 6/30 and resume as soon as safe per surgeon.           Mickey Wood PA-C to Cardiology Ep Clincal   From an EP standpoint it is beneficial to resume anticoagulation as soon as possible - as for when this can be done exactly would be up to the surgeon. Thanks!

## 2025-06-26 NOTE — ASSESSMENT & PLAN NOTE
Follows with [facility] outpatient, seen on 06/04/2025  S/p robotic prostatectomy and diverticulectomy on 06/25  Correa catheter draining well   Pt here for routine SP tube change. Pt transferred to bed with assist of 2. Pt able to stand and pivot. Old 16 fr silicone catheter removed and new 18 fr silicone catheter placed without difficulty by Urmila Recinos NP. Catheter flushed with 20cc of normal saline to check placement. Anchoring balloon filled with 10cc and catheter placed in a securement device. Supplies given and all questions answered. To return in 4-6 weeks for another change. Pt stable for discharge.

## 2025-06-26 NOTE — ASSESSMENT & PLAN NOTE
#Non-Oliguric KDIGO CARISSA stage 2 on CKD G3a  Etiology: Likely secondary to hemodynamic changes in the settings of recent surgery  Baseline creatinine 1 to 1.0 mg/dL  Peak creatinine:2.1 mg/dL  Current creatinine: 1.2 mg/dL, back to baseline   Treatment:  Kidney function back to baseline, no indication of dialysis discontinue fluids   stable for discharge from my end

## 2025-06-27 ENCOUNTER — TELEPHONE (OUTPATIENT)
Dept: UROLOGY | Facility: CLINIC | Age: 80
End: 2025-06-27

## 2025-06-27 LAB
ABO GROUP BLD BPU: NORMAL
ABO GROUP BLD BPU: NORMAL
BPU ID: NORMAL
BPU ID: NORMAL
CROSSMATCH: NORMAL
CROSSMATCH: NORMAL
UNIT DISPENSE STATUS: NORMAL
UNIT DISPENSE STATUS: NORMAL
UNIT PRODUCT CODE: NORMAL
UNIT PRODUCT CODE: NORMAL
UNIT PRODUCT VOLUME: 300 ML
UNIT PRODUCT VOLUME: 300 ML
UNIT RH: NORMAL
UNIT RH: NORMAL

## 2025-06-27 NOTE — TELEPHONE ENCOUNTER
----- Message from Roger Banuelos MD sent at 6/26/2025  6:23 PM EDT -----  Please arrange double nursing visit for catheter removal in 2 weeks.

## 2025-06-27 NOTE — UTILIZATION REVIEW
NOTIFICATION OF ADMISSION DISCHARGE   This is a Notification of Discharge from [facility]. Please be advised that this patient has been discharge from our facility. Below you will find the admission and discharge date and time including the patient’s disposition.   UTILIZATION REVIEW CONTACT:  Utilization Review Assistants  Network Utilization Review Department  Phone: [phone] x carefully listen to the prompts. All voicemails are confidential.  Email: [email or web address]     ADMISSION INFORMATION  PRESENTATION DATE: 6/25/2025  6:04 AM  OBERVATION ADMISSION DATE: N/A  INPATIENT ADMISSION DATE: 6/25/25  1:03 PM   DISCHARGE DATE: 6/26/2025  2:13 PM   DISPOSITION:Home/Self Care    Network Utilization Review Department  ATTENTION: Please call with any questions or concerns to [phone] and carefully listen to the prompts so that you are directed to the right person. All voicemails are confidential.   For Discharge needs, contact Care Management DC Support Team at [phone] opt. [phone]  Send all requests for admission clinical reviews, approved or denied determinations and any other requests to dedicated fax number below belonging to the campus where the patient is receiving treatment. List of dedicated fax numbers for the Facilities:  FACILITY NAME UR FAX NUMBER   ADMISSION DENIALS (Administrative/Medical Necessity) [phone]   DISCHARGE SUPPORT TEAM ([facility]) [phone]   PARENT CHILD HEALTH (Maternity/NICU/Pediatrics) [phone]   [facility] [phone]   [facility] [phone]   [facility] [phone]   [facility] [phone]   [facility] [phone]   [facility] [phone]   [facility] [phone]   [facility] [phone]   [facility]  [facility] [phone]   [facility] [phone]   [facility] [phone]   [facility] [phone]

## 2025-07-02 PROCEDURE — 88307 TISSUE EXAM BY PATHOLOGIST: CPT | Performed by: PATHOLOGY

## 2025-07-02 PROCEDURE — 88309 TISSUE EXAM BY PATHOLOGIST: CPT | Performed by: PATHOLOGY

## 2025-07-03 ENCOUNTER — OFFICE VISIT (OUTPATIENT)
Age: 80
End: 2025-07-03
Payer: COMMERCIAL

## 2025-07-03 VITALS
WEIGHT: 253.8 LBS | SYSTOLIC BLOOD PRESSURE: 124 MMHG | BODY MASS INDEX: 36.33 KG/M2 | TEMPERATURE: 98.6 F | HEART RATE: 81 BPM | OXYGEN SATURATION: 95 % | HEIGHT: 70 IN | DIASTOLIC BLOOD PRESSURE: 90 MMHG

## 2025-07-03 DIAGNOSIS — N32.3 BLADDER DIVERTICULUM: Primary | ICD-10-CM

## 2025-07-03 DIAGNOSIS — I48.0 PAROXYSMAL ATRIAL FIBRILLATION (HCC): ICD-10-CM

## 2025-07-03 DIAGNOSIS — N17.9 AKI (ACUTE KIDNEY INJURY) (HCC): ICD-10-CM

## 2025-07-03 DIAGNOSIS — Z90.79 HISTORY OF ROBOT-ASSISTED LAPAROSCOPIC RADICAL PROSTATECTOMY: ICD-10-CM

## 2025-07-03 PROCEDURE — 99495 TRANSJ CARE MGMT MOD F2F 14D: CPT | Performed by: INTERNAL MEDICINE

## 2025-07-03 NOTE — ASSESSMENT & PLAN NOTE
It was laparoscopic surgery and during the above surgery they also did the radical prostatectomy

## 2025-07-03 NOTE — PROGRESS NOTES
Name: Dawood Ramesh      : 1945      MRN: [id number]  Encounter Provider: August Domínguez MD  Encounter Date: 7/3/2025   Encounter department: [facility] [place]  :  Assessment & Plan  Bladder diverticulum  Patient was admitted to the hospital for diverticulectomy of the urinary bladder       History of robot-assisted laparoscopic radical prostatectomy  It was laparoscopic surgery and during the above surgery they also did the radical prostatectomy       CARISSA (acute kidney injury) (HCC)  Patient was admitted with the acute renal failure renal functions was much improved after that       Paroxysmal atrial fibrillation (HCC)  Patient was on anticoagulation which was stopped now he is back on his anticoagulation         TCM Call (since 2025)       Date and time call was made  2025 12:13 PM    Hospital care reviewed  Records reviewed    Patient was hospitialized at  [facility]    Date of Admission  25    Date of discharge  25    Diagnosis  benign HTM with CKDIII    Disposition  Home    Were the patients medications reviewed and updated  No    Current Symptoms  Weakness; Fatigue; Incisional pain          TCM Call (since 2025)       Post hospital issues  Reduced activity    Scheduled for follow up?  Yes    Did you obtain your prescribed medications  Yes    Do you need help managing your prescriptions or medications  Yes    Why type of assitance do you need  assist    I have advised the patient to call PCP with any new or worsening symptoms  Harish Shaikh CMA    Living Arrangements  Spouse or Significiant other    Support System  Spouse    Do you have social support  Yes, as much as I need               History of Present Illness   This is a very pleasant 80 years young gentleman who was seen by the urologist and found to have bladder diverticuli and retention of the urine patient was taken to the OR for laparoscopic diverticulectomy of the  "urinary bladder and followed up by radical prostatectomy patient is postoperative he was discharged next day during the course of hospitalization a cardiology consult was obtained I reviewed the cardiology consultation postoperatively patient did pretty well today he is here for the transition of the care visit he is on same medication as before and does not have any problem right now has some blood in the urine off and on but otherwise no problem in the urination he has catheter which will be removed by the urologist .  No chest pain or shortness of breath no nausea vomiting no fever or chills no pain in the flank area      Review of Systems   Constitutional:  Negative for appetite change, fatigue and fever.   HENT:  Negative for congestion, ear pain, hearing loss, nosebleeds, sneezing, tinnitus and voice change.    Eyes:  Negative for pain, discharge and redness.   Respiratory:  Negative for cough, chest tightness and wheezing.    Cardiovascular:  Negative for chest pain, palpitations and leg swelling.   Gastrointestinal:  Negative for abdominal pain, blood in stool, constipation, diarrhea, nausea and vomiting.   Genitourinary:  Negative for difficulty urinating, dysuria, hematuria and urgency.   Musculoskeletal:  Negative for arthralgias, back pain, gait problem and joint swelling.   Skin:  Negative for rash and wound.   Allergic/Immunologic: Negative for environmental allergies.   Neurological:  Negative for dizziness, tremors, seizures, weakness, light-headedness and numbness.   Hematological:  Negative for adenopathy. Does not bruise/bleed easily.   Psychiatric/Behavioral:  Negative for behavioral problems and confusion. The patient is not nervous/anxious.        Objective   /90 (BP Location: Left arm, Patient Position: Sitting, Cuff Size: Standard)   Pulse 81   Temp 98.6 °F (37 °C) (Temporal)   Ht 5' 10\" (1.778 m)   Wt 115 kg (253 lb 12.8 oz)   SpO2 95%   BMI 36.42 kg/m²      Physical Exam  Vitals " and nursing note reviewed.   Constitutional:       Appearance: Normal appearance. He is obese.   HENT:      Head: Normocephalic and atraumatic.      Right Ear: Tympanic membrane normal.      Left Ear: Tympanic membrane normal.      Nose: Nose normal.      Mouth/Throat:      Mouth: Mucous membranes are moist.     Eyes:      Extraocular Movements: Extraocular movements intact.      Pupils: Pupils are equal, round, and reactive to light.       Cardiovascular:      Rate and Rhythm: Normal rate and regular rhythm.      Pulses: Normal pulses.      Heart sounds: Normal heart sounds.   Pulmonary:      Effort: Pulmonary effort is normal.      Breath sounds: Normal breath sounds.   Abdominal:      General: Abdomen is flat. Bowel sounds are normal.      Palpations: Abdomen is soft.     Musculoskeletal:         General: No swelling, tenderness or deformity. Normal range of motion.      Cervical back: Normal range of motion and neck supple.     Skin:     General: Skin is warm.      Capillary Refill: Capillary refill takes 2 to 3 seconds.     Neurological:      General: No focal deficit present.      Mental Status: He is alert and oriented to person, place, and time. Mental status is at baseline.     Psychiatric:         Mood and Affect: Mood normal.         Behavior: Behavior normal.

## 2025-07-03 NOTE — ASSESSMENT & PLAN NOTE
Patient was admitted with the acute renal failure renal functions was much improved after that

## 2025-07-11 ENCOUNTER — PROCEDURE VISIT (OUTPATIENT)
Dept: UROLOGY | Facility: AMBULATORY SURGERY CENTER | Age: 80
End: 2025-07-11
Payer: COMMERCIAL

## 2025-07-11 VITALS
BODY MASS INDEX: 37.77 KG/M2 | OXYGEN SATURATION: 93 % | WEIGHT: 255 LBS | HEART RATE: 73 BPM | HEIGHT: 69 IN | SYSTOLIC BLOOD PRESSURE: 110 MMHG | DIASTOLIC BLOOD PRESSURE: 80 MMHG

## 2025-07-11 DIAGNOSIS — R33.8 URINARY RETENTION DUE TO BENIGN PROSTATIC HYPERPLASIA: Primary | ICD-10-CM

## 2025-07-11 DIAGNOSIS — N40.1 URINARY RETENTION DUE TO BENIGN PROSTATIC HYPERPLASIA: Primary | ICD-10-CM

## 2025-07-11 LAB — POST-VOID RESIDUAL VOLUME, ML POC: 14 ML

## 2025-07-11 PROCEDURE — 51798 US URINE CAPACITY MEASURE: CPT

## 2025-07-11 PROCEDURE — 99024 POSTOP FOLLOW-UP VISIT: CPT

## 2025-07-11 NOTE — PROGRESS NOTES
"7/11/2025    Dawood Ramesh  1945  [id number]    Diagnosis  Chief Complaint    Urinary Retention         Patient presents for void trial managed by Dr. Banuelos    Plan  Patient will follow up in 2 weeks s/p prostatectomy    Procedure Correa removal/voiding trial    Correa catheter removed after deflation of an intact balloon. Patient tolerated well. Encouraged patient to hydrate well and return this afternoon for post void residual.  he knows he may return early if uncomfortable and unable to urinate. Patient agrees to this plan.    Patient returned this afternoon. Patient states able to void. Patient voided  0 ml while in office. Bladder ultrasound performed and PVR measured 14 ml.    Recent Results (from the past 4 hours)   POCT Measure PVR    Collection Time: 07/11/25  2:36 PM   Result Value Ref Range    POST-VOID RESIDUAL VOLUME, ML POC 14 mL           Vitals:    07/11/25 0832   BP: 110/80   Pulse: 73   SpO2: 93%   Weight: 116 kg (255 lb)   Height: 5' 9\" (1.753 m)           Lashonda Goodwin RN       "

## 2025-07-14 DIAGNOSIS — I10 ESSENTIAL HYPERTENSION, BENIGN: ICD-10-CM

## 2025-07-14 NOTE — TELEPHONE ENCOUNTER
Reason for call:   [] Refill   [] Prior Auth  [] Other:     Office:   [x] PCP/Provider - [facility] [place]  Authorized By: August Domínguez MD    [] Specialty/Provider -     Medication: amLODIPine (NORVASC) 5 mg tablet    Dose/Frequency: Take 1 tablet (5 mg total) by mouth daily    Quantity: 90    Pharmacy: [facility] - [place], [place]    Local Pharmacy   Does the patient have enough for 3 days?   [] Yes   [] No - Send as HP to POD    Mail Away Pharmacy   Does the patient have enough for 10 days?   [x] Yes   [] No - Send as HP to POD

## 2025-07-15 ENCOUNTER — TELEPHONE (OUTPATIENT)
Dept: NON INVASIVE DIAGNOSTICS | Facility: CLINIC | Age: 80
End: 2025-07-15

## 2025-07-15 DIAGNOSIS — I47.29 NSVT (NONSUSTAINED VENTRICULAR TACHYCARDIA) (HCC): ICD-10-CM

## 2025-07-15 DIAGNOSIS — I10 BENIGN ESSENTIAL HYPERTENSION: ICD-10-CM

## 2025-07-15 RX ORDER — LISINOPRIL 5 MG/1
5 TABLET ORAL DAILY
Qty: 90 TABLET | Refills: 3 | Status: SHIPPED | OUTPATIENT
Start: 2025-07-15

## 2025-07-15 RX ORDER — METOPROLOL SUCCINATE 50 MG/1
50 TABLET, EXTENDED RELEASE ORAL DAILY
Qty: 90 TABLET | Refills: 3 | Status: SHIPPED | OUTPATIENT
Start: 2025-07-15

## 2025-07-15 RX ORDER — AMLODIPINE BESYLATE 5 MG/1
5 TABLET ORAL DAILY
Qty: 90 TABLET | Refills: 1 | Status: SHIPPED | OUTPATIENT
Start: 2025-07-15

## 2025-07-22 DIAGNOSIS — Z95.0 CARDIAC PACEMAKER IN SITU: ICD-10-CM

## 2025-07-22 DIAGNOSIS — R93.1 ABNORMAL FINDINGS ON DIAGNOSTIC IMAGING OF HEART AND CORONARY CIRCULATION: ICD-10-CM

## 2025-07-22 DIAGNOSIS — I47.29 NSVT (NONSUSTAINED VENTRICULAR TACHYCARDIA) (HCC): Primary | ICD-10-CM

## 2025-07-22 DIAGNOSIS — I77.810 AORTIC ROOT DILATATION (HCC): ICD-10-CM

## 2025-07-24 PROBLEM — N39.0 URINARY TRACT INFECTION ASSOCIATED WITH INDWELLING URETHRAL CATHETER  (HCC): Status: RESOLVED | Noted: 2025-06-24 | Resolved: 2025-07-24

## 2025-07-24 PROBLEM — T83.511A URINARY TRACT INFECTION ASSOCIATED WITH INDWELLING URETHRAL CATHETER  (HCC): Status: RESOLVED | Noted: 2025-06-24 | Resolved: 2025-07-24

## 2025-07-25 ENCOUNTER — OFFICE VISIT (OUTPATIENT)
Dept: UROLOGY | Facility: AMBULATORY SURGERY CENTER | Age: 80
End: 2025-07-25
Payer: COMMERCIAL

## 2025-07-25 VITALS
OXYGEN SATURATION: 94 % | WEIGHT: 250 LBS | HEART RATE: 94 BPM | HEIGHT: 69 IN | BODY MASS INDEX: 37.03 KG/M2 | SYSTOLIC BLOOD PRESSURE: 154 MMHG | DIASTOLIC BLOOD PRESSURE: 92 MMHG

## 2025-07-25 DIAGNOSIS — R97.20 ELEVATED PSA: Primary | ICD-10-CM

## 2025-07-25 DIAGNOSIS — R33.8 URINARY RETENTION DUE TO BENIGN PROSTATIC HYPERPLASIA: ICD-10-CM

## 2025-07-25 DIAGNOSIS — N40.1 URINARY RETENTION DUE TO BENIGN PROSTATIC HYPERPLASIA: ICD-10-CM

## 2025-07-25 DIAGNOSIS — N32.3 ACQUIRED BLADDER DIVERTICULUM: ICD-10-CM

## 2025-07-25 PROBLEM — Z90.79 HISTORY OF ROBOT-ASSISTED LAPAROSCOPIC RADICAL PROSTATECTOMY: Status: RESOLVED | Noted: 2025-07-03 | Resolved: 2025-07-25

## 2025-07-25 PROBLEM — Z01.89 ENCOUNTER FOR GERIATRIC ASSESSMENT: Status: RESOLVED | Noted: 2025-06-03 | Resolved: 2025-07-25

## 2025-07-25 LAB — POST-VOID RESIDUAL VOLUME, ML POC: 15 ML

## 2025-07-25 PROCEDURE — 99024 POSTOP FOLLOW-UP VISIT: CPT | Performed by: UROLOGY

## 2025-07-25 PROCEDURE — 51798 US URINE CAPACITY MEASURE: CPT | Performed by: UROLOGY

## 2025-07-25 NOTE — ASSESSMENT & PLAN NOTE
Patient is voiding well after robotic simple prostatectomy diverticulectomy.  He is very happy with his current symptoms.  Plan to check in in 6 months with a PSA.

## 2025-07-25 NOTE — PROGRESS NOTES
Assessment/Plan:    Acquired bladder diverticulum  This was removed at the time of some prostatectomy.  He is emptying his bladder well.    Elevated PSA  Pathology from some prostatectomy was unremarkable.  Plan to check PSA before next visit.    Urinary retention due to benign prostatic hyperplasia  Patient is voiding well after robotic simple prostatectomy diverticulectomy.  He is very happy with his current symptoms.  Plan to check in in 6 months with a PSA.          Subjective:      Patient ID: Dawood Ramesh is a 80 y.o. male.    HPI    Dawood Ramesh is a 80 y.o. male with known and long history of BPH and voiding issues status post UroLift surgery in 2018 who has then progressed to harris urinary retention with failed trials of void now scheduled for robotic simple prostatectomy with diverticulectomy.       He endorsed feelings as though his UroLift was failing with gradual worsening of LUTS.  He complained of urinary hesitancy, weak urinary stream, sensation of incomplete emptying, and nocturia every hour.  Evaluation has shown that he is not acting his bladder well with a average PVR of proximately 500 cc.     He was offered placement of Correa catheter or teaching of clean intermittent catheterization.  He ultimately declined both options.  He was otherwise asymptomatic without abdominal pain or flank pain.  He was restarted on Flomax 0.4 mg daily as well as finasteride 5 mg daily.  I also recommended an ultrasound and BMP to ensure there is no hydronephrosis or CARISSA.  Creatinine is stable at 1.26 as of 2/11/2025.  Renal ultrasound from 2/14/2025 showed large postvoid residual of 500 mL, no hydronephrosis bilaterally.  He does have bilateral renal simple cyst largest measuring 8.7 cm on the left.  Mild echogenic kidneys compatible with chronic medical renal disease.  Prostatomegaly noted measuring 6 x 8.2 x 6.4 cm, volume 162.5 mL.     Patient reports ever since restarting Flomax and finasteride he thinks his  voiding is symptomatically improved.  He is overall quite satisfied with his current voiding.  However PVR after cystoscopy today was 660 cc.     The patient underwent cystoscopy in March 2025 which showed bilobar hypertrophy the prostate with mild extrusion into the bladder with a exposed UroLift mendoza and bladder with significant trabeculations and a very large diverticulum coming off the right anterior lateral wall of the bladder measuring approximately 300 cc.     Later in March 2025 the patient went to harris retention.  A catheter was placed.  Over liter was drained.  He had a trial of void which he failed.  He has been living with a catheter since.  He has had to go to the ER twice for catheter obstruction issues.     Patient went robotic prostatectomy with diverticulectomy on June 24, 2025.  Correa catheter was removed 2 weeks later and PVR was 14 cc.  Pathology from surgery showed 44 g of benign prostate tissue and 6 cm x 6 cm bladder diverticulum with acute and chronic inflammation.    The patient is very happy with his voiding.  He did not think he could void this well.  Denies any pain or other complaints.  Passing gas and having bowel movements without issues.     The patient's last PSA was 5.8 in 2023.  Repeat PSA was ordered but not done before surgery.    Past Surgical History[1]     Past Medical History[2]          Review of Systems   Constitutional:  Negative for chills and fever.   HENT:  Negative for ear pain and sore throat.    Eyes:  Negative for pain and visual disturbance.   Respiratory:  Negative for cough and shortness of breath.    Cardiovascular:  Negative for chest pain and palpitations.   Gastrointestinal:  Negative for abdominal pain and vomiting.   Genitourinary:  Negative for dysuria and hematuria.   Musculoskeletal:  Negative for arthralgias and back pain.   Skin:  Negative for color change and rash.   Neurological:  Negative for seizures and syncope.   All other systems reviewed and  "are negative.        Objective:      /92 (BP Location: Left arm, Patient Position: Sitting, Cuff Size: Standard)   Pulse 94   Ht 5' 9\" (1.753 m)   Wt 113 kg (250 lb)   SpO2 94%   BMI 36.92 kg/m²     Lab Results   Component Value Date    PSA 5.83 (H) 10/05/2023    PSA 5.4 (H) 07/06/2020    PSA 3.2 10/23/2017    PSA 3.1 06/30/2017    PSA 3.9 12/30/2016    PSA 2.9 10/04/2016    PSA 4.4 (H) 05/13/2016          Physical Exam  Vitals reviewed.   Constitutional:       Appearance: Normal appearance. He is normal weight.   HENT:      Head: Normocephalic and atraumatic.     Eyes:      Pupils: Pupils are equal, round, and reactive to light.     Abdominal:      General: Abdomen is flat. There is no distension.      Palpations: There is no mass.      Tenderness: There is no abdominal tenderness. There is no right CVA tenderness, left CVA tenderness, guarding or rebound.      Hernia: No hernia is present.      Comments: Incisions are healing well without signs of dehiscence or infection.     Neurological:      General: No focal deficit present.      Mental Status: He is alert and oriented to person, place, and time.     Psychiatric:         Mood and Affect: Mood normal.         Thought Content: Thought content normal.           Orders  Orders Placed This Encounter   Procedures    PSA Total, Diagnostic     Standing Status:   Future     Expected Date:   1/25/2026     Expiration Date:   7/25/2026    POCT Measure PVR          [1]   Past Surgical History:  Procedure Laterality Date    ARM NEUROPLASTY Left     1977    CARDIAC PACEMAKER PLACEMENT Left     CARDIAC SURGERY  05/01/2019    pace maker placed    CATARACT EXTRACTION, BILATERAL  2021    COLONOSCOPY      CYSTOSCOPY  12/27/2017    diagnostic    FOREARM FRACTURE SURGERY      ORCHIECTOMY      surgery testis    NV CYSTO INSERTION TRANSPROSTATIC IMPLANT SINGLE N/A 2/9/2018    Procedure: CYSTOSCOPY WITH INSERTION UROLIFT;  Surgeon: Bernardo Billingsley MD;  Location: [facility]  " MAIN OR;  Service: Urology    SD CYSTOTOMY EXCISE BLADDER DIVERTICULUM 1/MULTIPLE N/A 6/25/2025    Procedure: DIVERTICULECTOMY BLADDER/URETHRAL LAPAROSCOPIC W ROBOTICS;  Surgeon: Roger Banuelos MD;  Location: [facility] Main [facility];  Service: Urology    SD CYSTOURETHROSCOPY WITH BIOPSY N/A 2/9/2018    Procedure: BLADDER BIOPSY;  Surgeon: Bernardo Billingsley MD;  Location: [facility];  Service: Urology    SD LAPS SURG [id number] RPBIC RAD W/NRV SPARING ROBOT N/A 6/25/2025    Procedure: PROSTATECTOMY,SUPRPUBIC LAPAROSCOPIC WITH ROBOTICS;  Surgeon: Roger Banuelos MD;  Location: [facility] Main [facility];  Service: Urology    TESTICLE SURGERY Right 1976   [2]   Past Medical History:  Diagnosis Date    Acute gouty arthropathy     last assessed-11/15/2013    Cataract     Diverticulitis of colon     Enlarged prostate     Gout     Hearing loss     Heart disease     History of diverticulitis of colon     History of dizziness     Hypercholesterolemia     Hyperlipidemia     Hypertension     Irregular heart beat     atrial fibrillation    Palpitations     Sinus bradycardia

## 2025-08-15 ENCOUNTER — VBI (OUTPATIENT)
Dept: ADMINISTRATIVE | Facility: OTHER | Age: 80
End: 2025-08-15

## (undated) DEVICE — DRAPE SHEET X-LG

## (undated) DEVICE — HEMOSTATIC MATRIX SURGIFLO 8ML W/THROMBIN

## (undated) DEVICE — REM POLYHESIVE ADULT PATIENT RETURN ELECTRODE: Brand: VALLEYLAB

## (undated) DEVICE — INVIEW CLEAR LEGGINGS: Brand: CONVERTORS

## (undated) DEVICE — GLOVE SRG BIOGEL ECLIPSE 7.5

## (undated) DEVICE — SURGIFLO ENDOSCOPIC APPICATOR: Brand: ETHICON

## (undated) DEVICE — GLOVE SRG BIOGEL 7.5

## (undated) DEVICE — LARGE NEEDLE DRIVER: Brand: ENDOWRIST

## (undated) DEVICE — COLUMN DRAPE

## (undated) DEVICE — SURGICEL 4 X 8IN

## (undated) DEVICE — TROCAR: Brand: KII FIOS FIRST ENTRY

## (undated) DEVICE — 3M™ DURAPORE™ SURGICAL TAPE 1538-3, 3 INCH X 10 YARD (7,5CM X 9,1M), 4 ROLLS/BOX: Brand: 3M™ DURAPORE™

## (undated) DEVICE — DECANTER: Brand: UNBRANDED

## (undated) DEVICE — STERILE LUBRICATING JELLY, TUBE: Brand: HR LUBRICATING JELLY

## (undated) DEVICE — BAG URINE DRAINAGE 4000ML CONTINUOUS IRR

## (undated) DEVICE — STERILE SURGICAL LUBRICANT,  TUBE: Brand: SURGILUBE

## (undated) DEVICE — SUT VLOC 90 3-0 CV-23 9 IN VLOCM0844

## (undated) DEVICE — BAG URINE DRAINAGE 2000ML ANTI RFLX LF

## (undated) DEVICE — KIT, BETHLEHEM ROBOTIC PROST: Brand: CARDINAL HEALTH

## (undated) DEVICE — COBRA GRASPER: Brand: ENDOWRIST;DAVINCI SI

## (undated) DEVICE — DRAIN SPONGES,6 PLY: Brand: EXCILON

## (undated) DEVICE — INSUFFLATION NEEDLE TO ESTABLISH PNEUMOPERITONEUM.: Brand: INSUFFLATION NEEDLE

## (undated) DEVICE — CATH FOLEY 20FR 5ML 2 WAY UNCOATED SILICONE

## (undated) DEVICE — ARM DRAPE

## (undated) DEVICE — Device

## (undated) DEVICE — LAPAROTOMY DRAPE WITH POUCHES: Brand: CONVERTORS

## (undated) DEVICE — SEAL

## (undated) DEVICE — VISUALIZATION SYSTEM: Brand: CLEARIFY

## (undated) DEVICE — TIP COVER ACCESSORY

## (undated) DEVICE — TISSUE RETRIEVAL SYSTEM: Brand: INZII RETRIEVAL SYSTEM

## (undated) DEVICE — GLOVE SRG BIOGEL 7

## (undated) DEVICE — 40601 PROLONGED POSITIONING SYSTEM: Brand: 40601 PROLONGED POSITIONING SYSTEM

## (undated) DEVICE — 3M™ DURAPORE™ SURGICAL TAPE 2 INCHES X 10YARDS (5.0CM X 9.1M) 6ROLLS/CARTON 10CARTONS/CASE 1538-2: Brand: 3M™ DURAPORE™

## (undated) DEVICE — EXOFIN PRECISION PEN HIGH VISCOSITY TOPICAL SKIN ADHESIVE: Brand: EXOFIN PRECISION PEN, 1G

## (undated) DEVICE — INTENDED FOR TISSUE SEPARATION, AND OTHER PROCEDURES THAT REQUIRE A SHARP SURGICAL BLADE TO PUNCTURE OR CUT.: Brand: BARD-PARKER SAFETY BLADES SIZE 11, STERILE

## (undated) DEVICE — Device: Brand: OMNICLOSE TROCAR SITE CLOSURE DEVICE

## (undated) DEVICE — CHLORHEXIDINE 4PCT 4 OZ

## (undated) DEVICE — ASTOUND STANDARD SURGICAL GOWN, XL: Brand: CONVERTORS

## (undated) DEVICE — SYRINGE 10ML LL

## (undated) DEVICE — HEM-O-LOK CLIP CARTRIDGE LARGE DA VINCI SI/XI 6CLIPS/EA

## (undated) DEVICE — PACK TUR

## (undated) DEVICE — UROCATCH BAG

## (undated) DEVICE — FENESTRATED BIPOLAR FORCEPS: Brand: ENDOWRIST

## (undated) DEVICE — SUT VLOC 90 3-0 90 CV-23 L9 IN VLOCM1944

## (undated) DEVICE — BAG URINE DRAINAGE URIMETER 350ML LF

## (undated) DEVICE — SUT VICRYL 2-0 SH 27 IN UNDYED J417H

## (undated) DEVICE — CYSTO TUBING TUR Y IRRIGATION

## (undated) DEVICE — GLOVE INDICATOR PI UNDERGLOVE SZ 8 BLUE

## (undated) DEVICE — CHLORAPREP HI-LITE 26ML ORANGE

## (undated) DEVICE — SUT STRATAFIX SMTR PDS PLUS 1 CT-1 30CM SXPP1A435

## (undated) DEVICE — SUT MONOCRYL 4-0 PS-2 27 IN Y426H

## (undated) DEVICE — TROCAR PORT ACCESS 12 X120MM W/BLDLS OPTICAL TIP AIRSEAL

## (undated) DEVICE — MONOPOLAR CURVED SCISSORS: Brand: ENDOWRIST

## (undated) DEVICE — AIRSEAL TUBE SMOKE EVAC LUMENX3 FILTERED

## (undated) DEVICE — JP CHANNEL DRAIN 19FR, FULL FLUTES: Brand: JACKSON-PRATT

## (undated) DEVICE — JACKSON-PRATT 100CC BULB RESERVOIR: Brand: CARDINAL HEALTH

## (undated) DEVICE — PREMIUM DRY TRAY LF: Brand: MEDLINE INDUSTRIES, INC.

## (undated) DEVICE — SUT VICRYL 0 UR-6 27 IN J603H

## (undated) DEVICE — KERLIX BANDAGE ROLL: Brand: KERLIX